# Patient Record
Sex: MALE | Race: WHITE | NOT HISPANIC OR LATINO
[De-identification: names, ages, dates, MRNs, and addresses within clinical notes are randomized per-mention and may not be internally consistent; named-entity substitution may affect disease eponyms.]

---

## 2018-05-02 ENCOUNTER — TRANSCRIPTION ENCOUNTER (OUTPATIENT)
Age: 83
End: 2018-05-02

## 2019-10-26 ENCOUNTER — TRANSCRIPTION ENCOUNTER (OUTPATIENT)
Age: 84
End: 2019-10-26

## 2020-03-16 RX ORDER — WARFARIN SODIUM 2.5 MG/1
1 TABLET ORAL
Qty: 0 | Refills: 0 | DISCHARGE
Start: 2020-03-16

## 2020-06-20 ENCOUNTER — EMERGENCY (EMERGENCY)
Facility: HOSPITAL | Age: 85
LOS: 1 days | Discharge: ANOTHER HOSPITAL | End: 2020-06-20
Attending: EMERGENCY MEDICINE | Admitting: EMERGENCY MEDICINE
Payer: MEDICARE

## 2020-06-20 ENCOUNTER — INPATIENT (INPATIENT)
Facility: HOSPITAL | Age: 85
LOS: 9 days | Discharge: NURSING, REHAB OR LONG-TERM CARE | DRG: 299 | End: 2020-06-30
Attending: INTERNAL MEDICINE | Admitting: INTERNAL MEDICINE
Payer: MEDICARE

## 2020-06-20 VITALS
RESPIRATION RATE: 18 BRPM | DIASTOLIC BLOOD PRESSURE: 87 MMHG | TEMPERATURE: 98 F | SYSTOLIC BLOOD PRESSURE: 128 MMHG | HEART RATE: 92 BPM | OXYGEN SATURATION: 98 %

## 2020-06-20 VITALS
WEIGHT: 184.97 LBS | DIASTOLIC BLOOD PRESSURE: 90 MMHG | OXYGEN SATURATION: 99 % | HEIGHT: 69 IN | TEMPERATURE: 98 F | RESPIRATION RATE: 20 BRPM | SYSTOLIC BLOOD PRESSURE: 146 MMHG | HEART RATE: 120 BPM

## 2020-06-20 VITALS
WEIGHT: 184.97 LBS | RESPIRATION RATE: 18 BRPM | HEART RATE: 96 BPM | DIASTOLIC BLOOD PRESSURE: 90 MMHG | TEMPERATURE: 98 F | SYSTOLIC BLOOD PRESSURE: 145 MMHG | OXYGEN SATURATION: 99 %

## 2020-06-20 DIAGNOSIS — I48.91 UNSPECIFIED ATRIAL FIBRILLATION: ICD-10-CM

## 2020-06-20 DIAGNOSIS — I50.9 HEART FAILURE, UNSPECIFIED: ICD-10-CM

## 2020-06-20 DIAGNOSIS — Z90.2 ACQUIRED ABSENCE OF LUNG [PART OF]: Chronic | ICD-10-CM

## 2020-06-20 DIAGNOSIS — L03.119 CELLULITIS OF UNSPECIFIED PART OF LIMB: ICD-10-CM

## 2020-06-20 DIAGNOSIS — Z29.9 ENCOUNTER FOR PROPHYLACTIC MEASURES, UNSPECIFIED: ICD-10-CM

## 2020-06-20 DIAGNOSIS — E87.1 HYPO-OSMOLALITY AND HYPONATREMIA: ICD-10-CM

## 2020-06-20 LAB
ALBUMIN SERPL ELPH-MCNC: 3.1 G/DL — LOW (ref 3.3–5)
ALP SERPL-CCNC: 76 U/L — SIGNIFICANT CHANGE UP (ref 30–120)
ALT FLD-CCNC: 19 U/L DA — SIGNIFICANT CHANGE UP (ref 10–60)
ANION GAP SERPL CALC-SCNC: 7 MMOL/L — SIGNIFICANT CHANGE UP (ref 5–17)
APTT BLD: 48.8 SEC — HIGH (ref 28.5–37)
AST SERPL-CCNC: 15 U/L — SIGNIFICANT CHANGE UP (ref 10–40)
BASOPHILS # BLD AUTO: 0 K/UL — SIGNIFICANT CHANGE UP (ref 0–0.2)
BASOPHILS NFR BLD AUTO: 0 % — SIGNIFICANT CHANGE UP (ref 0–2)
BILIRUB SERPL-MCNC: 1.2 MG/DL — SIGNIFICANT CHANGE UP (ref 0.2–1.2)
BUN SERPL-MCNC: 14 MG/DL — SIGNIFICANT CHANGE UP (ref 7–23)
BURR CELLS BLD QL SMEAR: PRESENT — SIGNIFICANT CHANGE UP
CALCIUM SERPL-MCNC: 8.8 MG/DL — SIGNIFICANT CHANGE UP (ref 8.4–10.5)
CHLORIDE SERPL-SCNC: 96 MMOL/L — SIGNIFICANT CHANGE UP (ref 96–108)
CO2 SERPL-SCNC: 26 MMOL/L — SIGNIFICANT CHANGE UP (ref 22–31)
CREAT SERPL-MCNC: 0.84 MG/DL — SIGNIFICANT CHANGE UP (ref 0.5–1.3)
ELLIPTOCYTES BLD QL SMEAR: SLIGHT — SIGNIFICANT CHANGE UP
EOSINOPHIL # BLD AUTO: 0 K/UL — SIGNIFICANT CHANGE UP (ref 0–0.5)
EOSINOPHIL NFR BLD AUTO: 0 % — SIGNIFICANT CHANGE UP (ref 0–6)
GLUCOSE SERPL-MCNC: 116 MG/DL — HIGH (ref 70–99)
HCT VFR BLD CALC: 37.1 % — LOW (ref 39–50)
HGB BLD-MCNC: 12.1 G/DL — LOW (ref 13–17)
HYPOCHROMIA BLD QL: SIGNIFICANT CHANGE UP
INR BLD: 5.24 RATIO — CRITICAL HIGH (ref 0.88–1.16)
LACTATE SERPL-SCNC: 1.4 MMOL/L — SIGNIFICANT CHANGE UP (ref 0.7–2)
LYMPHOCYTES # BLD AUTO: 2.14 K/UL — SIGNIFICANT CHANGE UP (ref 1–3.3)
LYMPHOCYTES # BLD AUTO: 26 % — SIGNIFICANT CHANGE UP (ref 13–44)
MANUAL SMEAR VERIFICATION: SIGNIFICANT CHANGE UP
MCHC RBC-ENTMCNC: 29.2 PG — SIGNIFICANT CHANGE UP (ref 27–34)
MCHC RBC-ENTMCNC: 32.6 GM/DL — SIGNIFICANT CHANGE UP (ref 32–36)
MCV RBC AUTO: 89.4 FL — SIGNIFICANT CHANGE UP (ref 80–100)
MONOCYTES # BLD AUTO: 0.82 K/UL — SIGNIFICANT CHANGE UP (ref 0–0.9)
MONOCYTES NFR BLD AUTO: 10 % — SIGNIFICANT CHANGE UP (ref 2–14)
NEUTROPHILS # BLD AUTO: 5.18 K/UL — SIGNIFICANT CHANGE UP (ref 1.8–7.4)
NEUTROPHILS NFR BLD AUTO: 54 % — SIGNIFICANT CHANGE UP (ref 43–77)
NEUTS BAND # BLD: 9 % — HIGH (ref 0–8)
NRBC # BLD: 0 — SIGNIFICANT CHANGE UP
NRBC # BLD: SIGNIFICANT CHANGE UP /100 WBCS (ref 0–0)
NT-PROBNP SERPL-SCNC: 5405 PG/ML — HIGH (ref 0–450)
PLAT MORPH BLD: NORMAL — SIGNIFICANT CHANGE UP
PLATELET # BLD AUTO: 254 K/UL — SIGNIFICANT CHANGE UP (ref 150–400)
POIKILOCYTOSIS BLD QL AUTO: SIGNIFICANT CHANGE UP
POTASSIUM SERPL-MCNC: 4.8 MMOL/L — SIGNIFICANT CHANGE UP (ref 3.5–5.3)
POTASSIUM SERPL-SCNC: 4.8 MMOL/L — SIGNIFICANT CHANGE UP (ref 3.5–5.3)
PROT SERPL-MCNC: 6.5 G/DL — SIGNIFICANT CHANGE UP (ref 6–8.3)
PROTHROM AB SERPL-ACNC: 61.2 SEC — HIGH (ref 10–12.9)
RBC # BLD: 4.15 M/UL — LOW (ref 4.2–5.8)
RBC # FLD: 16 % — HIGH (ref 10.3–14.5)
RBC BLD AUTO: ABNORMAL
SODIUM SERPL-SCNC: 129 MMOL/L — LOW (ref 135–145)
TROPONIN I SERPL-MCNC: 0.03 NG/ML — SIGNIFICANT CHANGE UP (ref 0.02–0.06)
VARIANT LYMPHS # BLD: 1 % — SIGNIFICANT CHANGE UP (ref 0–6)
WBC # BLD: 8.22 K/UL — SIGNIFICANT CHANGE UP (ref 3.8–10.5)
WBC # FLD AUTO: 8.22 K/UL — SIGNIFICANT CHANGE UP (ref 3.8–10.5)

## 2020-06-20 PROCEDURE — 84484 ASSAY OF TROPONIN QUANT: CPT

## 2020-06-20 PROCEDURE — 93005 ELECTROCARDIOGRAM TRACING: CPT

## 2020-06-20 PROCEDURE — 83880 ASSAY OF NATRIURETIC PEPTIDE: CPT

## 2020-06-20 PROCEDURE — 99285 EMERGENCY DEPT VISIT HI MDM: CPT | Mod: 25

## 2020-06-20 PROCEDURE — 96365 THER/PROPH/DIAG IV INF INIT: CPT

## 2020-06-20 PROCEDURE — 71045 X-RAY EXAM CHEST 1 VIEW: CPT | Mod: 26

## 2020-06-20 PROCEDURE — 71045 X-RAY EXAM CHEST 1 VIEW: CPT

## 2020-06-20 PROCEDURE — 83605 ASSAY OF LACTIC ACID: CPT

## 2020-06-20 PROCEDURE — 80053 COMPREHEN METABOLIC PANEL: CPT

## 2020-06-20 PROCEDURE — 87040 BLOOD CULTURE FOR BACTERIA: CPT

## 2020-06-20 PROCEDURE — 99285 EMERGENCY DEPT VISIT HI MDM: CPT

## 2020-06-20 PROCEDURE — 36415 COLL VENOUS BLD VENIPUNCTURE: CPT

## 2020-06-20 PROCEDURE — 96375 TX/PRO/DX INJ NEW DRUG ADDON: CPT

## 2020-06-20 PROCEDURE — 99284 EMERGENCY DEPT VISIT MOD MDM: CPT

## 2020-06-20 PROCEDURE — 85610 PROTHROMBIN TIME: CPT

## 2020-06-20 PROCEDURE — 85027 COMPLETE CBC AUTOMATED: CPT

## 2020-06-20 PROCEDURE — 85730 THROMBOPLASTIN TIME PARTIAL: CPT

## 2020-06-20 PROCEDURE — 93010 ELECTROCARDIOGRAM REPORT: CPT

## 2020-06-20 RX ORDER — FUROSEMIDE 40 MG
20 TABLET ORAL DAILY
Refills: 0 | Status: DISCONTINUED | OUTPATIENT
Start: 2020-06-20 | End: 2020-06-29

## 2020-06-20 RX ORDER — ACETAMINOPHEN 500 MG
650 TABLET ORAL EVERY 6 HOURS
Refills: 0 | Status: DISCONTINUED | OUTPATIENT
Start: 2020-06-20 | End: 2020-06-30

## 2020-06-20 RX ORDER — VANCOMYCIN HCL 1 G
1000 VIAL (EA) INTRAVENOUS ONCE
Refills: 0 | Status: COMPLETED | OUTPATIENT
Start: 2020-06-20 | End: 2020-06-20

## 2020-06-20 RX ORDER — VANCOMYCIN HCL 1 G
1000 VIAL (EA) INTRAVENOUS EVERY 12 HOURS
Refills: 0 | Status: DISCONTINUED | OUTPATIENT
Start: 2020-06-21 | End: 2020-06-22

## 2020-06-20 RX ORDER — FUROSEMIDE 40 MG
20 TABLET ORAL ONCE
Refills: 0 | Status: COMPLETED | OUTPATIENT
Start: 2020-06-20 | End: 2020-06-20

## 2020-06-20 RX ORDER — DILTIAZEM HCL 120 MG
10 CAPSULE, EXT RELEASE 24 HR ORAL ONCE
Refills: 0 | Status: COMPLETED | OUTPATIENT
Start: 2020-06-20 | End: 2020-06-20

## 2020-06-20 RX ORDER — DILTIAZEM HCL 120 MG
5 CAPSULE, EXT RELEASE 24 HR ORAL
Qty: 125 | Refills: 0 | Status: DISCONTINUED | OUTPATIENT
Start: 2020-06-20 | End: 2020-06-22

## 2020-06-20 RX ADMIN — Medication 10 MILLIGRAM(S): at 21:19

## 2020-06-20 RX ADMIN — Medication 20 MILLIGRAM(S): at 18:36

## 2020-06-20 RX ADMIN — Medication 10 MILLIGRAM(S): at 18:36

## 2020-06-20 RX ADMIN — Medication 1000 MILLIGRAM(S): at 18:58

## 2020-06-20 RX ADMIN — Medication 5 MG/HR: at 21:32

## 2020-06-20 RX ADMIN — Medication 250 MILLIGRAM(S): at 18:37

## 2020-06-20 NOTE — H&P ADULT - PROBLEM SELECTOR PLAN 4
Likely chronic, now in setting of overloaded state  - no ivf  - trend bmp - 2/2 Volume  overloaded   -IV Lasix 20 mg daily   - AM bmp

## 2020-06-20 NOTE — H&P ADULT - NEUROLOGICAL DETAILS
responds to verbal commands/cranial nerves intact/sensation intact/normal strength/alert and oriented x 3

## 2020-06-20 NOTE — H&P ADULT - PROBLEM SELECTOR PLAN 3
elevated bnp, unclear previous echo, last seen cardio >20 yrs ago, cxr reviewed with no pleural effusions or pulmonary vascular congestion, doubt acute exacerbation  - no lasix needed presently  - i/os, daily weights  - check TTE  - cardio consult elevated bnp, unclear previous echo, last seen cardio >20 yrs ago, cxr reviewed with no pleural effusions or pulmonary vascular congestion, doubt acute exacerbation  - no lasix needed presently  - i/os, daily weights  - check TTE  - cardio consult ([facility]) elevated bnp, unclear previous echo, last seen cardio >20 yrs ago, cxr reviewed with no pleural effusions or pulmonary vascular congestion, doubt acute exacerbation  - continue lasix 20 mg iv daily  - i/os, daily weights  - check TTE  - cardio consult ([facility]) elevated BNP  - unclear about previous echo, last seen cardio >20 yrs ago, cxr reviewed with no pleural effusions or pulmonary vascular congestion, doubt acute exacerbation  - continue lasix 20 mg iv daily  - i/os, daily weights  - check TTE  - cardio consult ([facility]) 2/2 Rapid A Fib , pt with elevated BNP, Hyponatremia & leg edema  - unclear about previous echo, last seen cardio >20 yrs ago,   - continue lasix 20 mg iv daily  - i/os, daily weights  - check TTE  - cardio consult ([facility])

## 2020-06-20 NOTE — ED ADULT NURSE NOTE - OBJECTIVE STATEMENT
Pt presents from [facility], s/o bilat lower leg redness, weeping edema, elevated heart rate. Pt placed on cardiac monitor, EKG done. Pt has #20 IV access on the left a/c , flushes with ease.

## 2020-06-20 NOTE — ED PROVIDER NOTE - CONSTITUTIONAL, MLM
normal... Well appearing, awake, alert, oriented to person, place, time/situation and in no apparent distress. vital signs normal, afebrile

## 2020-06-20 NOTE — H&P ADULT - PROBLEM SELECTOR PLAN 1
b/l LE marked erythema, but this could likely represent chronic venous stasis with possible +/- cellulitis.  Given vanco in the ED  - Will continue vanc for now, can likely d/c in the AM   - concern for ?PVD, check ABIs, can consider consulting vascular pending results  - cultures drawn, follow data  - no ivf for now given overload'd state  - check b/l LE dopplers  - pain control  - ID consult (francine) b/l LE marked erythema, but this could likely represent chronic venous stasis with possible +/- cellulitis.  Given vanco in the ED  - Will continue vanc for now, can likely d/c in the AM   - concern for ?PVD, check ABIs, can consider consulting vascular pending results  - cultures drawn, follow data  - no ivf for now given overload'd state  - check b/l LE dopplers  - pain control  - ID consult (janice) Cellulitis b/l LE marked erythema, with Oozing clear liquids /skin blister  with yellowish slough  with severe chronic venous stasis with possible +/- cellulitis. also fluid over load with edema   - s/p vanco in the ED  - Will continue vanc for now,   - PVD, check ABIs, , keep b/l lower EXT elevated  - Blood c/sx 2   - pain control, wound care Eval   - ID consult -DR gonzalez/DR Simms Cellulitis b/l LE marked erythema, with Oozing clear liquids /skin blister  with yellowish slough  with severe chronic venous stasis with possible +/- cellulitis, fluid over load with edema -Venous Stasis ulcers, afebrile, NL WBC  - s/p vanco in the ED  - Will continue IV  Vanc for now, Vanco level  - PVD, check ABIs, , keep b/l lower EXT elevated  - Blood c/sx 2   - pain control, wound care Eval   - ID consult -DR gonzalez/DR Simms

## 2020-06-20 NOTE — ED PROVIDER NOTE - SKIN, MLM
Extremities erythema w/ weeping ulcerations on both lower legs from ankles to just below knees. Skin otherwise warm and dry. No rashes.

## 2020-06-20 NOTE — H&P ADULT - GASTROINTESTINAL DETAILS
nontender/no distention/normal/no bruit/soft/no masses palpable/bowel sounds normal/no organomegaly/no rebound tenderness

## 2020-06-20 NOTE — H&P ADULT - HISTORY OF PRESENT ILLNESS
88 y/o M with hx of CHF, afib (on coumadin), lung ca s/p resection presents to ED for b/l weeping edema. Pt reports that his sons made him come into the ED. Reports has been having b/l LE weeping edema for around 1 year. Has gotten worse in the past few day with increasing erythema +/- pus with occasional bouts of pain. Denies any other complaints, no cp, sob, palpitations, abdominal pain, dizziness, palpitations.    Given IV Cardizem and started on Cardizem gtt in the ED for rapid afib 86 y/o M with hx of , afib (on coumadin), 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 CHF , PVD  presents to ED for worsening edema with  b/l weeping skin & erythema with pain that bothers him to walk  . Pt reports that his sons made him come into the ED. Reports has been having b/l LE edema with  weeping legs  for around 1 1/2 year. Has gotten worse in the past few day with increasing erythema, skin blisters +/- pus with occasional bouts of pain. Denies any other complaints, no fever, No chills ,No  cp, sob, palpitations, abdominal pain, dizziness, palpitations.  Pt was transferred from [place] [facility] after he got IV Vanco & IV Lasix, IV Cardizem     Given IV Cardizem and started on Cardizem gtt in the ED for rapid afib

## 2020-06-20 NOTE — ED ADULT NURSE NOTE - NSIMPLEMENTINTERV_GEN_ALL_ED
Implemented All Fall with Harm Risk Interventions:  [place] to call system. Call bell, personal items and telephone within reach. Instruct patient to call for assistance. Room bathroom lighting operational. Non-slip footwear when patient is off stretcher. Physically safe environment: no spills, clutter or unnecessary equipment. Stretcher in lowest position, wheels locked, appropriate side rails in place. Provide visual cue, wrist band, yellow gown, etc. Monitor gait and stability. Monitor for mental status changes and reorient to person, place, and time. Review medications for side effects contributing to fall risk. Reinforce activity limits and safety measures with patient and family. Provide visual clues: red socks.

## 2020-06-20 NOTE — H&P ADULT - NSHPSOCIALHISTORY_GEN_ALL_CORE
Never smoker, denies etoh and drug abuse Never smoker, denies etoh and drug abuse  , Lives alone, Retired

## 2020-06-20 NOTE — ED PROVIDER NOTE - CARE PLAN
Principal Discharge DX:	Afib  Secondary Diagnosis:	CHF (congestive heart failure)  Secondary Diagnosis:	Cellulitis

## 2020-06-20 NOTE — ED PROVIDER NOTE - CLINICAL SUMMARY MEDICAL DECISION MAKING FREE TEXT BOX
Impression is bilateral lower extremities cellulitis. P/o PVD. Plan: Labs, venous doppler. May need admit for IV antibiotics and further evaluation.

## 2020-06-20 NOTE — H&P ADULT - PROBLEM SELECTOR PLAN 2
Uncontrolled, likely driven by possible infection  - continue Cardizem gtt, can transition to PO in the AM, pt is not on any standing cristobal agents at home  - INR supra therapeutic, check daily INR/dose accordingly  - remote telemetry Uncontrolled, likely driven by possible infection  - continue Cardizem gtt, can transition to PO in the AM, pt is not on any standing cristobal agents at home  - INR supra therapeutic, check daily INR/dose accordingly  - remote telemetry  - check thyroid panel Rapid A Fib, -Pt stopped home Cardizem on his own, Non compliant for Cardiology follow up & meds  - continue Cardizem gtt, 5 mg   - INR supra therapeutic, check daily INR/dose AM   - telemetry  - check thyroid panel Rapid A Fib, -Pt stopped home Cardizem on his own, Non compliant for Cardiology follow up & meds  - continue Cardizem gtt, 5 mg   - INR supra therapeutic, check daily INR AM   - telemetry   - check thyroid panel

## 2020-06-20 NOTE — ED PROVIDER NOTE - OBJECTIVE STATEMENT
88 y/o M with hx of CHF transfer from [facility] for admission.  LE increased swelling, redness with concern for cellulitis.  Weeping edema, elevated BNP.  admit for abx, diuresis, cardio consult.

## 2020-06-20 NOTE — H&P ADULT - EXTREMITIES COMMENTS
B/L Lower EXT severe PVD with stasis dermatitis with broken blisters, with Yellowish slough with clear drainage , NO necrotic skin,   Severe erythema, with warmth, swelling & tenderness

## 2020-06-20 NOTE — H&P ADULT - RS GEN PE MLT RESP DETAILS PC
no rales/normal/no rhonchi/breath sounds equal/clear to auscultation bilaterally/respirations non-labored/no chest wall tenderness/no wheezes

## 2020-06-20 NOTE — H&P ADULT - PROBLEM SELECTOR PLAN 5
DVT ppx: on coumadin chronic PVD b/l lower EXT with venous stasis dermatitis & skin blistering  possible wound care

## 2020-06-20 NOTE — H&P ADULT - ASSESSMENT
88 y/o M with hx of CHF, afib (on coumadin), lung ca s/p resection presents to ED for b/l weeping edema. Admitted for b/l LE cellulitis 86 y/o M with hx of CHF, afib (on coumadin), lung ca s/p resection presents to ED for b/l weeping edema. Admitted for b/l LE cellulitis, Rapid A Fib with R/O CHF

## 2020-06-20 NOTE — H&P ADULT - CARDIOVASCULAR DETAILS
positive S2/positive S1/irregular rate and rhythm/tachycardia positive S1/murmur/positive S2/tachycardia/irregular rate and rhythm

## 2020-06-20 NOTE — ED PROVIDER NOTE - OBJECTIVE STATEMENT
87 YOM w/ PMHx of AFib on Coumadin BIBA when son noticed his legs were very swollen and red and weeping for the past few days. Pt states they have been like that for a year but gotten worse recently. Denies history of PVD, CHF, DVT or cellulitis. Denies chest pain, SOB, nausea, vomiting, or other symptoms at this time. PCP: Daiana.

## 2020-06-20 NOTE — H&P ADULT - NSICDXPASTSURGICALHX_GEN_ALL_CORE_FT
PAST SURGICAL HISTORY:  S/P lobectomy of lung PAST SURGICAL HISTORY:  S/P lobectomy of lung Tumor removal from Lower Lobe, NO CT, NO RT

## 2020-06-20 NOTE — H&P ADULT - NSHPPHYSICALEXAM_GEN_ALL_CORE
Vital Signs Last 24 Hrs  T(C): 36.4 (20 Jun 2020 19:16), Max: 36.4 (20 Jun 2020 19:16)  T(F): 97.5 (20 Jun 2020 19:16), Max: 97.5 (20 Jun 2020 19:16)  HR: 96 (20 Jun 2020 19:16) (96 - 96)  BP: 145/90 (20 Jun 2020 19:16) (145/90 - 145/90)  BP(mean): --  RR: 18 (20 Jun 2020 19:16) (18 - 18)  SpO2: 99% (20 Jun 2020 19:16) (99% - 99%)    Physical Exam:  General: NAD  HEENT: NCAT, PERRLA, EOMI bl, moist mucous membranes   Neck: Supple, nontender, no mass  Neurology: A&Ox3, nonfocal, sensation intact, no gait abnormalities   Respiratory: grossly CTA B/L, No W/R/R  CV: RRR, +S1/S2, no murmurs, rubs or gallops  Abdominal: Soft, NT, ND +BSx4  Extremities: b/l weeping edema with erythema extending to mid-shin  MSK: Normal ROM, no joint erythema or warmth, no joint swelling   Skin: warm, dry, normal color

## 2020-06-20 NOTE — ED PROVIDER NOTE - CARE PLAN
Principal Discharge DX:	Cellulitis of lower extremity, unspecified laterality  Secondary Diagnosis:	Acute on chronic congestive heart failure, unspecified heart failure type

## 2020-06-20 NOTE — ED PROVIDER NOTE - MUSCULOSKELETAL, MLM
Spine appears normal, rno muscle or joint tenderness. Extremities erythema w/ weeping ulcerations on both lower legs from ankles to just below knees. Full ROM, pulses sensation intact.

## 2020-06-20 NOTE — ED ADULT NURSE NOTE - OBJECTIVE STATEMENT
86 yo M w/ hx of Afib BIBS w increased swelling, edema w/ weeping in lower extremities. Pt states the bilat leg swelling started over a year ago and only the past few weeks has it began weeping with ulceration. Pt has +3 pedal pitting edema, pulses +2 upper and lower extremities. Denies any CP, chest pressure or any other symptoms. 88 yo M w/ hx of Afib BIBS w increased swelling, edema w/ weeping in lower extremities. Pt states the bilat leg swelling started over a year ago and only the past few weeks has it began weeping with ulceration. Pt has +3 pedal pitting edema, pulses +2 upper and lower extremities. Denies any CP, chest pressure, SOB or any other symptoms.

## 2020-06-20 NOTE — H&P ADULT - NSICDXPASTMEDICALHX_GEN_ALL_CORE_FT
PAST MEDICAL HISTORY:  Atrial fibrillation PAST MEDICAL HISTORY:  Atrial fibrillation     Lung cancer PAST MEDICAL HISTORY:  Atrial fibrillation on AC    Lung cancer Left lung Sx for removal of Tumor, Rt Lung Cryo therapy for recurrence    PVD (peripheral vascular disease)

## 2020-06-20 NOTE — H&P ADULT - NSHPREVIEWOFSYSTEMS_GEN_ALL_CORE
Constitutional: denies fever, chills, diaphoresis   HEENT: denies blurry vision, difficulty hearing  Respiratory: denies SOB, CUEVAS, cough, sputum production, wheezing, hemoptysis  Cardiovascular: denies CP, palpitations, edema  Gastrointestinal: denies nausea, vomiting, diarrhea, constipation, abdominal pain, melena, hematochezia   Genitourinary: denies dysuria, frequency, urgency, hematuria   Skin/Breast: denies rash, itching  Musculoskeletal: b/l LE erythema and swelling, denies myalgias, joint swelling, muscle weakness  Neurologic: denies headache, weakness, dizziness, paresthesias, numbness/tingling  Psychiatric: denies feeling anxious, depressed, suicidal, homicidal thoughts  Hematology/Oncology: denies bruising, tender or enlarged lymph nodes   ROS negative except as noted above

## 2020-06-21 ENCOUNTER — TRANSCRIPTION ENCOUNTER (OUTPATIENT)
Age: 85
End: 2020-06-21

## 2020-06-21 DIAGNOSIS — I50.9 HEART FAILURE, UNSPECIFIED: ICD-10-CM

## 2020-06-21 DIAGNOSIS — I73.9 PERIPHERAL VASCULAR DISEASE, UNSPECIFIED: ICD-10-CM

## 2020-06-21 LAB
ALBUMIN SERPL ELPH-MCNC: 2.9 G/DL — LOW (ref 3.3–5)
ALP SERPL-CCNC: 72 U/L — SIGNIFICANT CHANGE UP (ref 40–120)
ALT FLD-CCNC: 14 U/L — SIGNIFICANT CHANGE UP (ref 12–78)
ANION GAP SERPL CALC-SCNC: 10 MMOL/L — SIGNIFICANT CHANGE UP (ref 5–17)
AST SERPL-CCNC: 13 U/L — LOW (ref 15–37)
BILIRUB SERPL-MCNC: 1.2 MG/DL — SIGNIFICANT CHANGE UP (ref 0.2–1.2)
BUN SERPL-MCNC: 14 MG/DL — SIGNIFICANT CHANGE UP (ref 7–23)
CALCIUM SERPL-MCNC: 8.6 MG/DL — SIGNIFICANT CHANGE UP (ref 8.5–10.1)
CHLORIDE SERPL-SCNC: 97 MMOL/L — SIGNIFICANT CHANGE UP (ref 96–108)
CO2 SERPL-SCNC: 25 MMOL/L — SIGNIFICANT CHANGE UP (ref 22–31)
CREAT SERPL-MCNC: 0.57 MG/DL — SIGNIFICANT CHANGE UP (ref 0.5–1.3)
GLUCOSE SERPL-MCNC: 96 MG/DL — SIGNIFICANT CHANGE UP (ref 70–99)
HCT VFR BLD CALC: 33.9 % — LOW (ref 39–50)
HGB BLD-MCNC: 11.1 G/DL — LOW (ref 13–17)
INR BLD: 6.2 RATIO — CRITICAL HIGH (ref 0.88–1.16)
MCHC RBC-ENTMCNC: 28.7 PG — SIGNIFICANT CHANGE UP (ref 27–34)
MCHC RBC-ENTMCNC: 32.7 GM/DL — SIGNIFICANT CHANGE UP (ref 32–36)
MCV RBC AUTO: 87.6 FL — SIGNIFICANT CHANGE UP (ref 80–100)
NRBC # BLD: 0 /100 WBCS — SIGNIFICANT CHANGE UP (ref 0–0)
NT-PROBNP SERPL-SCNC: 5624 PG/ML — HIGH (ref 0–450)
PLATELET # BLD AUTO: 217 K/UL — SIGNIFICANT CHANGE UP (ref 150–400)
POTASSIUM SERPL-MCNC: 4.1 MMOL/L — SIGNIFICANT CHANGE UP (ref 3.5–5.3)
POTASSIUM SERPL-SCNC: 4.1 MMOL/L — SIGNIFICANT CHANGE UP (ref 3.5–5.3)
PROT SERPL-MCNC: 5.8 G/DL — LOW (ref 6–8.3)
PROTHROM AB SERPL-ACNC: 73.5 SEC — HIGH (ref 10–12.9)
RBC # BLD: 3.87 M/UL — LOW (ref 4.2–5.8)
RBC # FLD: 16.2 % — HIGH (ref 10.3–14.5)
SARS-COV-2 RNA SPEC QL NAA+PROBE: SIGNIFICANT CHANGE UP
SODIUM SERPL-SCNC: 132 MMOL/L — LOW (ref 135–145)
T3 SERPL-MCNC: 68 NG/DL — LOW (ref 80–200)
T4 AB SER-ACNC: 6.2 UG/DL — SIGNIFICANT CHANGE UP (ref 4.6–12)
TSH SERPL-MCNC: 1.33 UIU/ML — SIGNIFICANT CHANGE UP (ref 0.36–3.74)
WBC # BLD: 9.82 K/UL — SIGNIFICANT CHANGE UP (ref 3.8–10.5)
WBC # FLD AUTO: 9.82 K/UL — SIGNIFICANT CHANGE UP (ref 3.8–10.5)

## 2020-06-21 PROCEDURE — 93306 TTE W/DOPPLER COMPLETE: CPT | Mod: 26

## 2020-06-21 PROCEDURE — 99223 1ST HOSP IP/OBS HIGH 75: CPT

## 2020-06-21 RX ORDER — GENTAMICIN SULFATE 3 MG/ML
1 SOLUTION/ DROPS OPHTHALMIC
Refills: 0 | Status: COMPLETED | OUTPATIENT
Start: 2020-06-21 | End: 2020-06-26

## 2020-06-21 RX ADMIN — Medication 650 MILLIGRAM(S): at 06:53

## 2020-06-21 RX ADMIN — Medication 20 MILLIGRAM(S): at 06:13

## 2020-06-21 RX ADMIN — Medication 650 MILLIGRAM(S): at 01:38

## 2020-06-21 RX ADMIN — Medication 650 MILLIGRAM(S): at 17:38

## 2020-06-21 RX ADMIN — Medication 250 MILLIGRAM(S): at 06:11

## 2020-06-21 RX ADMIN — Medication 250 MILLIGRAM(S): at 17:39

## 2020-06-21 RX ADMIN — Medication 5 MG/HR: at 17:46

## 2020-06-21 NOTE — DISCHARGE NOTE PROVIDER - NSDCFUADDAPPT_GEN_ALL_CORE_FT
Please follow up with your PCP within 1 week of discharge  Please follow up with your cardiologist within 1 week of discharge Please follow up with your PCP within 1 week of discharge  Please follow up with your cardiologist within 1 week of discharge  Please follow up with your vascular surgeon within 1 week of discharge

## 2020-06-21 NOTE — CONSULT NOTE ADULT - PROBLEM SELECTOR RECOMMENDATION 9
suspect most of this is chronic, with venous stasis ulcers and dermatitis  pt nontoxic afeb, no leukocytosis  keep elevated  diurese as tolerated  wound care for local care  can treat with short course of antibx  vancomycin started in ED can do 5-7 days   follow levels keep 15-20

## 2020-06-21 NOTE — PHYSICAL THERAPY INITIAL EVALUATION ADULT - PERTINENT HX OF CURRENT PROBLEM, REHAB EVAL
Pt is 87 y.o. M with hx lung CA s/p resection presents to ED for BLE weeping edema admitted for BLE cellulitis.

## 2020-06-21 NOTE — PHYSICAL THERAPY INITIAL EVALUATION ADULT - CRITERIA FOR SKILLED THERAPEUTIC INTERVENTIONS
functional limitations in following categories/impairments found/predicted duration of therapy intervention/risk reduction/prevention/anticipated discharge recommendation/rehab potential/therapy frequency

## 2020-06-21 NOTE — PROGRESS NOTE ADULT - PROBLEM SELECTOR PLAN 3
Likely 2/2 Rapid A Fib ,elevated BNP, leg edema & Low Na   - unclear about previous echo, last seen cardio >20 yrs ago,   - continue Lasix 20 mg iv daily  - i/os, daily weights  - check TTE  - cardio consult ([place])

## 2020-06-21 NOTE — DISCHARGE NOTE PROVIDER - NSDCMRMEDTOKEN_GEN_ALL_CORE_FT
Coumadin 2.5 mg oral tablet: 1 tab(s) orally once a day Coumadin 2.5 mg oral tablet: 1 tab(s) orally once a day  Coumadin 2.5 mg oral tablet: 1 tab(s) orally once a day Coumadin 5 mg oral tablet: 1 tab(s) orally 2 times a day apixaban 5 mg oral tablet: 1 tab(s) orally 2 times a day  Coumadin 5 mg oral tablet: 1 tab(s) orally 2 times a day apixaban 5 mg oral tablet: 1 tab(s) orally 2 times a day  metoprolol tartrate 25 mg oral tablet: 1 tab(s) orally 2 times a day acetaminophen 325 mg oral tablet: 2 tab(s) orally every 6 hours, As needed, pain  apixaban 5 mg oral tablet: 1 tab(s) orally 2 times a day  furosemide 20 mg oral tablet: 1 tab(s) orally once a day  metoprolol tartrate 25 mg oral tablet: 1 tab(s) orally 2 times a day

## 2020-06-21 NOTE — PROGRESS NOTE ADULT - SUBJECTIVE AND OBJECTIVE BOX
Patient is a 87y old  Male who presents with a chief complaint of b/l LE weeping edema (21 Jun 2020 16:42)    HPI:  86 y/o M with hx of CHF, afib (on coumadin), lung ca s/p resection presents to ED for b/l weeping edema. Pt reports that his sons made him come into the ED. Reports has been having b/l LE weeping edema for around 1 year. Has gotten worse in the past few day with increasing erythema +/- pus with occasional bouts of pain. Denies any other complaints, no cp, sob, palpitations, abdominal pain, dizziness, palpitations.    Given IV Cardizem and started on Cardizem gtt in the ED for rapid afib (20 Jun 2020 19:26)    INTERVAL HPI:      OVERNIGHT EVENTS:    Home Medications:  Coumadin 2.5 mg oral tablet: 1 tab(s) orally once a day (20 Jun 2020 19:48)      MEDICATIONS  (STANDING):  diltiazem Infusion 5 mG/Hr (5 mL/Hr) IV Continuous <Continuous>  furosemide   Injectable 20 milliGRAM(s) IV Push daily  vancomycin  IVPB 1000 milliGRAM(s) IV Intermittent every 12 hours    MEDICATIONS  (PRN):  acetaminophen   Tablet .. 650 milliGRAM(s) Oral every 6 hours PRN Temp greater or equal to 38C (100.4F), Mild Pain (1 - 3)      Allergies    No Known Allergies    Intolerances        Social History:  Never smoker, denies etoh and drug abuse (20 Jun 2020 19:26)      REVIEW OF SYSTEMS:  CONSTITUTIONAL: No fever, No chills, No fatigue, No myalgia, No Body ache, No Weakness  EYES: No eye pain,  No visual disturbances, No discharge, NO Redness  ENMT:  No ear pain, No nose bleed, No vertigo; No sinus pain, NO throat pain, No Congestion  NECK: No pain, No stiffness  RESPIRATORY: No cough, NO wheezing, No  hemoptysis, NO  shortness of breath  CARDIOVASCULAR: No chest pain, palpitations  GASTROINTESTINAL: No abdominal pain, NO epigastric pain. No nausea, No vomiting; No diarrhea, No constipation. [  ] BM  GENITOURINARY: No dysuria, No frequency, No urgency, No hematuria, NO incontinence  NEUROLOGICAL: No headaches, No dizziness, No numbness, No tingling, No tremors, No weakness  EXT: No Swelling, No Pain, No Edema  SKIN:  [  ] No itching, burning, rashes, or lesions   MUSCULOSKELETAL: No joint pain ,No Jt swelling; No muscle pain, No back pain, No extremity pain  PSYCHIATRIC: No depression,  No anxiety,  No mood swings ,No difficulty sleeping at night  PAIN SCALE: [  ] None  [  ] Other-  ROS Unable to obtain due to - [  ] Dementia  [  ] Lethargy [  ] Drowsy [  ] Sedated [  ] non verbal  REST OF REVIEW Of SYSTEM - [  ] Normal     Vital Signs Last 24 Hrs  T(C): 36.3 (21 Jun 2020 17:10), Max: 36.8 (21 Jun 2020 08:04)  T(F): 97.3 (21 Jun 2020 17:10), Max: 98.2 (21 Jun 2020 08:04)  HR: 84 (21 Jun 2020 17:10) (83 - 108)  BP: 116/81 (21 Jun 2020 17:10) (116/81 - 148/73)  BP(mean): --  RR: 18 (21 Jun 2020 17:10) (17 - 18)  SpO2: 97% (21 Jun 2020 17:10) (97% - 100%)  Finger Stick        06-20 @ 07:01  -  06-21 @ 07:00  --------------------------------------------------------  IN: 0 mL / OUT: 775 mL / NET: -775 mL        PHYSICAL EXAM:  GENERAL:  [  ] NAD , [  ] well appearing, [  ] Agitated, [  ] Drowsy,  [  ] Lethargy, [  ] confused   HEAD:  [  ] Normal, [  ] Other  EYES:  [  ] EOMI, [  ] PERRLA, [  ] conjunctiva and sclera clear normal, [  ] Other,  [  ] Pallor,[  ] Discharge  ENMT:  [  ] Normal, [  ] Moist mucous membranes, [  ] Good dentition, [  ] No Thrush  NECK:  [  ] Supple, [  ] No JVD, [  ] Normal thyroid, [  ] Lymphadenopathy [  ] Other  CHEST/LUNG:  [  ] Clear to auscultation bilaterally, [  ] Breath Sounds equal B/L / Decrease, [  ] poor effort  [  ] No rales, [  ] No rhonchi  [  ]  No wheezing,   HEART:  [  ] Regular rate and rhythm, [  ] tachycardia, [  ] Bradycardia,  [  ] irregular  [  ] No murmurs, No rubs, No gallops, [  ] PPM in place (Mfr:  )  ABDOMEN:  [  ] Soft, [  ] Nontender, [  ] Nondistended, [  ] No mass, [  ] Bowel sounds present, [  ] obese  NERVOUS SYSTEM:  [  ] Alert & Oriented X3, [  ] Nonfocal  [  ] Confusion  [  ] Encephalopathic [  ] Sedated [  ] Unable to assess, [  ] Dementia [  ] Other-  EXTREMITIES: [  ] 2+ Peripheral Pulses, No clubbing, No cyanosis,  [  ] edema B/L lower EXT. [  ] PVD stasis skin changes B/L Lower EXT, [  ] wound  LYMPH: No lymphadenopathy noted  SKIN:  [  ] No rashes or lesions, [  ] Pressure Ulcers, [  ] ecchymosis, [  ] Skin Tears, [  ] Other    DIET: Diet, DASH/TLC:   Sodium & Cholesterol Restricted (06-20-20 @ 19:31)      LABS:                        11.1   9.82  )-----------( 217      ( 21 Jun 2020 05:27 )             33.9     21 Jun 2020 05:27    132    |  97     |  14     ----------------------------<  96     4.1     |  25     |  0.57     Ca    8.6        21 Jun 2020 05:27    TPro  5.8    /  Alb  2.9    /  TBili  1.2    /  DBili  x      /  AST  13     /  ALT  14     /  AlkPhos  72     21 Jun 2020 05:27    PT/INR - ( 21 Jun 2020 05:27 )   PT: 73.5 sec;   INR: 6.20 ratio      Anemia Panel:      Thyroid Panel:  T4, Serum: 6.2 ug/dL (06-21-20 @ 11:44)  Thyroid Stimulating Hormone, Serum: 1.33 uIU/mL (06-21-20 @ 05:27)      Serum Pro-Brain Natriuretic Peptide: 5624 pg/mL (06-21-20 @ 05:27)      RADIOLOGY & ADDITIONAL TESTS:      HEALTH ISSUES - PROBLEM Dx:  Hyponatremia: Hyponatremia  CHF (congestive heart failure): CHF (congestive heart failure)  Need for prophylactic measure: Need for prophylactic measure  Atrial fibrillation: Atrial fibrillation  Acute on chronic congestive heart failure, unspecified heart failure type: Acute on chronic congestive heart failure, unspecified heart failure type  Cellulitis of lower extremity, unspecified laterality: Cellulitis of lower extremity, unspecified laterality          Consultant(s) Notes Reviewed:  [  ] YES     Care Discussed with [X] Consultants  [  ] Patient  [  ] Family [  ] HCP [  ]   [  ] Social Service  [  ] RN, [  ] Physical Therapy,[  ] Palliative care team  DVT PPX: [  ] Lovenox, [  ] S C Heparin, [  ] Coumadin, [  ] Xarelto, [  ] Eliquis, [  ] Pradaxa, [  ] IV Heparin drip, [  ] SCD [  ] Contraindication 2 to GI Bleed,[  ] Ambulation [  ] Contraindicated 2 to  bleed [  ] Contraindicated 2 to Brain Bleed  Advanced directive: [  ] None, [  ] DNR/DNI Patient is a 87y old  Male who presents with a chief complaint of b/l LE weeping edema (21 Jun 2020 16:42)    HPI:  86 y/o M with hx of CHF, afib (on coumadin), lung ca s/p resection presents to ED for b/l weeping edema. Pt reports that his sons made him come into the ED. Reports has been having b/l LE weeping edema for around 1 year. Has gotten worse in the past few day with increasing erythema +/- pus with occasional bouts of pain. Denies any other complaints, no cp, sob, palpitations, abdominal pain, dizziness, palpitations.    Given IV Cardizem and started on Cardizem gtt in the ED for rapid afib (20 Jun 2020 19:26)    INTERVAL HPI:  6/21/2020: Pt seen, Examined, seen By ID Dr [email or web address], on IV Vanco , on Cardizem drip, HR stable in 90"s  in A fib, High INR C/O pain b/l lower EXT    OVERNIGHT EVENTS: NONE    Home Medications:  Coumadin 2.5 mg oral tablet: 1 tab(s) orally once a day (20 Jun 2020 19:48)      MEDICATIONS  (STANDING):  diltiazem Infusion 5 mG/Hr (5 mL/Hr) IV Continuous <Continuous>  furosemide   Injectable 20 milliGRAM(s) IV Push daily  vancomycin  IVPB 1000 milliGRAM(s) IV Intermittent every 12 hours    MEDICATIONS  (PRN):  acetaminophen   Tablet .. 650 milliGRAM(s) Oral every 6 hours PRN Temp greater or equal to 38C (100.4F), Mild Pain (1 - 3)      Allergies    No Known Allergies    Intolerances        Social History:  Never smoker, denies etoh and drug abuse (20 Jun 2020 19:26)      REVIEW OF SYSTEMS: i am OK  CONSTITUTIONAL: No fever, No chills, No fatigue, No myalgia, No Body ache, No Weakness  EYES: No eye pain,  No visual disturbances, No discharge, NO Redness  ENMT:  No ear pain, No nose bleed, No vertigo; No sinus pain, NO throat pain, No Congestion  NECK: No pain, No stiffness  RESPIRATORY: No cough, NO wheezing, No  hemoptysis, NO  shortness of breath  CARDIOVASCULAR: No chest pain, palpitations  GASTROINTESTINAL: No abdominal pain, NO epigastric pain. No nausea, No vomiting; No diarrhea, No constipation. [  ] BM  GENITOURINARY: No dysuria, No frequency, No urgency, No hematuria, NO incontinence  NEUROLOGICAL: No headaches, No dizziness, No numbness, No tingling, No tremors, No weakness  EXT: No Swelling, No Pain, No Edema  SKIN:  [  ] No itching, burning, rashes, or lesions   MUSCULOSKELETAL: No joint pain ,No Jt swelling; No muscle pain, No back pain, No extremity pain  PSYCHIATRIC: No depression,  No anxiety,  No mood swings ,No difficulty sleeping at night  PAIN SCALE: [  ] None  [ x ] Other- B/L Lower legs pain   ROS Unable to obtain due to - [  ] Dementia  [  ] Lethargy [  ] Drowsy [  ] Sedated [  ] non verbal  REST OF REVIEW Of SYSTEM - [x  ] Normal     Vital Signs Last 24 Hrs  T(C): 36.3 (21 Jun 2020 17:10), Max: 36.8 (21 Jun 2020 08:04)  T(F): 97.3 (21 Jun 2020 17:10), Max: 98.2 (21 Jun 2020 08:04)  HR: 84 (21 Jun 2020 17:10) (83 - 108)  BP: 116/81 (21 Jun 2020 17:10) (116/81 - 148/73)  BP(mean): --  RR: 18 (21 Jun 2020 17:10) (17 - 18)  SpO2: 97% (21 Jun 2020 17:10) (97% - 100%)  Finger Stick        06-20 @ 07:01  -  06-21 @ 07:00  --------------------------------------------------------  IN: 0 mL / OUT: 775 mL / NET: -775 mL        PHYSICAL EXAM:  GENERAL:  [ x ] NAD , [x  ] well appearing, [  ] Agitated, [  ] Drowsy,  [  ] Lethargy, [  ] confused   HEAD:  [ x ] Normal, [  ] Other  EYES:  [ x ] EOMI, [x  ] PERRLA, [ x ] conjunctiva and sclera clear normal, [  ] Other,  [  ] Pallor,[ x ] Discharge Left eye   ENMT:  [ x ] Normal, [ x ] Moist mucous membranes, [ x ] Good dentition, [x  ] No Thrush  NECK:  [ x ] Supple, [ x ] No JVD, [ x ] Normal thyroid, [  ] Lymphadenopathy [  ] Other  CHEST/LUNG:  [x  ] Clear to auscultation bilaterally, [ x ] Breath Sounds equal B/L / Decrease, [  ] poor effort  [ x ] No rales, [x  ] No rhonchi  [x  ]  No wheezing,   HEART:  [  ] Regular rate and rhythm, [  ] tachycardia, [  ] Bradycardia,  [x  ] irregular  [x  ] No murmurs, No rubs, No gallops, [  ] PPM in place (Mfr:  )  ABDOMEN:  [x  ] Soft, [x  ] Nontender, [ x ] Nondistended, [ x ] No mass, [ x ] Bowel sounds present, [  ] obese  NERVOUS SYSTEM:  [ x ] Alert & Oriented X3, [x  ] Nonfocal  [  ] Confusion  [  ] Encephalopathic [  ] Sedated [  ] Unable to assess, [  ] Dementia [  ] Other-  EXTREMITIES: [ x ] 2+ Peripheral Pulses, No clubbing, No cyanosis,  [ x ]  2 + Improving edema B/L lower EXT. [x  ] severe  PVD stasis skin changes B/L Lower EXT, with erythema , stasis dermatitis + Pain [  ] wound  LYMPH: No lymphadenopathy noted  SKIN:  [  ] No rashes or lesions, [  ] Pressure Ulcers, [  ] ecchymosis, [  ] Skin Tears, [ x ] Other- severe stasis dermatitis b/l Lower ext with minimal oozing today    DIET: Diet, DASH/TLC:   Sodium & Cholesterol Restricted (06-20-20 @ 19:31)      LABS:                        11.1   9.82  )-----------( 217      ( 21 Jun 2020 05:27 )             33.9     21 Jun 2020 05:27    132    |  97     |  14     ----------------------------<  96     4.1     |  25     |  0.57     Ca    8.6        21 Jun 2020 05:27    TPro  5.8    /  Alb  2.9    /  TBili  1.2    /  DBili  x      /  AST  13     /  ALT  14     /  AlkPhos  72     21 Jun 2020 05:27    PT/INR - ( 21 Jun 2020 05:27 )   PT: 73.5 sec;   INR: 6.20 ratio      Anemia Panel:      Thyroid Panel:  T4, Serum: 6.2 ug/dL (06-21-20 @ 11:44)  Thyroid Stimulating Hormone, Serum: 1.33 uIU/mL (06-21-20 @ 05:27)      Serum Pro-Brain Natriuretic Peptide: 5624 pg/mL (06-21-20 @ 05:27)      RADIOLOGY & ADDITIONAL TESTS: none      HEALTH ISSUES - PROBLEM Dx:  Hyponatremia: Hyponatremia  CHF (congestive heart failure): CHF (congestive heart failure)  Need for prophylactic measure: Need for prophylactic measure  Atrial fibrillation: Atrial fibrillation  Acute on chronic congestive heart failure, unspecified heart failure type: Acute on chronic congestive heart failure, unspecified heart failure type  Cellulitis of lower extremity, unspecified laterality: Cellulitis of lower extremity, unspecified laterality      Consultant(s) Notes Reviewed:  [x  ] YES     Care Discussed with [X] Consultants  [ x ] Patient  [  ] Family [  ] HCP [  ]   [  ] Social Service  [x  ] RN, [  ] Physical Therapy,[  ] Palliative care team  DVT PPX: [  ] Lovenox, [  ] S C Heparin, [x  ] Coumadin, [  ] Xarelto, [  ] Eliquis, [  ] Pradaxa, [  ] IV Heparin drip, [  ] SCD [  ] Contraindication 2 to GI Bleed,[  ] Ambulation [  ] Contraindicated 2 to  bleed [  ] Contraindicated 2 to Brain Bleed  Advanced directive: [x  ] None, [  ] DNR/DNI Patient is a 87y old  Male who presents with a chief complaint of b/l LE weeping edema (21 Jun 2020 16:42)    HPI:  86 y/o M with hx of CHF, afib (on coumadin), lung ca s/p resection presents to ED for b/l weeping edema. Pt reports that his sons made him come into the ED. Reports has been having b/l LE weeping edema for around 1 year. Has gotten worse in the past few day with increasing erythema +/- pus with occasional bouts of pain. Denies any other complaints, no cp, sob, palpitations, abdominal pain, dizziness, palpitations.    Given IV Cardizem and started on Cardizem gtt in the ED for rapid afib (20 Jun 2020 19:26)    INTERVAL HPI:  6/21/2020: Pt seen, Examined, seen By ID Dr gonzalez, on IV Vanco , on Cardizem drip, HR stable in 90"s  in A fib, High INR C/O pain b/l lower EXT    OVERNIGHT EVENTS: NONE    Home Medications:  Coumadin 2.5 mg oral tablet: 1 tab(s) orally once a day (20 Jun 2020 19:48)      MEDICATIONS  (STANDING):  diltiazem Infusion 5 mG/Hr (5 mL/Hr) IV Continuous <Continuous>  furosemide   Injectable 20 milliGRAM(s) IV Push daily  vancomycin  IVPB 1000 milliGRAM(s) IV Intermittent every 12 hours    MEDICATIONS  (PRN):  acetaminophen   Tablet .. 650 milliGRAM(s) Oral every 6 hours PRN Temp greater or equal to 38C (100.4F), Mild Pain (1 - 3)      Allergies    No Known Allergies    Intolerances        Social History:  Never smoker, denies etoh and drug abuse (20 Jun 2020 19:26)      REVIEW OF SYSTEMS: i am OK  CONSTITUTIONAL: No fever, No chills, No fatigue, No myalgia, No Body ache, No Weakness  EYES: No eye pain,  No visual disturbances, No discharge, NO Redness  ENMT:  No ear pain, No nose bleed, No vertigo; No sinus pain, NO throat pain, No Congestion  NECK: No pain, No stiffness  RESPIRATORY: No cough, NO wheezing, No  hemoptysis, NO  shortness of breath  CARDIOVASCULAR: No chest pain, palpitations  GASTROINTESTINAL: No abdominal pain, NO epigastric pain. No nausea, No vomiting; No diarrhea, No constipation. [  ] BM  GENITOURINARY: No dysuria, No frequency, No urgency, No hematuria, NO incontinence  NEUROLOGICAL: No headaches, No dizziness, No numbness, No tingling, No tremors, No weakness  EXT: No Swelling, No Pain, No Edema  SKIN:  [ x ] No itching, burning, rashes, or lesions   MUSCULOSKELETAL: No joint pain ,No Jt swelling; No muscle pain, No back pain, No extremity pain  PSYCHIATRIC: No depression,  No anxiety,  No mood swings ,No difficulty sleeping at night  PAIN SCALE: [  ] None  [ x ] Other- B/L Lower legs pain   ROS Unable to obtain due to - [  ] Dementia  [  ] Lethargy [  ] Drowsy [  ] Sedated [  ] non verbal  REST OF REVIEW Of SYSTEM - [x  ] Normal     Vital Signs Last 24 Hrs  T(C): 36.3 (21 Jun 2020 17:10), Max: 36.8 (21 Jun 2020 08:04)  T(F): 97.3 (21 Jun 2020 17:10), Max: 98.2 (21 Jun 2020 08:04)  HR: 84 (21 Jun 2020 17:10) (83 - 108)  BP: 116/81 (21 Jun 2020 17:10) (116/81 - 148/73)  BP(mean): --  RR: 18 (21 Jun 2020 17:10) (17 - 18)  SpO2: 97% (21 Jun 2020 17:10) (97% - 100%)  Finger Stick        06-20 @ 07:01  -  06-21 @ 07:00  --------------------------------------------------------  IN: 0 mL / OUT: 775 mL / NET: -775 mL        PHYSICAL EXAM:  GENERAL:  [ x ] NAD , [x  ] well appearing, [  ] Agitated, [  ] Drowsy,  [  ] Lethargy, [  ] confused   HEAD:  [ x ] Normal, [  ] Other  EYES:  [ x ] EOMI, [x  ] PERRLA, [ x ] conjunctiva and sclera clear normal, [  ] Other,  [  ] Pallor,[ x ] Discharge Left eye   ENMT:  [ x ] Normal, [ x ] Moist mucous membranes, [ x ] Good dentition, [x  ] No Thrush  NECK:  [ x ] Supple, [ x ] No JVD, [ x ] Normal thyroid, [  ] Lymphadenopathy [  ] Other  CHEST/LUNG:  [x  ] Clear to auscultation bilaterally, [ x ] Breath Sounds equal B/L / Decrease, [  ] poor effort  [ x ] No rales, [x  ] No rhonchi  [x  ]  No wheezing,   HEART:  [  ] Regular rate and rhythm, [  ] tachycardia, [  ] Bradycardia,  [x  ] irregular  [x  ] 3/6 murmurs, No rubs, No gallops, [  ] PPM in place (Mfr:  )  ABDOMEN:  [x  ] Soft, [x  ] Nontender, [ x ] Nondistended, [ x ] No mass, [ x ] Bowel sounds present, [  ] obese  NERVOUS SYSTEM:  [ x ] Alert & Oriented X3, [x  ] Nonfocal  [  ] Confusion  [  ] Encephalopathic [  ] Sedated [  ] Unable to assess, [  ] Dementia [  ] Other-  EXTREMITIES: [ x ] 2+ Peripheral Pulses, No clubbing, No cyanosis,  [ x ]  2 + Improving edema B/L lower EXT. [x  ] severe  PVD stasis skin changes B/L Lower EXT, with erythema , stasis dermatitis + Pain [ x ] Dry superficial wound/ exudates   LYMPH: No lymphadenopathy noted  SKIN:  [  ] No rashes or lesions, [  ] Pressure Ulcers, [  ] ecchymosis, [  ] Skin Tears, [ x ] Other- severe stasis dermatitis b/l Lower ext with minimal oozing today    DIET: Diet, DASH/TLC:   Sodium & Cholesterol Restricted (06-20-20 @ 19:31)      LABS:                        11.1   9.82  )-----------( 217      ( 21 Jun 2020 05:27 )             33.9     21 Jun 2020 05:27    132    |  97     |  14     ----------------------------<  96     4.1     |  25     |  0.57     Ca    8.6        21 Jun 2020 05:27    TPro  5.8    /  Alb  2.9    /  TBili  1.2    /  DBili  x      /  AST  13     /  ALT  14     /  AlkPhos  72     21 Jun 2020 05:27    PT/INR - ( 21 Jun 2020 05:27 )   PT: 73.5 sec;   INR: 6.20 ratio      Anemia Panel:      Thyroid Panel:  T4, Serum: 6.2 ug/dL (06-21-20 @ 11:44)  Thyroid Stimulating Hormone, Serum: 1.33 uIU/mL (06-21-20 @ 05:27)      Serum Pro-Brain Natriuretic Peptide: 5624 pg/mL (06-21-20 @ 05:27)      RADIOLOGY & ADDITIONAL TESTS: none      HEALTH ISSUES - PROBLEM Dx:  Hyponatremia: Hyponatremia  CHF (congestive heart failure): CHF (congestive heart failure)  Need for prophylactic measure: Need for prophylactic measure  Atrial fibrillation: Atrial fibrillation  Acute on chronic congestive heart failure, unspecified heart failure type: Acute on chronic congestive heart failure, unspecified heart failure type  Cellulitis of lower extremity, unspecified laterality: Cellulitis of lower extremity, unspecified laterality      Consultant(s) Notes Reviewed:  [x  ] YES     Care Discussed with [X] Consultants  [ x ] Patient  [  ] Family [  ] HCP [  ]   [  ] Social Service  [x  ] RN, [  ] Physical Therapy,[  ] Palliative care team  DVT PPX: [  ] Lovenox, [  ] S C Heparin, [x  ] Coumadin, [  ] Xarelto, [  ] Eliquis, [  ] Pradaxa, [  ] IV Heparin drip, [  ] SCD [  ] Contraindication 2 to GI Bleed,[  ] Ambulation [  ] Contraindicated 2 to  bleed [  ] Contraindicated 2 to Brain Bleed  Advanced directive: [x  ] None, [  ] DNR/DNI

## 2020-06-21 NOTE — DISCHARGE NOTE PROVIDER - NSDCACTIVITY_GEN_ALL_CORE
Walking - Outdoors allowed/Walking - Indoors allowed Bathing allowed/Walking - Indoors allowed/No heavy lifting/straining

## 2020-06-21 NOTE — CONSULT NOTE ADULT - SUBJECTIVE AND OBJECTIVE BOX
[facility], Division of Infectious Diseases  DEAN Fenton  [phone]    JENA VARGAS  87y, Male  [id number]    HPI:  86 y/o M with hx of CHF, afib (on coumadin), lung ca s/p resection presents to ED for b/l weeping edema.   Pt states legs have been red and swelling for a long time , but noted they have been getting "worse recently" in the last few weeks  He is having more oozing and pain so his sone made him come in to ED  no fevers, no dysuria, no chest pain, no cough, no headache.  no recent antibx, no sick contacts    PMH/PSH--  Lung cancer  Atrial fibrillation  S/P lobectomy of lung      Allergies--NKDA      Medications--  Antibiotics: vancomycin  IVPB 1000 milliGRAM(s) IV Intermittent every 12 hours    Immunologic:   Other: acetaminophen   Tablet .. PRN  diltiazem Infusion  furosemide   Injectable      Social History--  EtOH: denies ***  Tobacco: denies ***  Drug Use: denies ***    Family/Marital History--    lives alone  nc    Remainder not relevant to clinical concern.    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     Pertinent positives and negatives--  Constitutional: No fevers. No Chills. No Rigors.   Eyes: no blurry vision  ENMT:no sore throat  Cardiovascular: No chest pain. No palpitations.  Respiratory: No shortness of breath. No cough.  Gastrointestinal: No nausea or vomiting. No diarrhea or constipation.   Genitourinary: no dysuria  Musculoskeletal: no myalgia + lower ext pain  Skin: no rash  Neurologic: no headache  Psychiatric:no anxiety  Review of systems otherwise negative except as previously noted.    Physical Exam--  Vital Signs: T(F): 98.2 (06-21-20 @ 08:04), Max: 98.2 (06-21-20 @ 08:04)  HR: 85 (06-21-20 @ 08:04)  BP: 145/78 (06-21-20 @ 08:04)  RR: 18 (06-21-20 @ 08:04)  SpO2: 98% (06-21-20 @ 08:04)  Wt(kg): --  General: Nontoxic-appearing Male in no acute distress.  HEENT: AT/NC.  Neck: Not rigid. No sense of mass.  Nodes: None palpable.  Lungs: Clear bilaterally without rales, wheezing or rhonchi  Heart: irreg s1s2 +SM  Abdomen: Bowel sounds present and normoactive. Soft. Nondistended. Nontender.   Back: No spinal tenderness. No costovertebral angle tenderness.   Extremities: No cyanosis or clubbing. mild edema. b/l le erythema, ulcers, no foul smell  no warm, clear discharge   Skin: Warm. Dry. Good turgor. No rash. No vasculitic stigmata.  Psychiatric: Appropriate affect and mood for situation.         Laboratory & Imaging Data--  CBC                        11.1   9.82  )-----------( 217      ( 21 Jun 2020 05:27 )             33.9       Chemistries  06-21    132<L>  |  97  |  14  ----------------------------<  96  4.1   |  25  |  0.57    Ca    8.6      21 Jun 2020 05:27    TPro  5.8<L>  /  Alb  2.9<L>  /  TBili  1.2  /  DBili  x   /  AST  13<L>  /  ALT  14  /  AlkPhos  72  06-21      Culture Data

## 2020-06-21 NOTE — DISCHARGE NOTE PROVIDER - HOSPITAL COURSE
ADMISSION H+P:        HPI:    88 y/o M with hx of CHF, afib (on coumadin), lung ca s/p resection presents to ED for b/l weeping edema. Pt reports that his sons made him come into the ED. Reports has been having b/l LE weeping edema for around 1 year. Has gotten worse in the past few day with increasing erythema +/- pus with occasional bouts of pain. Denies any other complaints, no cp, sob, palpitations, abdominal pain, dizziness, palpitations.        Given IV Cardizem and started on Cardizem gtt in the ED for rapid afib (20 Jun 2020 19:26)            ---    HOSPITAL COURSE: Patient was admitted to [facility] for cellulitis.         Patient was medically optimized and improved clinically throughout hospital course. Patient seen and examined on day of discharge.        Vital Signs            Physical Exam:    General: well-developed, well-nourished, NAD    HEENT: normocephalic, atraumatic, EOMI, moist mucous membranes     Neck: supple, non-tender, no masses    Neurology: AAOx3, sensation intact    Respiratory: clear to auscultation bilaterally; no wheezes, rhonchi, or rales    CV: regular rate and rhythm, soft S1/S2, no murmurs, rubs, or gallops    Abdominal: soft, non-tender, non-distended, bowel sounds present    Extremities: no clubbing, cyanosis, or edema; palpable peripheral pulses    Musculoskeletal: no joint erythema or warmth, no joint swelling     Skin: warm, dry, normal color        Patient is medically stable for discharge to ____ with outpatient follow up.    ---    CONSULTANTS:         ---    FINAL DISCHARGE DIAGNOSIS LIST:    Please see last daily progress note for final discharge diagnoses ADMISSION H+P:        HPI:    86 y/o M with hx of CHF, afib (on coumadin), lung ca s/p resection presents to ED for b/l weeping edema. Pt reports that his sons made him come into the ED. Reports has been having b/l LE weeping edema for around 1 year. Has gotten worse in the past few day with increasing erythema +/- pus with occasional bouts of pain. Denies any other complaints, no cp, sob, palpitations, abdominal pain, dizziness, palpitations.        Given IV Cardizem and started on Cardizem gtt in the ED for rapid afib (20 Jun 2020 19:26)            ---    HOSPITAL COURSE: Patient was admitted to [facility] for cellulitis. Infectious Disease (Dr. Rose) was consulted. Patient was treated with IV Vancomycin, although it was suspected that most of patient's condition was chronic, with venous stasis ulcers and dermatitis. Patient was nontoxic, afebrile, and had no leukocytosis. Wound care was consulted. Skin changes were attributed to bilateral stasis dermatitis and bilateral venous hypertension with edema and venous stasis ulcers and weeping. Patient was treated with silver alginate and dry dressings every other day, drainage dependant, and ace wraps if vascular studies permit. Vascular doppler studies of bilateral lower extremities were performed ____________.         Patient has chronic atrial fibrillation and had a rapid ventricular response. Cardiology (Dr. Shankar) was consulted. Patient was treated with Cardizem drip and Lasix 20 mg IVP qd. INR was supra therapeutic and home medication warfarin was held during hospital course. Echocardiogram showed ___________.         PT evaluated patient and recommended discharge home with home PT vs. [facility]________. Patient was medically optimized and improved clinically throughout hospital course. Patient seen and examined on day of discharge.        Vital Signs    T(C): 36.3 (22 Jun 2020 09:14), Max: 36.8 (22 Jun 2020 05:08)    T(F): 97.4 (22 Jun 2020 09:14), Max: 98.2 (22 Jun 2020 05:08)    HR: 88 (22 Jun 2020 09:14) (84 - 90)    BP: 120/80 (22 Jun 2020 09:14) (116/81 - 126/84)    BP(mean): --    RR: 18 (22 Jun 2020 09:14) (18 - 20)    SpO2: 97% (22 Jun 2020 09:14) (97% - 98%)        Physical Exam:    General: well-developed, well-nourished, NAD    HEENT: normocephalic, atraumatic, EOMI, moist mucous membranes     Neck: supple, non-tender, no masses    Neurology: AAOx3, sensation intact    Respiratory: clear to auscultation bilaterally; no wheezes, rhonchi, or rales    CV: regular rate and rhythm, soft S1/S2, no murmurs, rubs, or gallops    Abdominal: soft, non-tender, non-distended, bowel sounds present    Extremities: no clubbing, cyanosis, or edema; palpable peripheral pulses    Musculoskeletal: no joint erythema or warmth, no joint swelling     Skin: warm, dry, normal color        Patient is medically stable for discharge to ____ with outpatient follow up.    ---    CONSULTANTS:     Wound Care: Dr. Chong    Infectious Disease: Dr. Rose    Cardiology: [facility]    ---    FINAL DISCHARGE DIAGNOSIS LIST:    Please see last daily progress note for final discharge diagnoses ADMISSION H+P:        HPI:    86 y/o M with hx of CHF, afib (on coumadin), lung ca s/p resection presents to ED for b/l weeping edema. Pt reports that his sons made him come into the ED. Reports has been having b/l LE weeping edema for around 1 year. Has gotten worse in the past few day with increasing erythema +/- pus with occasional bouts of pain. Denies any other complaints, no cp, sob, palpitations, abdominal pain, dizziness, palpitations.        Given IV Cardizem and started on Cardizem gtt in the ED for rapid afib (20 Jun 2020 19:26)            ---    HOSPITAL COURSE: Patient was admitted to [facility] for cellulitis. Infectious Disease (Dr. Rose) was consulted. Patient was treated with IV Vancomycin, although it was suspected that most of patient's condition was chronic, with venous stasis ulcers and dermatitis. Patient was nontoxic, afebrile, and had no leukocytosis. Wound care was consulted. Skin changes were attributed to bilateral stasis dermatitis and bilateral venous hypertension with edema and venous stasis ulcers and weeping. Patient was treated with silver alginate and dry dressings every other day, drainage dependant, and ace wraps if vascular studies permit. Vascular doppler studies of bilateral lower extremities were performed ____________.         Patient has chronic atrial fibrillation and had a rapid ventricular response. Cardiology (Dr. Shankar) was consulted. Patient was treated with Cardizem drip and Lasix 20 mg IVP qd. Cardizem drip was discontinued and patient was transitioned to Cardizem 30 mg PO q6h. Hear rates improved during hospital course. INR was supra therapeutic and home medication warfarin was held during hospital course. Echocardiogram showed ___________.         PT evaluated patient and recommended discharge home with home PT vs. [facility]________. Patient was medically optimized and improved clinically throughout hospital course. Patient seen and examined on day of discharge.        Vital Signs    T(C): 36.3 (22 Jun 2020 09:14), Max: 36.8 (22 Jun 2020 05:08)    T(F): 97.4 (22 Jun 2020 09:14), Max: 98.2 (22 Jun 2020 05:08)    HR: 88 (22 Jun 2020 09:14) (84 - 90)    BP: 120/80 (22 Jun 2020 09:14) (116/81 - 126/84)    BP(mean): --    RR: 18 (22 Jun 2020 09:14) (18 - 20)    SpO2: 97% (22 Jun 2020 09:14) (97% - 98%)        Physical Exam:    General: well-developed, well-nourished, NAD    HEENT: normocephalic, atraumatic, EOMI, moist mucous membranes     Neck: supple, non-tender, no masses    Neurology: AAOx3, sensation intact    Respiratory: clear to auscultation bilaterally; no wheezes, rhonchi, or rales    CV: regular rate and rhythm, soft S1/S2, no murmurs, rubs, or gallops    Abdominal: soft, non-tender, non-distended, bowel sounds present    Extremities: no clubbing, cyanosis, or edema; palpable peripheral pulses    Musculoskeletal: no joint erythema or warmth, no joint swelling     Skin: warm, dry, normal color        Patient is medically stable for discharge to ____ with outpatient follow up.    ---    CONSULTANTS:     Wound Care: Dr. Chong    Infectious Disease: Dr. Rose    Cardiology: [facility]    ---    FINAL DISCHARGE DIAGNOSIS LIST:    Please see last daily progress note for final discharge diagnoses ADMISSION H+P:        HPI:    86 y/o M with hx of CHF, afib (on coumadin), lung ca s/p resection presents to ED for b/l weeping edema. Pt reports that his sons made him come into the ED. Reports has been having b/l LE weeping edema for around 1 year. Has gotten worse in the past few day with increasing erythema +/- pus with occasional bouts of pain. Denies any other complaints, no cp, sob, palpitations, abdominal pain, dizziness, palpitations.        Given IV Cardizem and started on Cardizem gtt in the ED for rapid afib (20 Jun 2020 19:26)            ---    HOSPITAL COURSE: Patient was admitted to [facility] for cellulitis. Infectious Disease (Dr. Rose) was consulted. Patient was treated with IV Vancomycin, although it was suspected that most of patient's condition was chronic, with venous stasis ulcers and dermatitis. Patient was nontoxic, afebrile, and had no leukocytosis. Wound care was consulted. Skin changes were attributed to bilateral stasis dermatitis and bilateral venous hypertension with edema and venous stasis ulcers and weeping. Patient was treated with silver alginate and dry dressings every other day, drainage dependant, and ace wraps if vascular studies permit. Vascular doppler studies of bilateral lower extremities were performed ____________.         Patient has chronic atrial fibrillation and had a rapid ventricular response. Cardiology (Dr. Shankar) was consulted. Patient was treated with Cardizem drip and Lasix 20 mg IVP qd. Cardizem drip was discontinued and patient was transitioned to Cardizem 30 mg PO q6h. Hear rates improved during hospital course. INR was supra therapeutic and home medication warfarin was held during hospital course. Echocardiogram showed mild concentric LVH with moderate global LV systolic dysfunction with LVEF of 40%, grossly normal RV size and systolic functio, calcified trileaflet aortic valve with severe aortic stenosis, mild to moderate MR, mild TR.         PT evaluated patient and recommended discharge home with home PT vs. [facility]________. Patient was medically optimized and improved clinically throughout hospital course. Patient seen and examined on day of discharge.        Vital Signs    T(C): 36.3 (22 Jun 2020 09:14), Max: 36.8 (22 Jun 2020 05:08)    T(F): 97.4 (22 Jun 2020 09:14), Max: 98.2 (22 Jun 2020 05:08)    HR: 88 (22 Jun 2020 09:14) (84 - 90)    BP: 120/80 (22 Jun 2020 09:14) (116/81 - 126/84)    BP(mean): --    RR: 18 (22 Jun 2020 09:14) (18 - 20)    SpO2: 97% (22 Jun 2020 09:14) (97% - 98%)        Physical Exam:    General: well-developed, well-nourished, NAD    HEENT: normocephalic, atraumatic, EOMI, moist mucous membranes     Neck: supple, non-tender, no masses    Neurology: AAOx3, sensation intact    Respiratory: clear to auscultation bilaterally; no wheezes, rhonchi, or rales    CV: regular rate and rhythm, soft S1/S2, no murmurs, rubs, or gallops    Abdominal: soft, non-tender, non-distended, bowel sounds present    Extremities: no clubbing, cyanosis, or edema; palpable peripheral pulses    Musculoskeletal: no joint erythema or warmth, no joint swelling     Skin: warm, dry, normal color        Patient is medically stable for discharge to ____ with outpatient follow up.    ---    CONSULTANTS:     Wound Care: Dr. Chong    Infectious Disease: Dr. Rose    Cardiology: [facility]    ---    FINAL DISCHARGE DIAGNOSIS LIST:    Please see last daily progress note for final discharge diagnoses ADMISSION H+P:        HPI:    88 y/o M with hx of CHF, afib (on coumadin), lung ca s/p resection presents to ED for b/l weeping edema. Pt reports that his sons made him come into the ED. Reports has been having b/l LE weeping edema for around 1 year. Has gotten worse in the past few day with increasing erythema +/- pus with occasional bouts of pain. Denies any other complaints, no cp, sob, palpitations, abdominal pain, dizziness, palpitations.        Given IV Cardizem and started on Cardizem gtt in the ED for rapid afib (20 Jun 2020 19:26)            ---    HOSPITAL COURSE: Patient was admitted to [facility] for cellulitis. Infectious Disease (Dr. Rose) was consulted. Patient was treated with IV Vancomycin, although it was suspected that most of patient's condition was chronic, with venous stasis ulcers and dermatitis. Patient was nontoxic, afebrile, and had no leukocytosis. Rash appeared nonpurulent and rapidly improved so antibiotics were de-escalates to Ceftriaxone 1 gram IV Q-day and course completed with cefuroxime 500mg PO BID with last day 6/25.         Wound care was consulted. Skin changes were attributed to bilateral stasis dermatitis and bilateral venous hypertension with edema and venous stasis ulcers and weeping. Patient was treated with silver alginate and dry dressings every other day, drainage dependant, and ace wraps if vascular studies permit. Vascular doppler studies of bilateral lower extremities were performed ____________.         Patient has chronic atrial fibrillation and had a rapid ventricular response. Cardiology (Dr. Shankar) was consulted. Patient was treated with Cardizem drip and Lasix 20 mg IVP qd. Cardizem drip was discontinued and patient was transitioned to Cardizem 30 mg PO q6h. Hear rates improved during hospital course. INR was supra therapeutic and home medication warfarin was held during hospital course. Echocardiogram showed mild concentric LVH with moderate global LV systolic dysfunction with LVEF of 40%, grossly normal RV size and systolic functio, calcified trileaflet aortic valve with severe aortic stenosis, mild to moderate MR, mild TR.         PT evaluated patient and recommended discharge home with home PT vs. [facility]________. Patient was medically optimized and improved clinically throughout hospital course. Patient seen and examined on day of discharge.        Vital Signs    T(C): 36.3 (22 Jun 2020 09:14), Max: 36.8 (22 Jun 2020 05:08)    T(F): 97.4 (22 Jun 2020 09:14), Max: 98.2 (22 Jun 2020 05:08)    HR: 88 (22 Jun 2020 09:14) (84 - 90)    BP: 120/80 (22 Jun 2020 09:14) (116/81 - 126/84)    BP(mean): --    RR: 18 (22 Jun 2020 09:14) (18 - 20)    SpO2: 97% (22 Jun 2020 09:14) (97% - 98%)        Physical Exam:    General: well-developed, well-nourished, NAD    HEENT: normocephalic, atraumatic, EOMI, moist mucous membranes     Neck: supple, non-tender, no masses    Neurology: AAOx3, sensation intact    Respiratory: clear to auscultation bilaterally; no wheezes, rhonchi, or rales    CV: regular rate and rhythm, soft S1/S2, no murmurs, rubs, or gallops    Abdominal: soft, non-tender, non-distended, bowel sounds present    Extremities: no clubbing, cyanosis, or edema; palpable peripheral pulses    Musculoskeletal: no joint erythema or warmth, no joint swelling     Skin: warm, dry, normal color        Patient is medically stable for discharge to ____ with outpatient follow up.    ---    CONSULTANTS:     Wound Care: Dr. Chong    Infectious Disease: Dr. Rose    Cardiology: [facility]    ---    FINAL DISCHARGE DIAGNOSIS LIST:    Please see last daily progress note for final discharge diagnoses ADMISSION H+P:        HPI:    88 y/o M with hx of CHF, afib (on coumadin), lung ca s/p resection presents to ED for b/l weeping edema. Pt reports that his sons made him come into the ED. Reports has been having b/l LE weeping edema for around 1 year. Has gotten worse in the past few day with increasing erythema +/- pus with occasional bouts of pain. Denies any other complaints, no cp, sob, palpitations, abdominal pain, dizziness, palpitations.        Given IV Cardizem and started on Cardizem gtt in the ED for rapid afib (20 Jun 2020 19:26)            ---    HOSPITAL COURSE: Patient was admitted to [facility] for cellulitis. Infectious Disease (Dr. Rose) was consulted. Patient was treated with IV Vancomycin, although it was suspected that most of patient's condition was chronic, with venous stasis ulcers and dermatitis. Patient was nontoxic, afebrile, and had no leukocytosis. Rash appeared nonpurulent and rapidly improved so antibiotics were de-escalates to Ceftriaxone 1 gram IV Q-day and course completed with cefuroxime 500mg PO BID with last day 6/25.         Wound care was consulted. Skin changes were attributed to bilateral stasis dermatitis and bilateral venous hypertension with edema and venous stasis ulcers and weeping. Patient was treated with silver alginate and dry dressings every other day, drainage dependant, and ace wraps if vascular studies permit. Vascular doppler studies of bilateral lower extremities were performed. Right lower extremity MAYNOR is .54, pulse waveforms are diffusely monophasic and diminished in the right foot. Left lower extremity MAYNOR is .56, pulse waveforms are diffusely monophasic and severely diminished in the left foot. 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        Patient has chronic atrial fibrillation and had a rapid ventricular response. Cardiology (Dr. Shankar) was consulted. Patient was treated with Cardizem drip and Lasix 20 mg IVP qd. Cardizem drip was discontinued and patient was transitioned to Cardizem 30 mg PO q6h. Hear rates improved during hospital course. INR was supra therapeutic and home medication warfarin was held during hospital course. Echocardiogram showed mild concentric LVH with moderate global LV systolic dysfunction with LVEF of 40%, grossly normal RV size and systolic functio, calcified trileaflet aortic valve with severe aortic stenosis, mild to moderate MR, mild TR.         PT evaluated patient and recommended discharge home with home PT vs. [facility]________. Patient was medically optimized and improved clinically throughout hospital course. Patient seen and examined on day of discharge.        Vital Signs    T(C): 36.3 (22 Jun 2020 09:14), Max: 36.8 (22 Jun 2020 05:08)    T(F): 97.4 (22 Jun 2020 09:14), Max: 98.2 (22 Jun 2020 05:08)    HR: 88 (22 Jun 2020 09:14) (84 - 90)    BP: 120/80 (22 Jun 2020 09:14) (116/81 - 126/84)    BP(mean): --    RR: 18 (22 Jun 2020 09:14) (18 - 20)    SpO2: 97% (22 Jun 2020 09:14) (97% - 98%)        Physical Exam:    General: well-developed, well-nourished, NAD    HEENT: normocephalic, atraumatic, EOMI, moist mucous membranes     Neck: supple, non-tender, no masses    Neurology: AAOx3, sensation intact    Respiratory: clear to auscultation bilaterally; no wheezes, rhonchi, or rales    CV: regular rate and rhythm, soft S1/S2, no murmurs, rubs, or gallops    Abdominal: soft, non-tender, non-distended, bowel sounds present    Extremities: no clubbing, cyanosis, or edema; palpable peripheral pulses    Musculoskeletal: no joint erythema or warmth, no joint swelling     Skin: warm, dry, normal color        Patient is medically stable for discharge to ____ with outpatient follow up.    ---    CONSULTANTS:     Wound Care: Dr. Chong    Infectious Disease: Dr. Rose    Cardiology: [facility]    ---    FINAL DISCHARGE DIAGNOSIS LIST:    Please see last daily progress note for final discharge diagnoses ADMISSION H+P:        HPI:    88 y/o M with hx of CHF, afib (on coumadin), lung ca s/p resection presents to ED for b/l weeping edema. Pt reports that his sons made him come into the ED. Reports has been having b/l LE weeping edema for around 1 year. Has gotten worse in the past few day with increasing erythema +/- pus with occasional bouts of pain. Denies any other complaints, no cp, sob, palpitations, abdominal pain, dizziness, palpitations.        Given IV Cardizem and started on Cardizem gtt in the ED for rapid afib (20 Jun 2020 19:26)            ---    HOSPITAL COURSE: Patient was admitted to [facility] for cellulitis. Infectious Disease (Dr. Rose) was consulted. Patient was treated with IV Vancomycin, although it was suspected that most of patient's condition was chronic, with venous stasis ulcers and dermatitis. Patient was nontoxic, afebrile, and had no leukocytosis. Rash appeared nonpurulent and rapidly improved so antibiotics were de-escalates to Ceftriaxone 1 gram IV Q-day and course completed with cefuroxime 500mg PO BID with last day 6/25.         Wound care was consulted. Skin changes were attributed to bilateral stasis dermatitis and bilateral venous hypertension with edema and venous stasis ulcers and weeping. Patient was treated with silver alginate and dry dressings every other day, drainage dependant, and ace wraps if vascular studies permit. Vascular doppler studies of bilateral lower extremities were performed. Right lower extremity MAYNOR is .54, pulse waveforms are diffusely monophasic and diminished in the right foot. Left lower extremity MAYNOR is .56, pulse waveforms are diffusely monophasic and severely diminished in the left foot. 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 Vascular Surgery (Dr. Arias) evaluated you and advised no surgical intervention at this time.         Patient has chronic atrial fibrillation and had a rapid ventricular response. Cardiology (Dr. Shankar) was consulted. Patient was treated with Cardizem drip and Lasix 20 mg IVP qd. Cardizem drip was discontinued and patient was transitioned to Cardizem 30 mg PO q6h. Hear rates improved during hospital course. Cardizem was discontinued and patient was started on Lopressor 25 mg PO BID. INR was supra therapeutic and home medication warfarin was held during hospital course. Echocardiogram showed mild concentric LVH with moderate global LV systolic dysfunction with LVEF of 40%, grossly normal RV size and systolic functio, calcified trileaflet aortic valve with severe aortic stenosis, mild to moderate MR, mild TR.         PT evaluated patient and recommended discharge home with home PT vs. [facility]________. Patient was medically optimized and improved clinically throughout hospital course. Patient seen and examined on day of discharge.        Vital Signs    T(C): 36.5 (23 Jun 2020 07:57), Max: 36.6 (23 Jun 2020 03:04)    T(F): 97.7 (23 Jun 2020 07:57), Max: 97.9 (23 Jun 2020 03:04)    HR: 72 (23 Jun 2020 07:57) (72 - 96)    BP: 117/80 (23 Jun 2020 07:57) (117/80 - 137/90)    BP(mean): --    RR: 18 (23 Jun 2020 07:57) (18 - 18)    SpO2: 98% (23 Jun 2020 07:57) (96% - 98%)        Physical Exam:    General: well-developed, well-nourished, NAD    HEENT: normocephalic, atraumatic, EOMI, moist mucous membranes     Neck: supple, non-tender, no masses    Neurology: AAOx3, sensation intact    Respiratory: clear to auscultation bilaterally; no wheezes, rhonchi, or rales    CV: regular rate and rhythm, soft S1/S2, no murmurs, rubs, or gallops    Abdominal: soft, non-tender, non-distended, bowel sounds present    Extremities: no clubbing, cyanosis, or edema; palpable peripheral pulses    Musculoskeletal: no joint erythema or warmth, no joint swelling     Skin: warm, dry, normal color        Patient is medically stable for discharge to ____ with outpatient follow up.    ---    CONSULTANTS:     Wound Care: Dr. Chong    Infectious Disease: Dr. Rose    Cardiology: [facility]    Vascular Surgery: Dr. Arias    ---    FINAL DISCHARGE DIAGNOSIS LIST:    Please see last daily progress note for final discharge diagnoses ADMISSION H+P:        HPI:    86 y/o M with hx of CHF, afib (on coumadin), lung ca s/p resection presents to ED for b/l weeping edema. Pt reports that his sons made him come into the ED. Reports has been having b/l LE weeping edema for around 1 year. Has gotten worse in the past few day with increasing erythema +/- pus with occasional bouts of pain. Denies any other complaints, no cp, sob, palpitations, abdominal pain, dizziness, palpitations.        Given IV Cardizem and started on Cardizem gtt in the ED for rapid afib (20 Jun 2020 19:26)            ---    HOSPITAL COURSE: Patient was admitted to [facility] for cellulitis. Infectious Disease (Dr. Rose) was consulted. Patient was treated with IV Vancomycin, although it was suspected that most of patient's condition was chronic, with venous stasis ulcers and dermatitis. Patient was nontoxic, afebrile, and had no leukocytosis. Rash appeared nonpurulent and rapidly improved so antibiotics were de-escalates to Ceftriaxone 1 gram IV Q-day and course completed with cefuroxime 500mg PO BID with last day 6/25.         Wound care was consulted. Skin changes were attributed to bilateral stasis dermatitis and bilateral venous hypertension with edema and venous stasis ulcers and weeping. Patient was treated with silver alginate and dry dressings every other day, drainage dependant, and ace wraps if vascular studies permit. Vascular doppler studies of bilateral lower extremities were performed. Right lower extremity MAYNOR is .54, pulse waveforms are diffusely monophasic and diminished in the right foot. Left lower extremity MAYNOR is .56, pulse waveforms are diffusely monophasic and severely diminished in the left foot. 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 Vascular Surgery (Dr. Arias) evaluated you and advised no surgical intervention at this time.         Patient has chronic atrial fibrillation and had a rapid ventricular response. Cardiology (Dr. Shankar) was consulted. Patient was treated with Cardizem drip and Lasix 20 mg IVP qd. Cardizem drip was discontinued and patient was transitioned to Cardizem 30 mg PO q6h. Hear rates improved during hospital course. Cardizem was discontinued and patient was started on Lopressor 25 mg PO BID. INR was supra therapeutic and home medication warfarin was held during hospital course. Echocardiogram showed mild concentric LVH with moderate global LV systolic dysfunction with LVEF of 40%, grossly normal RV size and systolic functio, calcified trileaflet aortic valve with severe aortic stenosis, mild to moderate MR, mild TR.         PT evaluated patient and recommended discharge to subacute rehab. Patient was medically optimized and improved clinically throughout hospital course. Patient seen and examined on day of discharge.        Vital Signs    T(C): 36.5 (23 Jun 2020 07:57), Max: 36.6 (23 Jun 2020 03:04)    T(F): 97.7 (23 Jun 2020 07:57), Max: 97.9 (23 Jun 2020 03:04)    HR: 72 (23 Jun 2020 07:57) (72 - 96)    BP: 117/80 (23 Jun 2020 07:57) (117/80 - 137/90)    BP(mean): --    RR: 18 (23 Jun 2020 07:57) (18 - 18)    SpO2: 98% (23 Jun 2020 07:57) (96% - 98%)        Physical Exam:    General: well-developed, well-nourished, NAD    HEENT: normocephalic, atraumatic, EOMI, moist mucous membranes     Neck: supple, non-tender, no masses    Neurology: AAOx3, sensation intact    Respiratory: clear to auscultation bilaterally; no wheezes, rhonchi, or rales    CV: regular rate and rhythm, soft S1/S2, no murmurs, rubs, or gallops    Abdominal: soft, non-tender, non-distended, bowel sounds present    Extremities: no clubbing, cyanosis, or edema; palpable peripheral pulses    Musculoskeletal: no joint erythema or warmth, no joint swelling     Skin: warm, dry, normal color        Patient is medically stable for discharge to subacute rehab with outpatient follow up.    ---    CONSULTANTS:     Wound Care: Dr. Chong    Infectious Disease: Dr. Rose    Cardiology: [facility]    Vascular Surgery: Dr. Arias    ---    FINAL DISCHARGE DIAGNOSIS LIST:    Please see last daily progress note for final discharge diagnoses ADMISSION H+P:        HPI:    88 y/o M with hx of CHF, afib (on coumadin), lung ca s/p resection presents to ED for b/l weeping edema. Pt reports that his sons made him come into the ED. Reports has been having b/l LE weeping edema for around 1 year. Has gotten worse in the past few day with increasing erythema +/- pus with occasional bouts of pain. Denies any other complaints, no cp, sob, palpitations, abdominal pain, dizziness, palpitations.        Given IV Cardizem and started on Cardizem gtt in the ED for rapid afib (20 Jun 2020 19:26)            ---    HOSPITAL COURSE: Patient was admitted to [facility] for cellulitis. Infectious Disease (Dr. Rose) was consulted. Patient was treated with IV Vancomycin, although it was suspected that most of patient's condition was chronic, with venous stasis ulcers and dermatitis. Patient was nontoxic, afebrile, and had no leukocytosis. Rash appeared nonpurulent and rapidly improved so antibiotics were de-escalates to Ceftriaxone 1 gram IV Q-day and course completed with cefuroxime 500mg PO BID with last day 6/25.         Wound care was consulted. Skin changes were attributed to bilateral stasis dermatitis and bilateral venous hypertension with edema and venous stasis ulcers and weeping. Patient was treated with silver alginate and dry dressings every other day, drainage dependant, and ace wraps. Vascular doppler studies of bilateral lower extremities were performed. Right lower extremity MAYNOR is .54, pulse waveforms are diffusely monophasic and diminished in the right foot. Left lower extremity MAYNOR is .56, pulse waveforms are diffusely monophasic and severely diminished in the left foot. 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 Vascular Surgery (Dr. Arias) evaluated you and advised right LE angiogram with stent/angioplasty in IR ___________.         Patient has chronic atrial fibrillation and had a rapid ventricular response. Cardiology (Dr. Shankar) was consulted. Patient was treated with Cardizem drip and Lasix 20 mg IVP qd. Cardizem drip was discontinued and patient was transitioned to Cardizem 30 mg PO q6h. Heart rates improved during hospital course. Cardizem was discontinued and patient was started on Lopressor 25 mg PO BID. Patient was transitioned to PO Lasix prior to discharge _________INR was supra therapeutic and home medication warfarin was held during hospital course. INR improved and patient was restarted on Coumadin __________. Echocardiogram showed mild concentric LVH with moderate global LV systolic dysfunction with LVEF of 40%, grossly normal RV size and systolic function, calcified trileaflet aortic valve with severe aortic stenosis, mild to moderate MR, mild TR.         PT evaluated patient and recommended discharge to subacute rehab. Patient was medically optimized and improved clinically throughout hospital course. Patient seen and examined on day of discharge.        Vital Signs    T(C): 36.5 (23 Jun 2020 07:57), Max: 36.6 (23 Jun 2020 03:04)    T(F): 97.7 (23 Jun 2020 07:57), Max: 97.9 (23 Jun 2020 03:04)    HR: 72 (23 Jun 2020 07:57) (72 - 96)    BP: 117/80 (23 Jun 2020 07:57) (117/80 - 137/90)    BP(mean): --    RR: 18 (23 Jun 2020 07:57) (18 - 18)    SpO2: 98% (23 Jun 2020 07:57) (96% - 98%)        Physical Exam:    General: well-developed, well-nourished, NAD    HEENT: normocephalic, atraumatic, EOMI, moist mucous membranes     Neck: supple, non-tender, no masses    Neurology: AAOx3, sensation intact    Respiratory: clear to auscultation bilaterally; no wheezes, rhonchi, or rales    CV: regular rate and rhythm, soft S1/S2, no murmurs, rubs, or gallops    Abdominal: soft, non-tender, non-distended, bowel sounds present    Extremities: PVD stasis skin changes B/L lower EXT, b/l le erythema, dried ulcers, yellowish slough, no foul smell, no warmth, no discharge    Musculoskeletal: no joint erythema or warmth, no joint swelling     Skin: warm, dry        Patient is medically stable for discharge to subacute rehab with outpatient follow up.    ---    CONSULTANTS:     Wound Care: Dr. Chong    Infectious Disease: Dr. Rose    Cardiology: [facility]    Vascular Surgery: Dr. Arias    ---    FINAL DISCHARGE DIAGNOSIS LIST:    Please see last daily progress note for final discharge diagnoses ADMISSION H+P:        HPI:    88 y/o M with hx of CHF, afib (on coumadin), lung ca s/p resection presents to ED for b/l weeping edema. Pt reports that his sons made him come into the ED. Reports has been having b/l LE weeping edema for around 1 year. Has gotten worse in the past few day with increasing erythema +/- pus with occasional bouts of pain. Denies any other complaints, no cp, sob, palpitations, abdominal pain, dizziness, palpitations.        Given IV Cardizem and started on Cardizem gtt in the ED for rapid afib (20 Jun 2020 19:26)            ---    HOSPITAL COURSE: Patient was admitted to [facility] for cellulitis. Infectious Disease (Dr. Rose) was consulted. Patient was treated with IV Vancomycin, although it was suspected that most of patient's condition was chronic, with venous stasis ulcers and dermatitis. Patient was nontoxic, afebrile, and had no leukocytosis. Rash appeared nonpurulent and rapidly improved so antibiotics were de-escalates to Ceftriaxone 1 gram IV Q-day and course completed with cefuroxime 500mg PO BID with last day 6/25.         Wound care was consulted. Skin changes were attributed to bilateral stasis dermatitis and bilateral venous hypertension with edema and venous stasis ulcers and weeping. Patient was treated with silver alginate and dry dressings every other day, drainage dependant, and ace wraps. Vascular doppler studies of bilateral lower extremities were performed. Right lower extremity MAYNOR is .54, pulse waveforms are diffusely monophasic and diminished in the right foot. Left lower extremity MAYNOR is .56, pulse waveforms are diffusely monophasic and severely diminished in the left foot. 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 Vascular Surgery (Dr. Arias) evaluated you. Right LE angiogram with stent/angioplasty was performed _____________.         Patient has chronic atrial fibrillation and had a rapid ventricular response. Cardiology (Dr. Shankar) was consulted. Patient was treated with Cardizem drip and Lasix 20 mg IVP qd. Cardizem drip was discontinued and patient was transitioned to Cardizem 30 mg PO q6h. Heart rates improved during hospital course. Cardizem was discontinued and patient was started on Lopressor 25 mg PO BID. Patient was transitioned to PO Lasix prior to discharge _________INR was supra therapeutic and home medication warfarin was held during hospital course. INR improved and patient was restarted on Coumadin __________. Echocardiogram showed mild concentric LVH with moderate global LV systolic dysfunction with LVEF of 40%, grossly normal RV size and systolic function, calcified trileaflet aortic valve with severe aortic stenosis, mild to moderate MR, mild TR.         PT evaluated patient and recommended discharge to subacute rehab. Patient was medically optimized and improved clinically throughout hospital course. Patient seen and examined on day of discharge.        Vital Signs    T(C): 36.3 (24 Jun 2020 07:51), Max: 36.8 (23 Jun 2020 16:05)    T(F): 97.4 (24 Jun 2020 07:51), Max: 98.2 (23 Jun 2020 16:05)    HR: 81 (24 Jun 2020 07:51) (81 - 84)    BP: 115/80 (24 Jun 2020 07:51) (115/80 - 131/86)    BP(mean): --    RR: 18 (24 Jun 2020 07:51) (18 - 18)    SpO2: 96% (24 Jun 2020 07:51) (96% - 97%)        Physical Exam:    General: well-developed, well-nourished, NAD    HEENT: normocephalic, atraumatic, EOMI, moist mucous membranes     Neck: supple, non-tender, no masses    Neurology: AAOx3, sensation intact    Respiratory: clear to auscultation bilaterally; no wheezes, rhonchi, or rales    CV: regular rate and rhythm, soft S1/S2, no murmurs, rubs, or gallops    Abdominal: soft, non-tender, non-distended, bowel sounds present    Extremities: PVD stasis skin changes B/L lower EXT, b/l le erythema, dried ulcers, yellowish slough, no foul smell, no warmth, no discharge    Musculoskeletal: no joint erythema or warmth, no joint swelling     Skin: warm, dry        Patient is medically stable for discharge to subacute rehab with outpatient follow up.    ---    CONSULTANTS:     Wound Care: Dr. Chong    Infectious Disease: Dr. Rose    Cardiology: [facility]    Vascular Surgery: Dr. Arias    ---    FINAL DISCHARGE DIAGNOSIS LIST:    Please see last daily progress note for final discharge diagnoses ADMISSION H+P:        HPI:    88 y/o M with hx of CHF, afib (on coumadin), lung ca s/p resection presents to ED for b/l weeping edema. Pt reports that his sons made him come into the ED. Reports has been having b/l LE weeping edema for around 1 year. Has gotten worse in the past few day with increasing erythema +/- pus with occasional bouts of pain. Denies any other complaints, no cp, sob, palpitations, abdominal pain, dizziness, palpitations.        Given IV Cardizem and started on Cardizem gtt in the ED for rapid afib (20 Jun 2020 19:26)            ---    HOSPITAL COURSE: Patient was admitted to [facility] for cellulitis. Infectious Disease (Dr. Rose) was consulted. Patient was treated with IV Vancomycin, although it was suspected that most of patient's condition was chronic, with venous stasis ulcers and dermatitis. Patient was nontoxic, afebrile, and had no leukocytosis. Rash appeared nonpurulent and rapidly improved so antibiotics were de-escalates to Ceftriaxone 1 gram IV Q-day and course completed with cefuroxime 500mg PO BID with last day 6/25.         Wound care was consulted. Skin changes were attributed to bilateral stasis dermatitis and bilateral venous hypertension with edema and venous stasis ulcers and weeping. Patient was treated with silver alginate and dry dressings every other day, drainage dependant, and ace wraps. Vascular doppler studies of bilateral lower extremities were performed. Right lower extremity MAYNOR is .54, pulse waveforms are diffusely monophasic and diminished in the right foot. Left lower extremity MAYNOR is .56, pulse waveforms are diffusely monophasic and severely diminished in the left foot. 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 Vascular Surgery (Dr. Arias) evaluated you. Right LE angiogram with stent/angioplasty was performed _____________.         Patient has chronic atrial fibrillation and had a rapid ventricular response. Cardiology (Dr. Shankar) was consulted. Patient was treated with Cardizem drip and Lasix 20 mg IVP qd. Cardizem drip was discontinued and patient was transitioned to Cardizem 30 mg PO q6h. Heart rates improved during hospital course. Cardizem was discontinued and patient was started on Lopressor 25 mg PO BID. Patient was transitioned to PO Lasix prior to discharge _________INR was supra therapeutic and home medication warfarin was held during hospital course. INR improved and patient was restarted on Coumadin __________. Echocardiogram showed mild concentric LVH with moderate global LV systolic dysfunction with LVEF of 40%, grossly normal RV size and systolic function, calcified trileaflet aortic valve with severe aortic stenosis, mild to moderate MR, mild TR.         PT evaluated patient and recommended discharge to subacute rehab. Patient was medically optimized and improved clinically throughout hospital course. Patient seen and examined on day of discharge.        Vital Signs    ----        Physical Exam:    General: well-developed, well-nourished, NAD    HEENT: normocephalic, atraumatic, EOMI, moist mucous membranes     Neck: supple, non-tender, no masses    Neurology: AAOx3, sensation intact    Respiratory: clear to auscultation bilaterally; no wheezes, rhonchi, or rales    CV: regular rate and rhythm, soft S1/S2, no murmurs, rubs, or gallops    Abdominal: soft, non-tender, non-distended, bowel sounds present    Extremities: PVD stasis skin changes B/L lower EXT, b/l le erythema, dried ulcers, yellowish slough, no foul smell, no warmth, no discharge    Musculoskeletal: no joint erythema or warmth, no joint swelling     Skin: warm, dry        Patient is medically stable for discharge to subacute rehab with outpatient follow up.    ---    CONSULTANTS:     Wound Care: Dr. Chong    Infectious Disease: Dr. Rose    Cardiology: [facility]    Vascular Surgery: Dr. Arias    ---    FINAL DISCHARGE DIAGNOSIS LIST:    Please see last daily progress note for final discharge diagnoses ADMISSION H+P:        HPI:    86 y/o M with hx of CHF, afib (on coumadin), lung ca s/p resection presents to ED for b/l weeping edema. Pt reports that his sons made him come into the ED. Reports has been having b/l LE weeping edema for around 1 year. Has gotten worse in the past few day with increasing erythema +/- pus with occasional bouts of pain. Denies any other complaints, no cp, sob, palpitations, abdominal pain, dizziness, palpitations.        Given IV Cardizem and started on Cardizem gtt in the ED for rapid afib (20 Jun 2020 19:26)            ---    HOSPITAL COURSE: Patient was admitted to [facility] for cellulitis. Infectious Disease (Dr. Rose) was consulted. Patient was treated with IV Vancomycin, although it was suspected that most of patient's condition was chronic, with venous stasis ulcers and dermatitis. Patient was nontoxic, afebrile, and had no leukocytosis. Rash appeared nonpurulent and rapidly improved so antibiotics were de-escalates to Ceftriaxone 1 gram IV Q-day and course completed with cefuroxime 500mg PO BID with last day 6/25.         Wound care was consulted. Skin changes were attributed to bilateral stasis dermatitis and bilateral venous hypertension with edema and venous stasis ulcers and weeping. Patient was treated with silver alginate and dry dressings every other day, drainage dependant, and ace wraps. Vascular doppler studies of bilateral lower extremities were performed. Right lower extremity MAYNOR is .54, pulse waveforms are diffusely monophasic and diminished in the right foot. Left lower extremity MAYNOR is .56, pulse waveforms are diffusely monophasic and severely diminished in the left foot. 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 Vascular Surgery (Dr. Arias) evaluated you. LE angiogram with stent/angioplasty was performed and showed occluded SFA and popliteal right leg and occluded popliteal left leg.         Patient has chronic atrial fibrillation and had a rapid ventricular response. Cardiology (Dr. Shankar) was consulted. Patient was treated with Cardizem drip and Lasix 20 mg IVP qd. Cardizem drip was discontinued and patient was transitioned to Cardizem 30 mg PO q6h. Heart rates improved during hospital course. Cardizem was discontinued and patient was started on Lopressor 25 mg PO BID. Patient was transitioned to PO Lasix 20 mg PO qd prior to discharge. INR was supra therapeutic and home medication warfarin was held during hospital course. INR was subtherapeutic. Patient was transitioned from Coumadin to Eliquis 5 mg PO BID. Echocardiogram showed mild concentric LVH with moderate global LV systolic dysfunction with LVEF of 40%, grossly normal RV size and systolic function, calcified trileaflet aortic valve with severe aortic stenosis, mild to moderate MR, mild TR.         PT evaluated patient and recommended discharge to subacute rehab. Patient was medically optimized and improved clinically throughout hospital course. Patient seen and examined on day of discharge.        Vital Signs    ----        Physical Exam:    General: well-developed, well-nourished, NAD    HEENT: normocephalic, atraumatic, EOMI, moist mucous membranes     Neck: supple, non-tender, no masses    Neurology: AAOx3, sensation intact    Respiratory: clear to auscultation bilaterally; no wheezes, rhonchi, or rales    CV: regular rate and rhythm, soft S1/S2, no murmurs, rubs, or gallops    Abdominal: soft, non-tender, non-distended, bowel sounds present    Extremities: PVD stasis skin changes B/L lower EXT, b/l le erythema, dried ulcers, yellowish slough, no foul smell, no warmth, no discharge    Musculoskeletal: no joint erythema or warmth, no joint swelling     Skin: warm, dry        Patient is medically stable for discharge to subacute rehab with outpatient follow up.    ---    CONSULTANTS:     Wound Care: Dr. Chong    Infectious Disease: Dr. Rose    Cardiology: [facility]    Vascular Surgery: Dr. Arias    ---    FINAL DISCHARGE DIAGNOSIS LIST:    Please see last daily progress note for final discharge diagnoses ADMISSION H+P:        HPI:    86 y/o M with hx of CHF, afib (on coumadin), lung ca s/p resection presents to ED for b/l weeping edema. Pt reports that his sons made him come into the ED. Reports has been having b/l LE weeping edema for around 1 year. Has gotten worse in the past few day with increasing erythema +/- pus with occasional bouts of pain. Denies any other complaints, no cp, sob, palpitations, abdominal pain, dizziness, palpitations.        Given IV Cardizem and started on Cardizem gtt in the ED for rapid afib (20 Jun 2020 19:26)            ---    HOSPITAL COURSE: Patient was admitted to [facility] for cellulitis. Infectious Disease (Dr. Rose) was consulted. Patient was treated with IV Vancomycin, although it was suspected that most of patient's condition was chronic, with venous stasis ulcers and dermatitis. Patient was nontoxic, afebrile, and had no leukocytosis. Rash appeared nonpurulent and rapidly improved so antibiotics were de-escalates to Ceftriaxone 1 gram IV Q-day and course completed with cefuroxime 500mg PO BID with last day 6/25.         Wound care was consulted. Skin changes were attributed to bilateral stasis dermatitis and bilateral venous hypertension with edema and venous stasis ulcers and weeping. Patient was treated with silver alginate and dry dressings every other day, drainage dependant, and ace wraps. Vascular doppler studies of bilateral lower extremities were performed. Right lower extremity MAYNOR is .54, pulse waveforms are diffusely monophasic and diminished in the right foot. Left lower extremity MAYNOR is .56, pulse waveforms are diffusely monophasic and severely diminished in the left foot. 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 Vascular Surgery (Dr. Arias) evaluated you. LE angiogram with stent/angioplasty was performed and showed occluded SFA and popliteal right leg and occluded popliteal left leg.         Patient has chronic atrial fibrillation and had a rapid ventricular response. Cardiology (Dr. Shankar) was consulted. Patient was treated with Cardizem drip and Lasix 20 mg IVP qd. Cardizem drip was discontinued and patient was transitioned to Cardizem 30 mg PO q6h. Heart rates improved during hospital course. Cardizem was discontinued and patient was started on Lopressor 25 mg PO BID. Patient was transitioned to PO Lasix 20 mg PO qd prior to discharge. INR was supra therapeutic and home medication warfarin was held during hospital course. INR was subtherapeutic. Patient was transitioned from Coumadin to Eliquis 5 mg PO BID. Echocardiogram showed mild concentric LVH with moderate global LV systolic dysfunction with LVEF of 40%, grossly normal RV size and systolic function, calcified trileaflet aortic valve with severe aortic stenosis, mild to moderate MR, mild TR.         PT evaluated patient and recommended discharge to subacute rehab. Patient was medically optimized and improved clinically throughout hospital course. Patient seen and examined on day of discharge.        Vital Signs        Vital Signs Last 24 Hrs    T(C): 36.3 (30 Jun 2020 07:26), Max: 36.6 (30 Jun 2020 05:10)    T(F): 97.3 (30 Jun 2020 07:26), Max: 97.8 (30 Jun 2020 05:10)    HR: 81 (30 Jun 2020 07:26) (72 - 96)    BP: 117/72 (30 Jun 2020 07:26) (106/71 - 121/73)    BP(mean): --    RR: 17 (30 Jun 2020 07:26) (17 - 17)    SpO2: 97% (30 Jun 2020 07:26) (95% - 97%)    ----        Physical Exam:    General: well-developed, well-nourished, NAD    HEENT: normocephalic, atraumatic, EOMI, moist mucous membranes     Neck: supple, non-tender, no masses    Neurology: AAOx3, sensation intact    Respiratory: clear to auscultation bilaterally; no wheezes, rhonchi, or rales    CV: regular rate and rhythm, soft S1/S2, no murmurs, rubs, or gallops    Abdominal: soft, non-tender, non-distended, bowel sounds present    Extremities: PVD stasis skin changes B/L lower EXT, b/l le erythema, dried ulcers, yellowish slough, no foul smell, no warmth, no discharge    Musculoskeletal: no joint erythema or warmth, no joint swelling     Skin: warm, dry        Patient is medically stable for discharge to subacute rehab with outpatient follow up.    ---    CONSULTANTS:     Wound Care: Dr. Chong    Infectious Disease: Dr. Rose    Cardiology: [facility]    Vascular Surgery: Dr. Arias    ---    FINAL DISCHARGE DIAGNOSIS LIST:    Please see last daily progress note for final discharge diagnoses ADMISSION H+P:        HPI:    88 y/o M with hx of CHF, afib (on coumadin), lung ca s/p resection presents to ED for b/l weeping edema. Pt reports that his sons made him come into the ED. Reports has been having b/l LE weeping edema for around 1 year. Has gotten worse in the past few day with increasing erythema +/- pus with occasional bouts of pain. Denies any other complaints, no cp, sob, palpitations, abdominal pain, dizziness, palpitations.        Given IV Cardizem and started on Cardizem gtt in the ED for rapid afib (20 Jun 2020 19:26)            ---    HOSPITAL COURSE: Patient was admitted to Tele     Pt has chronic atrial fibrillation and had a rapid ventricular response. Cardiology (Dr. Shankar) was consulted. Patient was treated with Cardizem drip and Lasix 20 mg IVP qd. Cardizem drip was discontinued and patient was transitioned to Cardizem 30 mg PO q6h. Heart rates improved during hospital course. Cardizem was discontinued  2/2 Low EF and patient was started on Lopressor 25 mg PO BID. Patient was transitioned to PO Lasix 20 mg PO qd prior to discharge. INR was supra therapeutic in ER and home medication warfarin was held during hospital course.  Patient was transitioned from Coumadin to Eliquis 5 mg PO BID.after the angiogram . Echocardiogram showed mild concentric LVH with moderate global LV systolic dysfunction with LVEF of 40%, grossly normal RV size and systolic function, calcified trileaflet aortic valve with severe aortic stenosis, mild to moderate MR, mild TR.      Pt was also treated for  cellulitis. Infectious Disease (Dr. Rose) was consulted. Patient was treated with IV Vancomycin, although it was suspected that most of patient's condition was chronic, with venous stasis ulcers and dermatitis with possible 2ndary infection . Patient was nontoxic, afebrile, and had no leukocytosis. Rash appeared nonpurulent and rapidly improved so antibiotics were de-escalates to Ceftriaxone 1 gram IV Q-day and course completed last day 6/25.         Wound care was consulted. Skin changes were attributed to bilateral stasis dermatitis and bilateral venous hypertension with edema and venous stasis ulcers and weeping. Patient was treated with silver alginate and dry dressings every other day, drainage dependant, and ace wraps. Vascular doppler studies of bilateral lower extremities were performed. Right lower extremity MAYNOR is .54, pulse waveforms are diffusely monophasic and diminished in the right foot. Left lower extremity MAYNOR is .56, pulse waveforms are diffusely monophasic and severely diminished in the left foot. 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 Vascular Surgery (Dr. Arias) evaluated you. LE angiogram with stent/angioplasty was performed and showed occluded SFA and popliteal right leg and occluded popliteal left leg. No Vascular Sx intervention at this time  as per Dr bustamante & Elijah Dotson, Only Local wound care with out pt follow up at wound care center &  Cardiology follow up.            PT evaluated patient and recommended discharge to subacute rehab. Patient was medically optimized and improved clinically throughout hospital course. Patient seen and examined on day of discharge.            Patient is medically stable for discharge to subacute rehab with outpatient follow up.    ---    CONSULTANTS:     Wound Care: Dr. Chong    Infectious Disease: Dr. Rose    Cardiology: [facility]    Vascular Surgery: Dr. Arias    ---    FINAL DISCHARGE DIAGNOSIS LIST:    Please see last daily progress note for final discharge diagnoses

## 2020-06-21 NOTE — DISCHARGE NOTE PROVIDER - NSDCCPCAREPLAN_GEN_ALL_CORE_FT
PRINCIPAL DISCHARGE DIAGNOSIS  Diagnosis: Cellulitis of lower extremity, unspecified laterality  Assessment and Plan of Treatment:       SECONDARY DISCHARGE DIAGNOSES  Diagnosis: Acute on chronic congestive heart failure, unspecified heart failure type  Assessment and Plan of Treatment: PRINCIPAL DISCHARGE DIAGNOSIS  Diagnosis: Stasis dermatitis with ulcer of left lower extremity due to peripheral venous hypertension  Assessment and Plan of Treatment: - You presented with bilateral lower extremityvwounds that were painful, red, and draining. attributed to possible skin infection and chronic vascular changes.   - You were treated with IV Vancomycin      SECONDARY DISCHARGE DIAGNOSES  Diagnosis: Atrial fibrillation  Assessment and Plan of Treatment: - You presented with elevated heart rate  - You were treated with Cardizem drip and Lasix   - Please follow up with you cardiologist (Dr. Boyle) within 1 week of discharge    Diagnosis: Acute on chronic congestive heart failure, unspecified heart failure type  Assessment and Plan of Treatment: PRINCIPAL DISCHARGE DIAGNOSIS  Diagnosis: Stasis dermatitis with ulcer of left lower extremity due to peripheral venous hypertension  Assessment and Plan of Treatment: - You presented with bilateral lower extremity wounds that were painful, red, and draining. You were treated with IV Vancomycin. Symptoms were attributed to possible skin infection and chronic vascular changes.   - Vascular studies of your right lower extremity showed 70% stenosis in your right superficial femoral artery. Left lower extremity showed that the peroneal artery was occluded.  - Please follow up with Wound Care (Dr. Chong) within 1 week of discharge      SECONDARY DISCHARGE DIAGNOSES  Diagnosis: Aortic stenosis  Assessment and Plan of Treatment:     Diagnosis: Atrial fibrillation  Assessment and Plan of Treatment: - You presented with elevated heart rate  - You were treated with Cardizem drip and Lasix   - Please follow up with you cardiologist (Dr. Boyle) within 1 week of discharge PRINCIPAL DISCHARGE DIAGNOSIS  Diagnosis: Stasis dermatitis with ulcer of left lower extremity due to peripheral venous hypertension  Assessment and Plan of Treatment: - You presented with bilateral lower extremity wounds that were painful, red, and draining. You were treated with IV Vancomycin. Symptoms were attributed to possible skin infection and chronic vascular changes.   - Vascular studies of your right lower extremity showed 70% stenosis in your right superficial femoral artery. Left lower extremity showed that the peroneal artery was occluded.   - Wound Care followed you.  - Vascular Surgery followed you and advised no surgical intervention at this time   - Please follow up with Wound Care (Dr. Chong) within 1 week of discharge      SECONDARY DISCHARGE DIAGNOSES  Diagnosis: Aortic stenosis  Assessment and Plan of Treatment: - Echocardiogram showed severe aortic stenosis. Treatment options, including aortic valve replacement were discussed with you and your family.   - Please follow up with you cardiologist (Dr. Boyle) within 1 week of discharge for further management.    Diagnosis: PVD (peripheral vascular disease)  Assessment and Plan of Treatment: PVD (peripheral vascular disease)    Diagnosis: Atrial fibrillation  Assessment and Plan of Treatment: - You presented with elevated heart rate  - You were treated with Cardizem drip, Lasix, and Metoprolol Tartrate  - START Metoprolol Tartrate 25 mg twice a day  - Please follow up with you cardiologist (Dr. Boyle) within 1 week of discharge    Diagnosis: Systolic CHF  Assessment and Plan of Treatment: - Echocardiogram showed systolic dysfunction with ejection fraction of 40%. You were treated with IV Lasix.   - Please follow up with you cardiologist (Dr. Boyle) within 1 week of discharge for further management. PRINCIPAL DISCHARGE DIAGNOSIS  Diagnosis: Stasis dermatitis with ulcer of left lower extremity due to peripheral venous hypertension  Assessment and Plan of Treatment: - You presented with bilateral lower extremity wounds that were painful, red, and draining. You were treated with IV Vancomycin. Symptoms were attributed to possible skin infection and chronic vascular changes.   - Vascular studies of your right lower extremity showed 70% stenosis in your right superficial femoral artery. Left lower extremity showed that the peroneal artery was occluded.   - Vascular Surgery followed you. You had an angiogram of your right lower extremity.   - Wound Care followed you. Continue wound care with silver alginate and dry dressings every other day, drainage dependant and ace wraps.  - Please follow up with Wound Care (Dr. Chong) and Vascular Surgeion (Dr. Arias) within 1 week of discharge.      SECONDARY DISCHARGE DIAGNOSES  Diagnosis: Aortic stenosis  Assessment and Plan of Treatment: - Echocardiogram showed severe aortic stenosis. Treatment options, including aortic valve replacement were discussed with you and your family.   - Please follow up with you cardiologist (Dr. Boyle) within 1 week of discharge for further management.    Diagnosis: Systolic CHF  Assessment and Plan of Treatment: - Echocardiogram showed systolic dysfunction with ejection fraction of 40%. You were treated with IV Lasix.   - Please follow up with you cardiologist (Dr. Boyle) within 1 week of discharge for further management.    Diagnosis: Atrial fibrillation  Assessment and Plan of Treatment: - You presented with elevated heart rate  - You were treated with Cardizem drip, Lasix, and Metoprolol Tartrate  - START Metoprolol Tartrate 25 mg twice a day  - Please follow up with you cardiologist (Dr. Boyle) within 1 week of discharge    Diagnosis: PVD (peripheral vascular disease)  Assessment and Plan of Treatment: - Vascular studies of your right lower extremity showed 70% stenosis in your right superficial femoral artery. Left lower extremity showed that the peroneal artery was occluded.   - Vascular Surgery followed you. You had an angiogram of your right lower extremity.  - Please follow up with Vascular Surgeion (Dr. Arias) within 1 week of discharge. PRINCIPAL DISCHARGE DIAGNOSIS  Diagnosis: Stasis dermatitis with ulcer of left lower extremity due to peripheral venous hypertension  Assessment and Plan of Treatment: - You presented with bilateral lower extremity wounds that were painful, red, and draining. You were treated with IV Vancomycin. Symptoms were attributed to possible skin infection and chronic vascular changes.   - Vascular studies of your right lower extremity showed 70% stenosis in your right superficial femoral artery. Left lower extremity showed that the peroneal artery was occluded.   - Vascular Surgery followed you. You had an angiogram of your lower extremities that showed occlusion of your left superficial femoral artery and popliteal artery and right popliteal artery.  - Wound Care followed you. Continue wound care with silver alginate and dry dressings every other day, drainage dependant and ace wraps.  - Please follow up with Wound Care (Dr. Chong) and Vascular Surgeion (Dr. Arias) within 1 week of discharge from subacute rehab.      SECONDARY DISCHARGE DIAGNOSES  Diagnosis: Atrial fibrillation  Assessment and Plan of Treatment: - You presented with elevated heart rate  - You were treated with Cardizem drip, Lasix, and Metoprolol Tartrate  - STOP home medication Coumadin   - START Metoprolol Tartrate 25 mg twice a day  - START Eliquis 5 mg twice a day  - Please follow up with you cardiologist (Dr. Boyle) within 1 week of discharge    Diagnosis: Aortic stenosis  Assessment and Plan of Treatment: - Echocardiogram showed severe aortic stenosis. Treatment options, including aortic valve replacement were discussed with you and your family.   - Please follow up with you cardiologist (Dr. Boyle) within 1 week of discharge for further management.    Diagnosis: Systolic CHF  Assessment and Plan of Treatment: - Echocardiogram showed systolic dysfunction with ejection fraction of 40%. You were treated with IV Lasix.   - Please follow up with you cardiologist (Dr. Boyle) within 1 week of discharge for further management.    Diagnosis: PVD (peripheral vascular disease)  Assessment and Plan of Treatment: - Please follow up with Vascular Surgeion (Dr. Arias) within 1 week of discharge from subacute rehab. PRINCIPAL DISCHARGE DIAGNOSIS  Diagnosis: Venous stasis ulcer without varicose veins  Assessment and Plan of Treatment: -You presented with bilateral lower extremity wounds, venous stasis ulcres / Cellulitis  that were painful, red, and draining.   -You were treated with IV Vancomycin, later changed to  IV Rocephin daily. Symptoms were attributed to possible 2nd ry infection and chronic vascular stasis skin changes.   - Vascular studies of your right lower extremity showed 70% stenosis in your right superficial femoral artery. Left lower extremity showed that the peroneal artery was occluded.   - Vascular Surgery Dr Goodwin  followed you. You had an angiogram of your lower extremities that showed occlusion of your left superficial femoral artery and popliteal artery and right popliteal artery. No intervention was done. Local wound care to continue.  - Wound Care followed you. Continue DAILY  wound care with silver alginate and dry dressings every other day, drainage dependant and ace wraps.  - Please follow up with Wound Care (Dr. Chong) and Vascular Surge on (Dr. Arias) within 1 week of discharge from subacute rehab.      SECONDARY DISCHARGE DIAGNOSES  Diagnosis: PVD (peripheral vascular disease)  Assessment and Plan of Treatment: - Please follow up with Vascular Surgeion (Dr. Arias) within 1 week of discharge from subacute rehab. 2/2 PAD  -Pt needs aggresive local wound care , follow up with Dr Goodwin in 1-2 weeks on Monday at wound care.  - Wound Care  Dressing B/L Lower EXT . Continue wound care with silver alginate and dry dressings every other day, drainage dependant and ace wraps.  - Please follow up with Wound Care (Dr. Chong) and Vascular Surgeion (Dr. Arias) within 1 week of discharge from subacute rehab.    Diagnosis: Systolic CHF  Assessment and Plan of Treatment: Ac on Chronic systolic CHF - Echocardiogram showed systolic dysfunction with ejection fraction of 40%. You were treated with IV Lasix.   - Please follow up with you cardiologist (Dr. Boyle) within 1 week of discharge for further management.    Diagnosis: Aortic stenosis  Assessment and Plan of Treatment: - Echocardiogram showed severe aortic stenosis. Treatment options, including aortic valve replacement were discussed with you and your family. Out pt close  follow up with Cardio   - Please follow up with you cardiologist (Dr. Boyle) within 1 week of discharge for further management.    Diagnosis: Atrial fibrillation  Assessment and Plan of Treatment: - You presented with rapid  heart rate in A Fib   - You were treated with Cardizem drip, Lasix, and Metoprolol Tartrate  - STOP home medication Coumadin   - START Metoprolol Tartrate 25 mg twice a day  - START Eliquis 5 mg twice a day  - Please follow up with you cardiologist (Dr. Boyle) within 1 week of discharge    Diagnosis: Hyponatremia  Assessment and Plan of Treatment: Hyponatremia Resolved , likely  2/2 fluid overload

## 2020-06-21 NOTE — DISCHARGE NOTE PROVIDER - PROVIDER TOKENS
PROVIDER:[TOKEN:[[id number]]] PROVIDER:[TOKEN:[[id number]]],PROVIDER:[TOKEN:[[id number]]] PROVIDER:[TOKEN:[[id number]]],PROVIDER:[TOKEN:[[id number]]],PROVIDER:[TOKEN:[[id number]]] PROVIDER:[TOKEN:[[id number]]],PROVIDER:[TOKEN:[[id number]]],PROVIDER:[TOKEN:[[id number]]],PROVIDER:[TOKEN:[[id number]]],PROVIDER:[TOKEN:[[id number]]]

## 2020-06-21 NOTE — CONSULT NOTE ADULT - ASSESSMENT
Patient admitted with progressive leg edema and erythema with possible cellulitis. He has weeping lesions no fever or elevated white blood count. He has chronic atrial fibrillation and had a rapid ventricular response. He has no clear evidence for decompensated left-sided heart failure at present.    Recommend    Admit to telemetry    Continue IV Cardizem for now    Echocardiography    Wound care consult    Physical therapy    Further management can be dependent on his clinical course Patient admitted with progressive leg edema and erythema with possible cellulitis. He has weeping lesions no fever or elevated white blood count. He has chronic atrial fibrillation and had a rapid ventricular response. He has no clear evidence for decompensated left-sided heart failure at present.    Recommend    Admit to telemetry    Continue IV Cardizem for now    Agree with diuretics    Abx per med    check proBNP    hold warfarin for now    Echocardiography    Wound care consult    Physical therapy    Further management can be dependent on his clinical course

## 2020-06-21 NOTE — PROGRESS NOTE ADULT - PROBLEM SELECTOR PLAN 1
Venous stasis ulcers with erythema, minimal  Oozing &  with yellowish slough  with severe chronic venous stasis with possible superimposed cellulitis. along with  fluid over load &  edema of B/L lower EXt, Pt is afebrile, NL WBC  - On IV  vanc  q 12 hrs , Vanco level  - PVD,  keep b/l lower EXT elevated  - Blood c/sx 2   - pain control, wound care Eval for local wound  - ID consult -DR gonzalez d/w

## 2020-06-21 NOTE — DISCHARGE NOTE PROVIDER - NSDCFUADDINST_GEN_ALL_CORE_FT
You must follow up with  Vascular Sx-DR Goodwin on Monday in 1week at wound care   Follow up with DR Ahmadi F PMD after D/C from Rehab in 1 week  Follow up with Cardiology Dr Boyle in 1 week from D/C from [facility]

## 2020-06-21 NOTE — PROGRESS NOTE ADULT - PROBLEM SELECTOR PLAN 5
chronic PVD b/l lower EXT with venous stasis dermatitis & skin sloughing   possible wound care  -IV Diuresis

## 2020-06-21 NOTE — CONSULT NOTE ADULT - SUBJECTIVE AND OBJECTIVE BOX
[facility] Consultation    CHIEF COMPLAINT: Patient is a 87y old  Male who presents with a chief complaint of b/l LE weeping edema (20 Jun 2020 19:26)      HPI:  88 y/o M with hx of CHF, afib (on coumadin), lung ca s/p resection presents to ED for b/l weeping edema. Pt reports that his sons made him come into the ED. Reports has been having b/l LE weeping edema for around 1 year. Has gotten worse in the past few day with increasing erythema +/- pus with occasional bouts of pain. Denies any other complaints, no cp, sob, palpitations, abdominal pain, dizziness, palpitations.    Given IV Cardizem and started on Cardizem gtt in the ED for rapid afib (20 Jun 2020 19:26)    He is awake and alert eating breakfast. He has no chest pain, dyspnea, or palpitations. His legs are uncomfortable.      PAST MEDICAL & SURGICAL HISTORY:  Lung cancer  Atrial fibrillation  S/P lobectomy of lung      SOCIAL HISTORY: no tob/etoh    FAMILY HISTORY: no CAD      MEDICATIONS  (STANDING):  diltiazem Infusion 5 mG/Hr (5 mL/Hr) IV Continuous <Continuous>  furosemide   Injectable 20 milliGRAM(s) IV Push daily  vancomycin  IVPB 1000 milliGRAM(s) IV Intermittent every 12 hours    MEDICATIONS  (PRN):  acetaminophen   Tablet .. 650 milliGRAM(s) Oral every 6 hours PRN Temp greater or equal to 38C (100.4F), Mild Pain (1 - 3)      Allergies    No Known Allergies    Intolerances        REVIEW OF SYSTEMS:    CONSTITUTIONAL: pos weakness, no fevers or chills  EYES: No visual changes, No diplopia  ENMT: No throat pain , No exudate  NECK: No pain or stiffness  RESPIRATORY: No cough, wheezing, hemoptysis; No shortness of breath  CARDIOVASCULAR: No chest pain or palpitations  GASTROINTESTINAL: No abdominal pain. No nausea, vomiting, or hematemesis; No diarrhea or constipation. No melena or hematochezia.  GENITOURINARY: No dysuria, frequency or hematuria  NEUROLOGICAL: No numbness or weakness  SKIN: legs with erythema bilat  All other review of systems is negative unless indicated above    VITAL SIGNS:   Vital Signs Last 24 Hrs  T(C): 36.8 (21 Jun 2020 08:04), Max: 36.8 (21 Jun 2020 08:04)  T(F): 98.2 (21 Jun 2020 08:04), Max: 98.2 (21 Jun 2020 08:04)  HR: 85 (21 Jun 2020 08:04) (83 - 108)  BP: 145/78 (21 Jun 2020 08:04) (124/57 - 148/73)  BP(mean): --  RR: 18 (21 Jun 2020 08:04) (17 - 18)  SpO2: 98% (21 Jun 2020 08:04) (97% - 100%)    I&O's Summary    20 Jun 2020 07:01  -  21 Jun 2020 07:00  --------------------------------------------------------  IN: 0 mL / OUT: 775 mL / NET: -775 mL        PHYSICAL EXAM:    Constitutional: NAD, awake and alert, well-developed  Eyes:   Pupils round, no lesions  ENMT: no exudate or erythema  Pulmonary: Non-labored, breath sounds are clear bilaterally, No wheezing, rales or rhonchi  Cardiovascular: PMI not palpable irreg normal S1 and S2, no murmurs, rubs, gallops or clicks  Gastrointestinal: Bowel Sounds present, soft, nontender.   Lymph: No cervical lymphadenopathy.  Neurological: Alert, no focal deficits  Skin: No rashes. Changes of chronic venous stasis. No cyanosis.  Ext: 1+ edema bilat  Psych:  Mood & affect appropriate    LABS: All Labs Reviewed:                        11.1   9.82  )-----------( 217      ( 21 Jun 2020 05:27 )             33.9     21 Jun 2020 05:27    132    |  97     |  14     ----------------------------<  96     4.1     |  25     |  0.57     Ca    8.6        21 Jun 2020 05:27    TPro  5.8    /  Alb  2.9    /  TBili  1.2    /  DBili  x      /  AST  13     /  ALT  14     /  AlkPhos  72     21 Jun 2020 05:27    PT/INR - ( 21 Jun 2020 05:27 )   PT: 73.5 sec;   INR: 6.20 ratio         06-21 @ 05:27  TSH: 1.33    ECG: AF with elev VR, non spec ST/T abn

## 2020-06-21 NOTE — ED ADULT NURSE REASSESSMENT NOTE - NS ED NURSE REASSESS COMMENT FT1
Patient mentating at baseline, stable on cardiac monitor remains on Cardizem, reports no chest pain, will monitor support and safety maintained.

## 2020-06-21 NOTE — PROGRESS NOTE ADULT - PROBLEM SELECTOR PLAN 2
Rapid A Fib, -Pt stopped home Cardizem on his own, Non compliant for Cardiology follow up & meds, Cardiology Dr Shankar   - continue Cardizem gtt, 5 mg , HR Stable now , Change to PO meds as per Cardiology  - INR supra therapeutic, check daily INR/dose AM   -- thyroid panel Rapid A Fib, -Pt stopped home Cardizem on his own, Non compliant for Cardiology follow up & meds, Cardiology Dr Shankar   - continue Cardizem gtt, 5 mg , HR Stable now , Change to PO meds as per Cardiology Dr Shankar  - INR supra therapeutic, check daily INR/dose AM   -- thyroid panel-Nl

## 2020-06-21 NOTE — PHYSICAL THERAPY INITIAL EVALUATION ADULT - ADDITIONAL COMMENTS
Pt states he lives alone in split level home with KEN with single HR, denies having needed AD, drives and was fully independent prior.

## 2020-06-21 NOTE — PROGRESS NOTE ADULT - ASSESSMENT
86 y/o M with hx of CHF, afib (on coumadin), lung ca s/p resection presents to ED for b/l weeping edema. Admitted for b/l LE cellulitis, Rapid A Fib with R/O CHF

## 2020-06-21 NOTE — DISCHARGE NOTE PROVIDER - CARE PROVIDERS DIRECT ADDRESSES
,DirectAddress_Unknown ,DirectAddress_Unknown,[email or web address] ,DirectAddress_Unknown,[email or web address],DirectAddress_Unknown ,DirectAddress_Unknown,[email or web address],DirectAddress_Unknown,DirectAddress_Unknown,DirectAddress_Unknown

## 2020-06-21 NOTE — DISCHARGE NOTE PROVIDER - CARE PROVIDER_API CALL
Inderjit Ahmadi  INTERNAL MEDICINE  [address] [place] [address]  Phone: [phone]  Fax: [phone]  Follow Up Time: Inderjit Ahmadi  INTERNAL MEDICINE  [address] [place] [address]  Phone: [phone]  Fax: [phone]  Follow Up Time:     Phil Boyle  CARDIOVASCULAR DISEASE  [address] [place] [address]  Phone: [phone]  Fax: [phone]  Follow Up Time: Inderjit Ahmadi  INTERNAL MEDICINE  [address] [place] [address]  Phone: [phone]  Fax: [phone]  Follow Up Time:     Phil Boyle  CARDIOVASCULAR DISEASE  [address] [place] [address]  Phone: [phone]  Fax: [phone]  Follow Up Time: Inderjit Ahmadi  INTERNAL MEDICINE  [address] [place] [address]  Phone: [phone]  Fax: [phone]  Follow Up Time:     Phil Boyle  CARDIOVASCULAR DISEASE  [address] [place] [address]  Phone: [phone]  Fax: [phone]  Follow Up Time:     Shahla Schwarz)  Vascular Surgery  [address] [place] [address]  Phone: [phone]  Fax: [phone]  Follow Up Time: Inderjit Ahmadi  INTERNAL MEDICINE  [address] [place] [address]  Phone: [phone]  Fax: [phone]  Follow Up Time:     Phil Boyle  CARDIOVASCULAR DISEASE  [address] [place] [address]  Phone: [phone]  Fax: [phone]  Follow Up Time:     Shahla Schwarz (MD)  Vascular Surgery  [address] [place] [address]  Phone: [phone]  Fax: [phone]  Follow Up Time:     Nemesio Harrington  HEMATOLOGY  [address] [facility] [address]  [place] [address]  Phone: [phone]  Fax: [phone]  Follow Up Time:     Vlad Otero (DPM)  [facility]  [address] [place] [address]  Phone: [phone]  Fax: [phone]  Follow Up Time:

## 2020-06-21 NOTE — PROVIDER CONTACT NOTE (CRITICAL VALUE NOTIFICATION) - ACTION/TREATMENT ORDERED:
Call placed to Dr DC Wylie   06:27 Second call placed to Dr DC Wylie Call placed to Dr DC Wylie   06:27 Second call placed to Dr DC Wylie  06:32 Dr DC Wylie notified

## 2020-06-22 DIAGNOSIS — I87.331 CHRONIC VENOUS HYPERTENSION (IDIOPATHIC) WITH ULCER AND INFLAMMATION OF RIGHT LOWER EXTREMITY: ICD-10-CM

## 2020-06-22 DIAGNOSIS — I50.20 UNSPECIFIED SYSTOLIC (CONGESTIVE) HEART FAILURE: ICD-10-CM

## 2020-06-22 DIAGNOSIS — I83.813 VARICOSE VEINS OF BILATERAL LOWER EXTREMITIES WITH PAIN: ICD-10-CM

## 2020-06-22 DIAGNOSIS — I87.332 CHRONIC VENOUS HYPERTENSION (IDIOPATHIC) WITH ULCER AND INFLAMMATION OF LEFT LOWER EXTREMITY: ICD-10-CM

## 2020-06-22 DIAGNOSIS — I35.0 NONRHEUMATIC AORTIC (VALVE) STENOSIS: ICD-10-CM

## 2020-06-22 DIAGNOSIS — I87.2 VENOUS INSUFFICIENCY (CHRONIC) (PERIPHERAL): ICD-10-CM

## 2020-06-22 PROBLEM — I48.91 UNSPECIFIED ATRIAL FIBRILLATION: Chronic | Status: ACTIVE | Noted: 2020-06-20

## 2020-06-22 LAB
ANION GAP SERPL CALC-SCNC: 7 MMOL/L — SIGNIFICANT CHANGE UP (ref 5–17)
BUN SERPL-MCNC: 12 MG/DL — SIGNIFICANT CHANGE UP (ref 7–23)
CALCIUM SERPL-MCNC: 8.4 MG/DL — LOW (ref 8.5–10.1)
CHLORIDE SERPL-SCNC: 94 MMOL/L — LOW (ref 96–108)
CO2 SERPL-SCNC: 28 MMOL/L — SIGNIFICANT CHANGE UP (ref 22–31)
CREAT SERPL-MCNC: 0.57 MG/DL — SIGNIFICANT CHANGE UP (ref 0.5–1.3)
GLUCOSE SERPL-MCNC: 98 MG/DL — SIGNIFICANT CHANGE UP (ref 70–99)
HCT VFR BLD CALC: 33.7 % — LOW (ref 39–50)
HGB BLD-MCNC: 11.4 G/DL — LOW (ref 13–17)
INR BLD: 6.6 RATIO — CRITICAL HIGH (ref 0.88–1.16)
MCHC RBC-ENTMCNC: 28.9 PG — SIGNIFICANT CHANGE UP (ref 27–34)
MCHC RBC-ENTMCNC: 33.8 GM/DL — SIGNIFICANT CHANGE UP (ref 32–36)
MCV RBC AUTO: 85.5 FL — SIGNIFICANT CHANGE UP (ref 80–100)
NRBC # BLD: 0 /100 WBCS — SIGNIFICANT CHANGE UP (ref 0–0)
PLATELET # BLD AUTO: 253 K/UL — SIGNIFICANT CHANGE UP (ref 150–400)
POTASSIUM SERPL-MCNC: 3.6 MMOL/L — SIGNIFICANT CHANGE UP (ref 3.5–5.3)
POTASSIUM SERPL-SCNC: 3.6 MMOL/L — SIGNIFICANT CHANGE UP (ref 3.5–5.3)
PROTHROM AB SERPL-ACNC: 78.4 SEC — HIGH (ref 10–12.9)
RBC # BLD: 3.94 M/UL — LOW (ref 4.2–5.8)
RBC # FLD: 15.8 % — HIGH (ref 10.3–14.5)
SODIUM SERPL-SCNC: 129 MMOL/L — LOW (ref 135–145)
WBC # BLD: 9.97 K/UL — SIGNIFICANT CHANGE UP (ref 3.8–10.5)
WBC # FLD AUTO: 9.97 K/UL — SIGNIFICANT CHANGE UP (ref 3.8–10.5)

## 2020-06-22 PROCEDURE — 99233 SBSQ HOSP IP/OBS HIGH 50: CPT

## 2020-06-22 PROCEDURE — 93922 UPR/L XTREMITY ART 2 LEVELS: CPT | Mod: 26

## 2020-06-22 PROCEDURE — 99221 1ST HOSP IP/OBS SF/LOW 40: CPT

## 2020-06-22 RX ORDER — CEFTRIAXONE 500 MG/1
1000 INJECTION, POWDER, FOR SOLUTION INTRAMUSCULAR; INTRAVENOUS EVERY 24 HOURS
Refills: 0 | Status: DISCONTINUED | OUTPATIENT
Start: 2020-06-23 | End: 2020-06-26

## 2020-06-22 RX ORDER — WARFARIN SODIUM 2.5 MG/1
1 TABLET ORAL
Qty: 0 | Refills: 0 | DISCHARGE

## 2020-06-22 RX ORDER — DILTIAZEM HCL 120 MG
30 CAPSULE, EXT RELEASE 24 HR ORAL
Refills: 0 | Status: DISCONTINUED | OUTPATIENT
Start: 2020-06-22 | End: 2020-06-23

## 2020-06-22 RX ORDER — CEFTRIAXONE 500 MG/1
INJECTION, POWDER, FOR SOLUTION INTRAMUSCULAR; INTRAVENOUS
Refills: 0 | Status: DISCONTINUED | OUTPATIENT
Start: 2020-06-22 | End: 2020-06-26

## 2020-06-22 RX ORDER — POTASSIUM CHLORIDE 20 MEQ
40 PACKET (EA) ORAL ONCE
Refills: 0 | Status: COMPLETED | OUTPATIENT
Start: 2020-06-22 | End: 2020-06-22

## 2020-06-22 RX ORDER — DILTIAZEM HCL 120 MG
30 CAPSULE, EXT RELEASE 24 HR ORAL EVERY 6 HOURS
Refills: 0 | Status: DISCONTINUED | OUTPATIENT
Start: 2020-06-22 | End: 2020-06-22

## 2020-06-22 RX ORDER — POTASSIUM CHLORIDE 20 MEQ
40 PACKET (EA) ORAL ONCE
Refills: 0 | Status: DISCONTINUED | OUTPATIENT
Start: 2020-06-22 | End: 2020-06-22

## 2020-06-22 RX ORDER — CEFTRIAXONE 500 MG/1
1000 INJECTION, POWDER, FOR SOLUTION INTRAMUSCULAR; INTRAVENOUS ONCE
Refills: 0 | Status: COMPLETED | OUTPATIENT
Start: 2020-06-22 | End: 2020-06-22

## 2020-06-22 RX ORDER — DILTIAZEM HCL 120 MG
30 CAPSULE, EXT RELEASE 24 HR ORAL
Refills: 0 | Status: DISCONTINUED | OUTPATIENT
Start: 2020-06-22 | End: 2020-06-22

## 2020-06-22 RX ADMIN — CEFTRIAXONE 100 MILLIGRAM(S): 500 INJECTION, POWDER, FOR SOLUTION INTRAMUSCULAR; INTRAVENOUS at 16:12

## 2020-06-22 RX ADMIN — Medication 250 MILLIGRAM(S): at 05:06

## 2020-06-22 RX ADMIN — Medication 650 MILLIGRAM(S): at 08:57

## 2020-06-22 RX ADMIN — Medication 40 MILLIEQUIVALENT(S): at 10:19

## 2020-06-22 RX ADMIN — Medication 650 MILLIGRAM(S): at 21:14

## 2020-06-22 RX ADMIN — Medication 20 MILLIGRAM(S): at 05:07

## 2020-06-22 RX ADMIN — Medication 30 MILLIGRAM(S): at 15:05

## 2020-06-22 RX ADMIN — Medication 30 MILLIGRAM(S): at 20:46

## 2020-06-22 NOTE — PROGRESS NOTE ADULT - ASSESSMENT
86 y/o M with hx of CHF, afib (on coumadin), lung ca s/p resection presents to ED for b/l weeping edema. Admitted for b/l LE cellulitis, Rapid A Fib with R/O CHF 86 y/o M with hx of CHF, afib (on coumadin), lung ca s/p resection presents to ED for b/l weeping edema. Admitted for b/l LE cellulitis, Rapid A Fib, and acute exacerbation of systolic CHF.

## 2020-06-22 NOTE — PROGRESS NOTE ADULT - PROBLEM SELECTOR PLAN 6
DVT ppx: on coumadin chronic PVD b/l lower EXT with venous stasis dermatitis & skin blistering  - Wound care (Dr. Chong) consulted, recs appreciated - Skin changes attributed to bilateral stasis dermatitis and bilateral venous hypertension with edema and venous stasis ulcers and weeping - silver alginate and dry dressings every other day, drainage dependant, and ace wraps if vascular studies permit chronic PVD b/l lower EXT with venous stasis dermatitis & skin blistering-dry now   - Wound care (Dr. Chong) consulted, recs appreciated - Skin changes attributed to bilateral stasis dermatitis and bilateral venous hypertension with edema and venous stasis ulcers and weeping - silver alginate and dry dressings every other day, drainage dependant, and ace wraps if vascular studies permit

## 2020-06-22 NOTE — PROGRESS NOTE ADULT - PROBLEM SELECTOR PLAN 3
2/2 Rapid A Fib, pt with elevated BNP, Hyponatremia & leg edema  - unclear about previous echo, last seen cardio >20 yrs ago  - continue lasix 20 mg iv daily  - i/os, daily weights  - check TTE  - Cardio consulted (torrey), recs appreciated 2/2 Rapid A Fib, pt with elevated BNP, Hyponatremia & leg edema  - unclear about previous echo, last seen cardio >20 yrs ago.  - Echo (6/22) - Mild concentric LVH with moderate global LV systolic dysfunction with LVEF of 40%, grossly normal RV size and systolic functio, calcified trileaflet aortic valve with severe aortic stenosis, mild to moderate MR, mild TR.   - continue lasix 20 mg iv daily  - i/os, daily weights  - Cardio consulted (torrey), recs appreciated 2/2 Rapid A Fib, pt with elevated BNP, Hyponatremia & leg edema  - unclear about previous echo, last seen cardio >20 yrs ago.  - Echo (6/22) - Mild concentric LVH with moderate global LV systolic dysfunction with LVEF of 40%, grossly normal RV size and systolic functio, calcified trileaflet aortic valve with severe aortic stenosis, mild to moderate MR, mild TR.   - continue Lasix 20 mg IV daily  - i/os, daily weights  - Cardio consulted (torrey), recs appreciated

## 2020-06-22 NOTE — PROGRESS NOTE ADULT - SUBJECTIVE AND OBJECTIVE BOX
Patient is a 87y old  Male who presents with a chief complaint of b/l LE weeping edema (21 Jun 2020 18:00)    HPI:  86 y/o M with hx of , afib (on coumadin), 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 CHF , PVD  presents to ED for worsening edema with  b/l weeping skin & erythema with pain that bothers him to walk  . Pt reports that his sons made him come into the ED. Reports has been having b/l LE edema with  weeping legs  for around 1 1/2 year. Has gotten worse in the past few day with increasing erythema, skin blisters +/- pus with occasional bouts of pain. Denies any other complaints, no fever, No chills ,No  cp, sob, palpitations, abdominal pain, dizziness, palpitations.  Pt was transferred from [place] ER after he got IV Vanco & IV Lasix, IV Cardizem     Given IV Cardizem and started on Cardizem gtt in the ED for rapid afib (20 Jun 2020 19:26)    INTERVAL HPI:  6/22/2020: Patient seen and examined at bedside. Low, stable H/H. INR 6.6. Hyponatremia, Na 132 --> 129 today. On Vancomycin.     OVERNIGHT EVENTS: NONE    Home Medications:  Coumadin 2.5 mg oral tablet: 1 tab(s) orally once a day (20 Jun 2020 19:48)      MEDICATIONS  (STANDING):  diltiazem Infusion 5 mG/Hr (5 mL/Hr) IV Continuous <Continuous>  furosemide   Injectable 20 milliGRAM(s) IV Push daily  gentamicin 0.3% Ophthalmic Solution 1 Drop(s) Both EYES five times a day  potassium chloride    Tablet ER 40 milliEquivalent(s) Oral once  vancomycin  IVPB 1000 milliGRAM(s) IV Intermittent every 12 hours    MEDICATIONS  (PRN):  acetaminophen   Tablet .. 650 milliGRAM(s) Oral every 6 hours PRN Temp greater or equal to 38C (100.4F), Mild Pain (1 - 3)      No Known Allergies      Social History:  Never smoker, denies etoh and drug abuse  , Lives alone, Retired  (20 Jun 2020 19:26)      REVIEW OF SYSTEMS:  CONSTITUTIONAL: No fever, No chills, No fatigue, No myalgia, No Body ache, No Weakness  EYES: No eye pain,  No visual disturbances, No discharge, No Redness  ENMT: No ear pain, No nose bleed, No vertigo; No sinus pain, No throat pain, No Congestion  NECK: No pain, No stiffness  RESPIRATORY: No cough, No wheezing, No hemoptysis, No shortness of breath  CARDIOVASCULAR: No chest pain, No palpitations  GASTROINTESTINAL: No abdominal pain, No epigastric pain. No nausea, No vomiting, No diarrhea, No constipation; [ x ] BM  GENITOURINARY: No dysuria, No frequency, No urgency, No hematuria, No incontinence  NEUROLOGICAL: No headaches, No dizziness, No numbness, No tingling, No tremors, No weakness  EXTREMITIES: No Swelling, No Pain, No Edema  SKIN: [ x ] Redness lower extremities b/l   MUSCULOSKELETAL: No joint pain, No joint swelling; No muscle pain, No back pain, No extremity pain  PSYCHIATRIC: No depression, No anxiety, No mood swings, No difficulty sleeping at night  PAIN SCALE: [ x ] None  [  ] Other-  ROS Unable to obtain due to: [  ] Dementia  [  ] Lethargy  [  ] Sedated  [  ] Non verbal  REST OF REVIEW OF SYSTEMS: [ x ] Normal     Vital Signs Last 24 Hrs  T(C): 36.8 (22 Jun 2020 05:08), Max: 36.8 (22 Jun 2020 05:08)  T(F): 98.2 (22 Jun 2020 05:08), Max: 98.2 (22 Jun 2020 05:08)  HR: 90 (22 Jun 2020 05:08) (84 - 90)  BP: 126/75 (22 Jun 2020 05:08) (116/81 - 126/84)  BP(mean): --  RR: 18 (22 Jun 2020 05:08) (18 - 20)  SpO2: 98% (22 Jun 2020 05:08) (97% - 98%)    CAPILLARY BLOOD GLUCOSE          I&O's Summary    21 Jun 2020 07:01  -  22 Jun 2020 07:00  --------------------------------------------------------  IN: 300 mL / OUT: 1800 mL / NET: -1500 mL      PHYSICAL EXAM:  GENERAL:  [ x ] NAD, [ x ] Well appearing, [  ] Agitated, [  ] Drowsy, [  ] Lethargy, [  ] Confused   HEAD:  [ x ] Normal, [  ] Other  EYES:  [ x ] EOMI, [ x ] PERRLA, [ x ] Conjunctiva and sclera clear normal, [  ] Other, [  ] Pallor, [  ] Discharge  ENMT:  [ x ] Normal, [ x ] Moist mucous membranes, [ x ] Good dentition, [ x ] No thrush  NECK:  [ x ] Supple, [ x ] No JVD, [ x ] Normal thyroid, [ x ] Lymphadenopathy, [  ] Other  CHEST/LUNG:  [ x ] Clear to auscultation bilaterally, [ x ] Breath Sounds equal B/L, [  ] Poor effort, [ x ] No rales, [ x ] No rhonchi, [ x ] No wheezing  HEART:  [ x ] Regular rate and rhythm, [  ] Tachycardia, [  ] Bradycardia, [  ] Irregular, [ x ] No murmurs, No rubs, No gallops, [  ] PPM in place (Mfr:  )  ABDOMEN:  [ x ] Soft, [ x ] Nontender, [ x ] Nondistended, [ x ] No mass, [  x] Bowel sounds present, [  ] Obese  NERVOUS SYSTEM:  [ x ] Alert & Oriented x3, [  ] Nonfocal, [  ] Confusion, [  ] Encephalopathic, [  ] Sedated, [  ] Unable to assess, [  ] Dementia, [  ] Other-  EXTREMITIES:  [ x ] 2+ Peripheral Pulses, No clubbing, No cyanosis,  [ x ] Mild Edema B/L lower EXT, [ x ] PVD stasis skin changes B/L lower EXT, [ x ] b/l le erythema, ulcers, no foul smell, no warmth, clear discharge   LYMPH:  No lymphadenopathy noted  SKIN:  [ x ] No rashes or lesions, [  ] Pressure ulcers, [  ] Ecchymosis, [  ] Skin tears    DIET: Diet, DASH/TLC:   Sodium & Cholesterol Restricted  1500mL Fluid Restriction ([id number]) (06-22-20 @ 08:06)      LABS:                        11.4   9.97  )-----------( 253      ( 22 Jun 2020 06:48 )             33.7     22 Jun 2020 06:48    129    |  94     |  12     ----------------------------<  98     3.6     |  28     |  0.57     Ca    8.4        22 Jun 2020 06:48      PT/INR - ( 22 Jun 2020 06:48 )   PT: 78.4 sec;   INR: 6.60 ratio                        Anemia Panel:      Thyroid Panel:  T4, Serum: 6.2 ug/dL (06-21-20 @ 11:44)  Thyroid Stimulating Hormone, Serum: 1.33 uIU/mL (06-21-20 @ 05:27)            Serum Pro-Brain Natriuretic Peptide: 5624 pg/mL (06-21-20 @ 05:27)      RADIOLOGY & ADDITIONAL TESTS:      HEALTH ISSUES - PROBLEM Dx:  CHF (congestive heart failure): CHF (congestive heart failure)  PVD (peripheral vascular disease): PVD (peripheral vascular disease)  Hyponatremia: Hyponatremia  Need for prophylactic measure: Need for prophylactic measure  Atrial fibrillation: Atrial fibrillation  Acute on chronic congestive heart failure, unspecified heart failure type: Acute on chronic congestive heart failure, unspecified heart failure type  Cellulitis of lower extremity, unspecified laterality: Cellulitis of lower extremity, unspecified laterality        Consultant(s) Notes Reviewed:  [ x ] YES     Care Discussed with [ x ] Consultants, [ x ] Patient, [  ] Family, [  ] HCP, [  ] , [  ] Social Service, [ x ] RN, [  ] Physical Therapy, [  ] Palliative Care Team  DVT PPX: [  ] Lovenox, [  ] SC Heparin, [ x ] Coumadin, [  ] Xarelto, [  ] Eliquis, [  ] Pradaxa, [  ] IV Heparin drip, [  ] SCD, [  ] Ambulation, [  ] Contraindicated 2/2 GI Bleed, [  ] Contraindicated 2/2  Bleed, [  ] Contraindicated 2/2 Brain Bleed  Advanced Directive: [  ] None, [  ] DNR/DNI Patient is a 87y old  Male who presents with a chief complaint of b/l LE weeping edema (21 Jun 2020 18:00)    HPI:  88 y/o M with hx of , afib (on coumadin), 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 CHF , PVD  presents to ED for worsening edema with  b/l weeping skin & erythema with pain that bothers him to walk  . Pt reports that his sons made him come into the ED. Reports has been having b/l LE edema with  weeping legs  for around 1 1/2 year. Has gotten worse in the past few day with increasing erythema, skin blisters +/- pus with occasional bouts of pain. Denies any other complaints, no fever, No chills ,No  cp, sob, palpitations, abdominal pain, dizziness, palpitations.  Pt was transferred from [place] ER after he got IV Vanco & IV Lasix, IV Cardizem     Given IV Cardizem and started on Cardizem gtt in the ED for rapid afib (20 Jun 2020 19:26)    INTERVAL HPI:  6/22/2020: Patient seen and examined at bedside. Low, stable H/H. INR 6.6. Hyponatremia, Na 132 --> 129 today. On Vancomycin. Stop Cardizem gtt. Started Cardizem 30 mg PO q6h.     OVERNIGHT EVENTS: NONE    Home Medications:  Coumadin 2.5 mg oral tablet: 1 tab(s) orally once a day (20 Jun 2020 19:48)      MEDICATIONS  (STANDING):  diltiazem Infusion 5 mG/Hr (5 mL/Hr) IV Continuous <Continuous>  furosemide   Injectable 20 milliGRAM(s) IV Push daily  gentamicin 0.3% Ophthalmic Solution 1 Drop(s) Both EYES five times a day  potassium chloride    Tablet ER 40 milliEquivalent(s) Oral once  vancomycin  IVPB 1000 milliGRAM(s) IV Intermittent every 12 hours    MEDICATIONS  (PRN):  acetaminophen   Tablet .. 650 milliGRAM(s) Oral every 6 hours PRN Temp greater or equal to 38C (100.4F), Mild Pain (1 - 3)      No Known Allergies      Social History:  Never smoker, denies etoh and drug abuse  , Lives alone, Retired  (20 Jun 2020 19:26)      REVIEW OF SYSTEMS:  CONSTITUTIONAL: No fever, No chills, No fatigue, No myalgia, No Body ache, No Weakness  EYES: No eye pain,  No visual disturbances, No discharge, No Redness  ENMT: No ear pain, No nose bleed, No vertigo; No sinus pain, No throat pain, No Congestion  NECK: No pain, No stiffness  RESPIRATORY: No cough, No wheezing, No hemoptysis, No shortness of breath  CARDIOVASCULAR: No chest pain, No palpitations  GASTROINTESTINAL: No abdominal pain, No epigastric pain. No nausea, No vomiting, No diarrhea, No constipation; [ x ] BM  GENITOURINARY: No dysuria, No frequency, No urgency, No hematuria, No incontinence  NEUROLOGICAL: No headaches, No dizziness, No numbness, No tingling, No tremors, No weakness  EXTREMITIES: No Swelling, No Pain, No Edema  SKIN: [ x ] Redness lower extremities b/l   MUSCULOSKELETAL: No joint pain, No joint swelling; No muscle pain, No back pain, No extremity pain  PSYCHIATRIC: No depression, No anxiety, No mood swings, No difficulty sleeping at night  PAIN SCALE: [ x ] None  [  ] Other-  ROS Unable to obtain due to: [  ] Dementia  [  ] Lethargy  [  ] Sedated  [  ] Non verbal  REST OF REVIEW OF SYSTEMS: [ x ] Normal     Vital Signs Last 24 Hrs  T(C): 36.8 (22 Jun 2020 05:08), Max: 36.8 (22 Jun 2020 05:08)  T(F): 98.2 (22 Jun 2020 05:08), Max: 98.2 (22 Jun 2020 05:08)  HR: 90 (22 Jun 2020 05:08) (84 - 90)  BP: 126/75 (22 Jun 2020 05:08) (116/81 - 126/84)  BP(mean): --  RR: 18 (22 Jun 2020 05:08) (18 - 20)  SpO2: 98% (22 Jun 2020 05:08) (97% - 98%)    CAPILLARY BLOOD GLUCOSE          I&O's Summary    21 Jun 2020 07:01  -  22 Jun 2020 07:00  --------------------------------------------------------  IN: 300 mL / OUT: 1800 mL / NET: -1500 mL      PHYSICAL EXAM:  GENERAL:  [ x ] NAD, [ x ] Well appearing, [  ] Agitated, [  ] Drowsy, [  ] Lethargy, [  ] Confused   HEAD:  [ x ] Normal, [  ] Other  EYES:  [ x ] EOMI, [ x ] PERRLA, [ x ] Conjunctiva and sclera clear normal, [  ] Other, [  ] Pallor, [  ] Discharge  ENMT:  [ x ] Normal, [ x ] Moist mucous membranes, [ x ] Good dentition, [ x ] No thrush  NECK:  [ x ] Supple, [ x ] No JVD, [ x ] Normal thyroid, [ x ] Lymphadenopathy, [  ] Other  CHEST/LUNG:  [ x ] Clear to auscultation bilaterally, [ x ] Breath Sounds equal B/L, [  ] Poor effort, [ x ] No rales, [ x ] No rhonchi, [ x ] No wheezing  HEART:  [ x ] Regular rate and rhythm, [  ] Tachycardia, [  ] Bradycardia, [  ] Irregular, [ x ] No murmurs, No rubs, No gallops, [  ] PPM in place (Mfr:  )  ABDOMEN:  [ x ] Soft, [ x ] Nontender, [ x ] Nondistended, [ x ] No mass, [  x] Bowel sounds present, [  ] Obese  NERVOUS SYSTEM:  [ x ] Alert & Oriented x3, [  ] Nonfocal, [  ] Confusion, [  ] Encephalopathic, [  ] Sedated, [  ] Unable to assess, [  ] Dementia, [  ] Other-  EXTREMITIES:  [ x ] 2+ Peripheral Pulses, No clubbing, No cyanosis,  [ x ] Mild Edema B/L lower EXT, [ x ] PVD stasis skin changes B/L lower EXT, [ x ] b/l le erythema, ulcers, no foul smell, no warmth, clear discharge   LYMPH:  No lymphadenopathy noted  SKIN:  [ x ] No rashes or lesions, [  ] Pressure ulcers, [  ] Ecchymosis, [  ] Skin tears    DIET: Diet, DASH/TLC:   Sodium & Cholesterol Restricted  1500mL Fluid Restriction ([id number]) (06-22-20 @ 08:06)      LABS:                        11.4   9.97  )-----------( 253      ( 22 Jun 2020 06:48 )             33.7     22 Jun 2020 06:48    129    |  94     |  12     ----------------------------<  98     3.6     |  28     |  0.57     Ca    8.4        22 Jun 2020 06:48      PT/INR - ( 22 Jun 2020 06:48 )   PT: 78.4 sec;   INR: 6.60 ratio                        Anemia Panel:      Thyroid Panel:  T4, Serum: 6.2 ug/dL (06-21-20 @ 11:44)  Thyroid Stimulating Hormone, Serum: 1.33 uIU/mL (06-21-20 @ 05:27)            Serum Pro-Brain Natriuretic Peptide: 5624 pg/mL (06-21-20 @ 05:27)      RADIOLOGY & ADDITIONAL TESTS:      HEALTH ISSUES - PROBLEM Dx:  CHF (congestive heart failure): CHF (congestive heart failure)  PVD (peripheral vascular disease): PVD (peripheral vascular disease)  Hyponatremia: Hyponatremia  Need for prophylactic measure: Need for prophylactic measure  Atrial fibrillation: Atrial fibrillation  Acute on chronic congestive heart failure, unspecified heart failure type: Acute on chronic congestive heart failure, unspecified heart failure type  Cellulitis of lower extremity, unspecified laterality: Cellulitis of lower extremity, unspecified laterality        Consultant(s) Notes Reviewed:  [ x ] YES     Care Discussed with [ x ] Consultants, [ x ] Patient, [  ] Family, [  ] HCP, [  ] , [  ] Social Service, [ x ] RN, [  ] Physical Therapy, [  ] Palliative Care Team  DVT PPX: [  ] Lovenox, [  ] SC Heparin, [ x ] Coumadin, [  ] Xarelto, [  ] Eliquis, [  ] Pradaxa, [  ] IV Heparin drip, [  ] SCD, [  ] Ambulation, [  ] Contraindicated 2/2 GI Bleed, [  ] Contraindicated 2/2  Bleed, [  ] Contraindicated 2/2 Brain Bleed  Advanced Directive: [  ] None, [  ] DNR/DNI Patient is a 87y old  Male who presents with a chief complaint of b/l LE weeping edema (21 Jun 2020 18:00)    HPI:  86 y/o M with hx of , afib (on coumadin), 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 CHF , PVD  presents to ED for worsening edema with  b/l weeping skin & erythema with pain that bothers him to walk  . Pt reports that his sons made him come into the ED. Reports has been having b/l LE edema with  weeping legs  for around 1 1/2 year. Has gotten worse in the past few day with increasing erythema, skin blisters +/- pus with occasional bouts of pain. Denies any other complaints, no fever, No chills ,No  cp, sob, palpitations, abdominal pain, dizziness, palpitations.  Pt was transferred from [place] ER after he got IV Vanco & IV Lasix, IV Cardizem     Given IV Cardizem and started on Cardizem gtt in the ED for rapid afib (20 Jun 2020 19:26)    INTERVAL HPI:  6/22/2020: Patient seen and examined at bedside. Low, stable H/H. INR 6.6. Hyponatremia, Na 132 --> 129 today. On Vancomycin. Stop Cardizem gtt. Started Cardizem 30 mg PO q6h. Vascular studies pending. Wound care evaluated patient - start silver alginate and dry dressings every other day, drainage dependant, and ace wraps if vascular studies permit.    OVERNIGHT EVENTS: NONE    Home Medications:  Coumadin 2.5 mg oral tablet: 1 tab(s) orally once a day (20 Jun 2020 19:48)      MEDICATIONS  (STANDING):  diltiazem Infusion 5 mG/Hr (5 mL/Hr) IV Continuous <Continuous>  furosemide   Injectable 20 milliGRAM(s) IV Push daily  gentamicin 0.3% Ophthalmic Solution 1 Drop(s) Both EYES five times a day  potassium chloride    Tablet ER 40 milliEquivalent(s) Oral once  vancomycin  IVPB 1000 milliGRAM(s) IV Intermittent every 12 hours    MEDICATIONS  (PRN):  acetaminophen   Tablet .. 650 milliGRAM(s) Oral every 6 hours PRN Temp greater or equal to 38C (100.4F), Mild Pain (1 - 3)      No Known Allergies      Social History:  Never smoker, denies etoh and drug abuse  , Lives alone, Retired  (20 Jun 2020 19:26)      REVIEW OF SYSTEMS:  CONSTITUTIONAL: No fever, No chills, No fatigue, No myalgia, No Body ache, No Weakness  EYES: No eye pain,  No visual disturbances, No discharge, No Redness  ENMT: No ear pain, No nose bleed, No vertigo; No sinus pain, No throat pain, No Congestion  NECK: No pain, No stiffness  RESPIRATORY: No cough, No wheezing, No hemoptysis, No shortness of breath  CARDIOVASCULAR: No chest pain, No palpitations  GASTROINTESTINAL: No abdominal pain, No epigastric pain. No nausea, No vomiting, No diarrhea, No constipation; [ x ] BM  GENITOURINARY: No dysuria, No frequency, No urgency, No hematuria, No incontinence  NEUROLOGICAL: No headaches, No dizziness, No numbness, No tingling, No tremors, No weakness  EXTREMITIES: No Swelling, No Pain, No Edema  SKIN: [ x ] Redness lower extremities b/l   MUSCULOSKELETAL: No joint pain, No joint swelling; No muscle pain, No back pain, No extremity pain  PSYCHIATRIC: No depression, No anxiety, No mood swings, No difficulty sleeping at night  PAIN SCALE: [ x ] None  [  ] Other-  ROS Unable to obtain due to: [  ] Dementia  [  ] Lethargy  [  ] Sedated  [  ] Non verbal  REST OF REVIEW OF SYSTEMS: [ x ] Normal     Vital Signs Last 24 Hrs  T(C): 36.8 (22 Jun 2020 05:08), Max: 36.8 (22 Jun 2020 05:08)  T(F): 98.2 (22 Jun 2020 05:08), Max: 98.2 (22 Jun 2020 05:08)  HR: 90 (22 Jun 2020 05:08) (84 - 90)  BP: 126/75 (22 Jun 2020 05:08) (116/81 - 126/84)  BP(mean): --  RR: 18 (22 Jun 2020 05:08) (18 - 20)  SpO2: 98% (22 Jun 2020 05:08) (97% - 98%)    CAPILLARY BLOOD GLUCOSE          I&O's Summary    21 Jun 2020 07:01  -  22 Jun 2020 07:00  --------------------------------------------------------  IN: 300 mL / OUT: 1800 mL / NET: -1500 mL      PHYSICAL EXAM:  GENERAL:  [ x ] NAD, [ x ] Well appearing, [  ] Agitated, [  ] Drowsy, [  ] Lethargy, [  ] Confused   HEAD:  [ x ] Normal, [  ] Other  EYES:  [ x ] EOMI, [ x ] PERRLA, [ x ] Conjunctiva and sclera clear normal, [  ] Other, [  ] Pallor, [  ] Discharge  ENMT:  [ x ] Normal, [ x ] Moist mucous membranes, [ x ] Good dentition, [ x ] No thrush  NECK:  [ x ] Supple, [ x ] No JVD, [ x ] Normal thyroid, [ x ] Lymphadenopathy, [  ] Other  CHEST/LUNG:  [ x ] Clear to auscultation bilaterally, [ x ] Breath Sounds equal B/L, [  ] Poor effort, [ x ] No rales, [ x ] No rhonchi, [ x ] No wheezing  HEART:  [ x ] Regular rate and rhythm, [  ] Tachycardia, [  ] Bradycardia, [  ] Irregular, [ x ] No murmurs, No rubs, No gallops, [  ] PPM in place (Mfr:  )  ABDOMEN:  [ x ] Soft, [ x ] Nontender, [ x ] Nondistended, [ x ] No mass, [  x] Bowel sounds present, [  ] Obese  NERVOUS SYSTEM:  [ x ] Alert & Oriented x3, [  ] Nonfocal, [  ] Confusion, [  ] Encephalopathic, [  ] Sedated, [  ] Unable to assess, [  ] Dementia, [  ] Other-  EXTREMITIES:  [ x ] 2+ Peripheral Pulses, No clubbing, No cyanosis,  [ x ] Mild Edema B/L lower EXT, [ x ] PVD stasis skin changes B/L lower EXT, [ x ] b/l le erythema, ulcers, no foul smell, no warmth, clear discharge   LYMPH:  No lymphadenopathy noted  SKIN:  [ x ] No rashes or lesions, [  ] Pressure ulcers, [  ] Ecchymosis, [  ] Skin tears    DIET: Diet, DASH/TLC:   Sodium & Cholesterol Restricted  1500mL Fluid Restriction ([id number]) (06-22-20 @ 08:06)      LABS:                        11.4   9.97  )-----------( 253      ( 22 Jun 2020 06:48 )             33.7     22 Jun 2020 06:48    129    |  94     |  12     ----------------------------<  98     3.6     |  28     |  0.57     Ca    8.4        22 Jun 2020 06:48      PT/INR - ( 22 Jun 2020 06:48 )   PT: 78.4 sec;   INR: 6.60 ratio                        Anemia Panel:      Thyroid Panel:  T4, Serum: 6.2 ug/dL (06-21-20 @ 11:44)  Thyroid Stimulating Hormone, Serum: 1.33 uIU/mL (06-21-20 @ 05:27)            Serum Pro-Brain Natriuretic Peptide: 5624 pg/mL (06-21-20 @ 05:27)      RADIOLOGY & ADDITIONAL TESTS:      HEALTH ISSUES - PROBLEM Dx:  CHF (congestive heart failure): CHF (congestive heart failure)  PVD (peripheral vascular disease): PVD (peripheral vascular disease)  Hyponatremia: Hyponatremia  Need for prophylactic measure: Need for prophylactic measure  Atrial fibrillation: Atrial fibrillation  Acute on chronic congestive heart failure, unspecified heart failure type: Acute on chronic congestive heart failure, unspecified heart failure type  Cellulitis of lower extremity, unspecified laterality: Cellulitis of lower extremity, unspecified laterality        Consultant(s) Notes Reviewed:  [ x ] YES     Care Discussed with [ x ] Consultants, [ x ] Patient, [  ] Family, [  ] HCP, [  ] , [  ] Social Service, [ x ] RN, [  ] Physical Therapy, [  ] Palliative Care Team  DVT PPX: [  ] Lovenox, [  ] SC Heparin, [ x ] Coumadin, [  ] Xarelto, [  ] Eliquis, [  ] Pradaxa, [  ] IV Heparin drip, [  ] SCD, [  ] Ambulation, [  ] Contraindicated 2/2 GI Bleed, [  ] Contraindicated 2/2  Bleed, [  ] Contraindicated 2/2 Brain Bleed  Advanced Directive: [  ] None, [  ] DNR/DNI Patient is a 87y old  Male who presents with a chief complaint of b/l LE weeping edema (21 Jun 2020 18:00)    HPI:  88 y/o M with hx of , afib (on coumadin), 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 CHF , PVD  presents to ED for worsening edema with  b/l weeping skin & erythema with pain that bothers him to walk  . Pt reports that his sons made him come into the ED. Reports has been having b/l LE edema with  weeping legs  for around 1 1/2 year. Has gotten worse in the past few day with increasing erythema, skin blisters +/- pus with occasional bouts of pain. Denies any other complaints, no fever, No chills ,No  cp, sob, palpitations, abdominal pain, dizziness, palpitations.  Pt was transferred from [place] ER after he got IV Vanco & IV Lasix, IV Cardizem     Given IV Cardizem and started on Cardizem gtt in the ED for rapid afib (20 Jun 2020 19:26)    INTERVAL HPI:  6/22/2020: Patient seen and examined at bedside. Low, stable H/H. INR 6.6. Hyponatremia, Na 132 --> 129 today. Dc Vancomycin. Rash appears nonpurulent and rapidly improving so will de-escalate to Ceftriaxone 1 gram IV Q-day and potential to finish course with cefuroxime 500mg PO BID with last day 6/25. Stop Cardizem gtt. Started Cardizem 30 mg PO q6h. Vascular studies pending. Wound care evaluated patient - start silver alginate and dry dressings every other day, drainage dependant, and ace wraps if vascular studies permit  .   OVERNIGHT EVENTS: NONE    Home Medications:  Coumadin 2.5 mg oral tablet: 1 tab(s) orally once a day (20 Jun 2020 19:48)      MEDICATIONS  (STANDING):  diltiazem Infusion 5 mG/Hr (5 mL/Hr) IV Continuous <Continuous>  furosemide   Injectable 20 milliGRAM(s) IV Push daily  gentamicin 0.3% Ophthalmic Solution 1 Drop(s) Both EYES five times a day  potassium chloride    Tablet ER 40 milliEquivalent(s) Oral once  vancomycin  IVPB 1000 milliGRAM(s) IV Intermittent every 12 hours    MEDICATIONS  (PRN):  acetaminophen   Tablet .. 650 milliGRAM(s) Oral every 6 hours PRN Temp greater or equal to 38C (100.4F), Mild Pain (1 - 3)      No Known Allergies      Social History:  Never smoker, denies etoh and drug abuse  , Lives alone, Retired  (20 Jun 2020 19:26)      REVIEW OF SYSTEMS:  CONSTITUTIONAL: No fever, No chills, No fatigue, No myalgia, No Body ache, No Weakness  EYES: No eye pain,  No visual disturbances, No discharge, No Redness  ENMT: No ear pain, No nose bleed, No vertigo; No sinus pain, No throat pain, No Congestion  NECK: No pain, No stiffness  RESPIRATORY: No cough, No wheezing, No hemoptysis, No shortness of breath  CARDIOVASCULAR: No chest pain, No palpitations  GASTROINTESTINAL: No abdominal pain, No epigastric pain. No nausea, No vomiting, No diarrhea, No constipation; [ x ] BM  GENITOURINARY: No dysuria, No frequency, No urgency, No hematuria, No incontinence  NEUROLOGICAL: No headaches, No dizziness, No numbness, No tingling, No tremors, No weakness  EXTREMITIES: No Swelling, No Pain, No Edema  SKIN: [ x ] Redness lower extremities b/l   MUSCULOSKELETAL: No joint pain, No joint swelling; No muscle pain, No back pain, No extremity pain  PSYCHIATRIC: No depression, No anxiety, No mood swings, No difficulty sleeping at night  PAIN SCALE: [ x ] None  [  ] Other-  ROS Unable to obtain due to: [  ] Dementia  [  ] Lethargy  [  ] Sedated  [  ] Non verbal  REST OF REVIEW OF SYSTEMS: [ x ] Normal     Vital Signs Last 24 Hrs  T(C): 36.8 (22 Jun 2020 05:08), Max: 36.8 (22 Jun 2020 05:08)  T(F): 98.2 (22 Jun 2020 05:08), Max: 98.2 (22 Jun 2020 05:08)  HR: 90 (22 Jun 2020 05:08) (84 - 90)  BP: 126/75 (22 Jun 2020 05:08) (116/81 - 126/84)  BP(mean): --  RR: 18 (22 Jun 2020 05:08) (18 - 20)  SpO2: 98% (22 Jun 2020 05:08) (97% - 98%)    CAPILLARY BLOOD GLUCOSE          I&O's Summary    21 Jun 2020 07:01  -  22 Jun 2020 07:00  --------------------------------------------------------  IN: 300 mL / OUT: 1800 mL / NET: -1500 mL      PHYSICAL EXAM:  GENERAL:  [ x ] NAD, [ x ] Well appearing, [  ] Agitated, [  ] Drowsy, [  ] Lethargy, [  ] Confused   HEAD:  [ x ] Normal, [  ] Other  EYES:  [ x ] EOMI, [ x ] PERRLA, [ x ] Conjunctiva and sclera clear normal, [  ] Other, [  ] Pallor, [  ] Discharge  ENMT:  [ x ] Normal, [ x ] Moist mucous membranes, [ x ] Good dentition, [ x ] No thrush  NECK:  [ x ] Supple, [ x ] No JVD, [ x ] Normal thyroid, [ x ] Lymphadenopathy, [  ] Other  CHEST/LUNG:  [ x ] Clear to auscultation bilaterally, [ x ] Breath Sounds equal B/L, [  ] Poor effort, [ x ] No rales, [ x ] No rhonchi, [ x ] No wheezing  HEART:  [ x ] Regular rate and rhythm, [  ] Tachycardia, [  ] Bradycardia, [  ] Irregular, [ x ] No murmurs, No rubs, No gallops, [  ] PPM in place (Mfr:  )  ABDOMEN:  [ x ] Soft, [ x ] Nontender, [ x ] Nondistended, [ x ] No mass, [  x] Bowel sounds present, [  ] Obese  NERVOUS SYSTEM:  [ x ] Alert & Oriented x3, [  ] Nonfocal, [  ] Confusion, [  ] Encephalopathic, [  ] Sedated, [  ] Unable to assess, [  ] Dementia, [  ] Other-  EXTREMITIES:  [ x ] 2+ Peripheral Pulses, No clubbing, No cyanosis,  [ x ] Mild Edema B/L lower EXT, [ x ] PVD stasis skin changes B/L lower EXT, [ x ] b/l le erythema, ulcers, no foul smell, no warmth, clear discharge   LYMPH:  No lymphadenopathy noted  SKIN:  [ x ] No rashes or lesions, [  ] Pressure ulcers, [  ] Ecchymosis, [  ] Skin tears    DIET: Diet, DASH/TLC:   Sodium & Cholesterol Restricted  1500mL Fluid Restriction ([id number]) (06-22-20 @ 08:06)      LABS:                        11.4   9.97  )-----------( 253      ( 22 Jun 2020 06:48 )             33.7     22 Jun 2020 06:48    129    |  94     |  12     ----------------------------<  98     3.6     |  28     |  0.57     Ca    8.4        22 Jun 2020 06:48      PT/INR - ( 22 Jun 2020 06:48 )   PT: 78.4 sec;   INR: 6.60 ratio                        Anemia Panel:      Thyroid Panel:  T4, Serum: 6.2 ug/dL (06-21-20 @ 11:44)  Thyroid Stimulating Hormone, Serum: 1.33 uIU/mL (06-21-20 @ 05:27)            Serum Pro-Brain Natriuretic Peptide: 5624 pg/mL (06-21-20 @ 05:27)      RADIOLOGY & ADDITIONAL TESTS:      HEALTH ISSUES - PROBLEM Dx:  CHF (congestive heart failure): CHF (congestive heart failure)  PVD (peripheral vascular disease): PVD (peripheral vascular disease)  Hyponatremia: Hyponatremia  Need for prophylactic measure: Need for prophylactic measure  Atrial fibrillation: Atrial fibrillation  Acute on chronic congestive heart failure, unspecified heart failure type: Acute on chronic congestive heart failure, unspecified heart failure type  Cellulitis of lower extremity, unspecified laterality: Cellulitis of lower extremity, unspecified laterality        Consultant(s) Notes Reviewed:  [ x ] YES     Care Discussed with [ x ] Consultants, [ x ] Patient, [  ] Family, [  ] HCP, [  ] , [  ] Social Service, [ x ] RN, [  ] Physical Therapy, [  ] Palliative Care Team  DVT PPX: [  ] Lovenox, [  ] SC Heparin, [ x ] Coumadin, [  ] Xarelto, [  ] Eliquis, [  ] Pradaxa, [  ] IV Heparin drip, [  ] SCD, [  ] Ambulation, [  ] Contraindicated 2/2 GI Bleed, [  ] Contraindicated 2/2  Bleed, [  ] Contraindicated 2/2 Brain Bleed  Advanced Directive: [  ] None, [  ] DNR/DNI Patient is a 87y old  Male who presents with a chief complaint of b/l LE weeping edema (21 Jun 2020 18:00)    HPI:  86 y/o M with hx of , afib (on coumadin), 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 CHF , PVD  presents to ED for worsening edema with  b/l weeping skin & erythema with pain that bothers him to walk  . Pt reports that his sons made him come into the ED. Reports has been having b/l LE edema with  weeping legs  for around 1 1/2 year. Has gotten worse in the past few day with increasing erythema, skin blisters +/- pus with occasional bouts of pain. Denies any other complaints, no fever, No chills ,No  cp, sob, palpitations, abdominal pain, dizziness, palpitations.  Pt was transferred from [place] ER after he got IV Vanco & IV Lasix, IV Cardizem     Given IV Cardizem and started on Cardizem gtt in the ED for rapid afib (20 Jun 2020 19:26)    INTERVAL HPI:  6/22/2020: Patient seen and examined at bedside. Low, stable H/H. INR 6.6. Hyponatremia, Na 132 --> 129 today. Dc Vancomycin. Rash appears nonpurulent and rapidly improving so will de-escalate to Ceftriaxone 1 gram IV Q-day and potential to finish course with cefuroxime 500mg PO BID with last day 6/25. Stop Cardizem gtt. Started Cardizem 30 mg PO q6h. Vascular studies pending. Wound care evaluated patient - start silver alginate and dry dressings every other day, drainage dependant, and ace wraps if vascular studies permit  .   OVERNIGHT EVENTS: NONE    Home Medications:  Coumadin 2.5 mg oral tablet: 1 tab(s) orally once a day (20 Jun 2020 19:48)      MEDICATIONS  (STANDING):  diltiazem Infusion 5 mG/Hr (5 mL/Hr) IV Continuous <Continuous>  furosemide   Injectable 20 milliGRAM(s) IV Push daily  gentamicin 0.3% Ophthalmic Solution 1 Drop(s) Both EYES five times a day  potassium chloride    Tablet ER 40 milliEquivalent(s) Oral once  vancomycin  IVPB 1000 milliGRAM(s) IV Intermittent every 12 hours    MEDICATIONS  (PRN):  acetaminophen   Tablet .. 650 milliGRAM(s) Oral every 6 hours PRN Temp greater or equal to 38C (100.4F), Mild Pain (1 - 3)      No Known Allergies      Social History:  Never smoker, denies etoh and drug abuse  , Lives alone, Retired  (20 Jun 2020 19:26)      REVIEW OF SYSTEMS: i fell 100% better  CONSTITUTIONAL: No fever, No chills, No fatigue, No myalgia, No Body ache, No Weakness  EYES: No eye pain,  No visual disturbances, No discharge, No Redness  ENMT: No ear pain, No nose bleed, No vertigo; No sinus pain, No throat pain, No Congestion  NECK: No pain, No stiffness  RESPIRATORY: No cough, No wheezing, No hemoptysis, No shortness of breath  CARDIOVASCULAR: No chest pain, No palpitations  GASTROINTESTINAL: No abdominal pain, No epigastric pain. No nausea, No vomiting, No diarrhea, No constipation; [ x ] BM  GENITOURINARY: No dysuria, No frequency, No urgency, No hematuria, No incontinence  NEUROLOGICAL: No headaches, No dizziness, No numbness, No tingling, No tremors, No weakness  EXTREMITIES: No Swelling, No Pain, No Edema  SKIN: [ x ] Redness lower extremities b/l   MUSCULOSKELETAL: No joint pain, No joint swelling; No muscle pain, No back pain, No extremity pain  PSYCHIATRIC: No depression, No anxiety, No mood swings, No difficulty sleeping at night  PAIN SCALE: [ x ] None  [  ] Other-  ROS Unable to obtain due to: [  ] Dementia  [  ] Lethargy  [  ] Sedated  [  ] Non verbal  REST OF REVIEW OF SYSTEMS: [ x ] Normal     Vital Signs Last 24 Hrs  T(C): 36.8 (22 Jun 2020 05:08), Max: 36.8 (22 Jun 2020 05:08)  T(F): 98.2 (22 Jun 2020 05:08), Max: 98.2 (22 Jun 2020 05:08)  HR: 90 (22 Jun 2020 05:08) (84 - 90)  BP: 126/75 (22 Jun 2020 05:08) (116/81 - 126/84)  BP(mean): --  RR: 18 (22 Jun 2020 05:08) (18 - 20)  SpO2: 98% (22 Jun 2020 05:08) (97% - 98%)    CAPILLARY BLOOD GLUCOSE          I&O's Summary    21 Jun 2020 07:01  -  22 Jun 2020 07:00  --------------------------------------------------------  IN: 300 mL / OUT: 1800 mL / NET: -1500 mL      PHYSICAL EXAM:  GENERAL:  [ x ] NAD, [ x ] Well appearing, [  ] Agitated, [  ] Drowsy, [  ] Lethargy, [  ] Confused   HEAD:  [ x ] Normal, [  ] Other  EYES:  [ x ] EOMI, [ x ] PERRLA, [ x ] Conjunctiva and sclera clear normal, [  ] Other, [  ] Pallor, [  ] Discharge  ENMT:  [ x ] Normal, [ x ] Moist mucous membranes, [ x ] Good dentition, [ x ] No thrush  NECK:  [ x ] Supple, [ x ] No JVD, [ x ] Normal thyroid, [ x ] Lymphadenopathy, [  ] Other  CHEST/LUNG:  [ x ] Clear to auscultation bilaterally, [ x ] Breath Sounds equal B/L, [  ] Poor effort, [ x ] No rales, [ x ] No rhonchi, [ x ] No wheezing  HEART:  [ x ] Regular rate and rhythm, [  ] Tachycardia, [  ] Bradycardia, [  ] Irregular, [ x ] 3/6 murmurs, No rubs, No gallops, [  ] PPM in place (Mfr:  )  ABDOMEN:  [ x ] Soft, [ x ] Nontender, [ x ] Nondistended, [ x ] No mass, [  x] Bowel sounds present, [  ] Obese  NERVOUS SYSTEM:  [ x ] Alert & Oriented x3, [ x ] Nonfocal, [  ] Confusion, [  ] Encephalopathic, [  ] Sedated, [  ] Unable to assess, [  ] Dementia, [  ] Other-  EXTREMITIES:  [ x ] 2+ Peripheral Pulses, No clubbing, No cyanosis,  [ x ] Mild Edema B/L lower EXT, [ x ] PVD stasis skin changes B/L lower EXT, [ x ] b/l le erythema,  dried ulcers, yellowish slough , no foul smell, no warmth, no discharge.  LYMPH:  No lymphadenopathy noted  SKIN:  [ x ] No rashes or lesions, [  ] Pressure ulcers, [  ] Ecchymosis, [  ] Skin tears    DIET: Diet, DASH/TLC:   Sodium & Cholesterol Restricted  1500mL Fluid Restriction ([id number]) (06-22-20 @ 08:06)      LABS:                        11.4   9.97  )-----------( 253      ( 22 Jun 2020 06:48 )             33.7     22 Jun 2020 06:48    129    |  94     |  12     ----------------------------<  98     3.6     |  28     |  0.57     Ca    8.4        22 Jun 2020 06:48      PT/INR - ( 22 Jun 2020 06:48 )   PT: 78.4 sec;   INR: 6.60 ratio                        Anemia Panel:      Thyroid Panel:  T4, Serum: 6.2 ug/dL (06-21-20 @ 11:44)  Thyroid Stimulating Hormone, Serum: 1.33 uIU/mL (06-21-20 @ 05:27)            Serum Pro-Brain Natriuretic Peptide: 5624 pg/mL (06-21-20 @ 05:27)      RADIOLOGY & ADDITIONAL TESTS:      HEALTH ISSUES - PROBLEM Dx:  CHF (congestive heart failure): CHF (congestive heart failure)  PVD (peripheral vascular disease): PVD (peripheral vascular disease)  Hyponatremia: Hyponatremia  Need for prophylactic measure: Need for prophylactic measure  Atrial fibrillation: Atrial fibrillation  Acute on chronic congestive heart failure, unspecified heart failure type: Acute on chronic congestive heart failure, unspecified heart failure type  Cellulitis of lower extremity, unspecified laterality: Cellulitis of lower extremity, unspecified laterality        Consultant(s) Notes Reviewed:  [ x ] YES     Care Discussed with [ x ] Consultants, [ x ] Patient, [  ] Family, [  ] HCP, [  ] , [  ] Social Service, [ x ] RN, [  ] Physical Therapy, [  ] Palliative Care Team  DVT PPX: [  ] Lovenox, [  ] SC Heparin, [ x ] Coumadin, [  ] Xarelto, [  ] Eliquis, [  ] Pradaxa, [  ] IV Heparin drip, [  ] SCD, [  ] Ambulation, [  ] Contraindicated 2/2 GI Bleed, [  ] Contraindicated 2/2  Bleed, [  ] Contraindicated 2/2 Brain Bleed  Advanced Directive: [  ] None, [  ] DNR/DNI Patient is a 87y old  Male who presents with a chief complaint of b/l LE weeping edema (21 Jun 2020 18:00)    HPI:  86 y/o M with hx of , afib (on coumadin), 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 CHF , PVD  presents to ED for worsening edema with  b/l weeping skin & erythema with pain that bothers him to walk  . Pt reports that his sons made him come into the ED. Reports has been having b/l LE edema with  weeping legs  for around 1 1/2 year. Has gotten worse in the past few day with increasing erythema, skin blisters +/- pus with occasional bouts of pain. Denies any other complaints, no fever, No chills ,No  cp, sob, palpitations, abdominal pain, dizziness, palpitations.  Pt was transferred from [place] ER after he got IV Vanco & IV Lasix, IV Cardizem     Given IV Cardizem and started on Cardizem gtt in the ED for rapid afib (20 Jun 2020 19:26)    INTERVAL HPI:  6/22/2020: Patient seen and examined at bedside. Low, stable H/H. INR 6.6. Hyponatremia, Na 132 --> 129 today. Dc Vancomycin. Rash appears nonpurulent and rapidly improving so will de-escalate to Ceftriaxone 1 gram IV Q-day and potential to finish course with cefuroxime 500mg PO BID with last day 6/25. Stop Cardizem gtt. Started Cardizem 30 mg PO q6h. Vascular studies pending. Wound care evaluated patient - start silver alginate and dry dressings every other day, drainage dependant, and ace wraps if vascular studies permit.  .   OVERNIGHT EVENTS: NONE    Home Medications:  Coumadin 2.5 mg oral tablet: 1 tab(s) orally once a day (20 Jun 2020 19:48)      MEDICATIONS  (STANDING):  diltiazem Infusion 5 mG/Hr (5 mL/Hr) IV Continuous <Continuous>  furosemide   Injectable 20 milliGRAM(s) IV Push daily  gentamicin 0.3% Ophthalmic Solution 1 Drop(s) Both EYES five times a day  potassium chloride    Tablet ER 40 milliEquivalent(s) Oral once  vancomycin  IVPB 1000 milliGRAM(s) IV Intermittent every 12 hours    MEDICATIONS  (PRN):  acetaminophen   Tablet .. 650 milliGRAM(s) Oral every 6 hours PRN Temp greater or equal to 38C (100.4F), Mild Pain (1 - 3)      No Known Allergies      Social History:  Never smoker, denies etoh and drug abuse  , Lives alone, Retired  (20 Jun 2020 19:26)      REVIEW OF SYSTEMS: i fell 100% better  CONSTITUTIONAL: No fever, No chills, No fatigue, No myalgia, No Body ache, No Weakness  EYES: No eye pain,  No visual disturbances, No discharge, No Redness  ENMT: No ear pain, No nose bleed, No vertigo; No sinus pain, No throat pain, No Congestion  NECK: No pain, No stiffness  RESPIRATORY: No cough, No wheezing, No hemoptysis, No shortness of breath  CARDIOVASCULAR: No chest pain, No palpitations  GASTROINTESTINAL: No abdominal pain, No epigastric pain. No nausea, No vomiting, No diarrhea, No constipation; [ x ] BM  GENITOURINARY: No dysuria, No frequency, No urgency, No hematuria, No incontinence  NEUROLOGICAL: No headaches, No dizziness, No numbness, No tingling, No tremors, No weakness  EXTREMITIES: No Swelling, No Pain, No Edema  SKIN: [ x ] Redness lower extremities b/l   MUSCULOSKELETAL: No joint pain, No joint swelling; No muscle pain, No back pain, No extremity pain  PSYCHIATRIC: No depression, No anxiety, No mood swings, No difficulty sleeping at night  PAIN SCALE: [ x ] None  [  ] Other-  ROS Unable to obtain due to: [  ] Dementia  [  ] Lethargy  [  ] Sedated  [  ] Non verbal  REST OF REVIEW OF SYSTEMS: [ x ] Normal     Vital Signs Last 24 Hrs  T(C): 36.8 (22 Jun 2020 05:08), Max: 36.8 (22 Jun 2020 05:08)  T(F): 98.2 (22 Jun 2020 05:08), Max: 98.2 (22 Jun 2020 05:08)  HR: 90 (22 Jun 2020 05:08) (84 - 90)  BP: 126/75 (22 Jun 2020 05:08) (116/81 - 126/84)  BP(mean): --  RR: 18 (22 Jun 2020 05:08) (18 - 20)  SpO2: 98% (22 Jun 2020 05:08) (97% - 98%)    CAPILLARY BLOOD GLUCOSE          I&O's Summary    21 Jun 2020 07:01  -  22 Jun 2020 07:00  --------------------------------------------------------  IN: 300 mL / OUT: 1800 mL / NET: -1500 mL      PHYSICAL EXAM:  GENERAL:  [ x ] NAD, [ x ] Well appearing, [  ] Agitated, [  ] Drowsy, [  ] Lethargy, [  ] Confused   HEAD:  [ x ] Normal, [  ] Other  EYES:  [ x ] EOMI, [ x ] PERRLA, [ x ] Conjunctiva and sclera clear normal, [  ] Other, [  ] Pallor, [  ] Discharge  ENMT:  [ x ] Normal, [ x ] Moist mucous membranes, [ x ] Good dentition, [ x ] No thrush  NECK:  [ x ] Supple, [ x ] No JVD, [ x ] Normal thyroid, [ x ] Lymphadenopathy, [  ] Other  CHEST/LUNG:  [ x ] Clear to auscultation bilaterally, [ x ] Breath Sounds equal B/L, [  ] Poor effort, [ x ] No rales, [ x ] No rhonchi, [ x ] No wheezing  HEART:  [ x ] Regular rate and rhythm, [  ] Tachycardia, [  ] Bradycardia, [  ] Irregular, [ x ] 3/6 murmurs, No rubs, No gallops, [  ] PPM in place (Mfr:  )  ABDOMEN:  [ x ] Soft, [ x ] Nontender, [ x ] Nondistended, [ x ] No mass, [  x] Bowel sounds present, [  ] Obese  NERVOUS SYSTEM:  [ x ] Alert & Oriented x3, [ x ] Nonfocal, [  ] Confusion, [  ] Encephalopathic, [  ] Sedated, [  ] Unable to assess, [  ] Dementia, [  ] Other-  EXTREMITIES:  [ x ] 2+ Peripheral Pulses, No clubbing, No cyanosis,  [ x ] Mild Edema B/L lower EXT, [ x ] PVD stasis skin changes B/L lower EXT, [ x ] b/l le erythema,  dried ulcers, yellowish slough , no foul smell, no warmth, no discharge.  LYMPH:  No lymphadenopathy noted  SKIN:  [ x ] No rashes or lesions, [  ] Pressure ulcers, [  ] Ecchymosis, [  ] Skin tears    DIET: Diet, DASH/TLC:   Sodium & Cholesterol Restricted  1500mL Fluid Restriction ([id number]) (06-22-20 @ 08:06)      LABS:                        11.4   9.97  )-----------( 253      ( 22 Jun 2020 06:48 )             33.7     22 Jun 2020 06:48    129    |  94     |  12     ----------------------------<  98     3.6     |  28     |  0.57     Ca    8.4        22 Jun 2020 06:48      PT/INR - ( 22 Jun 2020 06:48 )   PT: 78.4 sec;   INR: 6.60 ratio        Anemia Panel:      Thyroid Panel:  T4, Serum: 6.2 ug/dL (06-21-20 @ 11:44)  Thyroid Stimulating Hormone, Serum: 1.33 uIU/mL (06-21-20 @ 05:27)    Serum Pro-Brain Natriuretic Peptide: 5624 pg/mL (06-21-20 @ 05:27)      RADIOLOGY & ADDITIONAL TESTS:  < from: VA Physiol Extremity Lower 3+ Level, BI (06.22.20 @ 16:24) >    EXAM:  PHYSIOL EXTREM LOW 3+ LEV BI                            PROCEDURE DATE:  06/22/2020          INTERPRETATION:  Clinical Information: Peripheral vascular disease.    Technique: Bilateral lower extremity ABIs/PVR    Comparison: None    Findings/  Impression:  Right lower extremity: The ankle brachial index is .54. The pulse waveforms are diffusely monophasic and diminished in the right foot.    Left lower extremity: The ankle brachial index is .56. The pulse waveforms are diffusely monophasic and severely diminished in the left foot.        < end of copied text >        HEALTH ISSUES - PROBLEM Dx:  CHF (congestive heart failure): CHF (congestive heart failure)  PVD (peripheral vascular disease): PVD (peripheral vascular disease)  Hyponatremia: Hyponatremia  Need for prophylactic measure: Need for prophylactic measure  Atrial fibrillation: Atrial fibrillation  Acute on chronic congestive heart failure, unspecified heart failure type: Acute on chronic congestive heart failure, unspecified heart failure type  Cellulitis of lower extremity, unspecified laterality: Cellulitis of lower extremity, unspecified laterality        Consultant(s) Notes Reviewed:  [ x ] YES     Care Discussed with [ x ] Consultants, [ x ] Patient, [x  ] Family,- 2 sons [  ] HCP, [  ] , [  ] Social Service, [ x ] RN, [  ] Physical Therapy, [  ] Palliative Care Team  DVT PPX: [  ] Lovenox, [  ] SC Heparin, [ x ] Coumadin, [  ] Xarelto, [  ] Eliquis, [  ] Pradaxa, [  ] IV Heparin drip, [  ] SCD, [  ] Ambulation, [  ] Contraindicated 2/2 GI Bleed, [  ] Contraindicated 2/2  Bleed, [  ] Contraindicated 2/2 Brain Bleed  Advanced Directive: [ x ] None, [  ] DNR/DNI

## 2020-06-22 NOTE — CONSULT NOTE ADULT - SUBJECTIVE AND OBJECTIVE BOX
Chief Complaint: bilateral lower leg edema and opened weeping wounds with cellulitis    HPI: 86 Y/O white male admitted 6/20/20 after 1 1/2 days of increased pain, redness , swelling and weeping of multiple lower leg ulcers. Patient relates several year history of lower leg edema with pigmentation changes and varicose veins bilaterally. Legs started to have increased swelling, pain and skin breakdown with weeping    PAST MEDICAL & SURGICAL HISTORY:  PVD (peripheral vascular disease)  Lung cancer: Left lung Sx for removal of Tumor, Rt Lung Cryo therapy for recurrence  Atrial fibrillation: on AC  Atrial fibrillation, unspecified type  S/P lobectomy of lung: Tumor removal from Lower Lobe, NO CT, NO RT      Allergies    No Known Allergies    Intolerances        MEDICATIONS  (STANDING):  diltiazem Infusion 5 mG/Hr (5 mL/Hr) IV Continuous <Continuous>  furosemide   Injectable 20 milliGRAM(s) IV Push daily  gentamicin 0.3% Ophthalmic Solution 1 Drop(s) Both EYES five times a day  vancomycin  IVPB 1000 milliGRAM(s) IV Intermittent every 12 hours    MEDICATIONS  (PRN):  acetaminophen   Tablet .. 650 milliGRAM(s) Oral every 6 hours PRN Temp greater or equal to 38C (100.4F), Mild Pain (1 - 3)      FAMILY HISTORY:          ROS:  CONSTITUTIONAL: No fever, weight loss, or fatigue  EYES: No eye pain, visual disturbances, or discharge  ENMT:  No difficulty hearing, tinnitus, vertigo; No sinus or throat pain  NECK: No pain or stiffness  BREASTS: No pain, masses, or nipple discharge  RESPIRATORY: No cough, wheezing, chills or hemoptysis; No shortness of breath  CARDIOVASCULAR: No chest pain, palpitations, dizziness, or leg swelling  GASTROINTESTINAL: No abdominal or epigastric pain. No nausea, vomiting, or hematemesis; No diarrhea or constipation. No melena or hematochezia.  GENITOURINARY: No dysuria, frequency, hematuria, or incontinence  NEUROLOGICAL: No headaches, memory loss, loss of strength, numbness, or tremors  SKIN: No itching, burning, rashes, or lesions   LYMPH NODES: No enlarged glands  ENDOCRINE: No heat or cold intolerance; No hair loss  MUSCULOSKELETAL: No joint pain or swelling; No muscle, back, or extremity pain  PSYCHIATRIC: No depression, anxiety, mood swings, or difficulty sleeping  HEME/LYMPH: No easy bruising, or bleeding gums  ALLERGY AND IMMUNOLOGIC: No hives or eczema    PHYSICAL EXAM-      Vital Signs Last 24 Hrs  T(C): 36.3 (22 Jun 2020 09:14), Max: 36.8 (22 Jun 2020 05:08)  T(F): 97.4 (22 Jun 2020 09:14), Max: 98.2 (22 Jun 2020 05:08)  HR: 88 (22 Jun 2020 09:14) (84 - 90)  BP: 120/80 (22 Jun 2020 09:14) (116/81 - 126/84)  BP(mean): --  RR: 18 (22 Jun 2020 09:14) (18 - 20)  SpO2: 97% (22 Jun 2020 09:14) (97% - 98%)    Constitutional: well developed, well nourished, no apparent distress, alert, oriented x 3.   Pulmonary: no respiratory distress, normal respiratory rhythm and effort, lungs are clear to auscultation/percussion. No CVA tenderness.  Cardiovascular: heart rate normal, normal sinus rhythm; no murmurs, gallops, rubs, heaves or thrills   Abdomen: soft, non-tender, +BS, no guarding/rebound/rigidity.  Vascular:   ENMT:  Neck:  Extremites: bilateral lower leg varicose veins with hyperpigmentation and erythma. Multiple scattered VSU with weeping. Bilateral palpable DP pulses  Skin:                            11.4   9.97  )-----------( 253      ( 22 Jun 2020 06:48 )             33.7     06-22    129<L>  |  94<L>  |  12  ----------------------------<  98  3.6   |  28  |  0.57    Ca    8.4<L>      22 Jun 2020 06:48    TPro  5.8<L>  /  Alb  2.9<L>  /  TBili  1.2  /  DBili  x   /  AST  13<L>  /  ALT  14  /  AlkPhos  72  06-21      Radiology      Impression:      Recommendations:

## 2020-06-22 NOTE — PROGRESS NOTE ADULT - PROBLEM SELECTOR PLAN 1
Cellulitis b/l LE marked erythema, with Oozing clear liquids/skin blister with yellowish slough  with severe chronic venous stasis with possible +/- cellulitis, fluid over load with edema - Venous Stasis ulcers, afebrile, NL WBC  - s/p vanco in the ED  - Will continue IV Vanc for now, keep Vanco level 15-20  - PVD, check ABIs, , keep b/l lower EXT elevated  - Blood c/sx 2   - pain control, wound care Eval   - ID consult (Dr gonzalez/Dr Simms) consulted, recs appreciated - suspect most of this is chronic, with venous stasis ulcers and dermatitis Cellulitis b/l LE marked erythema, with Oozing clear liquids/skin blister with yellowish slough  with severe chronic venous stasis with possible +/- cellulitis, fluid over load with edema - Venous Stasis ulcers, afebrile, NL WBC  - s/p vanco in the ED  - Will continue IV Vanc for now, keep Vanco level 15-20  - PVD, check ABIs, , keep b/l lower EXT elevated  - Blood c/sx 2   - pain control  - Vascular studies pending  - Wound care (Dr. Chong) consulted, recs appreciated - Skin changes attributed to bilateral stasis dermatitis and bilateral venous hypertension with edema and venous stasis ulcers and weeping - silver alginate and dry dressings every other day, drainage dependant, and ace wraps if vascular studies permit  - ID consult (Dr gonzalez/Dr Simms) consulted, recs appreciated - suspect most of this is chronic, with venous stasis ulcers and dermatitis Cellulitis b/l LE marked erythema, with Oozing clear liquids/skin blister with yellowish slough  with severe chronic venous stasis with possible +/- cellulitis, fluid over load with edema - Venous Stasis ulcers, afebrile, NL WBC  - s/p vanco  - Cellulitis appears nonpurulent and rapidly improving so will de-escalate to Ceftriaxone 1 gram IV Q-day and potential to finish course with cefuroxime 500mg PO BID with last day 6/25.   - PVD, check ABIs, , keep b/l lower EXT elevated  - Blood c/sx 2   - Pain control  - Vascular studies pending  - Wound care (Dr. Chong) consulted, recs appreciated - Skin changes attributed to bilateral stasis dermatitis and bilateral venous hypertension with edema and venous stasis ulcers and weeping - silver alginate and dry dressings every other day, drainage dependant, and ace wraps if vascular studies permit  - ID consult (Dr gonzalez/Dr Simms) consulted, recs appreciated - suspect most of this is chronic, with venous stasis ulcers and dermatitis

## 2020-06-22 NOTE — PROGRESS NOTE ADULT - PROBLEM SELECTOR PLAN 2
Rapid A Fib - Pt stopped home Cardizem on his own, non compliant for Cardiology follow up & meds  - continue Cardizem gtt, 5 mg   - INR supra therapeutic, check daily INR AM   - telemetry   - TSH wnl, low T3 Rapid A Fib - Pt stopped home Cardizem on his own, non compliant for Cardiology follow up & meds  - Dc Cardizem gtt, 5 mg and start Cardizem 30 mg PO q6h  (6/22)  - INR supra therapeutic, check daily INR AM   - telemetry   - TSH wnl, low T3 Rapid A Fib - Pt stopped home Cardizem on his own, non compliant for Cardiology follow up & meds  - Dc Cardizem gtt, 5 mg and start Cardizem 30 mg PO q6h  (6/22)  - INR supra therapeutic, check daily INR AM   - Echo (6/22) - Mild concentric LVH with moderate global LV systolic dysfunction with LVEF of 40%, grossly normal RV size and systolic functio, calcified trileaflet aortic valve with severe aortic stenosis, mild to moderate MR, mild TR.     - Continue to monitor on telemetry   - TSH wnl, low T3

## 2020-06-22 NOTE — CONSULT NOTE ADULT - ASSESSMENT
bilateral stasis dermatitis, bilateral venous hypertension with edema and venous stasis ulcers and weeping. Resolving cellulitis bilateral lower legs

## 2020-06-22 NOTE — PROGRESS NOTE ADULT - ASSESSMENT
88 y/o M with hx of CHF, afib (on coumadin), lung ca s/p resection presents to ED for b/l weeping edema. Admitted for b/l LE cellulitis, Rapid A Fib with RVR, volume overload    Afib with RVR  Tele reviewed afib 70bpm  on cardizem 5mg/hr, change to 30mg po q 6 hours  hold coumaidn for INR 6.60    Volume overload  bnp [id number]  continue iv lasix   strict intake and output- negative 1500 cc 24 hours  echo results pending   troponin negative    chronic venous stasis with dermatitis   as per primary, fu id, wound consult      Monitor and replete electrolytes. Keep K>4.0 and Mg>2.0.  Beckie Oscar FNP-C  Cardiology NP  [id number] [phone]

## 2020-06-22 NOTE — PROGRESS NOTE ADULT - ATTENDING COMMENTS
Pt seen, examined, case & care plan d/w pt, residents at detail.  D/W Son -Devante & Other son at bed side at detail.  Dr Mcelroy d/w both sons at detail.  Pt has severe AS & EF 40 %   Podiatry eval DR Otero Called  [facility]   PT follow up

## 2020-06-22 NOTE — PROGRESS NOTE ADULT - PROBLEM SELECTOR PLAN 1
this appears nonpurulent and rapidly improving so will de-escalate to   Ceftriaxone 1 gram IV Q-day and potential to finish course with cefuroxime 500mg PO BID with last day 6/25

## 2020-06-22 NOTE — PROGRESS NOTE ADULT - ATTENDING COMMENTS
The patient was personally seen and examined, in addition to being examined and evaluated by NP.  All elements of the note were edited where appropriate.    severe decomp hf in the setting of rapid af and severe valvular heart disease  iv lasix  remains on dilt gtt, changing to po and will adjust rate control as needed  will need to determine whether his AV can be addressed

## 2020-06-22 NOTE — PROGRESS NOTE ADULT - SUBJECTIVE AND OBJECTIVE BOX
infectious diseases progress note:    JENA VARGAS is a 87y y. o. Male patient    No concerning overnight events, pt reports significant pain in the legs limiting ability to walk, but redness and swelling and everything improving    Allergies    No Known Allergies    Intolerances        ANTIBIOTICS/RELEVANT:  antimicrobials  vancomycin  IVPB 1000 milliGRAM(s) IV Intermittent every 12 hours    immunologic:    OTHER:  acetaminophen   Tablet .. 650 milliGRAM(s) Oral every 6 hours PRN  diltiazem    Tablet 30 milliGRAM(s) Oral every 6 hours  diltiazem Infusion 5 mG/Hr IV Continuous <Continuous>  furosemide   Injectable 20 milliGRAM(s) IV Push daily  gentamicin 0.3% Ophthalmic Solution 1 Drop(s) Both EYES five times a day      Objective:  Vital Signs Last 24 Hrs  T(C): 36.3 (22 Jun 2020 09:14), Max: 36.8 (22 Jun 2020 05:08)  T(F): 97.4 (22 Jun 2020 09:14), Max: 98.2 (22 Jun 2020 05:08)  HR: 88 (22 Jun 2020 09:14) (84 - 90)  BP: 120/80 (22 Jun 2020 09:14) (116/81 - 126/84)  BP(mean): --  RR: 18 (22 Jun 2020 09:14) (18 - 20)  SpO2: 97% (22 Jun 2020 09:14) (97% - 98%)    T(C): 36.3 (06-22-20 @ 09:14), Max: 36.8 (06-20-20 @ 14:40)  T(C): 36.3 (06-22-20 @ 09:14), Max: 36.8 (06-20-20 @ 14:40)  T(C): 36.3 (06-22-20 @ 09:14), Max: 36.8 (06-20-20 @ 14:40)    PHYSICAL EXAM:  HEENT: NC atraumatic  Neck: supple  Respiratory: no accessory muscle use, breathing comfortably  Cardiovascular: distant  Gastrointestinal: normal appearing, nondistended  Extremities: no clubbing, no cyanosis, legs with erythema, some chronic changes in addition to acute      LABS:                          11.4   9.97  )-----------( 253      ( 22 Jun 2020 06:48 )             33.7       9.97 06-22 @ 06:48  9.82 06-21 @ 05:27  8.22 06-20 @ 15:47      06-22    129<L>  |  94<L>  |  12  ----------------------------<  98  3.6   |  28  |  0.57    Ca    8.4<L>      22 Jun 2020 06:48    TPro  5.8<L>  /  Alb  2.9<L>  /  TBili  1.2  /  DBili  x   /  AST  13<L>  /  ALT  14  /  AlkPhos  72  06-21      Creatinine, Serum: 0.57 mg/dL (06-22-20 @ 06:48)  Creatinine, Serum: 0.57 mg/dL (06-21-20 @ 05:27)  Creatinine, Serum: 0.84 mg/dL (06-20-20 @ 15:47)      PT/INR - ( 22 Jun 2020 06:48 )   PT: 78.4 sec;   INR: 6.60 ratio         PTT - ( 20 Jun 2020 15:47 )  PTT:48.8 sec          INFLAMMATORY MARKERS  Auto Neutrophil #: 5.18 K/uL (06-20-20 @ 15:47)  Auto Lymphocyte #: 2.14 K/uL (06-20-20 @ 15:47)    Lactate, Blood: 1.4 mmol/L (06-20-20 @ 15:47)    Auto Eosinophil #: 0.00 K/uL (06-20-20 @ 15:47)          Troponin I, Serum: .026 ng/mL (06-20-20 @ 15:47)                INR: 6.60 ratio (06-22-20 @ 06:48)  INR: 6.20 ratio (06-21-20 @ 05:27)  INR: 5.24 ratio (06-20-20 @ 15:47)  Activated Partial Thromboplastin Time: 48.8 sec (06-20-20 @ 15:47)          MICROBIOLOGY:              RADIOLOGY & ADDITIONAL STUDIES:

## 2020-06-22 NOTE — CONSULT NOTE ADULT - PROBLEM SELECTOR RECOMMENDATION 9
patient to have vascular doppler studies lower legs  elevation both lower legs  silver alginate and dry dressings every other day drainage dependant, Ace wraps if vascular studies permit  follow up in wound care clinic S/P discharge

## 2020-06-22 NOTE — PROGRESS NOTE ADULT - PROBLEM SELECTOR PLAN 5
chronic PVD b/l lower EXT with venous stasis dermatitis & skin blistering  possible wound care chronic PVD b/l lower EXT with venous stasis dermatitis & skin blistering  - possible wound care chronic PVD b/l lower EXT with venous stasis dermatitis & skin blistering  - Wound care (Dr. Chong) consulted, recs appreciated - Skin changes attributed to bilateral stasis dermatitis and bilateral venous hypertension with edema and venous stasis ulcers and weeping - silver alginate and dry dressings every other day, drainage dependant, and ace wraps if vascular studies permit 132 --> 129 today  - 2/2 volume overloaded   - IV Lasix 20 mg daily

## 2020-06-22 NOTE — PROGRESS NOTE ADULT - SUBJECTIVE AND OBJECTIVE BOX
[facility] -- Radha Boyle, Vonnie, Lilibeth, Joel Pulido Savella  Office # [phone]      Follow Up:    chf, afib  Subjective/Observations:   Seen at bedside + le pain  no chest pain no dizziness no palpitations or shortness of breath     REVIEW OF SYSTEMS: All other review of systems is negative unless indicated above    PAST MEDICAL & SURGICAL HISTORY:  PVD (peripheral vascular disease)  Lung cancer: Left lung Sx for removal of Tumor, Rt Lung Cryo therapy for recurrence  Atrial fibrillation: on AC  Atrial fibrillation, unspecified type  S/P lobectomy of lung: Tumor removal from Lower Lobe, NO CT, NO RT      MEDICATIONS  (STANDING):  diltiazem Infusion 5 mG/Hr (5 mL/Hr) IV Continuous <Continuous>  furosemide   Injectable 20 milliGRAM(s) IV Push daily  gentamicin 0.3% Ophthalmic Solution 1 Drop(s) Both EYES five times a day  vancomycin  IVPB 1000 milliGRAM(s) IV Intermittent every 12 hours    MEDICATIONS  (PRN):  acetaminophen   Tablet .. 650 milliGRAM(s) Oral every 6 hours PRN Temp greater or equal to 38C (100.4F), Mild Pain (1 - 3)      Allergies    No Known Allergies    Intolerances        Vital Signs Last 24 Hrs  T(C): 36.3 (22 Jun 2020 09:14), Max: 36.8 (22 Jun 2020 05:08)  T(F): 97.4 (22 Jun 2020 09:14), Max: 98.2 (22 Jun 2020 05:08)  HR: 88 (22 Jun 2020 09:14) (84 - 90)  BP: 120/80 (22 Jun 2020 09:14) (116/81 - 126/84)  BP(mean): --  RR: 18 (22 Jun 2020 09:14) (18 - 20)  SpO2: 97% (22 Jun 2020 09:14) (97% - 98%)    I&O's Summary    21 Jun 2020 07:01  -  22 Jun 2020 07:00  --------------------------------------------------------  IN: 300 mL / OUT: 1800 mL / NET: -1500 mL          PHYSICAL EXAM:  TELE: afib 70  Constitutional: NAD, awake and alert, well-developed  HEENT: Moist Mucous Membranes, Anicteric  Pulmonary: Non-labored, breath sounds are clear bilaterally, No wheezing, crackles or rhonchi  Cardiovascular: IRegular, S1 and S2 nl, No murmurs, rubs, gallops or clicks  Gastrointestinal: Bowel Sounds present, soft, nontender.   Lymph:+ pitting edema bilaterally, erythema and wounds noted draining   Skin: No visible rashes or ulcers.  Psych:  Mood & affect appropriate    LABS: All Labs Reviewed:                        11.4   9.97  )-----------( 253      ( 22 Jun 2020 06:48 )             33.7                         11.1   9.82  )-----------( 217      ( 21 Jun 2020 05:27 )             33.9                         12.1   8.22  )-----------( 254      ( 20 Jun 2020 15:47 )             37.1     22 Jun 2020 06:48    129    |  94     |  12     ----------------------------<  98     3.6     |  28     |  0.57   21 Jun 2020 05:27    132    |  97     |  14     ----------------------------<  96     4.1     |  25     |  0.57   20 Jun 2020 15:47    129    |  96     |  14     ----------------------------<  116    4.8     |  26     |  0.84     Ca    8.4        22 Jun 2020 06:48  Ca    8.6        21 Jun 2020 05:27  Ca    8.8        20 Jun 2020 15:47    TPro  5.8    /  Alb  2.9    /  TBili  1.2    /  DBili  x      /  AST  13     /  ALT  14     /  AlkPhos  72     21 Jun 2020 05:27  TPro  6.5    /  Alb  3.1    /  TBili  1.2    /  DBili  x      /  AST  15     /  ALT  19     /  AlkPhos  76     20 Jun 2020 15:47    PT/INR - ( 22 Jun 2020 06:48 )   PT: 78.4 sec;   INR: 6.60 ratio         PTT - ( 20 Jun 2020 15:47 )  PTT:48.8 sec  CARDIAC MARKERS ( 20 Jun 2020 15:47 )  .026 ng/mL / x     / x     / x     / x             ECG:  < from: 12 Lead ECG (06.20.20 @ 14:49) >    Ventricular Rate 104 BPM    Atrial Rate 117 BPM    QRS Duration 114 ms    Q-T Interval 310 ms    QTC Calculation(Bezet) 407 ms    R Axis -28 degrees    T Axis 72 degrees    Diagnosis Line Atrial fibrillation  Abnormal ECG  No previous ECGs available    < end of copied text >      Echo:    Radiology: [facility] -- Radha Boyle, Vonnie, Lilibeth, Joel Pulido Savella  Office # [phone]      Follow Up:    chf, afib  Subjective/Observations:   Seen at bedside + le pain  no chest pain no dizziness no palpitations or shortness of breath     REVIEW OF SYSTEMS: All other review of systems is negative unless indicated above    PAST MEDICAL & SURGICAL HISTORY:  PVD (peripheral vascular disease)  Lung cancer: Left lung Sx for removal of Tumor, Rt Lung Cryo therapy for recurrence  Atrial fibrillation: on AC  Atrial fibrillation, unspecified type  S/P lobectomy of lung: Tumor removal from Lower Lobe, NO CT, NO RT      MEDICATIONS  (STANDING):  diltiazem Infusion 5 mG/Hr (5 mL/Hr) IV Continuous <Continuous>  furosemide   Injectable 20 milliGRAM(s) IV Push daily  gentamicin 0.3% Ophthalmic Solution 1 Drop(s) Both EYES five times a day  vancomycin  IVPB 1000 milliGRAM(s) IV Intermittent every 12 hours    MEDICATIONS  (PRN):  acetaminophen   Tablet .. 650 milliGRAM(s) Oral every 6 hours PRN Temp greater or equal to 38C (100.4F), Mild Pain (1 - 3)      Allergies    No Known Allergies    Intolerances        Vital Signs Last 24 Hrs  T(C): 36.3 (22 Jun 2020 09:14), Max: 36.8 (22 Jun 2020 05:08)  T(F): 97.4 (22 Jun 2020 09:14), Max: 98.2 (22 Jun 2020 05:08)  HR: 88 (22 Jun 2020 09:14) (84 - 90)  BP: 120/80 (22 Jun 2020 09:14) (116/81 - 126/84)  BP(mean): --  RR: 18 (22 Jun 2020 09:14) (18 - 20)  SpO2: 97% (22 Jun 2020 09:14) (97% - 98%)    I&O's Summary    21 Jun 2020 07:01  -  22 Jun 2020 07:00  --------------------------------------------------------  IN: 300 mL / OUT: 1800 mL / NET: -1500 mL          PHYSICAL EXAM:  TELE: afib 70  Constitutional: NAD, awake and alert, well-developed  HEENT: Moist Mucous Membranes, Anicteric  Pulmonary: Non-labored, breath sounds are clear bilaterally, No wheezing, crackles or rhonchi  Cardiovascular: IRegular, S1 and S2 nl, No murmurs, rubs, gallops or clicks  Gastrointestinal: Bowel Sounds present, soft, nontender.   Lymph:+ pitting edema bilaterally, erythema and wounds noted draining   Skin: purulent wounds noted bilateral le   Psych:  Mood & affect appropriate    LABS: All Labs Reviewed:                        11.4   9.97  )-----------( 253      ( 22 Jun 2020 06:48 )             33.7                         11.1   9.82  )-----------( 217      ( 21 Jun 2020 05:27 )             33.9                         12.1   8.22  )-----------( 254      ( 20 Jun 2020 15:47 )             37.1     22 Jun 2020 06:48    129    |  94     |  12     ----------------------------<  98     3.6     |  28     |  0.57   21 Jun 2020 05:27    132    |  97     |  14     ----------------------------<  96     4.1     |  25     |  0.57   20 Jun 2020 15:47    129    |  96     |  14     ----------------------------<  116    4.8     |  26     |  0.84     Ca    8.4        22 Jun 2020 06:48  Ca    8.6        21 Jun 2020 05:27  Ca    8.8        20 Jun 2020 15:47    TPro  5.8    /  Alb  2.9    /  TBili  1.2    /  DBili  x      /  AST  13     /  ALT  14     /  AlkPhos  72     21 Jun 2020 05:27  TPro  6.5    /  Alb  3.1    /  TBili  1.2    /  DBili  x      /  AST  15     /  ALT  19     /  AlkPhos  76     20 Jun 2020 15:47    PT/INR - ( 22 Jun 2020 06:48 )   PT: 78.4 sec;   INR: 6.60 ratio         PTT - ( 20 Jun 2020 15:47 )  PTT:48.8 sec  CARDIAC MARKERS ( 20 Jun 2020 15:47 )  .026 ng/mL / x     / x     / x     / x             ECG:  < from: 12 Lead ECG (06.20.20 @ 14:49) >    Ventricular Rate 104 BPM    Atrial Rate 117 BPM    QRS Duration 114 ms    Q-T Interval 310 ms    QTC Calculation(Bezet) 407 ms    R Axis -28 degrees    T Axis 72 degrees    Diagnosis Line Atrial fibrillation  Abnormal ECG  No previous ECGs available    < end of copied text >      Echo:    Radiology: [facility] -- Radha Boyle, Vonnie, Lilibeth, Joel Pulido Savella  Office # [phone]      Follow Up:    chf, afib  Subjective/Observations:   Seen at bedside + le pain  no chest pain no dizziness no palpitations or shortness of breath     REVIEW OF SYSTEMS: All other review of systems is negative unless indicated above    PAST MEDICAL & SURGICAL HISTORY:  PVD (peripheral vascular disease)  Lung cancer: Left lung Sx for removal of Tumor, Rt Lung Cryo therapy for recurrence  Atrial fibrillation: on AC  Atrial fibrillation, unspecified type  S/P lobectomy of lung: Tumor removal from Lower Lobe, NO CT, NO RT      MEDICATIONS  (STANDING):  diltiazem Infusion 5 mG/Hr (5 mL/Hr) IV Continuous <Continuous>  furosemide   Injectable 20 milliGRAM(s) IV Push daily  gentamicin 0.3% Ophthalmic Solution 1 Drop(s) Both EYES five times a day  vancomycin  IVPB 1000 milliGRAM(s) IV Intermittent every 12 hours    MEDICATIONS  (PRN):  acetaminophen   Tablet .. 650 milliGRAM(s) Oral every 6 hours PRN Temp greater or equal to 38C (100.4F), Mild Pain (1 - 3)      Allergies    No Known Allergies    Intolerances        Vital Signs Last 24 Hrs  T(C): 36.3 (22 Jun 2020 09:14), Max: 36.8 (22 Jun 2020 05:08)  T(F): 97.4 (22 Jun 2020 09:14), Max: 98.2 (22 Jun 2020 05:08)  HR: 88 (22 Jun 2020 09:14) (84 - 90)  BP: 120/80 (22 Jun 2020 09:14) (116/81 - 126/84)  BP(mean): --  RR: 18 (22 Jun 2020 09:14) (18 - 20)  SpO2: 97% (22 Jun 2020 09:14) (97% - 98%)    I&O's Summary    21 Jun 2020 07:01  -  22 Jun 2020 07:00  --------------------------------------------------------  IN: 300 mL / OUT: 1800 mL / NET: -1500 mL          PHYSICAL EXAM:  TELE: afib 70  Constitutional: NAD, awake and alert, well-developed  HEENT: Moist Mucous Membranes, Anicteric  Pulmonary: Non-labored, breath sounds are clear bilaterally, No wheezing, crackles or rhonchi  Cardiovascular: IRegular, S1 and S2 nl,+ murmur   Gastrointestinal: Bowel Sounds present, soft, nontender.   Lymph:+ pitting edema bilaterally, erythema and wounds noted draining   Skin: purulent wounds noted bilateral le   Psych:  Mood & affect appropriate    LABS: All Labs Reviewed:                        11.4   9.97  )-----------( 253      ( 22 Jun 2020 06:48 )             33.7                         11.1   9.82  )-----------( 217      ( 21 Jun 2020 05:27 )             33.9                         12.1   8.22  )-----------( 254      ( 20 Jun 2020 15:47 )             37.1     22 Jun 2020 06:48    129    |  94     |  12     ----------------------------<  98     3.6     |  28     |  0.57   21 Jun 2020 05:27    132    |  97     |  14     ----------------------------<  96     4.1     |  25     |  0.57   20 Jun 2020 15:47    129    |  96     |  14     ----------------------------<  116    4.8     |  26     |  0.84     Ca    8.4        22 Jun 2020 06:48  Ca    8.6        21 Jun 2020 05:27  Ca    8.8        20 Jun 2020 15:47    TPro  5.8    /  Alb  2.9    /  TBili  1.2    /  DBili  x      /  AST  13     /  ALT  14     /  AlkPhos  72     21 Jun 2020 05:27  TPro  6.5    /  Alb  3.1    /  TBili  1.2    /  DBili  x      /  AST  15     /  ALT  19     /  AlkPhos  76     20 Jun 2020 15:47    PT/INR - ( 22 Jun 2020 06:48 )   PT: 78.4 sec;   INR: 6.60 ratio         PTT - ( 20 Jun 2020 15:47 )  PTT:48.8 sec  CARDIAC MARKERS ( 20 Jun 2020 15:47 )  .026 ng/mL / x     / x     / x     / x             ECG:  < from: 12 Lead ECG (06.20.20 @ 14:49) >    Ventricular Rate 104 BPM    Atrial Rate 117 BPM    QRS Duration 114 ms    Q-T Interval 310 ms    QTC Calculation(Bezet) 407 ms    R Axis -28 degrees    T Axis 72 degrees    Diagnosis Line Atrial fibrillation  Abnormal ECG  No previous ECGs available    < end of copied text >      Echo:      Radiology: [facility] -- Radha Boyle, Vonnie, Lilibeth, Joel Pulido Savella  Office # [phone]      Follow Up:    chf, afib  Subjective/Observations:   Seen at bedside + le pain  no chest pain no dizziness no palpitations or shortness of breath     REVIEW OF SYSTEMS: All other review of systems is negative unless indicated above    PAST MEDICAL & SURGICAL HISTORY:  PVD (peripheral vascular disease)  Lung cancer: Left lung Sx for removal of Tumor, Rt Lung Cryo therapy for recurrence  Atrial fibrillation: on AC  Atrial fibrillation, unspecified type  S/P lobectomy of lung: Tumor removal from Lower Lobe, NO CT, NO RT      MEDICATIONS  (STANDING):  diltiazem Infusion 5 mG/Hr (5 mL/Hr) IV Continuous <Continuous>  furosemide   Injectable 20 milliGRAM(s) IV Push daily  gentamicin 0.3% Ophthalmic Solution 1 Drop(s) Both EYES five times a day  vancomycin  IVPB 1000 milliGRAM(s) IV Intermittent every 12 hours    MEDICATIONS  (PRN):  acetaminophen   Tablet .. 650 milliGRAM(s) Oral every 6 hours PRN Temp greater or equal to 38C (100.4F), Mild Pain (1 - 3)      Allergies    No Known Allergies    Intolerances        Vital Signs Last 24 Hrs  T(C): 36.3 (22 Jun 2020 09:14), Max: 36.8 (22 Jun 2020 05:08)  T(F): 97.4 (22 Jun 2020 09:14), Max: 98.2 (22 Jun 2020 05:08)  HR: 88 (22 Jun 2020 09:14) (84 - 90)  BP: 120/80 (22 Jun 2020 09:14) (116/81 - 126/84)  BP(mean): --  RR: 18 (22 Jun 2020 09:14) (18 - 20)  SpO2: 97% (22 Jun 2020 09:14) (97% - 98%)    I&O's Summary    21 Jun 2020 07:01  -  22 Jun 2020 07:00  --------------------------------------------------------  IN: 300 mL / OUT: 1800 mL / NET: -1500 mL          PHYSICAL EXAM:  TELE: afib 70  Constitutional: NAD, awake and alert, well-developed  HEENT: Moist Mucous Membranes, Anicteric  Pulmonary: Non-labored, breath sounds are clear bilaterally, No wheezing, crackles or rhonchi  Cardiovascular: IRegular, S1 and S2 nl,+ 2/6 sys murmur   Gastrointestinal: Bowel Sounds present, soft, nontender.   Lymph:+ pitting edema bilaterally, erythema and wounds noted draining   Skin: purulent wounds noted bilateral le   Psych:  Mood & affect appropriate    LABS: All Labs Reviewed:                        11.4   9.97  )-----------( 253      ( 22 Jun 2020 06:48 )             33.7                         11.1   9.82  )-----------( 217      ( 21 Jun 2020 05:27 )             33.9                         12.1   8.22  )-----------( 254      ( 20 Jun 2020 15:47 )             37.1     22 Jun 2020 06:48    129    |  94     |  12     ----------------------------<  98     3.6     |  28     |  0.57   21 Jun 2020 05:27    132    |  97     |  14     ----------------------------<  96     4.1     |  25     |  0.57   20 Jun 2020 15:47    129    |  96     |  14     ----------------------------<  116    4.8     |  26     |  0.84     Ca    8.4        22 Jun 2020 06:48  Ca    8.6        21 Jun 2020 05:27  Ca    8.8        20 Jun 2020 15:47    TPro  5.8    /  Alb  2.9    /  TBili  1.2    /  DBili  x      /  AST  13     /  ALT  14     /  AlkPhos  72     21 Jun 2020 05:27  TPro  6.5    /  Alb  3.1    /  TBili  1.2    /  DBili  x      /  AST  15     /  ALT  19     /  AlkPhos  76     20 Jun 2020 15:47    PT/INR - ( 22 Jun 2020 06:48 )   PT: 78.4 sec;   INR: 6.60 ratio         PTT - ( 20 Jun 2020 15:47 )  PTT:48.8 sec  CARDIAC MARKERS ( 20 Jun 2020 15:47 )  .026 ng/mL / x     / x     / x     / x             ECG:  < from: 12 Lead ECG (06.20.20 @ 14:49) >    Ventricular Rate 104 BPM    Atrial Rate 117 BPM    QRS Duration 114 ms    Q-T Interval 310 ms    QTC Calculation(Bezet) 407 ms    R Axis -28 degrees    T Axis 72 degrees    Diagnosis Line Atrial fibrillation  Abnormal ECG  No previous ECGs available    < end of copied text >      Echo:      Radiology:

## 2020-06-23 LAB
ANION GAP SERPL CALC-SCNC: 5 MMOL/L — SIGNIFICANT CHANGE UP (ref 5–17)
APTT BLD: 43.8 SEC — HIGH (ref 28.5–37)
BUN SERPL-MCNC: 12 MG/DL — SIGNIFICANT CHANGE UP (ref 7–23)
CALCIUM SERPL-MCNC: 8.5 MG/DL — SIGNIFICANT CHANGE UP (ref 8.5–10.1)
CHLORIDE SERPL-SCNC: 96 MMOL/L — SIGNIFICANT CHANGE UP (ref 96–108)
CO2 SERPL-SCNC: 30 MMOL/L — SIGNIFICANT CHANGE UP (ref 22–31)
CREAT SERPL-MCNC: 0.72 MG/DL — SIGNIFICANT CHANGE UP (ref 0.5–1.3)
GLUCOSE SERPL-MCNC: 99 MG/DL — SIGNIFICANT CHANGE UP (ref 70–99)
HCT VFR BLD CALC: 34.1 % — LOW (ref 39–50)
HGB BLD-MCNC: 11.3 G/DL — LOW (ref 13–17)
INR BLD: 3.55 RATIO — HIGH (ref 0.88–1.16)
MAGNESIUM SERPL-MCNC: 2 MG/DL — SIGNIFICANT CHANGE UP (ref 1.6–2.6)
MCHC RBC-ENTMCNC: 28.4 PG — SIGNIFICANT CHANGE UP (ref 27–34)
MCHC RBC-ENTMCNC: 33.1 GM/DL — SIGNIFICANT CHANGE UP (ref 32–36)
MCV RBC AUTO: 85.7 FL — SIGNIFICANT CHANGE UP (ref 80–100)
NRBC # BLD: 0 /100 WBCS — SIGNIFICANT CHANGE UP (ref 0–0)
PHOSPHATE SERPL-MCNC: 2.9 MG/DL — SIGNIFICANT CHANGE UP (ref 2.5–4.5)
PLATELET # BLD AUTO: 243 K/UL — SIGNIFICANT CHANGE UP (ref 150–400)
POTASSIUM SERPL-MCNC: 3.7 MMOL/L — SIGNIFICANT CHANGE UP (ref 3.5–5.3)
POTASSIUM SERPL-SCNC: 3.7 MMOL/L — SIGNIFICANT CHANGE UP (ref 3.5–5.3)
PROTHROM AB SERPL-ACNC: 41.4 SEC — HIGH (ref 10–12.9)
RBC # BLD: 3.98 M/UL — LOW (ref 4.2–5.8)
RBC # FLD: 15.6 % — HIGH (ref 10.3–14.5)
SODIUM SERPL-SCNC: 131 MMOL/L — LOW (ref 135–145)
WBC # BLD: 7.48 K/UL — SIGNIFICANT CHANGE UP (ref 3.8–10.5)
WBC # FLD AUTO: 7.48 K/UL — SIGNIFICANT CHANGE UP (ref 3.8–10.5)

## 2020-06-23 PROCEDURE — 99222 1ST HOSP IP/OBS MODERATE 55: CPT

## 2020-06-23 PROCEDURE — 99232 SBSQ HOSP IP/OBS MODERATE 35: CPT

## 2020-06-23 PROCEDURE — 93925 LOWER EXTREMITY STUDY: CPT | Mod: 26

## 2020-06-23 PROCEDURE — 99221 1ST HOSP IP/OBS SF/LOW 40: CPT

## 2020-06-23 RX ORDER — METOPROLOL TARTRATE 50 MG
25 TABLET ORAL
Refills: 0 | Status: DISCONTINUED | OUTPATIENT
Start: 2020-06-23 | End: 2020-06-30

## 2020-06-23 RX ADMIN — GENTAMICIN SULFATE 1 DROP(S): 3 SOLUTION/ DROPS OPHTHALMIC at 20:00

## 2020-06-23 RX ADMIN — CEFTRIAXONE 100 MILLIGRAM(S): 500 INJECTION, POWDER, FOR SOLUTION INTRAMUSCULAR; INTRAVENOUS at 15:16

## 2020-06-23 RX ADMIN — Medication 25 MILLIGRAM(S): at 12:15

## 2020-06-23 RX ADMIN — Medication 650 MILLIGRAM(S): at 05:20

## 2020-06-23 RX ADMIN — Medication 20 MILLIGRAM(S): at 05:20

## 2020-06-23 RX ADMIN — Medication 25 MILLIGRAM(S): at 17:31

## 2020-06-23 RX ADMIN — Medication 30 MILLIGRAM(S): at 02:12

## 2020-06-23 RX ADMIN — Medication 650 MILLIGRAM(S): at 20:00

## 2020-06-23 RX ADMIN — Medication 30 MILLIGRAM(S): at 09:05

## 2020-06-23 NOTE — CONSULT NOTE ADULT - ASSESSMENT
Assessment: 87 y.o male with elongated, dystrophic nails.    Plan:    Patient examined and evaluated at this time. Case discussed with Dr. Otero.  Discussed etiology of symptoms and all questions answered to satisfaction with patient and family members.  Pt nails aseptically debrided x10 w/o complications  Wound care for b/l LE per wound care attending  Medical management per primary team.    Podiatry will sign off

## 2020-06-23 NOTE — CONSULT NOTE ADULT - REASON FOR ADMISSION
b/l LE weeping edema

## 2020-06-23 NOTE — PROGRESS NOTE ADULT - PROBLEM SELECTOR PLAN 2
Rapid A Fib - Pt stopped home Cardizem on his own, non compliant for Cardiology follow up & meds  - Dc Cardizem gtt, 5 mg and start Cardizem 30 mg PO q6h  (6/22)  - INR supra therapeutic, check daily INR AM   - Echo (6/22) - Mild concentric LVH with moderate global LV systolic dysfunction with LVEF of 40%, grossly normal RV size and systolic functio, calcified trileaflet aortic valve with severe aortic stenosis, mild to moderate MR, mild TR.     - Continue to monitor on telemetry   - TSH wnl, low T3 Rapid A Fib - Pt stopped home Cardizem on his own, non compliant for Cardiology follow up & meds  - Dc Cardizem gtt 5 mg and Cardizem 30 mg PO q6h (6/23)  - Start Lopressor 25 mg PO BID (6/23)  - INR supra therapeutic, check daily INR AM   - Echo (6/22) - Mild concentric LVH with moderate global LV systolic dysfunction with LVEF of 40%, grossly normal RV size and systolic functio, calcified trileaflet aortic valve with severe aortic stenosis, mild to moderate MR, mild TR.     - Continue to monitor on telemetry   - TSH wnl, low T3 Rapid A Fib - Pt stopped home Cardizem on his own, non compliant for Cardiology follow up & meds  - Dc Cardizem gtt 5 mg and Cardizem 30 mg PO q6h (6/23)  - Start Lopressor 25 mg PO BID (6/23)  - Consider starting lisinopril 2.5 mg qd tomorrow   - INR supra therapeutic, check daily INR AM   - Echo (6/22) - Mild concentric LVH with moderate global LV systolic dysfunction with LVEF of 40%, grossly normal RV size and systolic functio, calcified trileaflet aortic valve with severe aortic stenosis, mild to moderate MR, mild TR.     - Continue to monitor on telemetry   - TSH wnl, low T3 Cellulitis b/l LE marked erythema, with Oozing clear liquids/skin blister with yellowish slough  with severe chronic venous stasis with possible +/- cellulitis, fluid over load with edema - Venous Stasis ulcers, afebrile, NL WBC  - s/p vanco  - Cellulitis appears nonpurulent and rapidly improving so de-escalate to Ceftriaxone 1 gram IV Q-day (started 6/23) and potential to finish course with cefuroxime 500mg PO BID with last day 6/25.   - PVD, keep b/l lower EXT elevated  - Blood c/sx 2   - Pain control  - Vascular studies: RLE: MAYNOR is .54, pulse waveforms are diffusely monophasic and diminished in the right foot. LLE: MAYNOR is .56, pulse waveforms are diffusely monophasic and severely diminished in the left foot.  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 Single-vessel runoff via the posterior tibial artery. 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 Otherwise, no focal stenosis or occlusion in the left leg.  - Vascular Surgery (Dr. Arias) consulted, recs appreciated - plan for right LE angiogram, possible stent/angioplasty on Thursday in IR  - Wound care (Dr. Chong) consulted, recs appreciated - Skin changes attributed to bilateral stasis dermatitis and bilateral venous hypertension with edema and venous stasis ulcers and weeping - silver alginate and dry dressings every other day, drainage dependant, and ace wraps  - ID consult (Dr gonzalez/Dr Simms) consulted, recs appreciated - suspect most of this is chronic, with venous stasis ulcers and dermatitis

## 2020-06-23 NOTE — PROGRESS NOTE ADULT - PROBLEM SELECTOR PLAN 5
132 --> 129 --> 131 today  - 2/2 volume overloaded   - IV Lasix 20 mg daily - Echo (6/22) - Mild concentric LVH with moderate global LV systolic dysfunction with LVEF of 40%, grossly normal RV size and systolic functio, calcified trileaflet aortic valve with severe aortic stenosis, mild to moderate MR, mild TR.   - Cardio DR Mcelroy d/w both sons at bed side at detail about ECHO results & different treatment options including TAVR as out pt. Chronic PVD b/l lower EXT with venous stasis dermatitis & skin blistering-dry now   - Wound care (Dr. Chong) consulted, recs appreciated - Skin changes attributed to bilateral stasis dermatitis and bilateral venous hypertension with edema and venous stasis ulcers and weeping - silver alginate and dry dressings every other day, drainage dependant, and ace wraps  - Vascular studies: RLE: MAYNOR is .54, pulse waveforms are diffusely monophasic and diminished in the right foot. LLE: MAYNOR is .56, pulse waveforms are diffusely monophasic and severely diminished in the left foot.  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 Single-vessel runoff via the posterior tibial artery. 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 Otherwise, no focal stenosis or occlusion in the left leg.  - Vascular Surgery (Dr. Arias) consulted, recs appreciated - plan for right LE angiogram, possible stent/angioplasty on Thursday in IR

## 2020-06-23 NOTE — PROGRESS NOTE ADULT - SUBJECTIVE AND OBJECTIVE BOX
infectious diseases progress note:    JENA VARGAS is a 87y y. o. Male patient    No concerning overnight events, pt reports pain control is improved but not perfect, legs are improved    Allergies    No Known Allergies    Intolerances        ANTIBIOTICS/RELEVANT:  antimicrobials  cefTRIAXone   IVPB      cefTRIAXone   IVPB 1000 milliGRAM(s) IV Intermittent every 24 hours    immunologic:    OTHER:  acetaminophen   Tablet .. 650 milliGRAM(s) Oral every 6 hours PRN  furosemide   Injectable 20 milliGRAM(s) IV Push daily  gentamicin 0.3% Ophthalmic Solution 1 Drop(s) Both EYES five times a day  metoprolol tartrate 25 milliGRAM(s) Oral two times a day      Objective:  Vital Signs Last 24 Hrs  T(C): 36.5 (23 Jun 2020 07:57), Max: 36.6 (23 Jun 2020 03:04)  T(F): 97.7 (23 Jun 2020 07:57), Max: 97.9 (23 Jun 2020 03:04)  HR: 72 (23 Jun 2020 07:57) (72 - 96)  BP: 117/80 (23 Jun 2020 07:57) (117/80 - 137/90)  BP(mean): --  RR: 18 (23 Jun 2020 07:57) (18 - 18)  SpO2: 98% (23 Jun 2020 07:57) (96% - 98%)    T(C): 36.5 (06-23-20 @ 07:57), Max: 36.8 (06-22-20 @ 05:08)  T(C): 36.5 (06-23-20 @ 07:57), Max: 36.8 (06-20-20 @ 14:40)  T(C): 36.5 (06-23-20 @ 07:57), Max: 36.8 (06-20-20 @ 14:40)    PHYSICAL EXAM:  HEENT: NC atraumatic  Neck: supple  Respiratory: no accessory muscle use, breathing comfortably  Cardiovascular: distant  Gastrointestinal: normal appearing, nondistended  Extremities: no clubbing, no cyanosis, legs wrapped      LABS:                          11.3   7.48  )-----------( 243      ( 23 Jun 2020 07:01 )             34.1       7.48 06-23 @ 07:01  9.97 06-22 @ 06:48  9.82 06-21 @ 05:27  8.22 06-20 @ 15:47      06-23    131<L>  |  96  |  12  ----------------------------<  99  3.7   |  30  |  0.72    Ca    8.5      23 Jun 2020 07:01  Phos  2.9     06-23  Mg     2.0     06-23        Creatinine, Serum: 0.72 mg/dL (06-23-20 @ 07:01)  Creatinine, Serum: 0.57 mg/dL (06-22-20 @ 06:48)  Creatinine, Serum: 0.57 mg/dL (06-21-20 @ 05:27)  Creatinine, Serum: 0.84 mg/dL (06-20-20 @ 15:47)      PT/INR - ( 23 Jun 2020 07:01 )   PT: 41.4 sec;   INR: 3.55 ratio         PTT - ( 23 Jun 2020 07:01 )  PTT:43.8 sec          INFLAMMATORY MARKERS  Auto Neutrophil #: 5.18 K/uL (06-20-20 @ 15:47)  Auto Lymphocyte #: 2.14 K/uL (06-20-20 @ 15:47)    Lactate, Blood: 1.4 mmol/L (06-20-20 @ 15:47)    Auto Eosinophil #: 0.00 K/uL (06-20-20 @ 15:47)          Troponin I, Serum: .026 ng/mL (06-20-20 @ 15:47)                INR: 3.55 ratio (06-23-20 @ 07:01)  Activated Partial Thromboplastin Time: 43.8 sec (06-23-20 @ 07:01)  INR: 6.60 ratio (06-22-20 @ 06:48)  INR: 6.20 ratio (06-21-20 @ 05:27)  INR: 5.24 ratio (06-20-20 @ 15:47)  Activated Partial Thromboplastin Time: 48.8 sec (06-20-20 @ 15:47)          MICROBIOLOGY:      Culture - Blood (collected 20 Jun 2020 22:27)  Source: .Blood Blood  Preliminary Report (21 Jun 2020 23:01):    No growth to date.    Culture - Blood (collected 20 Jun 2020 22:26)  Source: .Blood Blood  Preliminary Report (21 Jun 2020 23:01):    No growth to date.        RADIOLOGY & ADDITIONAL STUDIES:

## 2020-06-23 NOTE — CONSULT NOTE ADULT - ATTENDING COMMENTS
Patient evaluated at the bedside  Non invasive studies were also reviewed.   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 Patient would benefit from compression dressings to help heal venous stasis wounds but inflow-arterial needs to be addressed first. I explained to patient details about findings. I would start by performing a BLE angio with RLE intervention this Thursday. INR will need to be less than 1.5. Patients son (Álvaro leslie [phone], home [phone]) was called. I gave him all details and is aware of the plan. Patient may need LLE intervention during the same admission if PVD is identified this Thursday.

## 2020-06-23 NOTE — PROGRESS NOTE ADULT - PROBLEM SELECTOR PLAN 3
2/2 Rapid A Fib, pt with elevated BNP, Hyponatremia & leg edema  - unclear about previous echo, last seen cardio >20 yrs ago.  - Echo (6/22) - Mild concentric LVH with moderate global LV systolic dysfunction with LVEF of 40%, grossly normal RV size and systolic functio, calcified trileaflet aortic valve with severe aortic stenosis, mild to moderate MR, mild TR.   - continue Lasix 20 mg IV daily  - i/os, daily weights  - Cardio consulted (torrey), recs appreciated Chronic PVD b/l lower EXT with venous stasis dermatitis & skin blistering-dry now   - Wound care (Dr. Chong) consulted, recs appreciated - Skin changes attributed to bilateral stasis dermatitis and bilateral venous hypertension with edema and venous stasis ulcers and weeping - silver alginate and dry dressings every other day, drainage dependant, and ace wraps  - Vascular studies: RLE: MAYNOR is .54, pulse waveforms are diffusely monophasic and diminished in the right foot. LLE: MAYNOR is .56, pulse waveforms are diffusely monophasic and severely diminished in the left foot.  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 Single-vessel runoff via the posterior tibial artery. 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 Otherwise, no focal stenosis or occlusion in the left leg.  - Vascular Surgery (Dr. Arias) consulted, recs appreciated - no surgical intervention at this time Chronic PVD b/l lower EXT with venous stasis dermatitis & skin blistering-dry now   - Wound care (Dr. Chong) consulted, recs appreciated - Skin changes attributed to bilateral stasis dermatitis and bilateral venous hypertension with edema and venous stasis ulcers and weeping - silver alginate and dry dressings every other day, drainage dependant, and ace wraps  - Vascular studies: RLE: MAYNOR is .54, pulse waveforms are diffusely monophasic and diminished in the right foot. LLE: MAYNOR is .56, pulse waveforms are diffusely monophasic and severely diminished in the left foot.  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 Single-vessel runoff via the posterior tibial artery. 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 Otherwise, no focal stenosis or occlusion in the left leg.  - Vascular Surgery (Dr. Arias) consulted, recs appreciated - plan for right LE angiogram, possible stent/angioplasty on Thursday in IR

## 2020-06-23 NOTE — PROGRESS NOTE ADULT - PROBLEM SELECTOR PLAN 4
- Echo (6/22) - Mild concentric LVH with moderate global LV systolic dysfunction with LVEF of 40%, grossly normal RV size and systolic functio, calcified trileaflet aortic valve with severe aortic stenosis, mild to moderate MR, mild TR.   - Cardio DR Goncalves d/w both sons at bed side at detail about ECHO results & different treatment options including TAVR as out pt. 2/2 Rapid A Fib, pt with elevated BNP, Hyponatremia & leg edema  - unclear about previous echo, last seen cardio >20 yrs ago.  - Echo (6/22) - Mild concentric LVH with moderate global LV systolic dysfunction with LVEF of 40%, grossly normal RV size and systolic functio, calcified trileaflet aortic valve with severe aortic stenosis, mild to moderate MR, mild TR.   - continue Lasix 20 mg IV daily  - i/os, daily weights  - Cardio consulted (torrey), recs appreciated - Echo (6/22) - Mild concentric LVH with moderate global LV systolic dysfunction with LVEF of 40%, grossly normal RV size and systolic functio, calcified trileaflet aortic valve with severe aortic stenosis, mild to moderate MR, mild TR.   - Cardio DR Mcelroy d/w both sons at bed side at detail about ECHO results & different treatment options including TAVR as out pt.

## 2020-06-23 NOTE — PROGRESS NOTE ADULT - SUBJECTIVE AND OBJECTIVE BOX
Patient is a 87y old  Male who presents with a chief complaint of b/l LE weeping edema (22 Jun 2020 14:15)    HPI:  86 y/o M with hx of , afib (on coumadin), 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 CHF , PVD  presents to ED for worsening edema with  b/l weeping skin & erythema with pain that bothers him to walk  . Pt reports that his sons made him come into the ED. Reports has been having b/l LE edema with  weeping legs  for around 1 1/2 year. Has gotten worse in the past few day with increasing erythema, skin blisters +/- pus with occasional bouts of pain. Denies any other complaints, no fever, No chills ,No  cp, sob, palpitations, abdominal pain, dizziness, palpitations.  Pt was transferred from [place] ER after he got IV Vanco & IV Lasix, IV Cardizem     Given IV Cardizem and started on Cardizem gtt in the ED for rapid afib (20 Jun 2020 19:26)    INTERVAL HPI:  6/22/2020: Patient seen and examined at bedside. Low, stable H/H. INR 6.6. Hyponatremia, Na 132 --> 129 today. Dc Vancomycin. Rash appears nonpurulent and rapidly improving so will de-escalate to Ceftriaxone 1 gram IV Q-day and potential to finish course with cefuroxime 500mg PO BID with last day 6/25. Stop Cardizem gtt. Started Cardizem 30 mg PO q6h. Vascular studies pending. Wound care evaluated patient - start silver alginate and dry dressings every other day, drainage dependant, and ace wraps if vascular studies permit.  6/23/2020: Patient seen and examined at bedside. Low, stable H/H. INR 3.55. Hyponatremia improving. On Rocephin.  .   OVERNIGHT EVENTS: NONE    Home Medications:  Coumadin 5 mg oral tablet: 1 tab(s) orally 2 times a day (22 Jun 2020 18:12)      MEDICATIONS  (STANDING):  cefTRIAXone   IVPB      cefTRIAXone   IVPB 1000 milliGRAM(s) IV Intermittent every 24 hours  diltiazem    Tablet 30 milliGRAM(s) Oral <User Schedule>  furosemide   Injectable 20 milliGRAM(s) IV Push daily  gentamicin 0.3% Ophthalmic Solution 1 Drop(s) Both EYES five times a day    MEDICATIONS  (PRN):  acetaminophen   Tablet .. 650 milliGRAM(s) Oral every 6 hours PRN Temp greater or equal to 38C (100.4F), Mild Pain (1 - 3)      No Known Allergies      Social History:  Never smoker, denies etoh and drug abuse  , Lives alone, Retired  (20 Jun 2020 19:26)    REVIEW OF SYSTEMS:   CONSTITUTIONAL: No fever, No chills, No fatigue, No myalgia, No Body ache, No Weakness  EYES: No eye pain,  No visual disturbances, No discharge, No Redness  ENMT: No ear pain, No nose bleed, No vertigo; No sinus pain, No throat pain, No Congestion  NECK: No pain, No stiffness  RESPIRATORY: No cough, No wheezing, No hemoptysis, No shortness of breath  CARDIOVASCULAR: No chest pain, No palpitations  GASTROINTESTINAL: No abdominal pain, No epigastric pain. No nausea, No vomiting, No diarrhea, No constipation; [ x ] BM  GENITOURINARY: No dysuria, No frequency, No urgency, No hematuria, No incontinence  NEUROLOGICAL: No headaches, No dizziness, No numbness, No tingling, No tremors, No weakness  EXTREMITIES: No Swelling, No Pain, No Edema  SKIN: [ x ] Redness lower extremities b/l   MUSCULOSKELETAL: No joint pain, No joint swelling; No muscle pain, No back pain, No extremity pain  PSYCHIATRIC: No depression, No anxiety, No mood swings, No difficulty sleeping at night  PAIN SCALE: [ x ] None  [  ] Other-  ROS Unable to obtain due to: [  ] Dementia  [  ] Lethargy  [  ] Sedated  [  ] Non verbal  REST OF REVIEW OF SYSTEMS: [ x ] Normal        Vital Signs Last 24 Hrs  T(C): 36.4 (23 Jun 2020 04:15), Max: 36.6 (23 Jun 2020 03:04)  T(F): 97.5 (23 Jun 2020 04:15), Max: 97.9 (23 Jun 2020 03:04)  HR: 73 (23 Jun 2020 04:15) (73 - 96)  BP: 137/90 (23 Jun 2020 04:15) (119/80 - 137/90)  BP(mean): --  RR: 18 (23 Jun 2020 04:15) (18 - 18)  SpO2: 96% (23 Jun 2020 04:15) (96% - 98%)    CAPILLARY BLOOD GLUCOSE          I&O's Summary    22 Jun 2020 07:01  -  23 Jun 2020 07:00  --------------------------------------------------------  IN: 200 mL / OUT: 1200 mL / NET: -1000 mL      PHYSICAL EXAM:  GENERAL:  [ x ] NAD, [ x ] Well appearing, [  ] Agitated, [  ] Drowsy, [  ] Lethargy, [  ] Confused   HEAD:  [ x ] Normal, [  ] Other  EYES:  [ x ] EOMI, [ x ] PERRLA, [ x ] Conjunctiva and sclera clear normal, [  ] Other, [  ] Pallor, [  ] Discharge  ENMT:  [ x ] Normal, [ x ] Moist mucous membranes, [ x ] Good dentition, [ x ] No thrush  NECK:  [ x ] Supple, [ x ] No JVD, [ x ] Normal thyroid, [ x ] Lymphadenopathy, [  ] Other  CHEST/LUNG:  [ x ] Clear to auscultation bilaterally, [ x ] Breath Sounds equal B/L, [  ] Poor effort, [ x ] No rales, [ x ] No rhonchi, [ x ] No wheezing  HEART:  [ x ] Regular rate and rhythm, [  ] Tachycardia, [  ] Bradycardia, [  ] Irregular, [ x ] 3/6 murmurs, No rubs, No gallops, [  ] PPM in place (Mfr:  )  ABDOMEN:  [ x ] Soft, [ x ] Nontender, [ x ] Nondistended, [ x ] No mass, [  x] Bowel sounds present, [  ] Obese  NERVOUS SYSTEM:  [ x ] Alert & Oriented x3, [ x ] Nonfocal, [  ] Confusion, [  ] Encephalopathic, [  ] Sedated, [  ] Unable to assess, [  ] Dementia, [  ] Other-  EXTREMITIES:  [ x ] 2+ Peripheral Pulses, No clubbing, No cyanosis,  [ x ] Mild Edema B/L lower EXT, [ x ] PVD stasis skin changes B/L lower EXT, [ x ] b/l le erythema, dried ulcers, yellowish slough , no foul smell, no warmth, no discharge.  LYMPH:  No lymphadenopathy noted  SKIN:  [ x ] No rashes or lesions, [  ] Pressure ulcers, [  ] Ecchymosis, [  ] Skin tears    DIET: Diet, DASH/TLC:   Sodium & Cholesterol Restricted  1500mL Fluid Restriction ([id number]) (06-22-20 @ 08:06)      LABS:                        11.3   7.48  )-----------( 243      ( 23 Jun 2020 07:01 )             34.1     23 Jun 2020 07:01    131    |  96     |  12     ----------------------------<  99     3.7     |  30     |  0.72     Ca    8.5        23 Jun 2020 07:01  Phos  2.9       23 Jun 2020 07:01  Mg     2.0       23 Jun 2020 07:01      PT/INR - ( 23 Jun 2020 07:01 )   PT: 41.4 sec;   INR: 3.55 ratio         PTT - ( 23 Jun 2020 07:01 )  PTT:43.8 sec    Culture Results:   No growth to date. (06-20 @ 22:27)  Culture Results:   No growth to date. (06-20 @ 22:26)      CARDIAC MARKERS ( 20 Jun 2020 15:47 )  .026 ng/mL / x     / x     / x     / x            Culture - Blood (collected 20 Jun 2020 22:27)  Source: .Blood Blood  Preliminary Report (21 Jun 2020 23:01):    No growth to date.    Culture - Blood (collected 20 Jun 2020 22:26)  Source: .Blood Blood  Preliminary Report (21 Jun 2020 23:01):    No growth to date.       Anemia Panel:      Thyroid Panel:  T4, Serum: 6.2 ug/dL (06-21-20 @ 11:44)  Thyroid Stimulating Hormone, Serum: 1.33 uIU/mL (06-21-20 @ 05:27)            Serum Pro-Brain Natriuretic Peptide: 5624 pg/mL (06-21-20 @ 05:27)  Serum Pro-Brain Natriuretic Peptide: 5405 pg/mL (06-20-20 @ 15:47)      RADIOLOGY & ADDITIONAL TESTS:      HEALTH ISSUES - PROBLEM Dx:  Varicose veins of bilateral lower extremities with pain: Varicose veins of bilateral lower extremities with pain  Stasis dermatitis with ulcer of right lower extremity due to peripheral venous hypertension: Stasis dermatitis with ulcer of right lower extremity due to peripheral venous hypertension  Chronic venous stasis dermatitis of both lower extremities: Chronic venous stasis dermatitis of both lower extremities  CHF (congestive heart failure): CHF (congestive heart failure)  PVD (peripheral vascular disease): PVD (peripheral vascular disease)  Hyponatremia: Hyponatremia  Need for prophylactic measure: Need for prophylactic measure  Acute on chronic congestive heart failure, unspecified heart failure type: Acute on chronic congestive heart failure, unspecified heart failure type  Cellulitis of lower extremity, unspecified laterality: Cellulitis of lower extremity, unspecified laterality  Stasis dermatitis with ulcer of left lower extremity due to peripheral venous hypertension: Stasis dermatitis with ulcer of left lower extremity due to peripheral venous hypertension  Atrial fibrillation: Atrial fibrillation  Severe aortic stenosis: Severe aortic stenosis  Systolic CHF: Systolic CHF        Consultant(s) Notes Reviewed:  [ x ] YES     Care Discussed with [ x ] Consultants, [ x ] Patient, [x  ] Family,- 2 sons [  ] HCP, [  ] , [  ] Social Service, [ x ] RN, [  ] Physical Therapy, [  ] Palliative Care Team  DVT PPX: [  ] Lovenox, [  ] SC Heparin, [ x ] Coumadin, [  ] Xarelto, [  ] Eliquis, [  ] Pradaxa, [  ] IV Heparin drip, [  ] SCD, [  ] Ambulation, [  ] Contraindicated 2/2 GI Bleed, [  ] Contraindicated 2/2  Bleed, [  ] Contraindicated 2/2 Brain Bleed  Advanced Directive: [ x ] None, [  ] DNR/DNI Patient is a 87y old  Male who presents with a chief complaint of b/l LE weeping edema (22 Jun 2020 14:15)    HPI:  86 y/o M with hx of , afib (on coumadin), 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 CHF , PVD  presents to ED for worsening edema with  b/l weeping skin & erythema with pain that bothers him to walk  . Pt reports that his sons made him come into the ED. Reports has been having b/l LE edema with  weeping legs  for around 1 1/2 year. Has gotten worse in the past few day with increasing erythema, skin blisters +/- pus with occasional bouts of pain. Denies any other complaints, no fever, No chills ,No  cp, sob, palpitations, abdominal pain, dizziness, palpitations.  Pt was transferred from [place] ER after he got IV Vanco & IV Lasix, IV Cardizem     Given IV Cardizem and started on Cardizem gtt in the ED for rapid afib (20 Jun 2020 19:26)    INTERVAL HPI:  6/22/2020: Patient seen and examined at bedside. Low, stable H/H. INR 6.6. Hyponatremia, Na 132 --> 129 today. Dc Vancomycin. Rash appears nonpurulent and rapidly improving so will de-escalate to Ceftriaxone 1 gram IV Q-day and potential to finish course with cefuroxime 500mg PO BID with last day 6/25. Stop Cardizem gtt. Started Cardizem 30 mg PO q6h. Vascular studies pending. Wound care evaluated patient - start silver alginate and dry dressings every other day, drainage dependant, and ace wraps if vascular studies permit.  6/23/2020: Patient seen and examined at bedside. Low, stable H/H. INR 3.55. Hyponatremia improving. On Rocephin.   .   OVERNIGHT EVENTS: NONE    Home Medications:  Coumadin 5 mg oral tablet: 1 tab(s) orally 2 times a day (22 Jun 2020 18:12)      MEDICATIONS  (STANDING):  cefTRIAXone   IVPB      cefTRIAXone   IVPB 1000 milliGRAM(s) IV Intermittent every 24 hours  diltiazem    Tablet 30 milliGRAM(s) Oral <User Schedule>  furosemide   Injectable 20 milliGRAM(s) IV Push daily  gentamicin 0.3% Ophthalmic Solution 1 Drop(s) Both EYES five times a day    MEDICATIONS  (PRN):  acetaminophen   Tablet .. 650 milliGRAM(s) Oral every 6 hours PRN Temp greater or equal to 38C (100.4F), Mild Pain (1 - 3)      No Known Allergies      Social History:  Never smoker, denies etoh and drug abuse  , Lives alone, Retired  (20 Jun 2020 19:26)    REVIEW OF SYSTEMS:   CONSTITUTIONAL: No fever, No chills, No fatigue, No myalgia, No Body ache, No Weakness  EYES: No eye pain,  No visual disturbances, No discharge, No Redness  ENMT: No ear pain, No nose bleed, No vertigo; No sinus pain, No throat pain, No Congestion  NECK: No pain, No stiffness  RESPIRATORY: No cough, No wheezing, No hemoptysis, No shortness of breath  CARDIOVASCULAR: No chest pain, No palpitations  GASTROINTESTINAL: No abdominal pain, No epigastric pain. No nausea, No vomiting, No diarrhea, No constipation; [ x ] BM  GENITOURINARY: No dysuria, No frequency, No urgency, No hematuria, No incontinence  NEUROLOGICAL: No headaches, No dizziness, No numbness, No tingling, No tremors, No weakness  EXTREMITIES: No Swelling, No Pain, No Edema  SKIN: [ x ] Redness lower extremities b/l   MUSCULOSKELETAL: No joint pain, No joint swelling; No muscle pain, No back pain, No extremity pain  PSYCHIATRIC: No depression, No anxiety, No mood swings, No difficulty sleeping at night  PAIN SCALE: [ x ] None  [  ] Other-  ROS Unable to obtain due to: [  ] Dementia  [  ] Lethargy  [  ] Sedated  [  ] Non verbal  REST OF REVIEW OF SYSTEMS: [ x ] Normal        Vital Signs Last 24 Hrs  T(C): 36.4 (23 Jun 2020 04:15), Max: 36.6 (23 Jun 2020 03:04)  T(F): 97.5 (23 Jun 2020 04:15), Max: 97.9 (23 Jun 2020 03:04)  HR: 73 (23 Jun 2020 04:15) (73 - 96)  BP: 137/90 (23 Jun 2020 04:15) (119/80 - 137/90)  BP(mean): --  RR: 18 (23 Jun 2020 04:15) (18 - 18)  SpO2: 96% (23 Jun 2020 04:15) (96% - 98%)    CAPILLARY BLOOD GLUCOSE          I&O's Summary    22 Jun 2020 07:01  -  23 Jun 2020 07:00  --------------------------------------------------------  IN: 200 mL / OUT: 1200 mL / NET: -1000 mL      PHYSICAL EXAM:  GENERAL:  [ x ] NAD, [ x ] Well appearing, [  ] Agitated, [  ] Drowsy, [  ] Lethargy, [  ] Confused   HEAD:  [ x ] Normal, [  ] Other  EYES:  [ x ] EOMI, [ x ] PERRLA, [ x ] Conjunctiva and sclera clear normal, [  ] Other, [  ] Pallor, [  ] Discharge  ENMT:  [ x ] Normal, [ x ] Moist mucous membranes, [ x ] Good dentition, [ x ] No thrush  NECK:  [ x ] Supple, [ x ] No JVD, [ x ] Normal thyroid, [ x ] Lymphadenopathy, [  ] Other  CHEST/LUNG:  [ x ] Clear to auscultation bilaterally, [ x ] Breath Sounds equal B/L, [  ] Poor effort, [ x ] No rales, [ x ] No rhonchi, [ x ] No wheezing  HEART:  [ x ] Regular rate and rhythm, [  ] Tachycardia, [  ] Bradycardia, [  ] Irregular, [ x ] 3/6 murmurs, No rubs, No gallops, [  ] PPM in place (Mfr:  )  ABDOMEN:  [ x ] Soft, [ x ] Nontender, [ x ] Nondistended, [ x ] No mass, [  x] Bowel sounds present, [  ] Obese  NERVOUS SYSTEM:  [ x ] Alert & Oriented x3, [ x ] Nonfocal, [  ] Confusion, [  ] Encephalopathic, [  ] Sedated, [  ] Unable to assess, [  ] Dementia, [  ] Other-  EXTREMITIES:  [ x ] 2+ Peripheral Pulses, No clubbing, No cyanosis,  [ x ] Mild Edema B/L lower EXT, [ x ] PVD stasis skin changes B/L lower EXT, [ x ] b/l le erythema, dried ulcers, yellowish slough , no foul smell, no warmth, no discharge.  LYMPH:  No lymphadenopathy noted  SKIN:  [ x ] No rashes or lesions, [  ] Pressure ulcers, [  ] Ecchymosis, [  ] Skin tears    DIET: Diet, DASH/TLC:   Sodium & Cholesterol Restricted  1500mL Fluid Restriction ([id number]) (06-22-20 @ 08:06)      LABS:                        11.3   7.48  )-----------( 243      ( 23 Jun 2020 07:01 )             34.1     23 Jun 2020 07:01    131    |  96     |  12     ----------------------------<  99     3.7     |  30     |  0.72     Ca    8.5        23 Jun 2020 07:01  Phos  2.9       23 Jun 2020 07:01  Mg     2.0       23 Jun 2020 07:01      PT/INR - ( 23 Jun 2020 07:01 )   PT: 41.4 sec;   INR: 3.55 ratio         PTT - ( 23 Jun 2020 07:01 )  PTT:43.8 sec    Culture Results:   No growth to date. (06-20 @ 22:27)  Culture Results:   No growth to date. (06-20 @ 22:26)      CARDIAC MARKERS ( 20 Jun 2020 15:47 )  .026 ng/mL / x     / x     / x     / x            Culture - Blood (collected 20 Jun 2020 22:27)  Source: .Blood Blood  Preliminary Report (21 Jun 2020 23:01):    No growth to date.    Culture - Blood (collected 20 Jun 2020 22:26)  Source: .Blood Blood  Preliminary Report (21 Jun 2020 23:01):    No growth to date.       Anemia Panel:      Thyroid Panel:  T4, Serum: 6.2 ug/dL (06-21-20 @ 11:44)  Thyroid Stimulating Hormone, Serum: 1.33 uIU/mL (06-21-20 @ 05:27)            Serum Pro-Brain Natriuretic Peptide: 5624 pg/mL (06-21-20 @ 05:27)  Serum Pro-Brain Natriuretic Peptide: 5405 pg/mL (06-20-20 @ 15:47)      RADIOLOGY & ADDITIONAL TESTS:      HEALTH ISSUES - PROBLEM Dx:  Varicose veins of bilateral lower extremities with pain: Varicose veins of bilateral lower extremities with pain  Stasis dermatitis with ulcer of right lower extremity due to peripheral venous hypertension: Stasis dermatitis with ulcer of right lower extremity due to peripheral venous hypertension  Chronic venous stasis dermatitis of both lower extremities: Chronic venous stasis dermatitis of both lower extremities  CHF (congestive heart failure): CHF (congestive heart failure)  PVD (peripheral vascular disease): PVD (peripheral vascular disease)  Hyponatremia: Hyponatremia  Need for prophylactic measure: Need for prophylactic measure  Acute on chronic congestive heart failure, unspecified heart failure type: Acute on chronic congestive heart failure, unspecified heart failure type  Cellulitis of lower extremity, unspecified laterality: Cellulitis of lower extremity, unspecified laterality  Stasis dermatitis with ulcer of left lower extremity due to peripheral venous hypertension: Stasis dermatitis with ulcer of left lower extremity due to peripheral venous hypertension  Atrial fibrillation: Atrial fibrillation  Severe aortic stenosis: Severe aortic stenosis  Systolic CHF: Systolic CHF        Consultant(s) Notes Reviewed:  [ x ] YES     Care Discussed with [ x ] Consultants, [ x ] Patient, [x  ] Family,- 2 sons [  ] HCP, [  ] , [  ] Social Service, [ x ] RN, [  ] Physical Therapy, [  ] Palliative Care Team  DVT PPX: [  ] Lovenox, [  ] SC Heparin, [ x ] Coumadin, [  ] Xarelto, [  ] Eliquis, [  ] Pradaxa, [  ] IV Heparin drip, [  ] SCD, [  ] Ambulation, [  ] Contraindicated 2/2 GI Bleed, [  ] Contraindicated 2/2  Bleed, [  ] Contraindicated 2/2 Brain Bleed  Advanced Directive: [ x ] None, [  ] DNR/DNI Patient is a 87y old  Male who presents with a chief complaint of b/l LE weeping edema (22 Jun 2020 14:15)    HPI:  86 y/o M with hx of , afib (on coumadin), 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 CHF , PVD  presents to ED for worsening edema with  b/l weeping skin & erythema with pain that bothers him to walk  . Pt reports that his sons made him come into the ED. Reports has been having b/l LE edema with  weeping legs  for around 1 1/2 year. Has gotten worse in the past few day with increasing erythema, skin blisters +/- pus with occasional bouts of pain. Denies any other complaints, no fever, No chills ,No  cp, sob, palpitations, abdominal pain, dizziness, palpitations.  Pt was transferred from [place] ER after he got IV Vanco & IV Lasix, IV Cardizem     Given IV Cardizem and started on Cardizem gtt in the ED for rapid afib (20 Jun 2020 19:26)    INTERVAL HPI:  6/22/2020: Patient seen and examined at bedside. Low, stable H/H. INR 6.6. Hyponatremia, Na 132 --> 129 today. Dc Vancomycin. Rash appears nonpurulent and rapidly improving so will de-escalate to Ceftriaxone 1 gram IV Q-day and potential to finish course with cefuroxime 500mg PO BID with last day 6/25. Stop Cardizem gtt. Started Cardizem 30 mg PO q6h. Vascular studies pending. Wound care evaluated patient - start silver alginate and dry dressings every other day, drainage dependant, and ace wraps if vascular studies permit.  6/23/2020: Patient seen and examined at bedside. Low, stable H/H. INR 3.55. Hyponatremia improving. On Rocephin. DC Cardizem. Start Lopressor 25 mg PO BID. 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 Single-vessel runoff via the posterior tibial artery. 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 F/u vascular surgery.     OVERNIGHT EVENTS: NONE    Home Medications:  Coumadin 5 mg oral tablet: 1 tab(s) orally 2 times a day (22 Jun 2020 18:12)      MEDICATIONS  (STANDING):  cefTRIAXone   IVPB      cefTRIAXone   IVPB 1000 milliGRAM(s) IV Intermittent every 24 hours  diltiazem    Tablet 30 milliGRAM(s) Oral <User Schedule>  furosemide   Injectable 20 milliGRAM(s) IV Push daily  gentamicin 0.3% Ophthalmic Solution 1 Drop(s) Both EYES five times a day    MEDICATIONS  (PRN):  acetaminophen   Tablet .. 650 milliGRAM(s) Oral every 6 hours PRN Temp greater or equal to 38C (100.4F), Mild Pain (1 - 3)      No Known Allergies      Social History:  Never smoker, denies etoh and drug abuse  , Lives alone, Retired  (20 Jun 2020 19:26)    REVIEW OF SYSTEMS:   CONSTITUTIONAL: No fever, No chills, No fatigue, No myalgia, No Body ache, No Weakness  EYES: No eye pain,  No visual disturbances, No discharge, No Redness  ENMT: No ear pain, No nose bleed, No vertigo; No sinus pain, No throat pain, No Congestion  NECK: No pain, No stiffness  RESPIRATORY: No cough, No wheezing, No hemoptysis, No shortness of breath  CARDIOVASCULAR: No chest pain, No palpitations  GASTROINTESTINAL: No abdominal pain, No epigastric pain. No nausea, No vomiting, No diarrhea, No constipation; [ x ] BM  GENITOURINARY: No dysuria, No frequency, No urgency, No hematuria, No incontinence  NEUROLOGICAL: No headaches, No dizziness, No numbness, No tingling, No tremors, No weakness  EXTREMITIES: No Swelling, No Pain, No Edema  SKIN: [ x ] Redness lower extremities b/l   MUSCULOSKELETAL: No joint pain, No joint swelling; No muscle pain, No back pain, No extremity pain  PSYCHIATRIC: No depression, No anxiety, No mood swings, No difficulty sleeping at night  PAIN SCALE: [ x ] None  [  ] Other-  ROS Unable to obtain due to: [  ] Dementia  [  ] Lethargy  [  ] Sedated  [  ] Non verbal  REST OF REVIEW OF SYSTEMS: [ x ] Normal        Vital Signs Last 24 Hrs  T(C): 36.4 (23 Jun 2020 04:15), Max: 36.6 (23 Jun 2020 03:04)  T(F): 97.5 (23 Jun 2020 04:15), Max: 97.9 (23 Jun 2020 03:04)  HR: 73 (23 Jun 2020 04:15) (73 - 96)  BP: 137/90 (23 Jun 2020 04:15) (119/80 - 137/90)  BP(mean): --  RR: 18 (23 Jun 2020 04:15) (18 - 18)  SpO2: 96% (23 Jun 2020 04:15) (96% - 98%)    CAPILLARY BLOOD GLUCOSE          I&O's Summary    22 Jun 2020 07:01  -  23 Jun 2020 07:00  --------------------------------------------------------  IN: 200 mL / OUT: 1200 mL / NET: -1000 mL      PHYSICAL EXAM:  GENERAL:  [ x ] NAD, [ x ] Well appearing, [  ] Agitated, [  ] Drowsy, [  ] Lethargy, [  ] Confused   HEAD:  [ x ] Normal, [  ] Other  EYES:  [ x ] EOMI, [ x ] PERRLA, [ x ] Conjunctiva and sclera clear normal, [  ] Other, [  ] Pallor, [  ] Discharge  ENMT:  [ x ] Normal, [ x ] Moist mucous membranes, [ x ] Good dentition, [ x ] No thrush  NECK:  [ x ] Supple, [ x ] No JVD, [ x ] Normal thyroid, [ x ] Lymphadenopathy, [  ] Other  CHEST/LUNG:  [ x ] Clear to auscultation bilaterally, [ x ] Breath Sounds equal B/L, [  ] Poor effort, [ x ] No rales, [ x ] No rhonchi, [ x ] No wheezing  HEART:  [ x ] Regular rate and rhythm, [  ] Tachycardia, [  ] Bradycardia, [  ] Irregular, [ x ] 3/6 murmurs, No rubs, No gallops, [  ] PPM in place (Mfr:  )  ABDOMEN:  [ x ] Soft, [ x ] Nontender, [ x ] Nondistended, [ x ] No mass, [  x] Bowel sounds present, [  ] Obese  NERVOUS SYSTEM:  [ x ] Alert & Oriented x3, [ x ] Nonfocal, [  ] Confusion, [  ] Encephalopathic, [  ] Sedated, [  ] Unable to assess, [  ] Dementia, [  ] Other-  EXTREMITIES:  [ x ] 2+ Peripheral Pulses, No clubbing, No cyanosis,  [ x ] Mild Edema B/L lower EXT, [ x ] PVD stasis skin changes B/L lower EXT, [ x ] b/l le erythema, dried ulcers, yellowish slough , no foul smell, no warmth, no discharge.  LYMPH:  No lymphadenopathy noted  SKIN:  [ x ] No rashes or lesions, [  ] Pressure ulcers, [  ] Ecchymosis, [  ] Skin tears    DIET: Diet, DASH/TLC:   Sodium & Cholesterol Restricted  1500mL Fluid Restriction ([id number]) (06-22-20 @ 08:06)      LABS:                        11.3   7.48  )-----------( 243      ( 23 Jun 2020 07:01 )             34.1     23 Jun 2020 07:01    131    |  96     |  12     ----------------------------<  99     3.7     |  30     |  0.72     Ca    8.5        23 Jun 2020 07:01  Phos  2.9       23 Jun 2020 07:01  Mg     2.0       23 Jun 2020 07:01      PT/INR - ( 23 Jun 2020 07:01 )   PT: 41.4 sec;   INR: 3.55 ratio         PTT - ( 23 Jun 2020 07:01 )  PTT:43.8 sec    Culture Results:   No growth to date. (06-20 @ 22:27)  Culture Results:   No growth to date. (06-20 @ 22:26)      CARDIAC MARKERS ( 20 Jun 2020 15:47 )  .026 ng/mL / x     / x     / x     / x            Culture - Blood (collected 20 Jun 2020 22:27)  Source: .Blood Blood  Preliminary Report (21 Jun 2020 23:01):    No growth to date.    Culture - Blood (collected 20 Jun 2020 22:26)  Source: .Blood Blood  Preliminary Report (21 Jun 2020 23:01):    No growth to date.       Anemia Panel:      Thyroid Panel:  T4, Serum: 6.2 ug/dL (06-21-20 @ 11:44)  Thyroid Stimulating Hormone, Serum: 1.33 uIU/mL (06-21-20 @ 05:27)            Serum Pro-Brain Natriuretic Peptide: 5624 pg/mL (06-21-20 @ 05:27)  Serum Pro-Brain Natriuretic Peptide: 5405 pg/mL (06-20-20 @ 15:47)      RADIOLOGY & ADDITIONAL TESTS:      HEALTH ISSUES - PROBLEM Dx:  Varicose veins of bilateral lower extremities with pain: Varicose veins of bilateral lower extremities with pain  Stasis dermatitis with ulcer of right lower extremity due to peripheral venous hypertension: Stasis dermatitis with ulcer of right lower extremity due to peripheral venous hypertension  Chronic venous stasis dermatitis of both lower extremities: Chronic venous stasis dermatitis of both lower extremities  CHF (congestive heart failure): CHF (congestive heart failure)  PVD (peripheral vascular disease): PVD (peripheral vascular disease)  Hyponatremia: Hyponatremia  Need for prophylactic measure: Need for prophylactic measure  Acute on chronic congestive heart failure, unspecified heart failure type: Acute on chronic congestive heart failure, unspecified heart failure type  Cellulitis of lower extremity, unspecified laterality: Cellulitis of lower extremity, unspecified laterality  Stasis dermatitis with ulcer of left lower extremity due to peripheral venous hypertension: Stasis dermatitis with ulcer of left lower extremity due to peripheral venous hypertension  Atrial fibrillation: Atrial fibrillation  Severe aortic stenosis: Severe aortic stenosis  Systolic CHF: Systolic CHF        Consultant(s) Notes Reviewed:  [ x ] YES     Care Discussed with [ x ] Consultants, [ x ] Patient, [x  ] Family,- 2 sons [  ] HCP, [  ] , [  ] Social Service, [ x ] RN, [  ] Physical Therapy, [  ] Palliative Care Team  DVT PPX: [  ] Lovenox, [  ] SC Heparin, [ x ] Coumadin, [  ] Xarelto, [  ] Eliquis, [  ] Pradaxa, [  ] IV Heparin drip, [  ] SCD, [  ] Ambulation, [  ] Contraindicated 2/2 GI Bleed, [  ] Contraindicated 2/2  Bleed, [  ] Contraindicated 2/2 Brain Bleed  Advanced Directive: [ x ] None, [  ] DNR/DNI Patient is a 87y old  Male who presents with a chief complaint of b/l LE weeping edema (22 Jun 2020 14:15)    HPI:  86 y/o M with hx of , afib (on coumadin), 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 CHF , PVD  presents to ED for worsening edema with  b/l weeping skin & erythema with pain that bothers him to walk  . Pt reports that his sons made him come into the ED. Reports has been having b/l LE edema with  weeping legs  for around 1 1/2 year. Has gotten worse in the past few day with increasing erythema, skin blisters +/- pus with occasional bouts of pain. Denies any other complaints, no fever, No chills ,No  cp, sob, palpitations, abdominal pain, dizziness, palpitations.  Pt was transferred from [place] ER after he got IV Vanco & IV Lasix, IV Cardizem     Given IV Cardizem and started on Cardizem gtt in the ED for rapid afib (20 Jun 2020 19:26)    INTERVAL HPI:  6/22/2020: Patient seen and examined at bedside. Low, stable H/H. INR 6.6. Hyponatremia, Na 132 --> 129 today. Dc Vancomycin. Rash appears nonpurulent and rapidly improving so will de-escalate to Ceftriaxone 1 gram IV Q-day and potential to finish course with cefuroxime 500mg PO BID with last day 6/25. Stop Cardizem gtt. Started Cardizem 30 mg PO q6h. Vascular studies pending. Wound care evaluated patient - start silver alginate and dry dressings every other day, drainage dependant, and ace wraps if vascular studies permit.  6/23/2020: Patient seen and examined at bedside. Low, stable H/H. INR 3.55. Hyponatremia improving. On Rocephin. DC Cardizem. Start Lopressor 25 mg PO BID. 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 Single-vessel runoff via the posterior tibial artery. 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 Vascular surgery (Dr. Arias) - no surgical intervention at this time.     OVERNIGHT EVENTS: NONE    Home Medications:  Coumadin 5 mg oral tablet: 1 tab(s) orally 2 times a day (22 Jun 2020 18:12)      MEDICATIONS  (STANDING):  cefTRIAXone   IVPB      cefTRIAXone   IVPB 1000 milliGRAM(s) IV Intermittent every 24 hours  diltiazem    Tablet 30 milliGRAM(s) Oral <User Schedule>  furosemide   Injectable 20 milliGRAM(s) IV Push daily  gentamicin 0.3% Ophthalmic Solution 1 Drop(s) Both EYES five times a day    MEDICATIONS  (PRN):  acetaminophen   Tablet .. 650 milliGRAM(s) Oral every 6 hours PRN Temp greater or equal to 38C (100.4F), Mild Pain (1 - 3)      No Known Allergies      Social History:  Never smoker, denies etoh and drug abuse  , Lives alone, Retired  (20 Jun 2020 19:26)    REVIEW OF SYSTEMS:   CONSTITUTIONAL: No fever, No chills, No fatigue, No myalgia, No Body ache, No Weakness  EYES: No eye pain,  No visual disturbances, No discharge, No Redness  ENMT: No ear pain, No nose bleed, No vertigo; No sinus pain, No throat pain, No Congestion  NECK: No pain, No stiffness  RESPIRATORY: No cough, No wheezing, No hemoptysis, No shortness of breath  CARDIOVASCULAR: No chest pain, No palpitations  GASTROINTESTINAL: No abdominal pain, No epigastric pain. No nausea, No vomiting, No diarrhea, No constipation; [ x ] BM  GENITOURINARY: No dysuria, No frequency, No urgency, No hematuria, No incontinence  NEUROLOGICAL: No headaches, No dizziness, No numbness, No tingling, No tremors, No weakness  EXTREMITIES: No Swelling, No Pain, No Edema  SKIN: [ x ] Redness lower extremities b/l   MUSCULOSKELETAL: No joint pain, No joint swelling; No muscle pain, No back pain, No extremity pain  PSYCHIATRIC: No depression, No anxiety, No mood swings, No difficulty sleeping at night  PAIN SCALE: [ x ] None  [  ] Other-  ROS Unable to obtain due to: [  ] Dementia  [  ] Lethargy  [  ] Sedated  [  ] Non verbal  REST OF REVIEW OF SYSTEMS: [ x ] Normal        Vital Signs Last 24 Hrs  T(C): 36.4 (23 Jun 2020 04:15), Max: 36.6 (23 Jun 2020 03:04)  T(F): 97.5 (23 Jun 2020 04:15), Max: 97.9 (23 Jun 2020 03:04)  HR: 73 (23 Jun 2020 04:15) (73 - 96)  BP: 137/90 (23 Jun 2020 04:15) (119/80 - 137/90)  BP(mean): --  RR: 18 (23 Jun 2020 04:15) (18 - 18)  SpO2: 96% (23 Jun 2020 04:15) (96% - 98%)    CAPILLARY BLOOD GLUCOSE          I&O's Summary    22 Jun 2020 07:01  -  23 Jun 2020 07:00  --------------------------------------------------------  IN: 200 mL / OUT: 1200 mL / NET: -1000 mL      PHYSICAL EXAM:  GENERAL:  [ x ] NAD, [ x ] Well appearing, [  ] Agitated, [  ] Drowsy, [  ] Lethargy, [  ] Confused   HEAD:  [ x ] Normal, [  ] Other  EYES:  [ x ] EOMI, [ x ] PERRLA, [ x ] Conjunctiva and sclera clear normal, [  ] Other, [  ] Pallor, [  ] Discharge  ENMT:  [ x ] Normal, [ x ] Moist mucous membranes, [ x ] Good dentition, [ x ] No thrush  NECK:  [ x ] Supple, [ x ] No JVD, [ x ] Normal thyroid, [ x ] Lymphadenopathy, [  ] Other  CHEST/LUNG:  [ x ] Clear to auscultation bilaterally, [ x ] Breath Sounds equal B/L, [  ] Poor effort, [ x ] No rales, [ x ] No rhonchi, [ x ] No wheezing  HEART:  [ x ] Regular rate and rhythm, [  ] Tachycardia, [  ] Bradycardia, [  ] Irregular, [ x ] 3/6 murmurs, No rubs, No gallops, [  ] PPM in place (Mfr:  )  ABDOMEN:  [ x ] Soft, [ x ] Nontender, [ x ] Nondistended, [ x ] No mass, [  x] Bowel sounds present, [  ] Obese  NERVOUS SYSTEM:  [ x ] Alert & Oriented x3, [ x ] Nonfocal, [  ] Confusion, [  ] Encephalopathic, [  ] Sedated, [  ] Unable to assess, [  ] Dementia, [  ] Other-  EXTREMITIES:  [ x ] 2+ Peripheral Pulses, No clubbing, No cyanosis,  [ x ] Mild Edema B/L lower EXT, [ x ] PVD stasis skin changes B/L lower EXT, [ x ] b/l le erythema, dried ulcers, yellowish slough , no foul smell, no warmth, no discharge.  LYMPH:  No lymphadenopathy noted  SKIN:  [ x ] No rashes or lesions, [  ] Pressure ulcers, [  ] Ecchymosis, [  ] Skin tears    DIET: Diet, DASH/TLC:   Sodium & Cholesterol Restricted  1500mL Fluid Restriction ([id number]) (06-22-20 @ 08:06)      LABS:                        11.3   7.48  )-----------( 243      ( 23 Jun 2020 07:01 )             34.1     23 Jun 2020 07:01    131    |  96     |  12     ----------------------------<  99     3.7     |  30     |  0.72     Ca    8.5        23 Jun 2020 07:01  Phos  2.9       23 Jun 2020 07:01  Mg     2.0       23 Jun 2020 07:01      PT/INR - ( 23 Jun 2020 07:01 )   PT: 41.4 sec;   INR: 3.55 ratio         PTT - ( 23 Jun 2020 07:01 )  PTT:43.8 sec    Culture Results:   No growth to date. (06-20 @ 22:27)  Culture Results:   No growth to date. (06-20 @ 22:26)      CARDIAC MARKERS ( 20 Jun 2020 15:47 )  .026 ng/mL / x     / x     / x     / x            Culture - Blood (collected 20 Jun 2020 22:27)  Source: .Blood Blood  Preliminary Report (21 Jun 2020 23:01):    No growth to date.    Culture - Blood (collected 20 Jun 2020 22:26)  Source: .Blood Blood  Preliminary Report (21 Jun 2020 23:01):    No growth to date.       Anemia Panel:      Thyroid Panel:  T4, Serum: 6.2 ug/dL (06-21-20 @ 11:44)  Thyroid Stimulating Hormone, Serum: 1.33 uIU/mL (06-21-20 @ 05:27)            Serum Pro-Brain Natriuretic Peptide: 5624 pg/mL (06-21-20 @ 05:27)  Serum Pro-Brain Natriuretic Peptide: 5405 pg/mL (06-20-20 @ 15:47)      RADIOLOGY & ADDITIONAL TESTS:      HEALTH ISSUES - PROBLEM Dx:  Varicose veins of bilateral lower extremities with pain: Varicose veins of bilateral lower extremities with pain  Stasis dermatitis with ulcer of right lower extremity due to peripheral venous hypertension: Stasis dermatitis with ulcer of right lower extremity due to peripheral venous hypertension  Chronic venous stasis dermatitis of both lower extremities: Chronic venous stasis dermatitis of both lower extremities  CHF (congestive heart failure): CHF (congestive heart failure)  PVD (peripheral vascular disease): PVD (peripheral vascular disease)  Hyponatremia: Hyponatremia  Need for prophylactic measure: Need for prophylactic measure  Acute on chronic congestive heart failure, unspecified heart failure type: Acute on chronic congestive heart failure, unspecified heart failure type  Cellulitis of lower extremity, unspecified laterality: Cellulitis of lower extremity, unspecified laterality  Stasis dermatitis with ulcer of left lower extremity due to peripheral venous hypertension: Stasis dermatitis with ulcer of left lower extremity due to peripheral venous hypertension  Atrial fibrillation: Atrial fibrillation  Severe aortic stenosis: Severe aortic stenosis  Systolic CHF: Systolic CHF        Consultant(s) Notes Reviewed:  [ x ] YES     Care Discussed with [ x ] Consultants, [ x ] Patient, [x  ] Family,- 2 sons [  ] HCP, [  ] , [  ] Social Service, [ x ] RN, [  ] Physical Therapy, [  ] Palliative Care Team  DVT PPX: [  ] Lovenox, [  ] SC Heparin, [ x ] Coumadin, [  ] Xarelto, [  ] Eliquis, [  ] Pradaxa, [  ] IV Heparin drip, [  ] SCD, [  ] Ambulation, [  ] Contraindicated 2/2 GI Bleed, [  ] Contraindicated 2/2  Bleed, [  ] Contraindicated 2/2 Brain Bleed  Advanced Directive: [ x ] None, [  ] DNR/DNI Patient is a 87y old  Male who presents with a chief complaint of b/l LE weeping edema (22 Jun 2020 14:15)    HPI:  88 y/o M with hx of , afib (on coumadin), 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 CHF , PVD  presents to ED for worsening edema with  b/l weeping skin & erythema with pain that bothers him to walk  . Pt reports that his sons made him come into the ED. Reports has been having b/l LE edema with  weeping legs  for around 1 1/2 year. Has gotten worse in the past few day with increasing erythema, skin blisters +/- pus with occasional bouts of pain. Denies any other complaints, no fever, No chills ,No  cp, sob, palpitations, abdominal pain, dizziness, palpitations.  Pt was transferred from [place] ER after he got IV Vanco & IV Lasix, IV Cardizem     Given IV Cardizem and started on Cardizem gtt in the ED for rapid afib (20 Jun 2020 19:26)    INTERVAL HPI:  6/22/2020: Patient seen and examined at bedside. Low, stable H/H. INR 6.6. Hyponatremia, Na 132 --> 129 today. Dc Vancomycin. Rash appears nonpurulent and rapidly improving so will de-escalate to Ceftriaxone 1 gram IV Q-day and potential to finish course with cefuroxime 500mg PO BID with last day 6/25. Stop Cardizem gtt. Started Cardizem 30 mg PO q6h. Vascular studies pending. Wound care evaluated patient - start silver alginate and dry dressings every other day, drainage dependant, and ace wraps if vascular studies permit.  6/23/2020: Patient seen and examined at bedside. Low, stable H/H. INR 3.55. Hyponatremia improving. On Rocephin. DC Cardizem. Start Lopressor 25 mg PO BID. 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 Single-vessel runoff via the posterior tibial artery. 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 Vascular surgery (Dr. Arias) - plan for right LE angiogram, possible stent/angioplasty on Thursday in IR.    OVERNIGHT EVENTS: NONE    Home Medications:  Coumadin 5 mg oral tablet: 1 tab(s) orally 2 times a day (22 Jun 2020 18:12)      MEDICATIONS  (STANDING):  cefTRIAXone   IVPB      cefTRIAXone   IVPB 1000 milliGRAM(s) IV Intermittent every 24 hours  diltiazem    Tablet 30 milliGRAM(s) Oral <User Schedule>  furosemide   Injectable 20 milliGRAM(s) IV Push daily  gentamicin 0.3% Ophthalmic Solution 1 Drop(s) Both EYES five times a day    MEDICATIONS  (PRN):  acetaminophen   Tablet .. 650 milliGRAM(s) Oral every 6 hours PRN Temp greater or equal to 38C (100.4F), Mild Pain (1 - 3)      No Known Allergies      Social History:  Never smoker, denies etoh and drug abuse  , Lives alone, Retired  (20 Jun 2020 19:26)    REVIEW OF SYSTEMS:   CONSTITUTIONAL: No fever, No chills, No fatigue, No myalgia, No Body ache, No Weakness  EYES: No eye pain,  No visual disturbances, No discharge, No Redness  ENMT: No ear pain, No nose bleed, No vertigo; No sinus pain, No throat pain, No Congestion  NECK: No pain, No stiffness  RESPIRATORY: No cough, No wheezing, No hemoptysis, No shortness of breath  CARDIOVASCULAR: No chest pain, No palpitations  GASTROINTESTINAL: No abdominal pain, No epigastric pain. No nausea, No vomiting, No diarrhea, No constipation; [ x ] BM  GENITOURINARY: No dysuria, No frequency, No urgency, No hematuria, No incontinence  NEUROLOGICAL: No headaches, No dizziness, No numbness, No tingling, No tremors, No weakness  EXTREMITIES: No Swelling, No Pain, No Edema  SKIN: [ x ] Redness lower extremities b/l   MUSCULOSKELETAL: No joint pain, No joint swelling; No muscle pain, No back pain, No extremity pain  PSYCHIATRIC: No depression, No anxiety, No mood swings, No difficulty sleeping at night  PAIN SCALE: [ x ] None  [  ] Other-  ROS Unable to obtain due to: [  ] Dementia  [  ] Lethargy  [  ] Sedated  [  ] Non verbal  REST OF REVIEW OF SYSTEMS: [ x ] Normal        Vital Signs Last 24 Hrs  T(C): 36.4 (23 Jun 2020 04:15), Max: 36.6 (23 Jun 2020 03:04)  T(F): 97.5 (23 Jun 2020 04:15), Max: 97.9 (23 Jun 2020 03:04)  HR: 73 (23 Jun 2020 04:15) (73 - 96)  BP: 137/90 (23 Jun 2020 04:15) (119/80 - 137/90)  BP(mean): --  RR: 18 (23 Jun 2020 04:15) (18 - 18)  SpO2: 96% (23 Jun 2020 04:15) (96% - 98%)    CAPILLARY BLOOD GLUCOSE          I&O's Summary    22 Jun 2020 07:01  -  23 Jun 2020 07:00  --------------------------------------------------------  IN: 200 mL / OUT: 1200 mL / NET: -1000 mL      PHYSICAL EXAM:  GENERAL:  [ x ] NAD, [ x ] Well appearing, [  ] Agitated, [  ] Drowsy, [  ] Lethargy, [  ] Confused   HEAD:  [ x ] Normal, [  ] Other  EYES:  [ x ] EOMI, [ x ] PERRLA, [ x ] Conjunctiva and sclera clear normal, [  ] Other, [  ] Pallor, [  ] Discharge  ENMT:  [ x ] Normal, [ x ] Moist mucous membranes, [ x ] Good dentition, [ x ] No thrush  NECK:  [ x ] Supple, [ x ] No JVD, [ x ] Normal thyroid, [ x ] Lymphadenopathy, [  ] Other  CHEST/LUNG:  [ x ] Clear to auscultation bilaterally, [ x ] Breath Sounds equal B/L, [  ] Poor effort, [ x ] No rales, [ x ] No rhonchi, [ x ] No wheezing  HEART:  [ x ] Regular rate and rhythm, [  ] Tachycardia, [  ] Bradycardia, [  ] Irregular, [ x ] 3/6 murmurs, No rubs, No gallops, [  ] PPM in place (Mfr:  )  ABDOMEN:  [ x ] Soft, [ x ] Nontender, [ x ] Nondistended, [ x ] No mass, [  x] Bowel sounds present, [  ] Obese  NERVOUS SYSTEM:  [ x ] Alert & Oriented x3, [ x ] Nonfocal, [  ] Confusion, [  ] Encephalopathic, [  ] Sedated, [  ] Unable to assess, [  ] Dementia, [  ] Other-  EXTREMITIES:  [ x ] 2+ Peripheral Pulses, No clubbing, No cyanosis,  [ x ] Mild Edema B/L lower EXT, [ x ] PVD stasis skin changes B/L lower EXT, [ x ] b/l le erythema, dried ulcers, yellowish slough , no foul smell, no warmth, no discharge.  LYMPH:  No lymphadenopathy noted  SKIN:  [ x ] No rashes or lesions, [  ] Pressure ulcers, [  ] Ecchymosis, [  ] Skin tears    DIET: Diet, DASH/TLC:   Sodium & Cholesterol Restricted  1500mL Fluid Restriction ([id number]) (06-22-20 @ 08:06)      LABS:                        11.3   7.48  )-----------( 243      ( 23 Jun 2020 07:01 )             34.1     23 Jun 2020 07:01    131    |  96     |  12     ----------------------------<  99     3.7     |  30     |  0.72     Ca    8.5        23 Jun 2020 07:01  Phos  2.9       23 Jun 2020 07:01  Mg     2.0       23 Jun 2020 07:01      PT/INR - ( 23 Jun 2020 07:01 )   PT: 41.4 sec;   INR: 3.55 ratio         PTT - ( 23 Jun 2020 07:01 )  PTT:43.8 sec    Culture Results:   No growth to date. (06-20 @ 22:27)  Culture Results:   No growth to date. (06-20 @ 22:26)      CARDIAC MARKERS ( 20 Jun 2020 15:47 )  .026 ng/mL / x     / x     / x     / x            Culture - Blood (collected 20 Jun 2020 22:27)  Source: .Blood Blood  Preliminary Report (21 Jun 2020 23:01):    No growth to date.    Culture - Blood (collected 20 Jun 2020 22:26)  Source: .Blood Blood  Preliminary Report (21 Jun 2020 23:01):    No growth to date.       Anemia Panel:      Thyroid Panel:  T4, Serum: 6.2 ug/dL (06-21-20 @ 11:44)  Thyroid Stimulating Hormone, Serum: 1.33 uIU/mL (06-21-20 @ 05:27)            Serum Pro-Brain Natriuretic Peptide: 5624 pg/mL (06-21-20 @ 05:27)  Serum Pro-Brain Natriuretic Peptide: 5405 pg/mL (06-20-20 @ 15:47)      RADIOLOGY & ADDITIONAL TESTS:      HEALTH ISSUES - PROBLEM Dx:  Varicose veins of bilateral lower extremities with pain: Varicose veins of bilateral lower extremities with pain  Stasis dermatitis with ulcer of right lower extremity due to peripheral venous hypertension: Stasis dermatitis with ulcer of right lower extremity due to peripheral venous hypertension  Chronic venous stasis dermatitis of both lower extremities: Chronic venous stasis dermatitis of both lower extremities  CHF (congestive heart failure): CHF (congestive heart failure)  PVD (peripheral vascular disease): PVD (peripheral vascular disease)  Hyponatremia: Hyponatremia  Need for prophylactic measure: Need for prophylactic measure  Acute on chronic congestive heart failure, unspecified heart failure type: Acute on chronic congestive heart failure, unspecified heart failure type  Cellulitis of lower extremity, unspecified laterality: Cellulitis of lower extremity, unspecified laterality  Stasis dermatitis with ulcer of left lower extremity due to peripheral venous hypertension: Stasis dermatitis with ulcer of left lower extremity due to peripheral venous hypertension  Atrial fibrillation: Atrial fibrillation  Severe aortic stenosis: Severe aortic stenosis  Systolic CHF: Systolic CHF        Consultant(s) Notes Reviewed:  [ x ] YES     Care Discussed with [ x ] Consultants, [ x ] Patient, [x  ] Family,- 2 sons [  ] HCP, [  ] , [  ] Social Service, [ x ] RN, [  ] Physical Therapy, [  ] Palliative Care Team  DVT PPX: [  ] Lovenox, [  ] SC Heparin, [ x ] Coumadin, [  ] Xarelto, [  ] Eliquis, [  ] Pradaxa, [  ] IV Heparin drip, [  ] SCD, [  ] Ambulation, [  ] Contraindicated 2/2 GI Bleed, [  ] Contraindicated 2/2  Bleed, [  ] Contraindicated 2/2 Brain Bleed  Advanced Directive: [ x ] None, [  ] DNR/DNI Patient is a 87y old  Male who presents with a chief complaint of b/l LE weeping edema (22 Jun 2020 14:15)    HPI:  86 y/o M with hx of , afib (on coumadin), 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 CHF , PVD  presents to ED for worsening edema with  b/l weeping skin & erythema with pain that bothers him to walk  . Pt reports that his sons made him come into the ED. Reports has been having b/l LE edema with  weeping legs  for around 1 1/2 year. Has gotten worse in the past few day with increasing erythema, skin blisters +/- pus with occasional bouts of pain. Denies any other complaints, no fever, No chills ,No  cp, sob, palpitations, abdominal pain, dizziness, palpitations.  Pt was transferred from [place] ER after he got IV Vanco & IV Lasix, IV Cardizem     Given IV Cardizem and started on Cardizem gtt in the ED for rapid afib (20 Jun 2020 19:26)    INTERVAL HPI:  6/22/2020: Patient seen and examined at bedside. Low, stable H/H. INR 6.6. Hyponatremia, Na 132 --> 129 today. Dc Vancomycin. Rash appears nonpurulent and rapidly improving so will de-escalate to Ceftriaxone 1 gram IV Q-day and potential to finish course with cefuroxime 500mg PO BID with last day 6/25. Stop Cardizem gtt. Started Cardizem 30 mg PO q6h. Vascular studies pending. Wound care evaluated patient - start silver alginate and dry dressings every other day, drainage dependant, and ace wraps if vascular studies permit.  6/23/2020: Patient seen and examined at bedside. Low, stable H/H. INR 3.55. Hyponatremia improving. On Rocephin. DC Cardizem. Start Lopressor 25 mg PO BID. 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 Single-vessel runoff via the posterior tibial artery. 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 Vascular surgery (Dr. Arias) - plan for right LE angiogram, possible stent/angioplasty on Thursday in IR.    OVERNIGHT EVENTS: NONE    Home Medications:  Coumadin 5 mg oral tablet: 1 tab(s) orally 2 times a day (22 Jun 2020 18:12)      MEDICATIONS  (STANDING):  cefTRIAXone   IVPB      cefTRIAXone   IVPB 1000 milliGRAM(s) IV Intermittent every 24 hours  diltiazem    Tablet 30 milliGRAM(s) Oral <User Schedule>  furosemide   Injectable 20 milliGRAM(s) IV Push daily  gentamicin 0.3% Ophthalmic Solution 1 Drop(s) Both EYES five times a day    MEDICATIONS  (PRN):  acetaminophen   Tablet .. 650 milliGRAM(s) Oral every 6 hours PRN Temp greater or equal to 38C (100.4F), Mild Pain (1 - 3)      No Known Allergies      Social History:  Never smoker, denies etoh and drug abuse  , Lives alone, Retired  (20 Jun 2020 19:26)    REVIEW OF SYSTEMS:   CONSTITUTIONAL: No fever, No chills, No fatigue, No myalgia, No Body ache, No Weakness  EYES: No eye pain,  No visual disturbances, No discharge, No Redness  ENMT: No ear pain, No nose bleed, No vertigo; No sinus pain, No throat pain, No Congestion  NECK: No pain, No stiffness  RESPIRATORY: No cough, No wheezing, No hemoptysis, No shortness of breath  CARDIOVASCULAR: No chest pain, No palpitations  GASTROINTESTINAL: No abdominal pain, No epigastric pain. No nausea, No vomiting, No diarrhea, No constipation; [ x ] BM  GENITOURINARY: No dysuria, No frequency, No urgency, No hematuria, No incontinence  NEUROLOGICAL: No headaches, No dizziness, No numbness, No tingling, No tremors, No weakness  EXTREMITIES: No Swelling, No Pain, No Edema  SKIN: [ x ] Redness lower extremities b/l   MUSCULOSKELETAL: No joint pain, No joint swelling; No muscle pain, No back pain, No extremity pain  PSYCHIATRIC: No depression, No anxiety, No mood swings, No difficulty sleeping at night  PAIN SCALE: [ x ] None  [  ] Other-  ROS Unable to obtain due to: [  ] Dementia  [  ] Lethargy  [  ] Sedated  [  ] Non verbal  REST OF REVIEW OF SYSTEMS: [ x ] Normal        Vital Signs Last 24 Hrs  T(C): 36.4 (23 Jun 2020 04:15), Max: 36.6 (23 Jun 2020 03:04)  T(F): 97.5 (23 Jun 2020 04:15), Max: 97.9 (23 Jun 2020 03:04)  HR: 73 (23 Jun 2020 04:15) (73 - 96)  BP: 137/90 (23 Jun 2020 04:15) (119/80 - 137/90)  BP(mean): --  RR: 18 (23 Jun 2020 04:15) (18 - 18)  SpO2: 96% (23 Jun 2020 04:15) (96% - 98%)    CAPILLARY BLOOD GLUCOSE          I&O's Summary    22 Jun 2020 07:01  -  23 Jun 2020 07:00  --------------------------------------------------------  IN: 200 mL / OUT: 1200 mL / NET: -1000 mL      PHYSICAL EXAM:  GENERAL:  [ x ] NAD, [ x ] Well appearing, [  ] Agitated, [  ] Drowsy, [  ] Lethargy, [  ] Confused   HEAD:  [ x ] Normal, [  ] Other  EYES:  [ x ] EOMI, [ x ] PERRLA, [ x ] Conjunctiva and sclera clear normal, [  ] Other, [  ] Pallor, [  ] Discharge  ENMT:  [ x ] Normal, [ x ] Moist mucous membranes, [ x ] Good dentition, [ x ] No thrush  NECK:  [ x ] Supple, [ x ] No JVD, [ x ] Normal thyroid, [ x ] Lymphadenopathy, [  ] Other  CHEST/LUNG:  [ x ] Clear to auscultation bilaterally, [ x ] Breath Sounds equal B/L, [  ] Poor effort, [ x ] No rales, [ x ] No rhonchi, [ x ] No wheezing  HEART:  [ x ] Regular rate and rhythm, [  ] Tachycardia, [  ] Bradycardia, [  ] Irregular, [ x ] 3/6 murmurs, No rubs, No gallops, [  ] PPM in place (Mfr:  )  ABDOMEN:  [ x ] Soft, [ x ] Nontender, [ x ] Nondistended, [ x ] No mass, [  x] Bowel sounds present, [  ] Obese  NERVOUS SYSTEM:  [ x ] Alert & Oriented x3, [ x ] Nonfocal, [  ] Confusion, [  ] Encephalopathic, [  ] Sedated, [  ] Unable to assess, [  ] Dementia, [  ] Other-  EXTREMITIES:  [ x ] 2+ Peripheral Pulses, No clubbing, No cyanosis,  [ x ] Mild Edema B/L lower EXT, [ x ] PVD stasis skin changes B/L lower EXT, [ x ] b/l le erythema, dried ulcers, yellowish slough , no foul smell, no warmth, no discharge.  LYMPH:  No lymphadenopathy noted  SKIN:  [ x ] No rashes or lesions, [  ] Pressure ulcers, [  ] Ecchymosis, [  ] Skin tears    DIET: Diet, DASH/TLC:   Sodium & Cholesterol Restricted  1500mL Fluid Restriction ([id number]) (06-22-20 @ 08:06)      LABS:                        11.3   7.48  )-----------( 243      ( 23 Jun 2020 07:01 )             34.1     23 Jun 2020 07:01    131    |  96     |  12     ----------------------------<  99     3.7     |  30     |  0.72     Ca    8.5        23 Jun 2020 07:01  Phos  2.9       23 Jun 2020 07:01  Mg     2.0       23 Jun 2020 07:01      PT/INR - ( 23 Jun 2020 07:01 )   PT: 41.4 sec;   INR: 3.55 ratio         PTT - ( 23 Jun 2020 07:01 )  PTT:43.8 sec    Culture Results:   No growth to date. (06-20 @ 22:27)  Culture Results:   No growth to date. (06-20 @ 22:26)      CARDIAC MARKERS ( 20 Jun 2020 15:47 )  .026 ng/mL / x     / x     / x     / x            Culture - Blood (collected 20 Jun 2020 22:27)  Source: .Blood Blood  Preliminary Report (21 Jun 2020 23:01):    No growth to date.    Culture - Blood (collected 20 Jun 2020 22:26)  Source: .Blood Blood  Preliminary Report (21 Jun 2020 23:01):    No growth to date.       Anemia Panel:      Thyroid Panel:  T4, Serum: 6.2 ug/dL (06-21-20 @ 11:44)  Thyroid Stimulating Hormone, Serum: 1.33 uIU/mL (06-21-20 @ 05:27)            Serum Pro-Brain Natriuretic Peptide: 5624 pg/mL (06-21-20 @ 05:27)  Serum Pro-Brain Natriuretic Peptide: 5405 pg/mL (06-20-20 @ 15:47)      RADIOLOGY & ADDITIONAL TESTS:      HEALTH ISSUES - PROBLEM Dx:  Varicose veins of bilateral lower extremities with pain: Varicose veins of bilateral lower extremities with pain  Stasis dermatitis with ulcer of right lower extremity due to peripheral venous hypertension: Stasis dermatitis with ulcer of right lower extremity due to peripheral venous hypertension  Chronic venous stasis dermatitis of both lower extremities: Chronic venous stasis dermatitis of both lower extremities  CHF (congestive heart failure): CHF (congestive heart failure)  PVD (peripheral vascular disease): PVD (peripheral vascular disease)  Hyponatremia: Hyponatremia  Need for prophylactic measure: Need for prophylactic measure  Acute on chronic congestive heart failure, unspecified heart failure type: Acute on chronic congestive heart failure, unspecified heart failure type  Cellulitis of lower extremity, unspecified laterality: Cellulitis of lower extremity, unspecified laterality  Stasis dermatitis with ulcer of left lower extremity due to peripheral venous hypertension: Stasis dermatitis with ulcer of left lower extremity due to peripheral venous hypertension  Atrial fibrillation: Atrial fibrillation  Severe aortic stenosis: Severe aortic stenosis  Systolic CHF: Systolic CHF        Consultant(s) Notes Reviewed:  [ x ] YES     Care Discussed with [ x ] Consultants, [ x ] Patient, [x  ] Family,-  sons [  ] HCP, [  ] , [  ] Social Service, [ x ] RN, [  ] Physical Therapy, [  ] Palliative Care Team  DVT PPX: [  ] Lovenox, [  ] SC Heparin, [ x ] Coumadin, [  ] Xarelto, [  ] Eliquis, [  ] Pradaxa, [  ] IV Heparin drip, [  ] SCD, [  ] Ambulation, [  ] Contraindicated 2/2 GI Bleed, [  ] Contraindicated 2/2  Bleed, [  ] Contraindicated 2/2 Brain Bleed  Advanced Directive: [ x ] None, [  ] DNR/DNI Patient is a 87y old  Male who presents with a chief complaint of b/l LE weeping edema (22 Jun 2020 14:15)    HPI:  86 y/o M with hx of , afib (on coumadin), 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 CHF , PVD  presents to ED for worsening edema with  b/l weeping skin & erythema with pain that bothers him to walk  . Pt reports that his sons made him come into the ED. Reports has been having b/l LE edema with  weeping legs  for around 1 1/2 year. Has gotten worse in the past few day with increasing erythema, skin blisters +/- pus with occasional bouts of pain. Denies any other complaints, no fever, No chills ,No  cp, sob, palpitations, abdominal pain, dizziness, palpitations.  Pt was transferred from [place] [facility] after he got IV Vanco & IV Lasix, IV Cardizem     Given IV Cardizem and started on Cardizem gtt in the ED for rapid afib (20 Jun 2020 19:26)    INTERVAL HPI:  6/22/2020: Patient seen and examined at bedside. Low, stable H/H. INR 6.6. Hyponatremia, Na 132 --> 129 today. Dc Vancomycin. Rash appears nonpurulent and rapidly improving so will de-escalate to Ceftriaxone 1 gram IV Q-day and potential to finish course with cefuroxime 500mg PO BID with last day 6/25. Stop Cardizem gtt. Started Cardizem 30 mg PO q6h. Vascular studies pending. Wound care evaluated patient - start silver alginate and dry dressings every other day, drainage dependant, and ace wraps if vascular studies permit.  6/23/2020: Patient seen and examined at bedside. Low, stable H/H. INR 3.55. Hyponatremia improving. On Rocephin. DC Cardizem. Start Lopressor 25 mg PO BID. 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 Single-vessel runoff via the posterior tibial artery. 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 Vascular surgery (Dr. Arias) - plan for right LE angiogram, possible stent/angioplasty on Thursday in IR. HOLD AC    OVERNIGHT EVENTS: NONE    Home Medications:  Coumadin 5 mg oral tablet: 1 tab(s) orally 2 times a day (22 Jun 2020 18:12)      MEDICATIONS  (STANDING):  cefTRIAXone   IVPB      cefTRIAXone   IVPB 1000 milliGRAM(s) IV Intermittent every 24 hours  diltiazem    Tablet 30 milliGRAM(s) Oral <User Schedule>  furosemide   Injectable 20 milliGRAM(s) IV Push daily  gentamicin 0.3% Ophthalmic Solution 1 Drop(s) Both EYES five times a day    MEDICATIONS  (PRN):  acetaminophen   Tablet .. 650 milliGRAM(s) Oral every 6 hours PRN Temp greater or equal to 38C (100.4F), Mild Pain (1 - 3)      No Known Allergies      Social History:  Never smoker, denies etoh and drug abuse  , Lives alone, Retired  (20 Jun 2020 19:26)    REVIEW OF SYSTEMS:   CONSTITUTIONAL: No fever, No chills, No fatigue, No myalgia, No Body ache, No Weakness  EYES: No eye pain,  No visual disturbances, No discharge, No Redness  ENMT: No ear pain, No nose bleed, No vertigo; No sinus pain, No throat pain, No Congestion  NECK: No pain, No stiffness  RESPIRATORY: No cough, No wheezing, No hemoptysis, No shortness of breath  CARDIOVASCULAR: No chest pain, No palpitations  GASTROINTESTINAL: No abdominal pain, No epigastric pain. No nausea, No vomiting, No diarrhea, No constipation; [ x ] BM  GENITOURINARY: No dysuria, No frequency, No urgency, No hematuria, No incontinence  NEUROLOGICAL: No headaches, No dizziness, No numbness, No tingling, No tremors, No weakness  EXTREMITIES: No Swelling, No Pain, No Edema  SKIN: [ x ] Redness lower extremities b/l   MUSCULOSKELETAL: No joint pain, No joint swelling; No muscle pain, No back pain, No extremity pain  PSYCHIATRIC: No depression, No anxiety, No mood swings, No difficulty sleeping at night  PAIN SCALE: [ x ] None  [  ] Other-  ROS Unable to obtain due to: [  ] Dementia  [  ] Lethargy  [  ] Sedated  [  ] Non verbal  REST OF REVIEW OF SYSTEMS: [ x ] Normal        Vital Signs Last 24 Hrs  T(C): 36.4 (23 Jun 2020 04:15), Max: 36.6 (23 Jun 2020 03:04)  T(F): 97.5 (23 Jun 2020 04:15), Max: 97.9 (23 Jun 2020 03:04)  HR: 73 (23 Jun 2020 04:15) (73 - 96)  BP: 137/90 (23 Jun 2020 04:15) (119/80 - 137/90)  BP(mean): --  RR: 18 (23 Jun 2020 04:15) (18 - 18)  SpO2: 96% (23 Jun 2020 04:15) (96% - 98%)    CAPILLARY BLOOD GLUCOSE          I&O's Summary    22 Jun 2020 07:01  -  23 Jun 2020 07:00  --------------------------------------------------------  IN: 200 mL / OUT: 1200 mL / NET: -1000 mL      PHYSICAL EXAM:  GENERAL:  [ x ] NAD, [ x ] Well appearing, [  ] Agitated, [  ] Drowsy, [  ] Lethargy, [  ] Confused   HEAD:  [ x ] Normal, [  ] Other  EYES:  [ x ] EOMI, [ x ] PERRLA, [ x ] Conjunctiva and sclera clear normal, [  ] Other, [  ] Pallor, [  ] Discharge  ENMT:  [ x ] Normal, [ x ] Moist mucous membranes, [ x ] Good dentition, [ x ] No thrush  NECK:  [ x ] Supple, [ x ] No JVD, [ x ] Normal thyroid, [ x ] Lymphadenopathy, [  ] Other  CHEST/LUNG:  [ x ] Clear to auscultation bilaterally, [ x ] Breath Sounds equal B/L, [  ] Poor effort, [ x ] No rales, [ x ] No rhonchi, [ x ] No wheezing  HEART:  [ x ] Regular rate and rhythm, [  ] Tachycardia, [  ] Bradycardia, [  ] Irregular, [ x ] 3/6 murmurs, No rubs, No gallops, [  ] PPM in place (Mfr:  )  ABDOMEN:  [ x ] Soft, [ x ] Nontender, [ x ] Nondistended, [ x ] No mass, [  x] Bowel sounds present, [  ] Obese  NERVOUS SYSTEM:  [ x ] Alert & Oriented x3, [ x ] Nonfocal, [  ] Confusion, [  ] Encephalopathic, [  ] Sedated, [  ] Unable to assess, [  ] Dementia, [  ] Other-  EXTREMITIES:  [ x ] 2+ Peripheral Pulses, No clubbing, No cyanosis,  [ x ] Mild Edema B/L lower EXT, [ x ] PVD stasis skin changes B/L lower EXT, [ x ] b/l le erythema, dried ulcers, yellowish slough , no foul smell, no warmth, no discharge.  LYMPH:  No lymphadenopathy noted  SKIN:  [ x ] No rashes or lesions, [  ] Pressure ulcers, [  ] Ecchymosis, [  ] Skin tears    DIET: Diet, DASH/TLC:   Sodium & Cholesterol Restricted  1500mL Fluid Restriction ([id number]) (06-22-20 @ 08:06)      LABS:                        11.3   7.48  )-----------( 243      ( 23 Jun 2020 07:01 )             34.1     23 Jun 2020 07:01    131    |  96     |  12     ----------------------------<  99     3.7     |  30     |  0.72     Ca    8.5        23 Jun 2020 07:01  Phos  2.9       23 Jun 2020 07:01  Mg     2.0       23 Jun 2020 07:01      PT/INR - ( 23 Jun 2020 07:01 )   PT: 41.4 sec;   INR: 3.55 ratio         PTT - ( 23 Jun 2020 07:01 )  PTT:43.8 sec    Culture Results:   No growth to date. (06-20 @ 22:27)  Culture Results:   No growth to date. (06-20 @ 22:26)      CARDIAC MARKERS ( 20 Jun 2020 15:47 )  .026 ng/mL / x     / x     / x     / x            Culture - Blood (collected 20 Jun 2020 22:27)  Source: .Blood Blood  Preliminary Report (21 Jun 2020 23:01):    No growth to date.    Culture - Blood (collected 20 Jun 2020 22:26)  Source: .Blood Blood  Preliminary Report (21 Jun 2020 23:01):    No growth to date.       Anemia Panel:      Thyroid Panel:  T4, Serum: 6.2 ug/dL (06-21-20 @ 11:44)  Thyroid Stimulating Hormone, Serum: 1.33 uIU/mL (06-21-20 @ 05:27)            Serum Pro-Brain Natriuretic Peptide: 5624 pg/mL (06-21-20 @ 05:27)  Serum Pro-Brain Natriuretic Peptide: 5405 pg/mL (06-20-20 @ 15:47)      RADIOLOGY & ADDITIONAL TESTS:      HEALTH ISSUES - PROBLEM Dx:  Varicose veins of bilateral lower extremities with pain: Varicose veins of bilateral lower extremities with pain  Stasis dermatitis with ulcer of right lower extremity due to peripheral venous hypertension: Stasis dermatitis with ulcer of right lower extremity due to peripheral venous hypertension  Chronic venous stasis dermatitis of both lower extremities: Chronic venous stasis dermatitis of both lower extremities  CHF (congestive heart failure): CHF (congestive heart failure)  PVD (peripheral vascular disease): PVD (peripheral vascular disease)  Hyponatremia: Hyponatremia  Need for prophylactic measure: Need for prophylactic measure  Acute on chronic congestive heart failure, unspecified heart failure type: Acute on chronic congestive heart failure, unspecified heart failure type  Cellulitis of lower extremity, unspecified laterality: Cellulitis of lower extremity, unspecified laterality  Stasis dermatitis with ulcer of left lower extremity due to peripheral venous hypertension: Stasis dermatitis with ulcer of left lower extremity due to peripheral venous hypertension  Atrial fibrillation: Atrial fibrillation  Severe aortic stenosis: Severe aortic stenosis  Systolic CHF: Systolic CHF        Consultant(s) Notes Reviewed:  [ x ] YES     Care Discussed with [ x ] Consultants, [ x ] Patient, [x  ] Family,-  sons [  ] HCP, [  ] , [  ] Social Service, [ x ] RN, [  ] Physical Therapy, [  ] Palliative Care Team  DVT PPX: [  ] Lovenox, [  ] SC Heparin, [ x ] Coumadin, [  ] Xarelto, [  ] Eliquis, [  ] Pradaxa, [  ] IV Heparin drip, [  ] SCD, [  ] Ambulation, [  ] Contraindicated 2/2 GI Bleed, [  ] Contraindicated 2/2  Bleed, [  ] Contraindicated 2/2 Brain Bleed  Advanced Directive: [ x ] None, [  ] DNR/DNI

## 2020-06-23 NOTE — PROGRESS NOTE ADULT - PROBLEM SELECTOR PLAN 1
recommend   Ceftriaxone 1 gram IV Q-day and potential to finish course with cefuroxime 500mg PO BID with last day 6/25

## 2020-06-23 NOTE — PROGRESS NOTE ADULT - SUBJECTIVE AND OBJECTIVE BOX
[facility] -- Radha Boyle, Vonnie, Lilibeth, Joel Pulido Savella  Office # [phone]    Follow up:  chf, afib  Subjective/Observations:   No events overnight resting comfortably in bed.  No complaints of chest pain, dyspnea, or palpitations reported. No signs of orthopnea or PND.    + le pain but improved         REVIEW OF SYSTEMS: All other review of systems is negative unless indicated above    PAST MEDICAL & SURGICAL HISTORY:  PVD (peripheral vascular disease)  Lung cancer: Left lung Sx for removal of Tumor, Rt Lung Cryo therapy for recurrence  Atrial fibrillation: on AC  Atrial fibrillation, unspecified type  S/P lobectomy of lung: Tumor removal from Lower Lobe, NO CT, NO RT      MEDICATIONS  (STANDING):  cefTRIAXone   IVPB      cefTRIAXone   IVPB 1000 milliGRAM(s) IV Intermittent every 24 hours  diltiazem    Tablet 30 milliGRAM(s) Oral <User Schedule>  furosemide   Injectable 20 milliGRAM(s) IV Push daily  gentamicin 0.3% Ophthalmic Solution 1 Drop(s) Both EYES five times a day    MEDICATIONS  (PRN):  acetaminophen   Tablet .. 650 milliGRAM(s) Oral every 6 hours PRN Temp greater or equal to 38C (100.4F), Mild Pain (1 - 3)      Allergies    No Known Allergies    Intolerances        Vital Signs Last 24 Hrs  T(C): 36.5 (23 Jun 2020 07:57), Max: 36.6 (23 Jun 2020 03:04)  T(F): 97.7 (23 Jun 2020 07:57), Max: 97.9 (23 Jun 2020 03:04)  HR: 72 (23 Jun 2020 07:57) (72 - 96)  BP: 117/80 (23 Jun 2020 07:57) (117/80 - 137/90)  BP(mean): --  RR: 18 (23 Jun 2020 07:57) (18 - 18)  SpO2: 98% (23 Jun 2020 07:57) (96% - 98%)    I&O's Summary    22 Jun 2020 07:01  -  23 Jun 2020 07:00  --------------------------------------------------------  IN: 200 mL / OUT: 1200 mL / NET: -1000 mL          PHYSICAL EXAM:  TELE: Afib   Constitutional: NAD, awake and alert, well-developed  HEENT: Moist Mucous Membranes, Anicteric  Pulmonary: Non-labored, breath sounds are clear bilaterally, No wheezing, crackles or rhonchi  Cardiovascular: irregular, S1 and S2 nl, + murmur No rubs, gallops or clicks   Gastrointestinal: Bowel Sounds present, soft, nontender.   Lymph: No lymphadenopathy. + Bilat LE peripheral edema.   Skin: No visible rashes or ulcers.  Psych:  Mood & affect appropriate    LABS: All Labs Reviewed:                        11.3   7.48  )-----------( 243      ( 23 Jun 2020 07:01 )             34.1                         11.4   9.97  )-----------( 253      ( 22 Jun 2020 06:48 )             33.7                         11.1   9.82  )-----------( 217      ( 21 Jun 2020 05:27 )             33.9     23 Jun 2020 07:01    131    |  96     |  12     ----------------------------<  99     3.7     |  30     |  0.72   22 Jun 2020 06:48    129    |  94     |  12     ----------------------------<  98     3.6     |  28     |  0.57   21 Jun 2020 05:27    132    |  97     |  14     ----------------------------<  96     4.1     |  25     |  0.57     Ca    8.5        23 Jun 2020 07:01  Ca    8.4        22 Jun 2020 06:48  Ca    8.6        21 Jun 2020 05:27  Phos  2.9       23 Jun 2020 07:01  Mg     2.0       23 Jun 2020 07:01    TPro  5.8    /  Alb  2.9    /  TBili  1.2    /  DBili  x      /  AST  13     /  ALT  14     /  AlkPhos  72     21 Jun 2020 05:27  TPro  6.5    /  Alb  3.1    /  TBili  1.2    /  DBili  x      /  AST  15     /  ALT  19     /  AlkPhos  76     20 Jun 2020 15:47    PT/INR - ( 23 Jun 2020 07:01 )   PT: 41.4 sec;   INR: 3.55 ratio         PTT - ( 23 Jun 2020 07:01 )  PTT:43.8 sec         ECG:  < from: 12 Lead ECG (06.20.20 @ 14:49) >  Ventricular Rate 104 BPM    Atrial Rate 117 BPM    QRS Duration 114 ms    Q-T Interval 310 ms    QTC Calculation(Bezet) 407 ms    R Axis -28 degrees    T Axis 72 degrees    Diagnosis Line Atrial fibrillation  Abnormal ECG  No previous ECGs available  Confirmed by Marlene Poeples MD ([id number]) on 6/22/2020 9:42:20 AM    < end of copied text >    Echo:  < from: TTE Echo Complete w/o Contrast w/ Doppler (06.21.20 @ 13:37) >  EXAM:  ECHO TTE WO CON COMP W DOPP         PROCEDURE DATE:  06/21/2020        INTERPRETATION:  INDICATION: Heart failure  Sonographer AS    Blood Pressure 124/57    Height unavailable     Weight 83.9 kg    Dimensions:    LA 3.8       Normal Values: 2.0 - 4.0 cm    Ao 3.0        Normal Values: 2.0 - 3.8 cm  SEPTUM 1.2       Normal Values: 0.6 - 1.2 cm  PWT 1.3       Normal Values: 0.6 - 1.1 cm  LVIDd 4.7         Normal Values: 3.0 - 5.6 cm  LVIDs Normal Values: 1.8 - 4.0 cm      OBSERVATIONS:  Technically difficult study  Mitral Valve: Thickened leaflets, mild to moderate MR.  Aortic Valve/Aorta: Calcified trileaflet aortic valve with decreased opening. Peak transaortic valve gradient of 72.9 mmHg with a mean transaortic valve gradient of 41 mmHg. The aortic valve area is calculated to be 0.36 sq cm. This is consistent with severe aortic stenosis  Tricuspid Valve: normal with mild TR.  Pulmonic Valve: Trace PI  Left Atrium: normal  Right Atrium: Not well visualized  Left Ventricle: Moderate global left ventricular systolic dysfunction with an estimated LVEF of 40%. Mild LVH  Right Ventricle: Grossly normal size and systolic function.  Pericardium/Pleura: normal, no significant pericardial effusion.  Pulmonary/RV Pressure: estimated PA systolic pressure of 28.4mmHg assuming an RA pressure of 3 mmHg.      Conclusion:   Technically difficult study  Mild concentric LVH with moderate global left ventricular systolic dysfunction with an estimated LVEF of 40%  Grossly normal RV size and systolic function.   Calcified trileaflet aortic valve with severe aortic stenosis  Mild to moderate MR   Mild TR.     No significant pericardial effusion.        KARIE LONGO   This document has been electronically signed. Jun 22 2020  1:08PM        < end of copied text >    Radiology:  < from: [facility] Duplex Lower Extrem Arterial, Bilat (06.23.20 @ 07:57) >  EXAM:  DUPLEX LOW ARTERIES COMP BILAT                            PROCEDURE DATE:  06/23/2020          INTERPRETATION:  US LOWER EXTREMITY ARTERIAL DUPLEX COMPLETE BILATERAL    CLINICAL INDICATION:  Peripheral vascular disease with bilateral leg swelling    COMPARISON: None    Real-time sonography of the bilateral lower extremity arterial system was performed using a high-resolution linear array transducer and including color and spectral Doppler.     Diffuse calcified plaque in the bilateral leg.. No soft tissue abnormalities are demonstrated in either leg. Flow phase patterns and peak systolic velocity measurements (in cm/s) were observed as follows:    RIGHT:  CFA: Biphasic; 56   Proximal SFA: Biphasic; 55   Mid SFA: Biphasic; 55   DistalSFA: Biphasic;  413   Popliteal: Monophasic;  43   Anterior tibial: Occluded;  Posterior tibial: Monophasic; 69   Peroneal: Occluded;  Dorsalis pedis: Occluded;    LEFT:  CFA: Triphasic; 120   Proximal SFA: Triphasic; 86   Mid SFA: Triphasic; 74   Distal SFA: Biphasic; 67   Popliteal: Biphasic;  81   Anterior tibial: Monophasic; 34   Posterior tibial: Biphasic; 74   Peroneal: Occluded;  Dorsalis pedis: Biphasic;  77     IMPRESSION:    Greater than 70% stenosis at the distal right SFA with diminished, tardus parvus flow below the right knee. Single-vessel runoff via the posterior tibial artery.    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 Otherwise, no focal stenosis or occlusion in the left leg.  CORTEZ HENSON M.D., ATTENDING RADIOLOGIST  This document has been electronically signed. Jun 22 2020  5:06PM    < end of copied text >  < from: Xray Chest 1 View- PORTABLE-Urgent (06.20.20 @ 16:03) >  EXAM:  XR CHEST PORTABLE URGENT 1V                                  PROCEDURE DATE:  06/20/2020          INTERPRETATION:  PROCEDURE: AP view of the chest.    CLINICAL INFORMATION: Leg swelling.    COMPARISON: None.    FINDINGS:    Lungs: Peripherallinear atelectasis versus scarring in the right upper lobe. No focal consolidation.  Heart: The heart is normal in size. Surgical clips overlie the heart.  Mediastinum: The mediastinum is within normal limits.    IMPRESSION:  Peripheral linear atelectasis versus scarring in the right upper lobe. No focal consolidation.        MADELINE SRIVASTAVA M.D., ATTENDING RADIOLOGIST  This document has been electronically signed. Jun 20 2020  4:10PM    < end of copied text > [facility] -- Radha Boyle, Vonnie, Lilibeth, Joel Pulido Savella  Office # [phone]    Follow up:  chf, afib    Subjective/Observations:   No events overnight resting comfortably in bed.  No complaints of chest pain, dyspnea, or palpitations reported. No signs of orthopnea or PND.    + le pain but improved         REVIEW OF SYSTEMS: All other review of systems is negative unless indicated above    PAST MEDICAL & SURGICAL HISTORY:  PVD (peripheral vascular disease)  Lung cancer: Left lung Sx for removal of Tumor, Rt Lung Cryo therapy for recurrence  Atrial fibrillation: on AC  Atrial fibrillation, unspecified type  S/P lobectomy of lung: Tumor removal from Lower Lobe, NO CT, NO RT      MEDICATIONS  (STANDING):  cefTRIAXone   IVPB      cefTRIAXone   IVPB 1000 milliGRAM(s) IV Intermittent every 24 hours  diltiazem    Tablet 30 milliGRAM(s) Oral <User Schedule>  furosemide   Injectable 20 milliGRAM(s) IV Push daily  gentamicin 0.3% Ophthalmic Solution 1 Drop(s) Both EYES five times a day    MEDICATIONS  (PRN):  acetaminophen   Tablet .. 650 milliGRAM(s) Oral every 6 hours PRN Temp greater or equal to 38C (100.4F), Mild Pain (1 - 3)      Allergies    No Known Allergies    Intolerances        Vital Signs Last 24 Hrs  T(C): 36.5 (23 Jun 2020 07:57), Max: 36.6 (23 Jun 2020 03:04)  T(F): 97.7 (23 Jun 2020 07:57), Max: 97.9 (23 Jun 2020 03:04)  HR: 72 (23 Jun 2020 07:57) (72 - 96)  BP: 117/80 (23 Jun 2020 07:57) (117/80 - 137/90)  BP(mean): --  RR: 18 (23 Jun 2020 07:57) (18 - 18)  SpO2: 98% (23 Jun 2020 07:57) (96% - 98%)    I&O's Summary    22 Jun 2020 07:01  -  23 Jun 2020 07:00  --------------------------------------------------------  IN: 200 mL / OUT: 1200 mL / NET: -1000 mL          PHYSICAL EXAM:  TELE: Afib   Constitutional: NAD, awake   HEENT: Moist Mucous Membranes, Anicteric  Pulmonary: Non-labored, breath sounds are clear bilaterally, No wheezing, crackles or rhonchi  Cardiovascular: irregular, S1 and S2 nl, + murmur No rubs, gallops or clicks   Gastrointestinal: Bowel Sounds present, soft, nontender.   Lymph: No lymphadenopathy. + Bilat LE peripheral edema.   Skin: No visible rashes or ulcers.  Psych:  Mood & affect appropriate    LABS: All Labs Reviewed:                        11.3   7.48  )-----------( 243      ( 23 Jun 2020 07:01 )             34.1                         11.4   9.97  )-----------( 253      ( 22 Jun 2020 06:48 )             33.7                         11.1   9.82  )-----------( 217      ( 21 Jun 2020 05:27 )             33.9     23 Jun 2020 07:01    131    |  96     |  12     ----------------------------<  99     3.7     |  30     |  0.72   22 Jun 2020 06:48    129    |  94     |  12     ----------------------------<  98     3.6     |  28     |  0.57   21 Jun 2020 05:27    132    |  97     |  14     ----------------------------<  96     4.1     |  25     |  0.57     Ca    8.5        23 Jun 2020 07:01  Ca    8.4        22 Jun 2020 06:48  Ca    8.6        21 Jun 2020 05:27  Phos  2.9       23 Jun 2020 07:01  Mg     2.0       23 Jun 2020 07:01    TPro  5.8    /  Alb  2.9    /  TBili  1.2    /  DBili  x      /  AST  13     /  ALT  14     /  AlkPhos  72     21 Jun 2020 05:27  TPro  6.5    /  Alb  3.1    /  TBili  1.2    /  DBili  x      /  AST  15     /  ALT  19     /  AlkPhos  76     20 Jun 2020 15:47    PT/INR - ( 23 Jun 2020 07:01 )   PT: 41.4 sec;   INR: 3.55 ratio         PTT - ( 23 Jun 2020 07:01 )  PTT:43.8 sec         ECG:  < from: 12 Lead ECG (06.20.20 @ 14:49) >  Ventricular Rate 104 BPM    Atrial Rate 117 BPM    QRS Duration 114 ms    Q-T Interval 310 ms    QTC Calculation(Bezet) 407 ms    R Axis -28 degrees    T Axis 72 degrees    Diagnosis Line Atrial fibrillation  Abnormal ECG  No previous ECGs available  Confirmed by Marlene Peoples MD (20) on 6/22/2020 9:42:20 AM    < end of copied text >    Echo:  < from: TTE Echo Complete w/o Contrast w/ Doppler (06.21.20 @ 13:37) >  EXAM:  ECHO TTE WO CON COMP W DOPP         PROCEDURE DATE:  06/21/2020        INTERPRETATION:  INDICATION: Heart failure  Sonographer AS    Blood Pressure 124/57    Height unavailable     Weight 83.9 kg    Dimensions:    LA 3.8       Normal Values: 2.0 - 4.0 cm    Ao 3.0        Normal Values: 2.0 - 3.8 cm  SEPTUM 1.2       Normal Values: 0.6 - 1.2 cm  PWT 1.3       Normal Values: 0.6 - 1.1 cm  LVIDd 4.7         Normal Values: 3.0 - 5.6 cm  LVIDs Normal Values: 1.8 - 4.0 cm      OBSERVATIONS:  Technically difficult study  Mitral Valve: Thickened leaflets, mild to moderate MR.  Aortic Valve/Aorta: Calcified trileaflet aortic valve with decreased opening. Peak transaortic valve gradient of 72.9 mmHg with a mean transaortic valve gradient of 41 mmHg. The aortic valve area is calculated to be 0.36 sq cm. This is consistent with severe aortic stenosis  Tricuspid Valve: normal with mild TR.  Pulmonic Valve: Trace PI  Left Atrium: normal  Right Atrium: Not well visualized  Left Ventricle: Moderate global left ventricular systolic dysfunction with an estimated LVEF of 40%. Mild LVH  Right Ventricle: Grossly normal size and systolic function.  Pericardium/Pleura: normal, no significant pericardial effusion.  Pulmonary/RV Pressure: estimated PA systolic pressure of 28.4mmHg assuming an RA pressure of 3 mmHg.      Conclusion:   Technically difficult study  Mild concentric LVH with moderate global left ventricular systolic dysfunction with an estimated LVEF of 40%  Grossly normal RV size and systolic function.   Calcified trileaflet aortic valve with severe aortic stenosis  Mild to moderate MR   Mild TR.     No significant pericardial effusion.        KARIE LONGO   This document has been electronically signed. Jun 22 2020  1:08PM        < end of copied text >    Radiology:  < from: [facility] Duplex Lower Extrem Arterial, Bilat (06.23.20 @ 07:57) >  EXAM:  DUPLEX LOW ARTERIES COMP BILAT                            PROCEDURE DATE:  06/23/2020          INTERPRETATION:  US LOWER EXTREMITY ARTERIAL DUPLEX COMPLETE BILATERAL    CLINICAL INDICATION:  Peripheral vascular disease with bilateral leg swelling    COMPARISON: None    Real-time sonography of the bilateral lower extremity arterial system was performed using a high-resolution linear array transducer and including color and spectral Doppler.     Diffuse calcified plaque in the bilateral leg.. No soft tissue abnormalities are demonstrated in either leg. Flow phase patterns and peak systolic velocity measurements (in cm/s) were observed as follows:    RIGHT:  CFA: Biphasic; 56   Proximal SFA: Biphasic; 55   Mid SFA: Biphasic; 55   DistalSFA: Biphasic;  413   Popliteal: Monophasic;  43   Anterior tibial: Occluded;  Posterior tibial: Monophasic; 69   Peroneal: Occluded;  Dorsalis pedis: Occluded;    LEFT:  CFA: Triphasic; 120   Proximal SFA: Triphasic; 86   Mid SFA: Triphasic; 74   Distal SFA: Biphasic; 67   Popliteal: Biphasic;  81   Anterior tibial: Monophasic; 34   Posterior tibial: Biphasic; 74   Peroneal: Occluded;  Dorsalis pedis: Biphasic;  77     IMPRESSION:    Greater than 70% stenosis at the distal right SFA with diminished, tardus parvus flow below the right knee. Single-vessel runoff via the posterior tibial artery.    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 Otherwise, no focal stenosis or occlusion in the left leg.  CORTEZ HENSON M.D., ATTENDING RADIOLOGIST  This document has been electronically signed. Jun 22 2020  5:06PM    < end of copied text >  < from: Xray Chest 1 View- PORTABLE-Urgent (06.20.20 @ 16:03) >  EXAM:  XR CHEST PORTABLE URGENT 1V                                  PROCEDURE DATE:  06/20/2020          INTERPRETATION:  PROCEDURE: AP view of the chest.    CLINICAL INFORMATION: Leg swelling.    COMPARISON: None.    FINDINGS:    Lungs: Peripherallinear atelectasis versus scarring in the right upper lobe. No focal consolidation.  Heart: The heart is normal in size. Surgical clips overlie the heart.  Mediastinum: The mediastinum is within normal limits.    IMPRESSION:  Peripheral linear atelectasis versus scarring in the right upper lobe. No focal consolidation.        MADELINE SRIVASTAVA M.D., ATTENDING RADIOLOGIST  This document has been electronically signed. Jun 20 2020  4:10PM    < end of copied text >

## 2020-06-23 NOTE — CONSULT NOTE ADULT - SUBJECTIVE AND OBJECTIVE BOX
Vascular Attending:        HPI:  88 y/o M with significant pmhx of afib (on coumadin), lung CA,  PVD  presents to ED for worsening edema with  b/l weeping skin & erythema with pain that bothers him to walk  .Reports has been having b/l LE edema with  weeping legs  for around 1 1/2 year. Has gotten worse in the past few day with increasing erythema, skin blisters +/- pus with occasional bouts of pain. Denies any other complaints, no fever, No chills ,No  cp, sob, palpitations, abdominal pain, dizziness, palpitations.    Pt was transferred from [place] ER after he got IV Vanco & IV Lasix, IV Cardizem       PAST MEDICAL & SURGICAL HISTORY:  PVD (peripheral vascular disease)  Lung cancer: Left lung Sx for removal of Tumor, Rt Lung Cryo therapy for recurrence  Atrial fibrillation: on AC  Atrial fibrillation, unspecified type  S/P lobectomy of lung: Tumor removal from Lower Lobe, NO CT, NO RT      REVIEW OF SYSTEMS:    CONSTITUTIONAL: No weakness, fevers or chills, weight loss  EYES/ENT: No visual changes;  dizziness or throat pain   NECK: No pain or stiffness/ swelling  RESPIRATORY: No cough, wheezing, hemoptysis; No shortness of breath  CARDIOVASCULAR: No chest pain or palpitations, Dyspnea on exertion  GASTROINTESTINAL: No abdominal or epigastric pain. No nausea, vomiting, or hematemesis; No diarrhea or constipation. No melena or hematochezia.  GENITOURINARY: No dysuria, frequency or hematuria  NEUROLOGICAL: No numbness or weakness  SKIN: No itching, rashes  VASCULAR: no rest pain, claudication, peripheral edema, calf tenderness       MEDICATIONS  (STANDING):  cefTRIAXone   IVPB      cefTRIAXone   IVPB 1000 milliGRAM(s) IV Intermittent every 24 hours  furosemide   Injectable 20 milliGRAM(s) IV Push daily  gentamicin 0.3% Ophthalmic Solution 1 Drop(s) Both EYES five times a day  metoprolol tartrate 25 milliGRAM(s) Oral two times a day    MEDICATIONS  (PRN):  acetaminophen   Tablet .. 650 milliGRAM(s) Oral every 6 hours PRN Temp greater or equal to 38C (100.4F), Mild Pain (1 - 3)      Allergies    No Known Allergies    Intolerances        SOCIAL HISTORY:      Vital Signs Last 24 Hrs  T(C): 36.5 (23 Jun 2020 07:57), Max: 36.6 (23 Jun 2020 03:04)  T(F): 97.7 (23 Jun 2020 07:57), Max: 97.9 (23 Jun 2020 03:04)  HR: 72 (23 Jun 2020 07:57) (72 - 96)  BP: 117/80 (23 Jun 2020 07:57) (117/80 - 137/90)  BP(mean): --  RR: 18 (23 Jun 2020 07:57) (18 - 18)  SpO2: 98% (23 Jun 2020 07:57) (96% - 98%)    General: A+O x 3 in NAD  HEENT: PERRLA, EOM intact  Neck: trachea midline  Chest: Clear through auscultation bilaterally, No rales, rhonchi wheezes noted bilaterally  Heart: S1,S1 RRR, no murmurs noted  Abdomen: soft non distended, non tympanic, BS x 4, no guarding noted  Incision: intact, no erythema noted  Extremities: no edema noted, warm,  no calf tenderness   Pulses:   Right:                                                                          Left:  FEM [ ]2+ [ ]1+ [ ]doppler                                             FEM [ ]2+ [ ]1+ [ ]doppler    POP [ ]2+ [ ]1+ [ ]doppler                                             POP [ ]2+ [ ]1+ [ ]doppler    DP [ ]2+ [ ]1+ [ ]doppler                                                DP [ ]2+ [ ]1+ [ ]doppler  PT[ ]2+ [ ]1+ [ ]doppler                                                  PT [ ]2+ [ ]1+ [ ]doppler      LABS:                        11.3   7.48  )-----------( 243      ( 23 Jun 2020 07:01 )             34.1     06-23    131<L>  |  96  |  12  ----------------------------<  99  3.7   |  30  |  0.72    Ca    8.5      23 Jun 2020 07:01  Phos  2.9     06-23  Mg     2.0     06-23      PT/INR - ( 23 Jun 2020 07:01 )   PT: 41.4 sec;   INR: 3.55 ratio         PTT - ( 23 Jun 2020 07:01 )  PTT:43.8 sec      RADIOLOGY & ADDITIONAL STUDIES  < from: VA Duplex Lower Extrem Arterial, Bilat (06.23.20 @ 07:57) >    INTERPRETATION:  US LOWER EXTREMITY ARTERIAL DUPLEX COMPLETE BILATERAL    CLINICAL INDICATION:  Peripheral vascular disease with bilateral leg swelling    COMPARISON: None    Real-time sonography of the bilateral lower extremity arterial system was performed using a high-resolution linear array transducer and including color and spectral Doppler.     Diffuse calcified plaque in the bilateral leg.. No soft tissue abnormalities are demonstrated in either leg. Flow phase patterns and peak systolic velocity measurements (in cm/s) were observed as follows:    RIGHT:  CFA: Biphasic; 56   Proximal SFA: Biphasic; 55   Mid SFA: Biphasic; 55   DistalSFA: Biphasic;  413   Popliteal: Monophasic;  43   Anterior tibial: Occluded;  Posterior tibial: Monophasic; 69   Peroneal: Occluded;  Dorsalis pedis: Occluded;    LEFT:  CFA: Triphasic; 120   Proximal SFA: Triphasic; 86   Mid SFA: Triphasic; 74   Distal SFA: Biphasic; 67   Popliteal: Biphasic;  81   Anterior tibial: Monophasic; 34   Posterior tibial: Biphasic; 74   Peroneal: Occluded;  Dorsalis pedis: Biphasic;  77     IMPRESSION:    Greater than 70% stenosis at the distal right SFA with diminished, tardus parvus flow below the right knee. Single-vessel runoff via the posterior tibial artery.    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 Otherwise, no focal stenosis or occlusion in the left leg.    < end of copied text >    Impression and Plan: Vascular Attending:        HPI:  86 y/o M with significant pmhx of afib (on coumadin), lung CA s/p removal of tumor and cryo for reoccurance,  PVD presents to ED for worsening edema with  b/l weeping skin & erythema with pain that bothers him to walk  .Reports has been having b/l LE edema with weeping legs for around 1 1/2 year. Has gotten worse in the past few weeks with increasing erythema, skin blisters +/- pus with occasional bouts of pain which brought him into the hospital. Admits to some claudication when walking but unable to differentiate if this is new or chronic. Denies any other complaints, no fever, no chills ,no  cp, sob, palpitations, abdominal pain, dizziness, palpitations, rest pain.    Pt was transferred from [place] ER after he got IV Vanco & IV Lasix, IV Cardizem       PAST MEDICAL & SURGICAL HISTORY:  PVD (peripheral vascular disease)  Lung cancer: Left lung Sx for removal of Tumor, Rt Lung Cryo therapy for recurrence  Atrial fibrillation: on AC  Atrial fibrillation, unspecified type  S/P lobectomy of lung: Tumor removal from Lower Lobe, NO CT, NO RT      REVIEW OF SYSTEMS:    CONSTITUTIONAL: No weakness, fevers or chills, weight loss  EYES/ENT: No visual changes;  dizziness or throat pain   NECK: No pain or stiffness/ swelling  RESPIRATORY: No cough, wheezing, hemoptysis; No shortness of breath  CARDIOVASCULAR: No chest pain or palpitations, admits to some CUEVAS  NEUROLOGICAL: No numbness or weakness  SKIN: as above  VASCULAR: no rest pain, see HPI      MEDICATIONS  (STANDING):  cefTRIAXone   IVPB      cefTRIAXone   IVPB 1000 milliGRAM(s) IV Intermittent every 24 hours  furosemide   Injectable 20 milliGRAM(s) IV Push daily  gentamicin 0.3% Ophthalmic Solution 1 Drop(s) Both EYES five times a day  metoprolol tartrate 25 milliGRAM(s) Oral two times a day    MEDICATIONS  (PRN):  acetaminophen   Tablet .. 650 milliGRAM(s) Oral every 6 hours PRN Temp greater or equal to 38C (100.4F), Mild Pain (1 - 3)      Allergies    No Known Allergies    Intolerances        SOCIAL HISTORY:      Vital Signs Last 24 Hrs  T(C): 36.5 (23 Jun 2020 07:57), Max: 36.6 (23 Jun 2020 03:04)  T(F): 97.7 (23 Jun 2020 07:57), Max: 97.9 (23 Jun 2020 03:04)  HR: 72 (23 Jun 2020 07:57) (72 - 96)  BP: 117/80 (23 Jun 2020 07:57) (117/80 - 137/90)  BP(mean): --  RR: 18 (23 Jun 2020 07:57) (18 - 18)  SpO2: 98% (23 Jun 2020 07:57) (96% - 98%)    General: A+O x 3 in NAD  HEENT: PERRLA, EOM intact  Neck: trachea midline  Heart: S1,S1 irregularly irregular, normal rate  Extremities: +2 pitting edema with brownish discoloration with erythema noted in lower half of bilateral legs with multiple venous ulcerations on left > Right. loss of hair on both feet  Pulses:   Right:                                                                          Left:  FEM    [ x]1+ [biphasic ]doppler                                          [ x]1+ [biphasic ]doppler              POP    [ x]1+ [biphasic ]doppler                                                       POP [ x]1+ [biphasic ]doppler              DP      [ x]1+ [biphasic ]doppler                                                       DP [ x]1+ [biphasic ]doppler              PT      [ x]1+ [biphasic ]doppler                                                           PT [ x]1+ [biphasic ]doppler                  LABS:                        11.3   7.48  )-----------( 243      ( 23 Jun 2020 07:01 )             34.1     06-23    131<L>  |  96  |  12  ----------------------------<  99  3.7   |  30  |  0.72    Ca    8.5      23 Jun 2020 07:01  Phos  2.9     06-23  Mg     2.0     06-23      PT/INR - ( 23 Jun 2020 07:01 )   PT: 41.4 sec;   INR: 3.55 ratio         PTT - ( 23 Jun 2020 07:01 )  PTT:43.8 sec      RADIOLOGY & ADDITIONAL STUDIES  < from: VA Duplex Lower Extrem Arterial, Bilat (06.23.20 @ 07:57) >    INTERPRETATION:  US LOWER EXTREMITY ARTERIAL DUPLEX COMPLETE BILATERAL    CLINICAL INDICATION:  Peripheral vascular disease with bilateral leg swelling    COMPARISON: None    Real-time sonography of the bilateral lower extremity arterial system was performed using a high-resolution linear array transducer and including color and spectral Doppler.     Diffuse calcified plaque in the bilateral leg.. No soft tissue abnormalities are demonstrated in either leg. Flow phase patterns and peak systolic velocity measurements (in cm/s) were observed as follows:    RIGHT:  CFA: Biphasic; 56   Proximal SFA: Biphasic; 55   Mid SFA: Biphasic; 55   DistalSFA: Biphasic;  413   Popliteal: Monophasic;  43   Anterior tibial: Occluded;  Posterior tibial: Monophasic; 69   Peroneal: Occluded;  Dorsalis pedis: Occluded;    LEFT:  CFA: Triphasic; 120   Proximal SFA: Triphasic; 86   Mid SFA: Triphasic; 74   Distal SFA: Biphasic; 67   Popliteal: Biphasic;  81   Anterior tibial: Monophasic; 34   Posterior tibial: Biphasic; 74   Peroneal: Occluded;  Dorsalis pedis: Biphasic;  77     EXAM:  PHYSIOL [facility] LOW 3+ LEV BI                            PROCEDURE DATE:  06/22/2020          INTERPRETATION:  Clinical Information: Peripheral vascular disease.    Technique: Bilateral lower extremity ABIs/PVR    Comparison: None    Findings/  Impression:  Right lower extremity: The ankle brachial index is .54. The pulse waveforms are diffusely monophasic and diminished in the right foot.    Left lower extremity: The ankle brachial index is .56. The pulse waveforms are diffusely monophasic and severely diminished in the left foot.    IMPRESSION:    Greater than 70% stenosis at the distal right SFA with diminished, tardus parvus flow below the right knee. Single-vessel runoff via the posterior tibial artery.    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 Otherwise, no focal stenosis or occlusion in the left leg.    < end of copied text >    Impression and Plan:

## 2020-06-23 NOTE — CONSULT NOTE ADULT - ASSESSMENT
this is chronic, with venous stasis ulcers and dermatitis  pt nontoxic afeb, no leukocytosis 88 y/o M with significant pmhx of afib (on coumadin), lung CA s/p removal of tumor and cryo for reoccurance, PVD with worsening bilateral LE peripheral edema with venous ulcerations with moderate PAD/ cellulitis  - aquacel to venous ulcers  - light compressive dressings due to moderate PAD with ACE bandages bilaterally   - no surgical intervention at this time 86 y/o M with significant pmhx of afib (on coumadin), lung CA s/p removal of tumor and cryo for reoccurance, PVD with worsening bilateral LE peripheral edema with venous ulcerations with moderate PAD/ stenosis in right SFA and LLE  - aquacel to venous ulcers  - light compressive dressings due to moderate PAD with ACE bandages bilaterally at this time  - will plan for right LE angiogram, possible stent/angioplasty on Thursday in IR  - likely will address left sided PAD on this admission  - no coumadin at this time, goal INR for procedure is 1.5 or less.

## 2020-06-23 NOTE — PROGRESS NOTE ADULT - PROBLEM SELECTOR PLAN 6
chronic PVD b/l lower EXT with venous stasis dermatitis & skin blistering-dry now   - Wound care (Dr. Chong) consulted, recs appreciated - Skin changes attributed to bilateral stasis dermatitis and bilateral venous hypertension with edema and venous stasis ulcers and weeping - silver alginate and dry dressings every other day, drainage dependant, and ace wraps chronic PVD b/l lower EXT with venous stasis dermatitis & skin blistering-dry now   - Wound care (Dr. Chong) consulted, recs appreciated - Skin changes attributed to bilateral stasis dermatitis and bilateral venous hypertension with edema and venous stasis ulcers and weeping - silver alginate and dry dressings every other day, drainage dependant, and ace wraps  - Vascular studies: RLE: MAYNOR is .54, pulse waveforms are diffusely monophasic and diminished in the right foot. LLE: MAYNOR is .56, pulse waveforms are diffusely monophasic and severely diminished in the left foot.  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 Single-vessel runoff via the posterior tibial artery. 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 Otherwise, no focal stenosis or occlusion in the left leg. 132 --> 129 --> 131 today  - 2/2 volume overloaded   - IV Lasix 20 mg daily

## 2020-06-23 NOTE — PROGRESS NOTE ADULT - ASSESSMENT
88 y/o M with hx of CHF, afib (on coumadin), lung ca s/p resection presents to ED for b/l weeping edema. Admitted for b/l LE cellulitis, Rapid A Fib with RVR, volume overload    Afib with RVR  -controlled  -HR: 72 (06-23-20 @ 07:57) (72 - 96)  - BP: 117/80 (06-23-20 @ 07:57) (117/80 - 137/90)  -S/P cardizem gtt, and cardizem  30mg po q 6 hours, will D/C in light of the EF and AS  -Will start lopressor 25 mg twice daily with hold parameters   - Dose coumadin to maintain INR of 2-3   -INR: 3.55 ratio (06-23-20 @ 07:01)      Volume overload  -bnp 5624  -continue iv lasix   -strict intake and output  -IN: 200 mL / OUT: 1200 mL / NET: -1000 mL  -echo w/ severe AS, mod global LVDF EF 40 % mild LVh   -troponin negative    chronic venous stasis with dermatitis   -as per primary, fu id, wound consult      Monitor and replete electrolytes. Keep K>4.0 and Mg>2.0.  Matt Kohli DNP, ANP-c  Cardiology   Spectra #[id number] [phone] 86 y/o M with hx of CHF, afib (on coumadin), lung ca s/p resection presents to ED for b/l weeping edema. Admitted for b/l LE cellulitis, Rapid A Fib with RVR, volume overload    Afib with RVR  -controlled  -HR: 72 (06-23-20 @ 07:57) (72 - 96)  - BP: 117/80 (06-23-20 @ 07:57) (117/80 - 137/90)  -S/P cardizem gtt, and cardizem  30mg po q 6 hours, will D/C in light of the EF and AS  -Will start lopressor 25 mg twice daily with hold parameters   - Dose coumadin to maintain INR of 2-3   -INR: 3.55 ratio (06-23-20 @ 07:01)      Volume overload  -bnp 5624  -continue iv lasix   -strict intake and output  -IN: 200 mL / OUT: 1200 mL / NET: -1000 mL    -echo w/ severe AS, mod global LVDF EF 40 % mild LVh   -troponin negative  - will need outpt evaluation for AS. ?TAVR.   - consider starting lisinopril 2.5 mg Qday madelyn.     chronic venous stasis with dermatitis   -as per primary, fu id, wound consult      Monitor and replete electrolytes. Keep K>4.0 and Mg>2.0.  Matt Kohli DNP, ANP-c  Cardiology   Spectra #[id number] [phone]

## 2020-06-23 NOTE — PROGRESS NOTE ADULT - ASSESSMENT
86 y/o M with hx of CHF, afib (on coumadin), lung ca s/p resection presents to ED for b/l weeping edema. Admitted for b/l LE cellulitis, Rapid A Fib, and acute exacerbation of systolic CHF.

## 2020-06-23 NOTE — PROGRESS NOTE ADULT - ATTENDING COMMENTS
Pt seen, Examined case & care plan d/w pt, residents at detail.  AM Labs   D/W Surgical PA/Vascular -   venous ulcerations with moderate PAD/ stenosis in right SFA and LLE  - Aquacel to venous ulcers  - light compressive dressings due to moderate PAD with ACE bandages bilaterally at this time  - will plan for right LE angiogram, possible stent/angioplasty on Thursday in IR  - likely will address left sided PAD on this admission  - no coumadin at this time, goal INR for procedure is 1.5 or less.

## 2020-06-23 NOTE — PROGRESS NOTE ADULT - PROBLEM SELECTOR PLAN 1
Cellulitis b/l LE marked erythema, with Oozing clear liquids/skin blister with yellowish slough  with severe chronic venous stasis with possible +/- cellulitis, fluid over load with edema - Venous Stasis ulcers, afebrile, NL WBC  - s/p vanco  - Cellulitis appears nonpurulent and rapidly improving so de-escalate to Ceftriaxone 1 gram IV Q-day (started 6/23) and potential to finish course with cefuroxime 500mg PO BID with last day 6/25.   - PVD, check ABIs, , keep b/l lower EXT elevated  - Blood c/sx 2   - Pain control  - Vascular studies: RLE: MAYNOR is .54, pulse waveforms are diffusely monophasic and diminished in the right foot. LLE: MAYNOR is .56, pulse waveforms are diffusely monophasic and severely diminished in the left foot.  - Wound care (Dr. Chong) consulted, recs appreciated - Skin changes attributed to bilateral stasis dermatitis and bilateral venous hypertension with edema and venous stasis ulcers and weeping - silver alginate and dry dressings every other day, drainage dependant, and ace wraps  - ID consult (Dr gonzalez/Dr Simms) consulted, recs appreciated - suspect most of this is chronic, with venous stasis ulcers and dermatitis Cellulitis b/l LE marked erythema, with Oozing clear liquids/skin blister with yellowish slough  with severe chronic venous stasis with possible +/- cellulitis, fluid over load with edema - Venous Stasis ulcers, afebrile, NL WBC  - s/p vanco  - Cellulitis appears nonpurulent and rapidly improving so de-escalate to Ceftriaxone 1 gram IV Q-day (started 6/23) and potential to finish course with cefuroxime 500mg PO BID with last day 6/25.   - PVD, keep b/l lower EXT elevated  - Blood c/sx 2   - Pain control  - Vascular studies: RLE: MAYNOR is .54, pulse waveforms are diffusely monophasic and diminished in the right foot. LLE: MAYNOR is .56, pulse waveforms are diffusely monophasic and severely diminished in the left foot.  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 Single-vessel runoff via the posterior tibial artery. 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 Otherwise, no focal stenosis or occlusion in the left leg.  - Wound care (Dr. Chong) consulted, recs appreciated - Skin changes attributed to bilateral stasis dermatitis and bilateral venous hypertension with edema and venous stasis ulcers and weeping - silver alginate and dry dressings every other day, drainage dependant, and ace wraps  - ID consult (Dr gonzalez/Dr Simms) consulted, recs appreciated - suspect most of this is chronic, with venous stasis ulcers and dermatitis Cellulitis b/l LE marked erythema, with Oozing clear liquids/skin blister with yellowish slough  with severe chronic venous stasis with possible +/- cellulitis, fluid over load with edema - Venous Stasis ulcers, afebrile, NL WBC  - s/p vanco  - Cellulitis appears nonpurulent and rapidly improving so de-escalate to Ceftriaxone 1 gram IV Q-day (started 6/23) and potential to finish course with cefuroxime 500mg PO BID with last day 6/25.   - PVD, keep b/l lower EXT elevated  - Blood c/sx 2   - Pain control  - Vascular studies: RLE: MAYNOR is .54, pulse waveforms are diffusely monophasic and diminished in the right foot. LLE: MAYNOR is .56, pulse waveforms are diffusely monophasic and severely diminished in the left foot.  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 Single-vessel runoff via the posterior tibial artery. 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 Otherwise, no focal stenosis or occlusion in the left leg.  - Vascular Surgery (Dr. Arias) consulted, f/u recs  - Wound care (Dr. Chong) consulted, recs appreciated - Skin changes attributed to bilateral stasis dermatitis and bilateral venous hypertension with edema and venous stasis ulcers and weeping - silver alginate and dry dressings every other day, drainage dependant, and ace wraps  - ID consult (Dr gonzalez/Dr Simms) consulted, recs appreciated - suspect most of this is chronic, with venous stasis ulcers and dermatitis Cellulitis b/l LE marked erythema, with Oozing clear liquids/skin blister with yellowish slough  with severe chronic venous stasis with possible +/- cellulitis, fluid over load with edema - Venous Stasis ulcers, afebrile, NL WBC  - s/p vanco  - Cellulitis appears nonpurulent and rapidly improving so de-escalate to Ceftriaxone 1 gram IV Q-day (started 6/23) and potential to finish course with cefuroxime 500mg PO BID with last day 6/25.   - PVD, keep b/l lower EXT elevated  - Blood c/sx 2   - Pain control  - Vascular studies: RLE: MAYNOR is .54, pulse waveforms are diffusely monophasic and diminished in the right foot. LLE: MAYNOR is .56, pulse waveforms are diffusely monophasic and severely diminished in the left foot.  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 Single-vessel runoff via the posterior tibial artery. 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 Otherwise, no focal stenosis or occlusion in the left leg.  - Vascular Surgery (Dr. Arias) consulted, recs appreciated - no surgical intervention at this time  - Wound care (Dr. Chong) consulted, recs appreciated - Skin changes attributed to bilateral stasis dermatitis and bilateral venous hypertension with edema and venous stasis ulcers and weeping - silver alginate and dry dressings every other day, drainage dependant, and ace wraps  - ID consult (Dr gonzalez/Dr Simms) consulted, recs appreciated - suspect most of this is chronic, with venous stasis ulcers and dermatitis Cellulitis b/l LE marked erythema, with Oozing clear liquids/skin blister with yellowish slough  with severe chronic venous stasis with possible +/- cellulitis, fluid over load with edema - Venous Stasis ulcers, afebrile, NL WBC  - s/p vanco  - Cellulitis appears nonpurulent and rapidly improving so de-escalate to Ceftriaxone 1 gram IV Q-day (started 6/23) and potential to finish course with cefuroxime 500mg PO BID with last day 6/25.   - PVD, keep b/l lower EXT elevated  - Blood c/sx 2   - Pain control  - Vascular studies: RLE: MAYNOR is .54, pulse waveforms are diffusely monophasic and diminished in the right foot. LLE: MAYNOR is .56, pulse waveforms are diffusely monophasic and severely diminished in the left foot.  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 Single-vessel runoff via the posterior tibial artery. 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 Otherwise, no focal stenosis or occlusion in the left leg.  - Vascular Surgery (Dr. Arias) consulted, recs appreciated - plan for right LE angiogram, possible stent/angioplasty on Thursday in IR  - Wound care (Dr. Chong) consulted, recs appreciated - Skin changes attributed to bilateral stasis dermatitis and bilateral venous hypertension with edema and venous stasis ulcers and weeping - silver alginate and dry dressings every other day, drainage dependant, and ace wraps  - ID consult (Dr gonzalez/Dr Simms) consulted, recs appreciated - suspect most of this is chronic, with venous stasis ulcers and dermatitis Rapid A Fib - Pt stopped home Cardizem on his own, non compliant for Cardiology follow up & meds  - Dc Cardizem gtt 5 mg and Cardizem 30 mg PO q6h (6/23)  - Start Lopressor 25 mg PO BID (6/23)  - Consider starting lisinopril 2.5 mg qd tomorrow   - INR supra therapeutic, check daily INR AM   - Echo (6/22) - Mild concentric LVH with moderate global LV systolic dysfunction with LVEF of 40%, grossly normal RV size and systolic functio, calcified trileaflet aortic valve with severe aortic stenosis, mild to moderate MR, mild TR.     - Continue to monitor on telemetry   - TSH wnl, low T3

## 2020-06-23 NOTE — CONSULT NOTE ADULT - SUBJECTIVE AND OBJECTIVE BOX
Podiatry was consulted for an 87 year old Male seen at [facility] [address] for elongated, dystrophic nails. Patient relates he used to cut his nails on his own with a  due to their thickness but relates no that they are unable to cut it themselves. Denies any fever, chills, nausea, vomiting, chest pain, shortness of breath, or calf pain at this time.    REVIEW OF SYSTEMS    PAST MEDICAL & SURGICAL HISTORY:  PVD (peripheral vascular disease)  Lung cancer: Left lung Sx for removal of Tumor, Rt Lung Cryo therapy for recurrence  Atrial fibrillation: on AC  Atrial fibrillation, unspecified type  S/P lobectomy of lung: Tumor removal from Lower Lobe, NO CT, NO RT      Allergies    No Known Allergies    Intolerances        MEDICATIONS  (STANDING):  cefTRIAXone   IVPB      cefTRIAXone   IVPB 1000 milliGRAM(s) IV Intermittent every 24 hours  diltiazem    Tablet 30 milliGRAM(s) Oral <User Schedule>  furosemide   Injectable 20 milliGRAM(s) IV Push daily  gentamicin 0.3% Ophthalmic Solution 1 Drop(s) Both EYES five times a day    MEDICATIONS  (PRN):  acetaminophen   Tablet .. 650 milliGRAM(s) Oral every 6 hours PRN Temp greater or equal to 38C (100.4F), Mild Pain (1 - 3)      Social History:  Never smoker, denies etoh and drug abuse  , Lives alone, Retired  (20 Jun 2020 19:26)      FAMILY HISTORY:      Vital Signs Last 24 Hrs  T(C): 36.5 (23 Jun 2020 07:57), Max: 36.6 (23 Jun 2020 03:04)  T(F): 97.7 (23 Jun 2020 07:57), Max: 97.9 (23 Jun 2020 03:04)  HR: 72 (23 Jun 2020 07:57) (72 - 96)  BP: 117/80 (23 Jun 2020 07:57) (117/80 - 137/90)  BP(mean): --  RR: 18 (23 Jun 2020 07:57) (18 - 18)  SpO2: 98% (23 Jun 2020 07:57) (96% - 98%)    PHYSICAL EXAM:  Vascular: DP & PT non-palpable bilaterally, Capillary refill >5 seconds, Digital hair absent bilaterally  Neurological: Light touch sensation intact bilaterally  Musculoskeletal: 5/5 strength in all quadrants bilaterally, AJ & STJ ROM intact  Dermatological: Elongated, dystrophic, discolored nails noted 1-5 bilaterally with subungual debris

## 2020-06-23 NOTE — CONSULT NOTE ADULT - CONSULT REASON
AF
bilateral lower leg opened weepy wounds
cellulitis
hypertrophic nails
bilateral swelling and ulcerations

## 2020-06-24 LAB
ANION GAP SERPL CALC-SCNC: 8 MMOL/L — SIGNIFICANT CHANGE UP (ref 5–17)
BUN SERPL-MCNC: 13 MG/DL — SIGNIFICANT CHANGE UP (ref 7–23)
CALCIUM SERPL-MCNC: 8.4 MG/DL — LOW (ref 8.5–10.1)
CHLORIDE SERPL-SCNC: 97 MMOL/L — SIGNIFICANT CHANGE UP (ref 96–108)
CO2 SERPL-SCNC: 28 MMOL/L — SIGNIFICANT CHANGE UP (ref 22–31)
CREAT SERPL-MCNC: 0.67 MG/DL — SIGNIFICANT CHANGE UP (ref 0.5–1.3)
GLUCOSE SERPL-MCNC: 95 MG/DL — SIGNIFICANT CHANGE UP (ref 70–99)
HCT VFR BLD CALC: 34.8 % — LOW (ref 39–50)
HGB BLD-MCNC: 11.9 G/DL — LOW (ref 13–17)
INR BLD: 2.36 RATIO — HIGH (ref 0.88–1.16)
MAGNESIUM SERPL-MCNC: 2 MG/DL — SIGNIFICANT CHANGE UP (ref 1.6–2.6)
MCHC RBC-ENTMCNC: 29.2 PG — SIGNIFICANT CHANGE UP (ref 27–34)
MCHC RBC-ENTMCNC: 34.2 GM/DL — SIGNIFICANT CHANGE UP (ref 32–36)
MCV RBC AUTO: 85.5 FL — SIGNIFICANT CHANGE UP (ref 80–100)
NRBC # BLD: 0 /100 WBCS — SIGNIFICANT CHANGE UP (ref 0–0)
PHOSPHATE SERPL-MCNC: 3.2 MG/DL — SIGNIFICANT CHANGE UP (ref 2.5–4.5)
PLATELET # BLD AUTO: 265 K/UL — SIGNIFICANT CHANGE UP (ref 150–400)
POTASSIUM SERPL-MCNC: 3.6 MMOL/L — SIGNIFICANT CHANGE UP (ref 3.5–5.3)
POTASSIUM SERPL-SCNC: 3.6 MMOL/L — SIGNIFICANT CHANGE UP (ref 3.5–5.3)
PROTHROM AB SERPL-ACNC: 27.2 SEC — HIGH (ref 10–12.9)
RBC # BLD: 4.07 M/UL — LOW (ref 4.2–5.8)
RBC # FLD: 15.6 % — HIGH (ref 10.3–14.5)
SARS-COV-2 RNA SPEC QL NAA+PROBE: SIGNIFICANT CHANGE UP
SODIUM SERPL-SCNC: 133 MMOL/L — LOW (ref 135–145)
WBC # BLD: 8.08 K/UL — SIGNIFICANT CHANGE UP (ref 3.8–10.5)
WBC # FLD AUTO: 8.08 K/UL — SIGNIFICANT CHANGE UP (ref 3.8–10.5)

## 2020-06-24 PROCEDURE — 99232 SBSQ HOSP IP/OBS MODERATE 35: CPT

## 2020-06-24 RX ORDER — LISINOPRIL 2.5 MG/1
2.5 TABLET ORAL DAILY
Refills: 0 | Status: DISCONTINUED | OUTPATIENT
Start: 2020-06-24 | End: 2020-06-24

## 2020-06-24 RX ORDER — POTASSIUM CHLORIDE 20 MEQ
40 PACKET (EA) ORAL ONCE
Refills: 0 | Status: COMPLETED | OUTPATIENT
Start: 2020-06-24 | End: 2020-06-24

## 2020-06-24 RX ORDER — SODIUM CHLORIDE 9 MG/ML
1000 INJECTION INTRAMUSCULAR; INTRAVENOUS; SUBCUTANEOUS
Refills: 0 | Status: DISCONTINUED | OUTPATIENT
Start: 2020-06-24 | End: 2020-06-24

## 2020-06-24 RX ADMIN — Medication 25 MILLIGRAM(S): at 04:52

## 2020-06-24 RX ADMIN — Medication 650 MILLIGRAM(S): at 12:23

## 2020-06-24 RX ADMIN — CEFTRIAXONE 100 MILLIGRAM(S): 500 INJECTION, POWDER, FOR SOLUTION INTRAMUSCULAR; INTRAVENOUS at 14:50

## 2020-06-24 RX ADMIN — Medication 650 MILLIGRAM(S): at 20:36

## 2020-06-24 RX ADMIN — Medication 20 MILLIGRAM(S): at 04:52

## 2020-06-24 RX ADMIN — Medication 650 MILLIGRAM(S): at 03:40

## 2020-06-24 RX ADMIN — Medication 40 MILLIEQUIVALENT(S): at 09:51

## 2020-06-24 RX ADMIN — Medication 25 MILLIGRAM(S): at 18:05

## 2020-06-24 NOTE — PROGRESS NOTE ADULT - PROBLEM SELECTOR PLAN 4
- Echo (6/22) - Mild concentric LVH with moderate global LV systolic dysfunction with LVEF of 40%, grossly normal RV size and systolic functio, calcified trileaflet aortic valve with severe aortic stenosis, mild to moderate MR, mild TR.   - Cardio DR Mcelroy d/w both sons at bed side at detail about ECHO results & different treatment options including TAVR as out pt.

## 2020-06-24 NOTE — PROGRESS NOTE ADULT - ATTENDING COMMENTS
Pt seen, examined, case & care plan d/w pt, residents at detail.  D/W Cardiology -Dr Pulido -STOP IV Fluids as ordered by Surgical PA, D/W JORGE Maradiaga  HOLD ACEi, Continue IV Lasix, NO Vit K. PT/INR in AM   NPO after mid night  Pt will be followed by Cardiology in AM to reassess.  pt is medically stable for Angiogram Lower EXT with DR VALLADARES/Dr Goodwin.

## 2020-06-24 NOTE — PROGRESS NOTE ADULT - ATTENDING COMMENTS
I personally saw and examined the patient in detail.  I have spoken to the above provider regarding the assessment and plan of care.  I reviewed the above assessment and plan of care, and agree.  I have made changes in the body of the note where appropriate.  High risk given severe AS.  STill mildly volume overloaded.  Continue IV Lasix

## 2020-06-24 NOTE — PROGRESS NOTE ADULT - SUBJECTIVE AND OBJECTIVE BOX
[facility] -- Radha Boyle, Vonnie, Lilibeth, Joel Pulido Savella  Office # [phone]      Follow Up:    CHF, Afib   Subjective/Observations:   No events overnight resting comfortably in bed.  No complaints of chest pain, dyspnea, or palpitations reported. No signs of orthopnea or PND.     REVIEW OF SYSTEMS: All other review of systems is negative unless indicated above    PAST MEDICAL & SURGICAL HISTORY:  PVD (peripheral vascular disease)  Lung cancer: Left lung Sx for removal of Tumor, Rt Lung Cryo therapy for recurrence  Atrial fibrillation: on AC  Atrial fibrillation, unspecified type  S/P lobectomy of lung: Tumor removal from Lower Lobe, NO CT, NO RT      MEDICATIONS  (STANDING):  cefTRIAXone   IVPB      cefTRIAXone   IVPB 1000 milliGRAM(s) IV Intermittent every 24 hours  furosemide   Injectable 20 milliGRAM(s) IV Push daily  gentamicin 0.3% Ophthalmic Solution 1 Drop(s) Both EYES five times a day  metoprolol tartrate 25 milliGRAM(s) Oral two times a day  sodium chloride 0.9%. 1000 milliLiter(s) (75 mL/Hr) IV Continuous <Continuous>    MEDICATIONS  (PRN):  acetaminophen   Tablet .. 650 milliGRAM(s) Oral every 6 hours PRN Temp greater or equal to 38C (100.4F), Mild Pain (1 - 3)      Allergies    No Known Allergies    Intolerances        Vital Signs Last 24 Hrs  T(C): 36.3 (24 Jun 2020 07:51), Max: 36.8 (23 Jun 2020 16:05)  T(F): 97.4 (24 Jun 2020 07:51), Max: 98.2 (23 Jun 2020 16:05)  HR: 81 (24 Jun 2020 07:51) (81 - 84)  BP: 115/80 (24 Jun 2020 07:51) (115/80 - 131/86)  BP(mean): --  RR: 18 (24 Jun 2020 07:51) (18 - 18)  SpO2: 96% (24 Jun 2020 07:51) (96% - 97%)    I&O's Summary    23 Jun 2020 07:01  -  24 Jun 2020 07:00  --------------------------------------------------------  IN: 0 mL / OUT: 775 mL / NET: -775 mL          PHYSICAL EXAM:  TELE: Afib   Constitutional: NAD, awake and alert, well-developed  HEENT: Moist Mucous Membranes, Anicteric  Pulmonary: Non-labored, breath sounds are clear bilaterally, No wheezing, crackles or rhonchi  Cardiovascular: Irregular, S1 and S2 nl, + murmur No rubs, gallops or clicks   Gastrointestinal: Bowel Sounds present, soft, nontender.   Lymph: No lymphadenopathy. No peripheral edema.  Skin: No visible rashes or ulcers.  Psych:  Mood & affect appropriate    LABS: All Labs Reviewed:                        11.9   8.08  )-----------( 265      ( 24 Jun 2020 06:40 )             34.8                         11.3   7.48  )-----------( 243      ( 23 Jun 2020 07:01 )             34.1                         11.4   9.97  )-----------( 253      ( 22 Jun 2020 06:48 )             33.7     24 Jun 2020 06:40    133    |  97     |  13     ----------------------------<  95     3.6     |  28     |  0.67   23 Jun 2020 07:01    131    |  96     |  12     ----------------------------<  99     3.7     |  30     |  0.72   22 Jun 2020 06:48    129    |  94     |  12     ----------------------------<  98     3.6     |  28     |  0.57     Ca    8.4        24 Jun 2020 06:40  Ca    8.5        23 Jun 2020 07:01  Ca    8.4        22 Jun 2020 06:48  Phos  3.2       24 Jun 2020 06:40  Phos  2.9       23 Jun 2020 07:01  Mg     2.0       24 Jun 2020 06:40  Mg     2.0       23 Jun 2020 07:01      PT/INR - ( 24 Jun 2020 06:40 )   PT: 27.2 sec;   INR: 2.36 ratio         PTT - ( 23 Jun 2020 07:01 )  PTT:43.8 sec       ECG:  < from: 12 Lead ECG (06.20.20 @ 14:49) >  Ventricular Rate 104 BPM    Atrial Rate 117 BPM    QRS Duration 114 ms    Q-T Interval 310 ms    QTC Calculation(Bezet) 407 ms    R Axis -28 degrees    T Axis 72 degrees    Diagnosis Line Atrial fibrillation  Abnormal ECG  No previous ECGs available  Confirmed by Marlene Peoples MD ([id number]) on 6/22/2020 9:42:20 AM    < end of copied text >    Echo:  < from: TTE Echo Complete w/o Contrast w/ Doppler (06.21.20 @ 13:37) >  EXAM:  ECHO TTE WO CON COMP W DOPP         PROCEDURE DATE:  06/21/2020        INTERPRETATION:  INDICATION: Heart failure  Sonographer AS    Blood Pressure 124/57    Height unavailable     Weight 83.9 kg    Dimensions:    LA 3.8       Normal Values: 2.0 - 4.0 cm    Ao 3.0        Normal Values: 2.0 - 3.8 cm  SEPTUM 1.2       Normal Values: 0.6 - 1.2 cm  PWT 1.3       Normal Values: 0.6 - 1.1 cm  LVIDd 4.7         Normal Values: 3.0 - 5.6 cm  LVIDs Normal Values: 1.8 - 4.0 cm      OBSERVATIONS:  Technically difficult study  Mitral Valve: Thickened leaflets, mild to moderate MR.  Aortic Valve/Aorta: Calcified trileaflet aortic valve with decreased opening. Peak transaortic valve gradient of 72.9 mmHg with a mean transaortic valve gradient of 41 mmHg. The aortic valve area is calculated to be 0.36 sq cm. This is consistent with severe aortic stenosis  Tricuspid Valve: normal with mild TR.  Pulmonic Valve: Trace PI  Left Atrium: normal  Right Atrium: Not well visualized  Left Ventricle: Moderate global left ventricular systolic dysfunction with an estimated LVEF of 40%. Mild LVH  Right Ventricle: Grossly normal size and systolic function.  Pericardium/Pleura: normal, no significant pericardial effusion.  Pulmonary/RV Pressure: estimated PA systolic pressure of 28.4mmHg assuming an RA pressure of 3 mmHg.      Conclusion:   Technically difficult study  Mild concentric LVH with moderate global left ventricular systolic dysfunction with an estimated LVEF of 40%  Grossly normal RV size and systolic function.   Calcified trileaflet aortic valve with severe aortic stenosis  Mild to moderate MR   Mild TR.     No significant pericardial effusion.        KARIE LONGO   This document has been electronically signed. Jun 22 2020  1:08PM        < end of copied text >    Radiology:  < from: VA Duplex Lower Extrem Arterial, Bilat (06.23.20 @ 07:57) >  EXAM:  DUPLEX LOW ARTERIES COMP BILAT                            PROCEDURE DATE:  06/23/2020          INTERPRETATION:  US LOWER EXTREMITY ARTERIAL DUPLEX COMPLETE BILATERAL    CLINICAL INDICATION:  Peripheral vascular disease with bilateral leg swelling    COMPARISON: None    Real-time sonography of the bilateral lower extremity arterial system was performed using a high-resolution linear array transducer and including color and spectral Doppler.     Diffuse calcified plaque in the bilateral leg.. No soft tissue abnormalities are demonstrated in either leg. Flow phase patterns and peak systolic velocity measurements (in cm/s) were observed as follows:    RIGHT:  CFA: Biphasic; 56   Proximal SFA: Biphasic; 55   Mid SFA: Biphasic; 55   DistalSFA: Biphasic;  413   Popliteal: Monophasic;  43   Anterior tibial: Occluded;  Posterior tibial: Monophasic; 69   Peroneal: Occluded;  Dorsalis pedis: Occluded;    LEFT:  CFA: Triphasic; 120   Proximal SFA: Triphasic; 86   Mid SFA: Triphasic; 74   Distal SFA: Biphasic; 67   Popliteal: Biphasic;  81   Anterior tibial: Monophasic; 34   Posterior tibial: Biphasic; 74   Peroneal: Occluded;  Dorsalis pedis: Biphasic;  77     IMPRESSION:    Greater than 70% stenosis at the distal right SFA with diminished, tardus parvus flow below the right knee. Single-vessel runoff via the posterior tibial artery.    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 Otherwise, no focal stenosis or occlusion in the left leg.  CORTEZ HENSON M.D., ATTENDING RADIOLOGIST  This document has been electronically signed. Jun 22 2020  5:06PM    < end of copied text >  < from: Xray Chest 1 View- PORTABLE-Urgent (06.20.20 @ 16:03) >  EXAM:  XR CHEST PORTABLE URGENT 1V                                  PROCEDURE DATE:  06/20/2020          INTERPRETATION:  PROCEDURE: AP view of the chest.    CLINICAL INFORMATION: Leg swelling.    COMPARISON: None.    FINDINGS:    Lungs: Peripherallinear atelectasis versus scarring in the right upper lobe. No focal consolidation.  Heart: The heart is normal in size. Surgical clips overlie the heart.  Mediastinum: The mediastinum is within normal limits.    IMPRESSION:  Peripheral linear atelectasis versus scarring in the right upper lobe. No focal consolidation.        MADELINE SRIVASTAVA M.D., ATTENDING RADIOLOGIST  This document has been electronically signed. Jun 20 2020  4:10PM

## 2020-06-24 NOTE — PROGRESS NOTE ADULT - ASSESSMENT
88 y/o M with hx of CHF, afib (on coumadin), lung ca s/p resection presents to ED for b/l weeping edema. Admitted for b/l LE cellulitis, Rapid A Fib with RVR, volume overload    Afib with RVR  -controlled  -HR: 81 (06-24-20 @ 07:51) (81 - 84)  - BP: 115/80 (06-24-20 @ 07:51) (115/80 - 131/86)  -S/P cardizem gtt, and cardizem  30mg po q 6 hours, will D/C in light of the EF and AS  -CW lopressor 25 mg twice daily with hold parameters   - Dose coumadin to maintain INR of 2-3   -INR: 2.36 ratio (06-24-20 @ 06:40)    Volume overload  -bnp 5624  -continue iv lasix   -strict intake and output  - IN: 0 mL / OUT: 775 mL / NET: -775 mL  -echo w/ severe AS, mod global LVDF EF 40 % mild LVh   -troponin negative  - will need outpt evaluation for AS. ?TAVR.   - start  lisinopril 2.5 mg daily with hold parameters     chronic venous stasis with dermatitis   -as per primary, fu id, wound consult    -  Patient is considered a high risk but optimized for a non-cardiac procedure. His structural heart disease is not modifiable nor is is overall risk of any invasive procedure. Monitor routine hemodynamics     Monitor and replete electrolytes. Keep K>4.0 and Mg>2.0.  Matt Kohli DNP, ANP-c  Cardiology   Spectra #[phone]

## 2020-06-24 NOTE — PROGRESS NOTE ADULT - SUBJECTIVE AND OBJECTIVE BOX
SUBJECTIVE:  Patient seen and examined at bedside.  No overnight events.  Patient with no new complaints at this time.    Vital Signs Last 24 Hrs  T(C): 36.3 (24 Jun 2020 07:51), Max: 36.8 (23 Jun 2020 16:05)  T(F): 97.4 (24 Jun 2020 07:51), Max: 98.2 (23 Jun 2020 16:05)  HR: 81 (24 Jun 2020 07:51) (81 - 84)  BP: 115/80 (24 Jun 2020 07:51) (115/80 - 131/86)  RR: 18 (24 Jun 2020 07:51) (18 - 18)  SpO2: 96% (24 Jun 2020 07:51) (96% - 97%)    PHYSICAL EXAM  GENERAL:  Well-nourished, well-developed male sitting in a chair in NAD  HEAD:  Normocephalic, atraumatic  EXTREMITIES: Bilateral lower legs wrapped in ace wrap  NEURO:  A&O x 3    MEDICATIONS  (STANDING):  cefTRIAXone   IVPB      cefTRIAXone   IVPB 1000 milliGRAM(s) IV Intermittent every 24 hours  furosemide   Injectable 20 milliGRAM(s) IV Push daily  gentamicin 0.3% Ophthalmic Solution 1 Drop(s) Both EYES five times a day  metoprolol tartrate 25 milliGRAM(s) Oral two times a day  sodium chloride 0.9%. 1000 milliLiter(s) (75 mL/Hr) IV Continuous <Continuous>    MEDICATIONS  (PRN):  acetaminophen   Tablet .. 650 milliGRAM(s) Oral every 6 hours PRN Temp greater or equal to 38C (100.4F), Mild Pain (1 - 3)    LABS:                        11.9   8.08  )-----------( 265      ( 24 Jun 2020 06:40 )             34.8     06-24    133<L>  |  97  |  13  ----------------------------<  95  3.6   |  28  |  0.67    Ca    8.4<L>      24 Jun 2020 06:40  Phos  3.2     06-24  Mg     2.0     06-24    PT/INR - ( 24 Jun 2020 06:40 )   PT: 27.2 sec;   INR: 2.36 ratio  PTT - ( 23 Jun 2020 07:01 )  PTT:43.8 sec    RADIOLOGY & ADDITIONAL STUDIES:  < from: VA Duplex Lower Extrem Arterial, Bilat (06.23.20 @ 07:57) >  IMPRESSION:  Greater than 70% stenosis at the distal right SFA with diminished, tardus parvus flow below the right knee. Single-vessel runoff via the posterior tibial artery.  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 Otherwise, no focal stenosis or occlusion in the left leg.    < from: VA Physiol Extremity Lower 3+ Level, BI (06.22.20 @ 16:24) >  Impression:  Right lower extremity: The ankle brachial index is .54. The pulse waveforms are diffusely monophasic and diminished in the right foot.    Left lower extremity: The ankle brachial index is .56. The pulse waveforms are diffusely monophasic and severely diminished in the left foot.    ASSESSMENT & PLAN  87 year old male with significant pmhx of afib (on coumadin, held for now), lung CA s/p removal of tumor and cryo for recurrence, PVD with worsening bilateral LE peripheral edema, venous ulcerations, moderate PAD/ stenosis in right SFA and LLE.    - Preop for angio tomorrow 6/25/20  - Follow up AM labs, INR should be 1.5 or under for angio  - Continue ceftriaxone per ID  - OOB, pain control  - Case discussed with Dr. Mesa

## 2020-06-24 NOTE — PROGRESS NOTE ADULT - PROBLEM SELECTOR PLAN 5
Chronic PVD b/l lower EXT with venous stasis dermatitis & skin blistering-dry now   - Wound care (Dr. Chong) consulted, recs appreciated - Skin changes attributed to bilateral stasis dermatitis and bilateral venous hypertension with edema and venous stasis ulcers and weeping - silver alginate and dry dressings every other day, drainage dependant, and ace wraps  - Vascular studies: RLE: MAYNOR is .54, pulse waveforms are diffusely monophasic and diminished in the right foot. LLE: MAYNOR is .56, pulse waveforms are diffusely monophasic and severely diminished in the left foot.  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 Single-vessel runoff via the posterior tibial artery. 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 Otherwise, no focal stenosis or occlusion in the left leg.  - Vascular Surgery (Dr. Arias) consulted, recs appreciated - plan for right LE angiogram, possible stent/angioplasty on Thursday in [place]

## 2020-06-24 NOTE — PROGRESS NOTE ADULT - SUBJECTIVE AND OBJECTIVE BOX
Patient is a 87y old  Male who presents with a chief complaint of b/l LE weeping edema (23 Jun 2020 12:26)    HPI:  86 y/o M with hx of , afib (on coumadin), 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 CHF , PVD  presents to ED for worsening edema with  b/l weeping skin & erythema with pain that bothers him to walk  . Pt reports that his sons made him come into the ED. Reports has been having b/l LE edema with  weeping legs  for around 1 1/2 year. Has gotten worse in the past few day with increasing erythema, skin blisters +/- pus with occasional bouts of pain. Denies any other complaints, no fever, No chills ,No  cp, sob, palpitations, abdominal pain, dizziness, palpitations.  Pt was transferred from [place] [facility] after he got IV Vanco & IV Lasix, IV Cardizem     Given IV Cardizem and started on Cardizem gtt in the ED for rapid afib (20 Jun 2020 19:26)    INTERVAL HPI:  6/22/2020: Patient seen and examined at bedside. Low, stable H/H. INR 6.6. Hyponatremia, Na 132 --> 129 today. Dc Vancomycin. Rash appears nonpurulent and rapidly improving so will de-escalate to Ceftriaxone 1 gram IV Q-day and potential to finish course with cefuroxime 500mg PO BID with last day 6/25. Stop Cardizem gtt. Started Cardizem 30 mg PO q6h. Vascular studies pending. Wound care evaluated patient - start silver alginate and dry dressings every other day, drainage dependant, and ace wraps if vascular studies permit.  6/23/2020: Patient seen and examined at bedside. Low, stable H/H. INR 3.55. Hyponatremia improving. On Rocephin. DC Cardizem. Start Lopressor 25 mg PO BID. 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 Single-vessel runoff via the posterior tibial artery. 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 Vascular surgery (Dr. Arias) - plan for RLE angiogram, possible stent/angioplasty on Thursday in IR. HOLD AC  6/24/2020: Patient seen and examined at bedside. Low, stable H/H. INR 2.36. Hyponatremia improving. Pending RLE angiogram, possible stent/angioplasty in IR tomorrow.    OVERNIGHT EVENTS: NONE    Home Medications:  Coumadin 5 mg oral tablet: 1 tab(s) orally 2 times a day (22 Jun 2020 18:12)      MEDICATIONS  (STANDING):  cefTRIAXone   IVPB      cefTRIAXone   IVPB 1000 milliGRAM(s) IV Intermittent every 24 hours  furosemide   Injectable 20 milliGRAM(s) IV Push daily  gentamicin 0.3% Ophthalmic Solution 1 Drop(s) Both EYES five times a day  metoprolol tartrate 25 milliGRAM(s) Oral two times a day  potassium chloride   Powder 40 milliEquivalent(s) Oral once    MEDICATIONS  (PRN):  acetaminophen   Tablet .. 650 milliGRAM(s) Oral every 6 hours PRN Temp greater or equal to 38C (100.4F), Mild Pain (1 - 3)      No Known Allergies      Social History:  Never smoker, denies etoh and drug abuse  , Lives alone, Retired  (20 Jun 2020 19:26)      REVIEW OF SYSTEMS:   CONSTITUTIONAL: No fever, No chills, No fatigue, No myalgia, No Body ache, No Weakness  EYES: No eye pain,  No visual disturbances, No discharge, No Redness  ENMT: No ear pain, No nose bleed, No vertigo; No sinus pain, No throat pain, No Congestion  NECK: No pain, No stiffness  RESPIRATORY: No cough, No wheezing, No hemoptysis, No shortness of breath  CARDIOVASCULAR: No chest pain, No palpitations  GASTROINTESTINAL: No abdominal pain, No epigastric pain. No nausea, No vomiting, No diarrhea, No constipation; [ x ] BM  GENITOURINARY: No dysuria, No frequency, No urgency, No hematuria, No incontinence  NEUROLOGICAL: No headaches, No dizziness, No numbness, No tingling, No tremors, No weakness  EXTREMITIES: No Swelling, No Pain, No Edema  SKIN: [ x ] Redness lower extremities b/l   MUSCULOSKELETAL: No joint pain, No joint swelling; No muscle pain, No back pain, No extremity pain  PSYCHIATRIC: No depression, No anxiety, No mood swings, No difficulty sleeping at night  PAIN SCALE: [ x ] None  [  ] Other-  ROS Unable to obtain due to: [  ] Dementia  [  ] Lethargy  [  ] Sedated  [  ] Non verbal  REST OF REVIEW OF SYSTEMS: [ x ] Normal     Vital Signs Last 24 Hrs  T(C): 36.8 (24 Jun 2020 04:26), Max: 36.8 (23 Jun 2020 16:05)  T(F): 98.2 (24 Jun 2020 04:26), Max: 98.2 (23 Jun 2020 16:05)  HR: 84 (24 Jun 2020 04:26) (72 - 84)  BP: 120/76 (24 Jun 2020 04:26) (117/80 - 131/86)  BP(mean): --  RR: 18 (24 Jun 2020 04:26) (18 - 18)  SpO2: 97% (24 Jun 2020 04:26) (96% - 98%)    CAPILLARY BLOOD GLUCOSE          I&O's Summary    23 Jun 2020 07:01  -  24 Jun 2020 07:00  --------------------------------------------------------  IN: 0 mL / OUT: 775 mL / NET: -775 mL      PHYSICAL EXAM:  GENERAL:  [ x ] NAD, [ x ] Well appearing, [  ] Agitated, [  ] Drowsy, [  ] Lethargy, [  ] Confused   HEAD:  [ x ] Normal, [  ] Other  EYES:  [ x ] EOMI, [ x ] PERRLA, [ x ] Conjunctiva and sclera clear normal, [  ] Other, [  ] Pallor, [  ] Discharge  ENMT:  [ x ] Normal, [ x ] Moist mucous membranes, [ x ] Good dentition, [ x ] No thrush  NECK:  [ x ] Supple, [ x ] No JVD, [ x ] Normal thyroid, [ x ] Lymphadenopathy, [  ] Other  CHEST/LUNG:  [ x ] Clear to auscultation bilaterally, [ x ] Breath Sounds equal B/L, [  ] Poor effort, [ x ] No rales, [ x ] No rhonchi, [ x ] No wheezing  HEART:  [ x ] Regular rate and rhythm, [  ] Tachycardia, [  ] Bradycardia, [  ] Irregular, [ x ] 3/6 murmurs, No rubs, No gallops, [  ] PPM in place (Mfr:  )  ABDOMEN:  [ x ] Soft, [ x ] Nontender, [ x ] Nondistended, [ x ] No mass, [  x] Bowel sounds present, [  ] Obese  NERVOUS SYSTEM:  [ x ] Alert & Oriented x3, [ x ] Nonfocal, [  ] Confusion, [  ] Encephalopathic, [  ] Sedated, [  ] Unable to assess, [  ] Dementia, [  ] Other-  EXTREMITIES:  [ x ] 2+ Peripheral Pulses, No clubbing, No cyanosis,  [ x ] Mild Edema B/L lower EXT, [ x ] PVD stasis skin changes B/L lower EXT, [ x ] b/l le erythema, dried ulcers, yellowish slough , no foul smell, no warmth, no discharge.  LYMPH:  No lymphadenopathy noted  SKIN:  [ x ] No rashes or lesions, [  ] Pressure ulcers, [  ] Ecchymosis, [  ] Skin tears    DIET: Diet, DASH/TLC:   Sodium & Cholesterol Restricted  1500mL Fluid Restriction ([id number]) (06-22-20 @ 08:06)      LABS:                        11.9   8.08  )-----------( 265      ( 24 Jun 2020 06:40 )             34.8     24 Jun 2020 06:40    133    |  97     |  13     ----------------------------<  95     3.6     |  28     |  0.67     Ca    8.4        24 Jun 2020 06:40  Phos  3.2       24 Jun 2020 06:40  Mg     2.0       24 Jun 2020 06:40      PT/INR - ( 24 Jun 2020 06:40 )   PT: 27.2 sec;   INR: 2.36 ratio         PTT - ( 23 Jun 2020 07:01 )  PTT:43.8 sec    Culture Results:   No growth to date. (06-20 @ 22:27)  Culture Results:   No growth to date. (06-20 @ 22:26)      CARDIAC MARKERS ( 20 Jun 2020 15:47 )  .026 ng/mL / x     / x     / x     / x            Culture - Blood (collected 20 Jun 2020 22:27)  Source: .Blood Blood  Preliminary Report (21 Jun 2020 23:01):    No growth to date.    Culture - Blood (collected 20 Jun 2020 22:26)  Source: .Blood Blood  Preliminary Report (21 Jun 2020 23:01):    No growth to date.       Anemia Panel:      Thyroid Panel:  T4, Serum: 6.2 ug/dL (06-21-20 @ 11:44)  Thyroid Stimulating Hormone, Serum: 1.33 uIU/mL (06-21-20 @ 05:27)            Serum Pro-Brain Natriuretic Peptide: 5624 pg/mL (06-21-20 @ 05:27)  Serum Pro-Brain Natriuretic Peptide: 5405 pg/mL (06-20-20 @ 15:47)      RADIOLOGY & ADDITIONAL TESTS:      HEALTH ISSUES - PROBLEM Dx:  Varicose veins of bilateral lower extremities with pain: Varicose veins of bilateral lower extremities with pain  Stasis dermatitis with ulcer of right lower extremity due to peripheral venous hypertension: Stasis dermatitis with ulcer of right lower extremity due to peripheral venous hypertension  Chronic venous stasis dermatitis of both lower extremities: Chronic venous stasis dermatitis of both lower extremities  CHF (congestive heart failure): CHF (congestive heart failure)  Hyponatremia: Hyponatremia  Need for prophylactic measure: Need for prophylactic measure  Acute on chronic congestive heart failure, unspecified heart failure type: Acute on chronic congestive heart failure, unspecified heart failure type  Cellulitis of lower extremity, unspecified laterality: Cellulitis of lower extremity, unspecified laterality  Stasis dermatitis with ulcer of left lower extremity due to peripheral venous hypertension: Stasis dermatitis with ulcer of left lower extremity due to peripheral venous hypertension  Atrial fibrillation: Atrial fibrillation  Severe aortic stenosis: Severe aortic stenosis  Systolic CHF: Systolic CHF  PVD (peripheral vascular disease): PVD (peripheral vascular disease)        Consultant(s) Notes Reviewed:  [ x ] YES     Care Discussed with [ x ] Consultants, [ x ] Patient, [x  ] Family,-  sons [  ] HCP, [  ] , [  ] Social Service, [ x ] RN, [  ] Physical Therapy, [  ] Palliative Care Team  DVT PPX: [  ] Lovenox, [  ] SC Heparin, [ x ] Coumadin, [  ] Xarelto, [  ] Eliquis, [  ] Pradaxa, [  ] IV Heparin drip, [  ] SCD, [  ] Ambulation, [  ] Contraindicated 2/2 GI Bleed, [  ] Contraindicated 2/2  Bleed, [  ] Contraindicated 2/2 Brain Bleed  Advanced Directive: [ x ] None, [  ] DNR/DNI Patient is a 87y old  Male who presents with a chief complaint of b/l LE weeping edema (23 Jun 2020 12:26)    HPI:  86 y/o M with hx of , afib (on coumadin), 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 CHF , PVD  presents to ED for worsening edema with  b/l weeping skin & erythema with pain that bothers him to walk  . Pt reports that his sons made him come into the ED. Reports has been having b/l LE edema with  weeping legs  for around 1 1/2 year. Has gotten worse in the past few day with increasing erythema, skin blisters +/- pus with occasional bouts of pain. Denies any other complaints, no fever, No chills ,No  cp, sob, palpitations, abdominal pain, dizziness, palpitations.  Pt was transferred from [place] ER after he got IV Vanco & IV Lasix, IV Cardizem     Given IV Cardizem and started on Cardizem gtt in the ED for rapid afib (20 Jun 2020 19:26)    INTERVAL HPI:  6/22/2020: Patient seen and examined at bedside. Low, stable H/H. INR 6.6. Hyponatremia, Na 132 --> 129 today. Dc Vancomycin. Rash appears nonpurulent and rapidly improving so will de-escalate to Ceftriaxone 1 gram IV Q-day and potential to finish course with cefuroxime 500mg PO BID with last day 6/25. Stop Cardizem gtt. Started Cardizem 30 mg PO q6h. Vascular studies pending. Wound care evaluated patient - start silver alginate and dry dressings every other day, drainage dependant, and ace wraps if vascular studies permit.  6/23/2020: Patient seen and examined at bedside. Low, stable H/H. INR 3.55. Hyponatremia improving. On Rocephin. DC Cardizem. Start Lopressor 25 mg PO BID. 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 Single-vessel runoff via the posterior tibial artery. 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 Vascular surgery (Dr. Arias) - plan for RLE angiogram, possible stent/angioplasty on Thursday in IR. HOLD AC  6/24/2020: Patient seen and examined at bedside. Low, stable H/H. INR 2.36. Hyponatremia improving. Pending RLE angiogram, possible stent/angioplasty in IR tomorrow. C coumadin at this time, goal INR for procedure is 1.5 or less.     OVERNIGHT EVENTS: NONE    Home Medications:  Coumadin 5 mg oral tablet: 1 tab(s) orally 2 times a day (22 Jun 2020 18:12)      MEDICATIONS  (STANDING):  cefTRIAXone   IVPB      cefTRIAXone   IVPB 1000 milliGRAM(s) IV Intermittent every 24 hours  furosemide   Injectable 20 milliGRAM(s) IV Push daily  gentamicin 0.3% Ophthalmic Solution 1 Drop(s) Both EYES five times a day  metoprolol tartrate 25 milliGRAM(s) Oral two times a day  potassium chloride   Powder 40 milliEquivalent(s) Oral once    MEDICATIONS  (PRN):  acetaminophen   Tablet .. 650 milliGRAM(s) Oral every 6 hours PRN Temp greater or equal to 38C (100.4F), Mild Pain (1 - 3)      No Known Allergies      Social History:  Never smoker, denies etoh and drug abuse  , Lives alone, Retired  (20 Jun 2020 19:26)      REVIEW OF SYSTEMS:   CONSTITUTIONAL: No fever, No chills, No fatigue, No myalgia, No Body ache, No Weakness  EYES: No eye pain,  No visual disturbances, No discharge, No Redness  ENMT: No ear pain, No nose bleed, No vertigo; No sinus pain, No throat pain, No Congestion  NECK: No pain, No stiffness  RESPIRATORY: No cough, No wheezing, No hemoptysis, No shortness of breath  CARDIOVASCULAR: No chest pain, No palpitations  GASTROINTESTINAL: No abdominal pain, No epigastric pain. No nausea, No vomiting, No diarrhea, No constipation; [ x ] BM  GENITOURINARY: No dysuria, No frequency, No urgency, No hematuria, No incontinence  NEUROLOGICAL: No headaches, No dizziness, No numbness, No tingling, No tremors, No weakness  EXTREMITIES: No Swelling, No Pain, No Edema  SKIN: [ x ] Redness lower extremities b/l   MUSCULOSKELETAL: No joint pain, No joint swelling; No muscle pain, No back pain, No extremity pain  PSYCHIATRIC: No depression, No anxiety, No mood swings, No difficulty sleeping at night  PAIN SCALE: [ x ] None  [  ] Other-  ROS Unable to obtain due to: [  ] Dementia  [  ] Lethargy  [  ] Sedated  [  ] Non verbal  REST OF REVIEW OF SYSTEMS: [ x ] Normal     Vital Signs Last 24 Hrs  T(C): 36.8 (24 Jun 2020 04:26), Max: 36.8 (23 Jun 2020 16:05)  T(F): 98.2 (24 Jun 2020 04:26), Max: 98.2 (23 Jun 2020 16:05)  HR: 84 (24 Jun 2020 04:26) (72 - 84)  BP: 120/76 (24 Jun 2020 04:26) (117/80 - 131/86)  BP(mean): --  RR: 18 (24 Jun 2020 04:26) (18 - 18)  SpO2: 97% (24 Jun 2020 04:26) (96% - 98%)    CAPILLARY BLOOD GLUCOSE          I&O's Summary    23 Jun 2020 07:01  -  24 Jun 2020 07:00  --------------------------------------------------------  IN: 0 mL / OUT: 775 mL / NET: -775 mL      PHYSICAL EXAM:  GENERAL:  [ x ] NAD, [ x ] Well appearing, [  ] Agitated, [  ] Drowsy, [  ] Lethargy, [  ] Confused   HEAD:  [ x ] Normal, [  ] Other  EYES:  [ x ] EOMI, [ x ] PERRLA, [ x ] Conjunctiva and sclera clear normal, [  ] Other, [  ] Pallor, [  ] Discharge  ENMT:  [ x ] Normal, [ x ] Moist mucous membranes, [ x ] Good dentition, [ x ] No thrush  NECK:  [ x ] Supple, [ x ] No JVD, [ x ] Normal thyroid, [ x ] Lymphadenopathy, [  ] Other  CHEST/LUNG:  [ x ] Clear to auscultation bilaterally, [ x ] Breath Sounds equal B/L, [  ] Poor effort, [ x ] No rales, [ x ] No rhonchi, [ x ] No wheezing  HEART:  [ x ] Regular rate and rhythm, [  ] Tachycardia, [  ] Bradycardia, [  ] Irregular, [ x ] 3/6 murmurs, No rubs, No gallops, [  ] PPM in place (Mfr:  )  ABDOMEN:  [ x ] Soft, [ x ] Nontender, [ x ] Nondistended, [ x ] No mass, [  x] Bowel sounds present, [  ] Obese  NERVOUS SYSTEM:  [ x ] Alert & Oriented x3, [ x ] Nonfocal, [  ] Confusion, [  ] Encephalopathic, [  ] Sedated, [  ] Unable to assess, [  ] Dementia, [  ] Other-  EXTREMITIES:  [ x ] 2+ Peripheral Pulses, No clubbing, No cyanosis,  [ x ] Mild Edema B/L lower EXT, [ x ] PVD stasis skin changes B/L lower EXT, [ x ] b/l le erythema, dried ulcers, yellowish slough , no foul smell, no warmth, no discharge.  LYMPH:  No lymphadenopathy noted  SKIN:  [ x ] No rashes or lesions, [  ] Pressure ulcers, [  ] Ecchymosis, [  ] Skin tears    DIET: Diet, DASH/TLC:   Sodium & Cholesterol Restricted  1500mL Fluid Restriction ([id number]) (06-22-20 @ 08:06)      LABS:                        11.9   8.08  )-----------( 265      ( 24 Jun 2020 06:40 )             34.8     24 Jun 2020 06:40    133    |  97     |  13     ----------------------------<  95     3.6     |  28     |  0.67     Ca    8.4        24 Jun 2020 06:40  Phos  3.2       24 Jun 2020 06:40  Mg     2.0       24 Jun 2020 06:40      PT/INR - ( 24 Jun 2020 06:40 )   PT: 27.2 sec;   INR: 2.36 ratio         PTT - ( 23 Jun 2020 07:01 )  PTT:43.8 sec    Culture Results:   No growth to date. (06-20 @ 22:27)  Culture Results:   No growth to date. (06-20 @ 22:26)      CARDIAC MARKERS ( 20 Jun 2020 15:47 )  .026 ng/mL / x     / x     / x     / x            Culture - Blood (collected 20 Jun 2020 22:27)  Source: .Blood Blood  Preliminary Report (21 Jun 2020 23:01):    No growth to date.    Culture - Blood (collected 20 Jun 2020 22:26)  Source: .Blood Blood  Preliminary Report (21 Jun 2020 23:01):    No growth to date.       Anemia Panel:      Thyroid Panel:  T4, Serum: 6.2 ug/dL (06-21-20 @ 11:44)  Thyroid Stimulating Hormone, Serum: 1.33 uIU/mL (06-21-20 @ 05:27)            Serum Pro-Brain Natriuretic Peptide: 5624 pg/mL (06-21-20 @ 05:27)  Serum Pro-Brain Natriuretic Peptide: 5405 pg/mL (06-20-20 @ 15:47)      RADIOLOGY & ADDITIONAL TESTS:      HEALTH ISSUES - PROBLEM Dx:  Varicose veins of bilateral lower extremities with pain: Varicose veins of bilateral lower extremities with pain  Stasis dermatitis with ulcer of right lower extremity due to peripheral venous hypertension: Stasis dermatitis with ulcer of right lower extremity due to peripheral venous hypertension  Chronic venous stasis dermatitis of both lower extremities: Chronic venous stasis dermatitis of both lower extremities  CHF (congestive heart failure): CHF (congestive heart failure)  Hyponatremia: Hyponatremia  Need for prophylactic measure: Need for prophylactic measure  Acute on chronic congestive heart failure, unspecified heart failure type: Acute on chronic congestive heart failure, unspecified heart failure type  Cellulitis of lower extremity, unspecified laterality: Cellulitis of lower extremity, unspecified laterality  Stasis dermatitis with ulcer of left lower extremity due to peripheral venous hypertension: Stasis dermatitis with ulcer of left lower extremity due to peripheral venous hypertension  Atrial fibrillation: Atrial fibrillation  Severe aortic stenosis: Severe aortic stenosis  Systolic CHF: Systolic CHF  PVD (peripheral vascular disease): PVD (peripheral vascular disease)        Consultant(s) Notes Reviewed:  [ x ] YES     Care Discussed with [ x ] Consultants, [ x ] Patient, [x  ] Family,-  sons [  ] HCP, [  ] , [  ] Social Service, [ x ] RN, [  ] Physical Therapy, [  ] Palliative Care Team  DVT PPX: [  ] Lovenox, [  ] SC Heparin, [ x ] Coumadin, [  ] Xarelto, [  ] Eliquis, [  ] Pradaxa, [  ] IV Heparin drip, [  ] SCD, [  ] Ambulation, [  ] Contraindicated 2/2 GI Bleed, [  ] Contraindicated 2/2  Bleed, [  ] Contraindicated 2/2 Brain Bleed  Advanced Directive: [ x ] None, [  ] DNR/DNI Patient is a 87y old  Male who presents with a chief complaint of b/l LE weeping edema (23 Jun 2020 12:26)    HPI:  86 y/o M with hx of , afib (on coumadin), 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 CHF , PVD  presents to ED for worsening edema with  b/l weeping skin & erythema with pain that bothers him to walk  . Pt reports that his sons made him come into the ED. Reports has been having b/l LE edema with  weeping legs  for around 1 1/2 year. Has gotten worse in the past few day with increasing erythema, skin blisters +/- pus with occasional bouts of pain. Denies any other complaints, no fever, No chills ,No  cp, sob, palpitations, abdominal pain, dizziness, palpitations.  Pt was transferred from [place] ER after he got IV Vanco & IV Lasix, IV Cardizem     Given IV Cardizem and started on Cardizem gtt in the ED for rapid afib (20 Jun 2020 19:26)    INTERVAL HPI:  6/22/2020: Patient seen and examined at bedside. Low, stable H/H. INR 6.6. Hyponatremia, Na 132 --> 129 today. Dc Vancomycin. Rash appears nonpurulent and rapidly improving so will de-escalate to Ceftriaxone 1 gram IV Q-day and potential to finish course with cefuroxime 500mg PO BID with last day 6/25. Stop Cardizem gtt. Started Cardizem 30 mg PO q6h. Vascular studies pending. Wound care evaluated patient - start silver alginate and dry dressings every other day, drainage dependant, and ace wraps if vascular studies permit.  6/23/2020: Patient seen and examined at bedside. Low, stable H/H. INR 3.55. Hyponatremia improving. On Rocephin. DC Cardizem. Start Lopressor 25 mg PO BID. 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 Single-vessel runoff via the posterior tibial artery. 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 Vascular surgery (Dr. Arias) - plan for RLE angiogram, possible stent/angioplasty on Thursday in IR. HOLD AC  6/24/2020: Patient seen and examined at bedside. Low, stable H/H. INR 2.36. Hyponatremia improving. Pending RLE angiogram, possible stent/angioplasty in IR tomorrow. C coumadin at this time, goal INR for procedure is 1.5 or less. Start Lisinopril 2.5 mg PO qd.    OVERNIGHT EVENTS: NONE    Home Medications:  Coumadin 5 mg oral tablet: 1 tab(s) orally 2 times a day (22 Jun 2020 18:12)      MEDICATIONS  (STANDING):  cefTRIAXone   IVPB      cefTRIAXone   IVPB 1000 milliGRAM(s) IV Intermittent every 24 hours  furosemide   Injectable 20 milliGRAM(s) IV Push daily  gentamicin 0.3% Ophthalmic Solution 1 Drop(s) Both EYES five times a day  metoprolol tartrate 25 milliGRAM(s) Oral two times a day  potassium chloride   Powder 40 milliEquivalent(s) Oral once    MEDICATIONS  (PRN):  acetaminophen   Tablet .. 650 milliGRAM(s) Oral every 6 hours PRN Temp greater or equal to 38C (100.4F), Mild Pain (1 - 3)      No Known Allergies      Social History:  Never smoker, denies etoh and drug abuse  , Lives alone, Retired  (20 Jun 2020 19:26)      REVIEW OF SYSTEMS:   CONSTITUTIONAL: No fever, No chills, No fatigue, No myalgia, No Body ache, No Weakness  EYES: No eye pain,  No visual disturbances, No discharge, No Redness  ENMT: No ear pain, No nose bleed, No vertigo; No sinus pain, No throat pain, No Congestion  NECK: No pain, No stiffness  RESPIRATORY: No cough, No wheezing, No hemoptysis, No shortness of breath  CARDIOVASCULAR: No chest pain, No palpitations  GASTROINTESTINAL: No abdominal pain, No epigastric pain. No nausea, No vomiting, No diarrhea, No constipation; [ x ] BM  GENITOURINARY: No dysuria, No frequency, No urgency, No hematuria, No incontinence  NEUROLOGICAL: No headaches, No dizziness, No numbness, No tingling, No tremors, No weakness  EXTREMITIES: No Swelling, No Pain, No Edema  SKIN: [ x ] Redness lower extremities b/l   MUSCULOSKELETAL: No joint pain, No joint swelling; No muscle pain, No back pain, No extremity pain  PSYCHIATRIC: No depression, No anxiety, No mood swings, No difficulty sleeping at night  PAIN SCALE: [ x ] None  [  ] Other-  ROS Unable to obtain due to: [  ] Dementia  [  ] Lethargy  [  ] Sedated  [  ] Non verbal  REST OF REVIEW OF SYSTEMS: [ x ] Normal     Vital Signs Last 24 Hrs  T(C): 36.8 (24 Jun 2020 04:26), Max: 36.8 (23 Jun 2020 16:05)  T(F): 98.2 (24 Jun 2020 04:26), Max: 98.2 (23 Jun 2020 16:05)  HR: 84 (24 Jun 2020 04:26) (72 - 84)  BP: 120/76 (24 Jun 2020 04:26) (117/80 - 131/86)  BP(mean): --  RR: 18 (24 Jun 2020 04:26) (18 - 18)  SpO2: 97% (24 Jun 2020 04:26) (96% - 98%)    CAPILLARY BLOOD GLUCOSE          I&O's Summary    23 Jun 2020 07:01  -  24 Jun 2020 07:00  --------------------------------------------------------  IN: 0 mL / OUT: 775 mL / NET: -775 mL      PHYSICAL EXAM:  GENERAL:  [ x ] NAD, [ x ] Well appearing, [  ] Agitated, [  ] Drowsy, [  ] Lethargy, [  ] Confused   HEAD:  [ x ] Normal, [  ] Other  EYES:  [ x ] EOMI, [ x ] PERRLA, [ x ] Conjunctiva and sclera clear normal, [  ] Other, [  ] Pallor, [  ] Discharge  ENMT:  [ x ] Normal, [ x ] Moist mucous membranes, [ x ] Good dentition, [ x ] No thrush  NECK:  [ x ] Supple, [ x ] No JVD, [ x ] Normal thyroid, [ x ] Lymphadenopathy, [  ] Other  CHEST/LUNG:  [ x ] Clear to auscultation bilaterally, [ x ] Breath Sounds equal B/L, [  ] Poor effort, [ x ] No rales, [ x ] No rhonchi, [ x ] No wheezing  HEART:  [ x ] Regular rate and rhythm, [  ] Tachycardia, [  ] Bradycardia, [  ] Irregular, [ x ] 3/6 murmurs, No rubs, No gallops, [  ] PPM in place (Mfr:  )  ABDOMEN:  [ x ] Soft, [ x ] Nontender, [ x ] Nondistended, [ x ] No mass, [  x] Bowel sounds present, [  ] Obese  NERVOUS SYSTEM:  [ x ] Alert & Oriented x3, [ x ] Nonfocal, [  ] Confusion, [  ] Encephalopathic, [  ] Sedated, [  ] Unable to assess, [  ] Dementia, [  ] Other-  EXTREMITIES:  [ x ] 2+ Peripheral Pulses, No clubbing, No cyanosis,  [ x ] Mild Edema B/L lower EXT, [ x ] PVD stasis skin changes B/L lower EXT, [ x ] b/l le erythema, dried ulcers, yellowish slough , no foul smell, no warmth, no discharge.  LYMPH:  No lymphadenopathy noted  SKIN:  [ x ] No rashes or lesions, [  ] Pressure ulcers, [  ] Ecchymosis, [  ] Skin tears    DIET: Diet, DASH/TLC:   Sodium & Cholesterol Restricted  1500mL Fluid Restriction ([id number]) (06-22-20 @ 08:06)      LABS:                        11.9   8.08  )-----------( 265      ( 24 Jun 2020 06:40 )             34.8     24 Jun 2020 06:40    133    |  97     |  13     ----------------------------<  95     3.6     |  28     |  0.67     Ca    8.4        24 Jun 2020 06:40  Phos  3.2       24 Jun 2020 06:40  Mg     2.0       24 Jun 2020 06:40      PT/INR - ( 24 Jun 2020 06:40 )   PT: 27.2 sec;   INR: 2.36 ratio         PTT - ( 23 Jun 2020 07:01 )  PTT:43.8 sec    Culture Results:   No growth to date. (06-20 @ 22:27)  Culture Results:   No growth to date. (06-20 @ 22:26)      CARDIAC MARKERS ( 20 Jun 2020 15:47 )  .026 ng/mL / x     / x     / x     / x            Culture - Blood (collected 20 Jun 2020 22:27)  Source: .Blood Blood  Preliminary Report (21 Jun 2020 23:01):    No growth to date.    Culture - Blood (collected 20 Jun 2020 22:26)  Source: .Blood Blood  Preliminary Report (21 Jun 2020 23:01):    No growth to date.       Anemia Panel:      Thyroid Panel:  T4, Serum: 6.2 ug/dL (06-21-20 @ 11:44)  Thyroid Stimulating Hormone, Serum: 1.33 uIU/mL (06-21-20 @ 05:27)            Serum Pro-Brain Natriuretic Peptide: 5624 pg/mL (06-21-20 @ 05:27)  Serum Pro-Brain Natriuretic Peptide: 5405 pg/mL (06-20-20 @ 15:47)      RADIOLOGY & ADDITIONAL TESTS:      HEALTH ISSUES - PROBLEM Dx:  Varicose veins of bilateral lower extremities with pain: Varicose veins of bilateral lower extremities with pain  Stasis dermatitis with ulcer of right lower extremity due to peripheral venous hypertension: Stasis dermatitis with ulcer of right lower extremity due to peripheral venous hypertension  Chronic venous stasis dermatitis of both lower extremities: Chronic venous stasis dermatitis of both lower extremities  CHF (congestive heart failure): CHF (congestive heart failure)  Hyponatremia: Hyponatremia  Need for prophylactic measure: Need for prophylactic measure  Acute on chronic congestive heart failure, unspecified heart failure type: Acute on chronic congestive heart failure, unspecified heart failure type  Cellulitis of lower extremity, unspecified laterality: Cellulitis of lower extremity, unspecified laterality  Stasis dermatitis with ulcer of left lower extremity due to peripheral venous hypertension: Stasis dermatitis with ulcer of left lower extremity due to peripheral venous hypertension  Atrial fibrillation: Atrial fibrillation  Severe aortic stenosis: Severe aortic stenosis  Systolic CHF: Systolic CHF  PVD (peripheral vascular disease): PVD (peripheral vascular disease)        Consultant(s) Notes Reviewed:  [ x ] YES     Care Discussed with [ x ] Consultants, [ x ] Patient, [x  ] Family,-  sons [  ] HCP, [  ] , [  ] Social Service, [ x ] RN, [  ] Physical Therapy, [  ] Palliative Care Team  DVT PPX: [  ] Lovenox, [  ] SC Heparin, [ x ] Coumadin, [  ] Xarelto, [  ] Eliquis, [  ] Pradaxa, [  ] IV Heparin drip, [  ] SCD, [  ] Ambulation, [  ] Contraindicated 2/2 GI Bleed, [  ] Contraindicated 2/2  Bleed, [  ] Contraindicated 2/2 Brain Bleed  Advanced Directive: [ x ] None, [  ] DNR/DNI Patient is a 87y old  Male who presents with a chief complaint of b/l LE weeping edema (23 Jun 2020 12:26)    HPI:  86 y/o M with hx of , afib (on coumadin), 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 CHF , PVD  presents to ED for worsening edema with  b/l weeping skin & erythema with pain that bothers him to walk  . Pt reports that his sons made him come into the ED. Reports has been having b/l LE edema with  weeping legs  for around 1 1/2 year. Has gotten worse in the past few day with increasing erythema, skin blisters +/- pus with occasional bouts of pain. Denies any other complaints, no fever, No chills ,No  cp, sob, palpitations, abdominal pain, dizziness, palpitations.  Pt was transferred from [facility] after he got IV Vanco & IV Lasix, IV Cardizem     Given IV Cardizem and started on Cardizem gtt in the ED for rapid afib (20 Jun 2020 19:26)    INTERVAL HPI:  6/22/2020: Patient seen and examined at bedside. Low, stable H/H. INR 6.6. Hyponatremia, Na 132 --> 129 today. Dc Vancomycin. Rash appears nonpurulent and rapidly improving so will de-escalate to Ceftriaxone 1 gram IV Q-day and potential to finish course with cefuroxime 500mg PO BID with last day 6/25. Stop Cardizem gtt. Started Cardizem 30 mg PO q6h. Vascular studies pending. Wound care evaluated patient - start silver alginate and dry dressings every other day, drainage dependant, and ace wraps if vascular studies permit.  6/23/2020: Patient seen and examined at bedside. Low, stable H/H. INR 3.55. Hyponatremia improving. On Rocephin. DC Cardizem. Start Lopressor 25 mg PO BID. 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 Single-vessel runoff via the posterior tibial artery. 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 Vascular surgery (Dr. Arias) - plan for RLE angiogram, possible stent/angioplasty on Thursday in IR. HOLD AC  6/24/2020: Patient seen and examined at bedside. Low, stable H/H. INR 2.36. Hyponatremia improving. Pending RLE angiogram, possible stent/angioplasty in IR tomorrow. C coumadin at this time, goal INR for procedure is 1.5 or less. HOLD  Lisinopril 2.5 mg PO qd for Angio.    OVERNIGHT EVENTS: NONE    Home Medications:  Coumadin 5 mg oral tablet: 1 tab(s) orally 2 times a day (22 Jun 2020 18:12)      MEDICATIONS  (STANDING):  cefTRIAXone   IVPB      cefTRIAXone   IVPB 1000 milliGRAM(s) IV Intermittent every 24 hours  furosemide   Injectable 20 milliGRAM(s) IV Push daily  gentamicin 0.3% Ophthalmic Solution 1 Drop(s) Both EYES five times a day  metoprolol tartrate 25 milliGRAM(s) Oral two times a day  potassium chloride   Powder 40 milliEquivalent(s) Oral once    MEDICATIONS  (PRN):  acetaminophen   Tablet .. 650 milliGRAM(s) Oral every 6 hours PRN Temp greater or equal to 38C (100.4F), Mild Pain (1 - 3)      No Known Allergies      Social History:  Never smoker, denies etoh and drug abuse  , Lives alone, Retired  (20 Jun 2020 19:26)      REVIEW OF SYSTEMS: i feel fine   CONSTITUTIONAL: No fever, No chills, No fatigue, No myalgia, No Body ache, No Weakness  EYES: No eye pain,  No visual disturbances, No discharge, No Redness  ENMT: No ear pain, No nose bleed, No vertigo; No sinus pain, No throat pain, No Congestion  NECK: No pain, No stiffness  RESPIRATORY: No cough, No wheezing, No hemoptysis, No shortness of breath  CARDIOVASCULAR: No chest pain, No palpitations  GASTROINTESTINAL: No abdominal pain, No epigastric pain. No nausea, No vomiting, No diarrhea, No constipation; [ x ] BM  GENITOURINARY: No dysuria, No frequency, No urgency, No hematuria, No incontinence  NEUROLOGICAL: No headaches, No dizziness, No numbness, No tingling, No tremors, No weakness  EXTREMITIES: No Swelling, No Pain, No Edema  SKIN: [ x ] Redness lower extremities b/l   MUSCULOSKELETAL: No joint pain, No joint swelling; No muscle pain, No back pain, No extremity pain  PSYCHIATRIC: No depression, No anxiety, No mood swings, No difficulty sleeping at night  PAIN SCALE: [ x ] None  [  ] Other-  ROS Unable to obtain due to: [  ] Dementia  [  ] Lethargy  [  ] Sedated  [  ] Non verbal  REST OF REVIEW OF SYSTEMS: [ x ] Normal     Vital Signs Last 24 Hrs  T(C): 36.8 (24 Jun 2020 04:26), Max: 36.8 (23 Jun 2020 16:05)  T(F): 98.2 (24 Jun 2020 04:26), Max: 98.2 (23 Jun 2020 16:05)  HR: 84 (24 Jun 2020 04:26) (72 - 84)  BP: 120/76 (24 Jun 2020 04:26) (117/80 - 131/86)  BP(mean): --  RR: 18 (24 Jun 2020 04:26) (18 - 18)  SpO2: 97% (24 Jun 2020 04:26) (96% - 98%)    CAPILLARY BLOOD GLUCOSE          I&O's Summary    23 Jun 2020 07:01  -  24 Jun 2020 07:00  --------------------------------------------------------  IN: 0 mL / OUT: 775 mL / NET: -775 mL      PHYSICAL EXAM:  GENERAL:  [ x ] NAD, [ x ] Well appearing, [  ] Agitated, [  ] Drowsy, [  ] Lethargy, [  ] Confused   HEAD:  [ x ] Normal, [  ] Other  EYES:  [ x ] EOMI, [ x ] PERRLA, [ x ] Conjunctiva and sclera clear normal, [  ] Other, [  ] Pallor, [  ] Discharge  ENMT:  [ x ] Normal, [ x ] Moist mucous membranes, [ x ] Good dentition, [ x ] No thrush  NECK:  [ x ] Supple, [ x ] No JVD, [ x ] Normal thyroid, [ x ] Lymphadenopathy, [  ] Other  CHEST/LUNG:  [ x ] Clear to auscultation bilaterally, [ x ] Breath Sounds equal B/L, [  ] Poor effort, [ x ] No rales, [ x ] No rhonchi, [ x ] No wheezing  HEART:  [ x ] Regular rate and rhythm, [  ] Tachycardia, [  ] Bradycardia, [  ] Irregular, [ x ] 3/6 murmurs, No rubs, No gallops, [  ] PPM in place (Mfr:  )  ABDOMEN:  [ x ] Soft, [ x ] Nontender, [ x ] Nondistended, [ x ] No mass, [  x] Bowel sounds present, [  ] Obese  NERVOUS SYSTEM:  [ x ] Alert & Oriented x3, [ x ] Nonfocal, [  ] Confusion, [  ] Encephalopathic, [  ] Sedated, [  ] Unable to assess, [  ] Dementia, [  ] Other-  EXTREMITIES:  [ x ] 2+ Peripheral Pulses, No clubbing, No cyanosis,  [ x ] Mild Edema B/L lower EXT, [ x ] PVD stasis skin changes B/L lower EXT, [ x ] b/l le erythema, dried ulcers, yellowish slough , no foul smell, no warmth, no discharge.  LYMPH:  No lymphadenopathy noted  SKIN:  [ x ] No rashes or lesions, [  ] Pressure ulcers, [  ] Ecchymosis, [  ] Skin tears    DIET: Diet, DASH/TLC:   Sodium & Cholesterol Restricted  1500mL Fluid Restriction ([id number]) (06-22-20 @ 08:06)      LABS:                        11.9   8.08  )-----------( 265      ( 24 Jun 2020 06:40 )             34.8     24 Jun 2020 06:40    133    |  97     |  13     ----------------------------<  95     3.6     |  28     |  0.67     Ca    8.4        24 Jun 2020 06:40  Phos  3.2       24 Jun 2020 06:40  Mg     2.0       24 Jun 2020 06:40      PT/INR - ( 24 Jun 2020 06:40 )   PT: 27.2 sec;   INR: 2.36 ratio         PTT - ( 23 Jun 2020 07:01 )  PTT:43.8 sec    Culture Results:   No growth to date. (06-20 @ 22:27)  Culture Results:   No growth to date. (06-20 @ 22:26)      CARDIAC MARKERS ( 20 Jun 2020 15:47 )  .026 ng/mL / x     / x     / x     / x            Culture - Blood (collected 20 Jun 2020 22:27)  Source: .Blood Blood  Preliminary Report (21 Jun 2020 23:01):    No growth to date.    Culture - Blood (collected 20 Jun 2020 22:26)  Source: .Blood Blood  Preliminary Report (21 Jun 2020 23:01):    No growth to date.       Anemia Panel:      Thyroid Panel:  T4, Serum: 6.2 ug/dL (06-21-20 @ 11:44)  Thyroid Stimulating Hormone, Serum: 1.33 uIU/mL (06-21-20 @ 05:27)      Serum Pro-Brain Natriuretic Peptide: 5624 pg/mL (06-21-20 @ 05:27)  Serum Pro-Brain Natriuretic Peptide: 5405 pg/mL (06-20-20 @ 15:47)      RADIOLOGY & ADDITIONAL TESTS: none      HEALTH ISSUES - PROBLEM Dx:  Varicose veins of bilateral lower extremities with pain: Varicose veins of bilateral lower extremities with pain  Stasis dermatitis with ulcer of right lower extremity due to peripheral venous hypertension: Stasis dermatitis with ulcer of right lower extremity due to peripheral venous hypertension  Chronic venous stasis dermatitis of both lower extremities: Chronic venous stasis dermatitis of both lower extremities  CHF (congestive heart failure): CHF (congestive heart failure)  Hyponatremia: Hyponatremia  Need for prophylactic measure: Need for prophylactic measure  Acute on chronic congestive heart failure, unspecified heart failure type: Acute on chronic congestive heart failure, unspecified heart failure type  Cellulitis of lower extremity, unspecified laterality: Cellulitis of lower extremity, unspecified laterality  Stasis dermatitis with ulcer of left lower extremity due to peripheral venous hypertension: Stasis dermatitis with ulcer of left lower extremity due to peripheral venous hypertension  Atrial fibrillation: Atrial fibrillation  Severe aortic stenosis: Severe aortic stenosis  Systolic CHF: Systolic CHF  PVD (peripheral vascular disease): PVD (peripheral vascular disease)        Consultant(s) Notes Reviewed:  [ x ] YES     Care Discussed with [ x ] Consultants, [ x ] Patient, [  ] Family,-   [  ] HCP, [  ] , [  ] Social Service, [ x ] RN, [  ] Physical Therapy, [  ] Palliative Care Team  DVT PPX: [  ] Lovenox, [  ] SC Heparin, [ x ] Coumadin, [  ] Xarelto, [  ] Eliquis, [  ] Pradaxa, [  ] IV Heparin drip, [  ] SCD, [  ] Ambulation, [  ] Contraindicated 2/2 GI Bleed, [  ] Contraindicated 2/2  Bleed, [  ] Contraindicated 2/2 Brain Bleed  Advanced Directive: [ x ] None, [  ] DNR/DNI

## 2020-06-24 NOTE — PROGRESS NOTE ADULT - PROBLEM SELECTOR PLAN 2
Cellulitis b/l LE marked erythema, with Oozing clear liquids/skin blister with yellowish slough  with severe chronic venous stasis with possible +/- cellulitis, fluid over load with edema - Venous Stasis ulcers, afebrile, NL WBC  - s/p vanco  - Cellulitis appears nonpurulent and rapidly improving so de-escalated to Ceftriaxone 1 gram IV Q-day (started 6/23) and potential to finish course with cefuroxime 500mg PO BID with last day 6/25.   - PVD, keep b/l lower EXT elevated  - Blood c/sx 2   - Pain control  - Vascular studies: RLE: MAYNOR is .54, pulse waveforms are diffusely monophasic and diminished in the right foot. LLE: MAYNOR is .56, pulse waveforms are diffusely monophasic and severely diminished in the left foot.  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 Single-vessel runoff via the posterior tibial artery. 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 Otherwise, no focal stenosis or occlusion in the left leg.  - Vascular Surgery (Dr. Arias) consulted, recs appreciated - plan for right LE angiogram, possible stent/angioplasty on Thursday in IR  - Wound care (Dr. Chong) consulted, recs appreciated - Skin changes attributed to bilateral stasis dermatitis and bilateral venous hypertension with edema and venous stasis ulcers and weeping - silver alginate and dry dressings every other day, drainage dependant, and ace wraps  - ID consult (Dr gonzalez/Dr Simms) consulted, recs appreciated - suspect most of this is chronic, with venous stasis ulcers and dermatitis

## 2020-06-24 NOTE — PROGRESS NOTE ADULT - PROBLEM SELECTOR PLAN 1
Rapid A Fib - Pt stopped home Cardizem on his own, non compliant for Cardiology follow up & meds  - Dc Cardizem gtt 5 mg and Cardizem 30 mg PO q6h (6/23)  - Continue Lopressor 25 mg PO BID (started 6/23)  - Consider starting lisinopril 2.5 mg qd tomorrow   - INR therapeutic   - Echo (6/22) - Mild concentric LVH with moderate global LV systolic dysfunction with LVEF of 40%, grossly normal RV size and systolic functio, calcified trileaflet aortic valve with severe aortic stenosis, mild to moderate MR, mild TR.     - Continue to monitor on telemetry   - TSH wnl, low T3 Rapid A Fib - Pt stopped home Cardizem on his own, non compliant for Cardiology follow up & meds  - Dc Cardizem gtt 5 mg and Cardizem 30 mg PO q6h (6/23)  - Continue Lopressor 25 mg PO BID (started 6/23)  - Start lisinopril 2.5 mg qd   - INR therapeutic   - Echo (6/22) - Mild concentric LVH with moderate global LV systolic dysfunction with LVEF of 40%, grossly normal RV size and systolic functio, calcified trileaflet aortic valve with severe aortic stenosis, mild to moderate MR, mild TR.     - Continue to monitor on telemetry   - TSH wnl, low T3

## 2020-06-24 NOTE — PROGRESS NOTE ADULT - ASSESSMENT
88 y/o M with hx of CHF, afib (on coumadin), lung ca s/p resection presents to ED for b/l weeping edema. Admitted for b/l LE cellulitis, Rapid A Fib, and acute exacerbation of systolic CHF, now pending RLE angiogram (6/25/2020).

## 2020-06-24 NOTE — PROGRESS NOTE ADULT - PROBLEM SELECTOR PLAN 3
2/2 Rapid A Fib, pt with elevated BNP, Hyponatremia & leg edema  - unclear about previous echo, last seen cardio >20 yrs ago.  - Echo (6/22) - Mild concentric LVH with moderate global LV systolic dysfunction with LVEF of 40%, grossly normal RV size and systolic functio, calcified trileaflet aortic valve with severe aortic stenosis, mild to moderate MR, mild TR.   - continue Lasix 20 mg IV daily  - i/os, daily weights  - Cardio consulted (torrey), recs appreciated

## 2020-06-24 NOTE — PROGRESS NOTE ADULT - PROBLEM SELECTOR PLAN 7
DVT ppx: on coumadin    DC planning to [facility] DVT ppx: no coumadin at this time, goal INR for procedure is 1.5 or less.     DC planning to [facility]

## 2020-06-25 DIAGNOSIS — T80.1XXA VASCULAR COMPLICATIONS FOLLOWING INFUSION, TRANSFUSION AND THERAPEUTIC INJECTION, INITIAL ENCOUNTER: ICD-10-CM

## 2020-06-25 LAB
ANION GAP SERPL CALC-SCNC: 6 MMOL/L — SIGNIFICANT CHANGE UP (ref 5–17)
APTT BLD: 34.9 SEC — SIGNIFICANT CHANGE UP (ref 28.5–37)
APTT BLD: 35 SEC — SIGNIFICANT CHANGE UP (ref 28.5–37)
BUN SERPL-MCNC: 14 MG/DL — SIGNIFICANT CHANGE UP (ref 7–23)
CALCIUM SERPL-MCNC: 8.6 MG/DL — SIGNIFICANT CHANGE UP (ref 8.5–10.1)
CHLORIDE SERPL-SCNC: 96 MMOL/L — SIGNIFICANT CHANGE UP (ref 96–108)
CO2 SERPL-SCNC: 30 MMOL/L — SIGNIFICANT CHANGE UP (ref 22–31)
CREAT SERPL-MCNC: 0.72 MG/DL — SIGNIFICANT CHANGE UP (ref 0.5–1.3)
CULTURE RESULTS: SIGNIFICANT CHANGE UP
CULTURE RESULTS: SIGNIFICANT CHANGE UP
GLUCOSE SERPL-MCNC: 100 MG/DL — HIGH (ref 70–99)
HCT VFR BLD CALC: 38.3 % — LOW (ref 39–50)
HGB BLD-MCNC: 12.8 G/DL — LOW (ref 13–17)
INR BLD: 1.84 RATIO — HIGH (ref 0.88–1.16)
INR BLD: 1.87 RATIO — HIGH (ref 0.88–1.16)
INR BLD: 1.89 RATIO — HIGH (ref 0.88–1.16)
MCHC RBC-ENTMCNC: 28.9 PG — SIGNIFICANT CHANGE UP (ref 27–34)
MCHC RBC-ENTMCNC: 33.4 GM/DL — SIGNIFICANT CHANGE UP (ref 32–36)
MCV RBC AUTO: 86.5 FL — SIGNIFICANT CHANGE UP (ref 80–100)
NRBC # BLD: 0 /100 WBCS — SIGNIFICANT CHANGE UP (ref 0–0)
PLATELET # BLD AUTO: 299 K/UL — SIGNIFICANT CHANGE UP (ref 150–400)
POTASSIUM SERPL-MCNC: 4 MMOL/L — SIGNIFICANT CHANGE UP (ref 3.5–5.3)
POTASSIUM SERPL-SCNC: 4 MMOL/L — SIGNIFICANT CHANGE UP (ref 3.5–5.3)
PROTHROM AB SERPL-ACNC: 21 SEC — HIGH (ref 10–12.9)
PROTHROM AB SERPL-ACNC: 21.3 SEC — HIGH (ref 10–12.9)
PROTHROM AB SERPL-ACNC: 21.6 SEC — HIGH (ref 10–12.9)
RBC # BLD: 4.43 M/UL — SIGNIFICANT CHANGE UP (ref 4.2–5.8)
RBC # FLD: 15.8 % — HIGH (ref 10.3–14.5)
SODIUM SERPL-SCNC: 132 MMOL/L — LOW (ref 135–145)
SPECIMEN SOURCE: SIGNIFICANT CHANGE UP
SPECIMEN SOURCE: SIGNIFICANT CHANGE UP
WBC # BLD: 9.34 K/UL — SIGNIFICANT CHANGE UP (ref 3.8–10.5)
WBC # FLD AUTO: 9.34 K/UL — SIGNIFICANT CHANGE UP (ref 3.8–10.5)

## 2020-06-25 PROCEDURE — 99233 SBSQ HOSP IP/OBS HIGH 50: CPT

## 2020-06-25 RX ORDER — WARFARIN SODIUM 2.5 MG/1
2.5 TABLET ORAL ONCE
Refills: 0 | Status: DISCONTINUED | OUTPATIENT
Start: 2020-06-25 | End: 2020-06-25

## 2020-06-25 RX ORDER — ENOXAPARIN SODIUM 100 MG/ML
80 INJECTION SUBCUTANEOUS EVERY 12 HOURS
Refills: 0 | Status: DISCONTINUED | OUTPATIENT
Start: 2020-06-25 | End: 2020-06-26

## 2020-06-25 RX ORDER — FUROSEMIDE 40 MG
20 TABLET ORAL ONCE
Refills: 0 | Status: COMPLETED | OUTPATIENT
Start: 2020-06-25 | End: 2020-06-25

## 2020-06-25 RX ADMIN — Medication 25 MILLIGRAM(S): at 17:36

## 2020-06-25 RX ADMIN — Medication 25 MILLIGRAM(S): at 05:06

## 2020-06-25 RX ADMIN — GENTAMICIN SULFATE 1 DROP(S): 3 SOLUTION/ DROPS OPHTHALMIC at 15:51

## 2020-06-25 RX ADMIN — CEFTRIAXONE 100 MILLIGRAM(S): 500 INJECTION, POWDER, FOR SOLUTION INTRAMUSCULAR; INTRAVENOUS at 15:48

## 2020-06-25 RX ADMIN — GENTAMICIN SULFATE 1 DROP(S): 3 SOLUTION/ DROPS OPHTHALMIC at 22:05

## 2020-06-25 RX ADMIN — Medication 20 MILLIGRAM(S): at 07:24

## 2020-06-25 RX ADMIN — Medication 20 MILLIGRAM(S): at 05:06

## 2020-06-25 RX ADMIN — GENTAMICIN SULFATE 1 DROP(S): 3 SOLUTION/ DROPS OPHTHALMIC at 07:24

## 2020-06-25 NOTE — PROGRESS NOTE ADULT - PROBLEM SELECTOR PLAN 1
Rapid A Fib - Pt stopped home Cardizem on his own, non compliant for Cardiology follow up & meds  - Dc Cardizem gtt 5 mg and Cardizem 30 mg PO q6h (6/23)  - Continue Lopressor 25 mg PO BID (started 6/23)  - Hold lisinopril 2.5 mg qd for angio  - INR therapeutic   - Echo (6/22) - Mild concentric LVH with moderate global LV systolic dysfunction with LVEF of 40%, grossly normal RV size and systolic functio, calcified trileaflet aortic valve with severe aortic stenosis, mild to moderate MR, mild TR.     - Continue to monitor on telemetry   - TSH wnl, low T3

## 2020-06-25 NOTE — PROGRESS NOTE ADULT - PROBLEM SELECTOR PLAN 2
Cellulitis b/l LE marked erythema, with Oozing clear liquids/skin blister with yellowish slough  with severe chronic venous stasis with possible +/- cellulitis, fluid over load with edema - Venous Stasis ulcers, afebrile, NL WBC  - s/p vanco  - Cellulitis appears nonpurulent and rapidly improving so de-escalated to Ceftriaxone 1 gram IV Q-day (started 6/23) and potential to finish course with cefuroxime 500mg PO BID with last day 6/25.   - PVD, keep b/l lower EXT elevated  - Blood c/sx 2   - Pain control  - Vascular studies: RLE: MAYNOR is .54, pulse waveforms are diffusely monophasic and diminished in the right foot. LLE: MAYNOR is .56, pulse waveforms are diffusely monophasic and severely diminished in the left foot.  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 Single-vessel runoff via the posterior tibial artery. 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 Otherwise, no focal stenosis or occlusion in the left leg.  - Vascular Surgery (Dr. Arias) consulted, recs appreciated - plan for right LE angiogram, possible stent/angioplasty 6/25  - Wound care (Dr. Chong) consulted, recs appreciated - Skin changes attributed to bilateral stasis dermatitis and bilateral venous hypertension with edema and venous stasis ulcers and weeping - silver alginate and dry dressings every other day, drainage dependant, and ace wraps  - ID consult (Dr gonzalez/Dr Simms) consulted, recs appreciated - suspect most of this is chronic, with venous stasis ulcers and dermatitis IV site infiltration L U Ext while giving FFP today prior to angiogram  -Keep KIRIT Ext elevated, Warm soaks PRN  -N/V intact

## 2020-06-25 NOTE — PROGRESS NOTE ADULT - ATTENDING COMMENTS
Pt seen, Examined, case & care plan  d/w  pt, resident at detail.  NPO for Angiogram today, Pt seen, Examined, case & care plan  d/w  pt, resident at detail.  -D/W Dr Goodwin at detail. she is on vacation next week,  Pt wants to await the whole next week in hospital while Dr Goodwin is away for the Angiogram /Vascular procedure for the following week.  D/W Pt at detail. D/W Charge RN /ANM

## 2020-06-25 NOTE — PROGRESS NOTE ADULT - PROBLEM SELECTOR PLAN 7
DVT ppx: no coumadin at this time, goal INR for procedure is 1.5 or less.     DC planning to [facility] Improving  - 2/2 volume overloaded   - IV Lasix 20 mg daily

## 2020-06-25 NOTE — PROGRESS NOTE ADULT - PROBLEM SELECTOR PLAN 4
- Echo (6/22) - Mild concentric LVH with moderate global LV systolic dysfunction with LVEF of 40%, grossly normal RV size and systolic functio, calcified trileaflet aortic valve with severe aortic stenosis, mild to moderate MR, mild TR.   - Cardio DR Mcelroy d/w both sons at bed side at detail about ECHO results & different treatment options including TAVR as out pt. Likely Ac on chronic Systolic CHF  -2/2 Rapid A Fib, pt with elevated BNP, Hyponatremia & leg edema  - unclear about previous echo, last seen cardio >20 yrs ago.  - Echo (6/22) - Mild concentric LVH with moderate global LV systolic dysfunction with LVEF of 40%, grossly normal RV size and systolic functio, calcified trileaflet aortic valve with severe aortic stenosis, mild to moderate MR, mild TR.   - continue Lasix 20 mg IV daily  - i/os, daily weights  - Cardio consulted (torrey), recs appreciated

## 2020-06-25 NOTE — PROGRESS NOTE ADULT - SUBJECTIVE AND OBJECTIVE BOX
Hospital day: 5    87y Male admitted with Cellulitis of extremity  .    Patient seen and examined bedside resting comfortably.  Pt was supposed to have an angio today, but due to issues with INR, and the FFP not perfusing, it was cancelled.  Pt at this time would like to wait to have it done until it can be done by Dr. Ngo in about a week or so.  Denies fevers, chills, SOB.      T(F): 97.7 (06-25-20 @ 16:07), Max: 98 (06-25-20 @ 12:25)  HR: 76 (06-25-20 @ 16:07) (76 - 97)  BP: 99/61 (06-25-20 @ 16:07) (99/61 - 133/67)  RR: 18 (06-25-20 @ 16:07) (17 - 18)  SpO2: 97% (06-25-20 @ 16:07) (97% - 99%)  Wt(kg): --  CAPILLARY BLOOD GLUCOSE          PHYSICAL EXAM:  General: NAD  Neuro:  Alert & oriented x 3  Extremities: B/L feet in bandages- not removed for exam.    Left arm: + Swelling from hand up to above elbow.  Mild ecchymosis seen at proximal elbow.   Right arm with IV- no issues.     LABS:                        12.8   9.34  )-----------( 299      ( 25 Jun 2020 06:37 )             38.3     06-25    132<L>  |  96  |  14  ----------------------------<  100<H>  4.0   |  30  |  0.72    Ca    8.6      25 Jun 2020 06:37  Phos  3.2     06-24  Mg     2.0     06-24      PT/INR - ( 25 Jun 2020 13:31 )   PT: 21.0 sec;   INR: 1.84 ratio         PTT - ( 25 Jun 2020 11:31 )  PTT:34.9 sec  I&O's Detail    25 Jun 2020 07:01  -  25 Jun 2020 17:10  --------------------------------------------------------  IN:    Plasma: 300 mL  Total IN: 300 mL    OUT:    Voided: 500 mL  Total OUT: 500 mL    Total NET: -200 mL          Impression: 87y Male admitted with Cellulitis of extremity  S/P Cancelled angio.      Plan:  -continue VTE prophylaxis- possible restart of coumadin as patient has A-fib and currently INR is subtherapeutic.    -Increase activity with PT, OOB, Ambulate  -Patient instructed on and encouraged incentive spirometry use  -continue local wound care  -Continue to monitor INR  -Continue medical management  -No surgical intervention in near future, but possibly when Mira Avila returns in a little over a week.  Patient would like to discuss options again with Dr. Ngo tomorrow.

## 2020-06-25 NOTE — PROGRESS NOTE ADULT - ASSESSMENT
88 y/o M with hx of CHF, afib (on coumadin), lung ca s/p resection presents to ED for b/l weeping edema. Admitted for b/l LE cellulitis, Rapid A Fib with RVR, volume overload    Afib with RVR  -controlled  - HR: 97 (06-25-20 @ 05:00) (81 - 97)  - BP: 124/83 (06-25-20 @ 05:00) (115/80 - 131/76)  -S/P cardizem gtt, and cardizem  30mg po q 6 hours, will D/C in light of the EF and AS  -CW lopressor 25 mg twice daily with hold parameters   - Dose coumadin to maintain INR of 2-3   - Labs pending f/u INR    HFrEF, Severe AS  -bnp 5624  -continue iv lasix   -Will give lasix 20 mg IV x1 now   -strict intake and output  - IN: 0 mL / OUT: 775 mL / NET: -775 mL  - echo w/ severe AS, mod global LVDF EF 40 % mild LVh   -troponin negative  - will need outpt evaluation for AS. ?TAVR.   - will start  lisinopril 2.5 mg daily with hold parameters this PM    chronic venous stasis with dermatitis   -as per primary, fu id, wound consult    -  Patient is considered a high risk but optimized for a non-cardiac procedure. His structural heart disease is not modifiable nor is is overall risk of any invasive procedure. Monitor routine hemodynamics     Monitor and replete electrolytes. Keep K>4.0 and Mg>2.0.  Matt Kohli DNP, ANP-c  Cardiology   Spectra #[phone] [phone] 86 y/o M with hx of CHF, afib (on coumadin), lung ca s/p resection presents to ED for b/l weeping edema. Admitted for b/l LE cellulitis, Rapid A Fib with RVR, volume overload    Afib with RVR  - controlled  - HR: 97 (06-25-20 @ 05:00) (81 - 97)  - BP: 124/83 (06-25-20 @ 05:00) (115/80 - 131/76)  - S/P cardizem gtt, and cardizem  30mg po q 6 hours, will D/C in light of the EF and AS  - CW lopressor 25 mg twice daily with hold parameters   - Dose coumadin to maintain INR of 2-3   - Labs pending f/u INR    HFrEF, Severe AS  - bnp 5624  - continue iv lasix   - Will give lasix 20 mg IV x1 now   - strict intake and output  - IN: 0 mL / OUT: 775 mL / NET: -775 mL  - echo w/ severe AS, mod global LVDF EF 40 % mild LVh   - troponin negative  - will need outpt evaluation for AS. ?TAVR.   - will start lisinopril 2.5 mg daily with hold parameters this PM    chronic venous stasis with dermatitis   -as per primary, fu id, wound consult    -  Patient is considered a high risk but optimized for a non-cardiac procedure. His structural heart disease is not modifiable nor is is overall risk of any invasive procedure. Monitor routine hemodynamics     Monitor and replete electrolytes. Keep K>4.0 and Mg>2.0.  Matt Kohli DNP, ANP-c  Cardiology   Spectra #[phone]

## 2020-06-25 NOTE — PROGRESS NOTE ADULT - PROBLEM SELECTOR PLAN 5
Chronic PVD b/l lower EXT with venous stasis dermatitis & skin blistering-dry now   - Wound care (Dr. Chong) consulted, recs appreciated - Skin changes attributed to bilateral stasis dermatitis and bilateral venous hypertension with edema and venous stasis ulcers and weeping - silver alginate and dry dressings every other day, drainage dependant, and ace wraps  - Vascular studies: RLE: MAYNOR is .54, pulse waveforms are diffusely monophasic and diminished in the right foot. LLE: MAYNOR is .56, pulse waveforms are diffusely monophasic and severely diminished in the left foot.  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 Single-vessel runoff via the posterior tibial artery. 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 Otherwise, no focal stenosis or occlusion in the left leg.  - Vascular Surgery (Dr. Arias) consulted, recs appreciated - plan for right LE angiogram, possible stent/angioplasty on 6/25 - Echo (6/22) - Mild concentric LVH with moderate global LV systolic dysfunction with LVEF of 40%, grossly normal RV size and systolic functio, calcified trileaflet aortic valve with severe aortic stenosis, mild to moderate MR, mild TR.   - Cardio DR Mcelroy d/w both sons at bed side at detail about ECHO results & different treatment options including TAVR as out pt.

## 2020-06-25 NOTE — PROGRESS NOTE ADULT - ASSESSMENT
86 y/o M with hx of CHF, afib (on coumadin), lung ca s/p resection presents to ED for b/l weeping edema. Admitted for b/l LE cellulitis, Rapid A Fib, and acute exacerbation of systolic CHF, now pending RLE angiogram (6/25/2020).

## 2020-06-25 NOTE — PROGRESS NOTE ADULT - SUBJECTIVE AND OBJECTIVE BOX
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 CHF , PVD  presents to ED for worsening edema with  b/l weeping skin & erythema with pain that bothers him to walk  . Pt reports that his sons made him come into the ED. Reports has been having b/l LE edema with  weeping legs  for around 1 1/2 year. Has gotten worse in the past few day with increasing erythema, skin blisters +/- pus with occasional bouts of pain. Denies any other complaints, no fever, No chills ,No  cp, sob, palpitations, abdominal pain, dizziness, palpitations.  Pt was transferred from [place] ER after he got IV Vanco & IV Lasix, IV Cardizem     Given IV Cardizem and started on Cardizem gtt in the ED for rapid afib (20 Jun 2020 19:26)    INTERVAL HPI:  6/22/2020: Patient seen and examined at bedside. Low, stable H/H. INR 6.6. Hyponatremia, Na 132 --> 129 today. Dc Vancomycin. Rash appears nonpurulent and rapidly improving so will de-escalate to Ceftriaxone 1 gram IV Q-day and potential to finish course with cefuroxime 500mg PO BID with last day 6/25. Stop Cardizem gtt. Started Cardizem 30 mg PO q6h. Vascular studies pending. Wound care evaluated patient - start silver alginate and dry dressings every other day, drainage dependant, and ace wraps if vascular studies permit.  6/23/2020: Patient seen and examined at bedside. Low, stable H/H. INR 3.55. Hyponatremia improving. On Rocephin. DC Cardizem. Start Lopressor 25 mg PO BID. 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 Single-vessel runoff via the posterior tibial artery. 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 Vascular surgery (Dr. Arias) - plan for RLE angiogram, possible stent/angioplasty on Thursday in IR. HOLD AC  6/24/2020: Patient seen and examined at bedside. Low, stable H/H. INR 2.36. Hyponatremia improving. Pending RLE angiogram, possible stent/angioplasty in IR tomorrow. Hold Coumadin at this time, goal INR for procedure is 1.5 or less. HOLD Lisinopril 2.5 mg PO qd for Angio.  6/25/2020: Patient seen and examined at bedside. Low, stable H/H. INR 1.89. Hyponatremia improving. RLE angiogram, possible stent/angioplasty in IR today.    OVERNIGHT EVENTS: NONE    Home Medications:  Coumadin 5 mg oral tablet: 1 tab(s) orally 2 times a day (22 Jun 2020 18:12)      MEDICATIONS  (STANDING):  cefTRIAXone   IVPB      cefTRIAXone   IVPB 1000 milliGRAM(s) IV Intermittent every 24 hours  furosemide   Injectable 20 milliGRAM(s) IV Push daily  gentamicin 0.3% Ophthalmic Solution 1 Drop(s) Both EYES five times a day  metoprolol tartrate 25 milliGRAM(s) Oral two times a day    MEDICATIONS  (PRN):  acetaminophen   Tablet .. 650 milliGRAM(s) Oral every 6 hours PRN Temp greater or equal to 38C (100.4F), Mild Pain (1 - 3)      No Known Allergies      Social History:  Never smoker, denies etoh and drug abuse  , Lives alone, Retired  (20 Jun 2020 19:26)      REVIEW OF SYSTEMS:   CONSTITUTIONAL: No fever, No chills, No fatigue, No myalgia, No Body ache, No Weakness  EYES: No eye pain,  No visual disturbances, No discharge, No Redness  ENMT: No ear pain, No nose bleed, No vertigo; No sinus pain, No throat pain, No Congestion  NECK: No pain, No stiffness  RESPIRATORY: No cough, No wheezing, No hemoptysis, No shortness of breath  CARDIOVASCULAR: No chest pain, No palpitations  GASTROINTESTINAL: No abdominal pain, No epigastric pain. No nausea, No vomiting, No diarrhea, No constipation; [ x ] BM  GENITOURINARY: No dysuria, No frequency, No urgency, No hematuria, No incontinence  NEUROLOGICAL: No headaches, No dizziness, No numbness, No tingling, No tremors, No weakness  EXTREMITIES: No Swelling, No Pain, No Edema  SKIN: [ x ] Redness lower extremities b/l   MUSCULOSKELETAL: No joint pain, No joint swelling; No muscle pain, No back pain, No extremity pain  PSYCHIATRIC: No depression, No anxiety, No mood swings, No difficulty sleeping at night  PAIN SCALE: [ x ] None  [  ] Other-  ROS Unable to obtain due to: [  ] Dementia  [  ] Lethargy  [  ] Sedated  [  ] Non verbal  REST OF REVIEW OF SYSTEMS: [ x ] Normal       Vital Signs Last 24 Hrs  T(C): 36.4 (24 Jun 2020 15:25), Max: 36.4 (24 Jun 2020 15:25)  T(F): 97.5 (24 Jun 2020 15:25), Max: 97.5 (24 Jun 2020 15:25)  HR: 97 (25 Jun 2020 05:00) (81 - 97)  BP: 124/83 (25 Jun 2020 05:00) (115/80 - 131/76)  BP(mean): --  RR: 17 (25 Jun 2020 05:00) (17 - 18)  SpO2: 97% (25 Jun 2020 05:00) (96% - 97%)    CAPILLARY BLOOD GLUCOSE          I&O's Summary    PHYSICAL EXAM:  GENERAL:  [ x ] NAD, [ x ] Well appearing, [  ] Agitated, [  ] Drowsy, [  ] Lethargy, [  ] Confused   HEAD:  [ x ] Normal, [  ] Other  EYES:  [ x ] EOMI, [ x ] PERRLA, [ x ] Conjunctiva and sclera clear normal, [  ] Other, [  ] Pallor, [  ] Discharge  ENMT:  [ x ] Normal, [ x ] Moist mucous membranes, [ x ] Good dentition, [ x ] No thrush  NECK:  [ x ] Supple, [ x ] No JVD, [ x ] Normal thyroid, [ x ] Lymphadenopathy, [  ] Other  CHEST/LUNG:  [ x ] Clear to auscultation bilaterally, [ x ] Breath Sounds equal B/L, [  ] Poor effort, [ x ] No rales, [ x ] No rhonchi, [ x ] No wheezing  HEART:  [ x ] Regular rate and rhythm, [  ] Tachycardia, [  ] Bradycardia, [  ] Irregular, [ x ] 3/6 murmurs, No rubs, No gallops, [  ] PPM in place (Mfr:  )  ABDOMEN:  [ x ] Soft, [ x ] Nontender, [ x ] Nondistended, [ x ] No mass, [  x] Bowel sounds present, [  ] Obese  NERVOUS SYSTEM:  [ x ] Alert & Oriented x3, [ x ] Nonfocal, [  ] Confusion, [  ] Encephalopathic, [  ] Sedated, [  ] Unable to assess, [  ] Dementia, [  ] Other-  EXTREMITIES:  [ x ] 2+ Peripheral Pulses, No clubbing, No cyanosis,  [ x ] Mild Edema B/L lower EXT, [ x ] PVD stasis skin changes B/L lower EXT, [ x ] b/l le erythema, dried ulcers, yellowish slough , no foul smell, no warmth, no discharge.  LYMPH:  No lymphadenopathy noted  SKIN:  [ x ] No rashes or lesions, [  ] Pressure ulcers, [  ] Ecchymosis, [  ] Skin tears    DIET: Diet, DASH/TLC:   Sodium & Cholesterol Restricted  1500mL Fluid Restriction ([id number]) (06-22-20 @ 08:06)  Diet, NPO after Midnight:      NPO Start Date: 24-Jun-2020,   NPO Start Time: 23:59  Except Medications (06-24-20 @ 10:11)      LABS:                        12.8   9.34  )-----------( 299      ( 25 Jun 2020 06:37 )             38.3     25 Jun 2020 06:37    132    |  96     |  14     ----------------------------<  100    4.0     |  30     |  0.72     Ca    8.6        25 Jun 2020 06:37      PT/INR - ( 25 Jun 2020 06:37 )   PT: 21.6 sec;   INR: 1.89 ratio         PTT - ( 25 Jun 2020 06:37 )  PTT:35.0 sec    Culture Results:   No growth to date. (06-20 @ 22:27)  Culture Results:   No growth to date. (06-20 @ 22:26)      CARDIAC MARKERS ( 20 Jun 2020 15:47 )  .026 ng/mL / x     / x     / x     / x            Culture - Blood (collected 20 Jun 2020 22:27)  Source: .Blood Blood  Preliminary Report (21 Jun 2020 23:01):    No growth to date.    Culture - Blood (collected 20 Jun 2020 22:26)  Source: .Blood Blood  Preliminary Report (21 Jun 2020 23:01):    No growth to date.       Anemia Panel:      Thyroid Panel:  T4, Serum: 6.2 ug/dL (06-21-20 @ 11:44)  Thyroid Stimulating Hormone, Serum: 1.33 uIU/mL (06-21-20 @ 05:27)            Serum Pro-Brain Natriuretic Peptide: 5624 pg/mL (06-21-20 @ 05:27)  Serum Pro-Brain Natriuretic Peptide: 5405 pg/mL (06-20-20 @ 15:47)      RADIOLOGY & ADDITIONAL TESTS:      HEALTH ISSUES - PROBLEM Dx:  Varicose veins of bilateral lower extremities with pain: Varicose veins of bilateral lower extremities with pain  Stasis dermatitis with ulcer of right lower extremity due to peripheral venous hypertension: Stasis dermatitis with ulcer of right lower extremity due to peripheral venous hypertension  Chronic venous stasis dermatitis of both lower extremities: Chronic venous stasis dermatitis of both lower extremities  CHF (congestive heart failure): CHF (congestive heart failure)  Hyponatremia: Hyponatremia  Need for prophylactic measure: Need for prophylactic measure  Acute on chronic congestive heart failure, unspecified heart failure type: Acute on chronic congestive heart failure, unspecified heart failure type  Cellulitis of lower extremity, unspecified laterality: Cellulitis of lower extremity, unspecified laterality  Stasis dermatitis with ulcer of left lower extremity due to peripheral venous hypertension: Stasis dermatitis with ulcer of left lower extremity due to peripheral venous hypertension  Atrial fibrillation: Atrial fibrillation  Severe aortic stenosis: Severe aortic stenosis  Systolic CHF: Systolic CHF  PVD (peripheral vascular disease): PVD (peripheral vascular disease)        Consultant(s) Notes Reviewed:  [ x ] YES     Care Discussed with [ x ] Consultants, [ x ] Patient, [  ] Family,-   [  ] HCP, [  ] , [  ] Social Service, [ x ] RN, [  ] Physical Therapy, [  ] Palliative Care Team  DVT PPX: [  ] Lovenox, [  ] SC Heparin, [ x ] Coumadin, [  ] Xarelto, [  ] Eliquis, [  ] Pradaxa, [  ] IV Heparin drip, [  ] SCD, [  ] Ambulation, [  ] Contraindicated 2/2 GI Bleed, [  ] Contraindicated 2/2  Bleed, [  ] Contraindicated 2/2 Brain Bleed  Advanced Directive: [ x ] None, [  ] DNR/DNI Patient is a 87y old  Male who presents with a chief complaint of b/l LE weeping edema (25 Jun 2020 06:43)    HPI:  88 y/o M with hx of , afib (on coumadin), 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 CHF , PVD  presents to ED for worsening edema with  b/l weeping skin & erythema with pain that bothers him to walk  . Pt reports that his sons made him come into the ED. Reports has been having b/l LE edema with  weeping legs  for around 1 1/2 year. Has gotten worse in the past few day with increasing erythema, skin blisters +/- pus with occasional bouts of pain. Denies any other complaints, no fever, No chills ,No  cp, sob, palpitations, abdominal pain, dizziness, palpitations.  Pt was transferred from [place] ER after he got IV Vanco & IV Lasix, IV Cardizem     Given IV Cardizem and started on Cardizem gtt in the ED for rapid afib (20 Jun 2020 19:26)    INTERVAL HPI:  6/22/2020: Patient seen and examined at bedside. Low, stable H/H. INR 6.6. Hyponatremia, Na 132 --> 129 today. Dc Vancomycin. Rash appears nonpurulent and rapidly improving so will de-escalate to Ceftriaxone 1 gram IV Q-day and potential to finish course with cefuroxime 500mg PO BID with last day 6/25. Stop Cardizem gtt. Started Cardizem 30 mg PO q6h. Vascular studies pending. Wound care evaluated patient - start silver alginate and dry dressings every other day, drainage dependant, and ace wraps if vascular studies permit.  6/23/2020: Patient seen and examined at bedside. Low, stable H/H. INR 3.55. Hyponatremia improving. On Rocephin. DC Cardizem. Start Lopressor 25 mg PO BID. 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 Single-vessel runoff via the posterior tibial artery. 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 Vascular surgery (Dr. Arias) - plan for RLE angiogram, possible stent/angioplasty on Thursday in IR. HOLD AC  6/24/2020: Patient seen and examined at bedside. Low, stable H/H. INR 2.36. Hyponatremia improving. Pending RLE angiogram, possible stent/angioplasty in IR tomorrow. Hold Coumadin at this time, goal INR for procedure is 1.5 or less. HOLD Lisinopril 2.5 mg PO qd for Angio.  6/25/2020: Patient seen and examined at bedside. Low, stable H/H. INR 1.89. Hyponatremia improving. RLE angiogram, possible stent/angioplasty in IR today. Procedure was cancelled in IR as pt got upset with KIRIT Ext IV site Infiltration with FFP causing swelling of L U EXT.    OVERNIGHT EVENTS: NONE    Home Medications:  Coumadin 5 mg oral tablet: 1 tab(s) orally 2 times a day (22 Jun 2020 18:12)      MEDICATIONS  (STANDING):  cefTRIAXone   IVPB      cefTRIAXone   IVPB 1000 milliGRAM(s) IV Intermittent every 24 hours  furosemide   Injectable 20 milliGRAM(s) IV Push daily  gentamicin 0.3% Ophthalmic Solution 1 Drop(s) Both EYES five times a day  metoprolol tartrate 25 milliGRAM(s) Oral two times a day    MEDICATIONS  (PRN):  acetaminophen   Tablet .. 650 milliGRAM(s) Oral every 6 hours PRN Temp greater or equal to 38C (100.4F), Mild Pain (1 - 3)      No Known Allergies      Social History:  Never smoker, denies etoh and drug abuse  , Lives alone, Retired  (20 Jun 2020 19:26)      REVIEW OF SYSTEMS: i am OK  CONSTITUTIONAL: No fever, No chills, No fatigue, No myalgia, No Body ache, No Weakness  EYES: No eye pain,  No visual disturbances, No discharge, No Redness  ENMT: No ear pain, No nose bleed, No vertigo; No sinus pain, No throat pain, No Congestion  NECK: No pain, No stiffness  RESPIRATORY: No cough, No wheezing, No hemoptysis, No shortness of breath  CARDIOVASCULAR: No chest pain, No palpitations  GASTROINTESTINAL: No abdominal pain, No epigastric pain. No nausea, No vomiting, No diarrhea, No constipation; [ x ] BM  GENITOURINARY: No dysuria, No frequency, No urgency, No hematuria, No incontinence  NEUROLOGICAL: No headaches, No dizziness, No numbness, No tingling, No tremors, No weakness  EXTREMITIES: No Swelling, No Pain, No Edema  SKIN: [ x ] Redness lower extremities b/l   MUSCULOSKELETAL: No joint pain, No joint swelling; No muscle pain, No back pain, No extremity pain  PSYCHIATRIC: No depression, No anxiety, No mood swings, No difficulty sleeping at night  PAIN SCALE: [ x ] None  [  ] Other-  ROS Unable to obtain due to: [  ] Dementia  [  ] Lethargy  [  ] Sedated  [  ] Non verbal  REST OF REVIEW OF SYSTEMS: [ x ] Normal       Vital Signs Last 24 Hrs  T(C): 36.4 (24 Jun 2020 15:25), Max: 36.4 (24 Jun 2020 15:25)  T(F): 97.5 (24 Jun 2020 15:25), Max: 97.5 (24 Jun 2020 15:25)  HR: 97 (25 Jun 2020 05:00) (81 - 97)  BP: 124/83 (25 Jun 2020 05:00) (115/80 - 131/76)  BP(mean): --  RR: 17 (25 Jun 2020 05:00) (17 - 18)  SpO2: 97% (25 Jun 2020 05:00) (96% - 97%)    CAPILLARY BLOOD GLUCOSE          I&O's Summary    PHYSICAL EXAM:  GENERAL:  [ x ] NAD, [ x ] Well appearing, [  ] Agitated, [  ] Drowsy, [  ] Lethargy, [  ] Confused   HEAD:  [ x ] Normal, [  ] Other  EYES:  [ x ] EOMI, [ x ] PERRLA, [ x ] Conjunctiva and sclera clear normal, [  ] Other, [  ] Pallor, [  ] Discharge  ENMT:  [ x ] Normal, [ x ] Moist mucous membranes, [ x ] Good dentition, [ x ] No thrush  NECK:  [ x ] Supple, [ x ] No JVD, [ x ] Normal thyroid, [ x ] Lymphadenopathy, [  ] Other  CHEST/LUNG:  [ x ] Clear to auscultation bilaterally, [ x ] Breath Sounds equal B/L, [  ] Poor effort, [ x ] No rales, [ x ] No rhonchi, [ x ] No wheezing  HEART:  [ x ] Regular rate and rhythm, [  ] Tachycardia, [  ] Bradycardia, [  ] Irregular, [ x ] 3/6 murmurs, No rubs, No gallops, [  ] PPM in place (Mfr:  )  ABDOMEN:  [ x ] Soft, [ x ] Nontender, [ x ] Nondistended, [ x ] No mass, [  x] Bowel sounds present, [  ] Obese  NERVOUS SYSTEM:  [ x ] Alert & Oriented x3, [ x ] Nonfocal, [  ] Confusion, [  ] Encephalopathic, [  ] Sedated, [  ] Unable to assess, [  ] Dementia, [  ] Other-  EXTREMITIES:  [ x ] 2+ Peripheral Pulses, No clubbing, No cyanosis,  [ x ] Mild Edema B/L lower EXT, [ x ] PVD stasis skin changes B/L lower EXT, [ x ] b/l le erythema, dried ulcers, yellowish slough , no foul smell, no warmth, no discharge.  LYMPH:  No lymphadenopathy noted, [ x ] LUEXT  & Hand swelling , No pain .N/V Intact, +ROM  SKIN:  [ x ] No rashes or lesions, [  ] Pressure ulcers, [  ] Ecchymosis, [  ] Skin tears    DIET: Diet, DASH/TLC:   Sodium & Cholesterol Restricted  1500mL Fluid Restriction ([id number]) (06-22-20 @ 08:06)  Diet, NPO after Midnight:      NPO Start Date: 24-Jun-2020,   NPO Start Time: 23:59  Except Medications (06-24-20 @ 10:11)      LABS:                        12.8   9.34  )-----------( 299      ( 25 Jun 2020 06:37 )             38.3     25 Jun 2020 06:37    132    |  96     |  14     ----------------------------<  100    4.0     |  30     |  0.72     Ca    8.6        25 Jun 2020 06:37      PT/INR - ( 25 Jun 2020 06:37 )   PT: 21.6 sec;   INR: 1.89 ratio         PTT - ( 25 Jun 2020 06:37 )  PTT:35.0 sec    Culture Results:   No growth to date. (06-20 @ 22:27)  Culture Results:   No growth to date. (06-20 @ 22:26)      CARDIAC MARKERS ( 20 Jun 2020 15:47 )  .026 ng/mL / x     / x     / x     / x            Culture - Blood (collected 20 Jun 2020 22:27)  Source: .Blood Blood  Preliminary Report (21 Jun 2020 23:01):    No growth to date.    Culture - Blood (collected 20 Jun 2020 22:26)  Source: .Blood Blood  Preliminary Report (21 Jun 2020 23:01):    No growth to date.       Anemia Panel:      Thyroid Panel:  T4, Serum: 6.2 ug/dL (06-21-20 @ 11:44)  Thyroid Stimulating Hormone, Serum: 1.33 uIU/mL (06-21-20 @ 05:27)      Serum Pro-Brain Natriuretic Peptide: 5624 pg/mL (06-21-20 @ 05:27)  Serum Pro-Brain Natriuretic Peptide: 5405 pg/mL (06-20-20 @ 15:47)      RADIOLOGY & ADDITIONAL TESTS: NONE      HEALTH ISSUES - PROBLEM Dx:  Varicose veins of bilateral lower extremities with pain: Varicose veins of bilateral lower extremities with pain  Stasis dermatitis with ulcer of right lower extremity due to peripheral venous hypertension: Stasis dermatitis with ulcer of right lower extremity due to peripheral venous hypertension  Chronic venous stasis dermatitis of both lower extremities: Chronic venous stasis dermatitis of both lower extremities  CHF (congestive heart failure): CHF (congestive heart failure)  Hyponatremia: Hyponatremia  Need for prophylactic measure: Need for prophylactic measure  Acute on chronic congestive heart failure, unspecified heart failure type: Acute on chronic congestive heart failure, unspecified heart failure type  Cellulitis of lower extremity, unspecified laterality: Cellulitis of lower extremity, unspecified laterality  Stasis dermatitis with ulcer of left lower extremity due to peripheral venous hypertension: Stasis dermatitis with ulcer of left lower extremity due to peripheral venous hypertension  Atrial fibrillation: Atrial fibrillation  Severe aortic stenosis: Severe aortic stenosis  Systolic CHF: Systolic CHF  PVD (peripheral vascular disease): PVD (peripheral vascular disease)        Consultant(s) Notes Reviewed:  [ x ] YES     Care Discussed with [ x ] Consultants, [ x ] Patient, [  ] Family,-   [  ] HCP, [  ] , [  ] Social Service, [ x ] RN, [  ] Physical Therapy, [  ] Palliative Care Team  DVT PPX: [  ] Lovenox, [  ] SC Heparin, [ x ] Coumadin, [  ] Xarelto, [  ] Eliquis, [  ] Pradaxa, [  ] IV Heparin drip, [  ] SCD, [  ] Ambulation, [  ] Contraindicated 2/2 GI Bleed, [  ] Contraindicated 2/2  Bleed, [  ] Contraindicated 2/2 Brain Bleed  Advanced Directive: [ x ] None, [  ] DNR/DNI

## 2020-06-25 NOTE — PROGRESS NOTE ADULT - ASSESSMENT
Patient was brought down to IR for planned BLE angio and potential RLE intervention.   He was received 2 Units of FFP via LUE IVs prior to arrival to treat an INR of 1.89.   Upon arrival to IR patient complaint of pain in the LUE and stated it had been like this ever since FFPs were given. He complaint that nobody checked his arm before.   Patients arm was elevated and IVs were removed. A new IV was placed in the RUE. An INR was sent and it came back 1.8 representing that all FFPs were likely to be given in an infiltrated IV.   Patient was very upset about the situation with UE pain. He decided to cancel the procedure.   I explained to him the situation and apologized.   He wants to have the intervention done next week to allow the INR to go down by itself. I did explain that I was not going to be around next week and that my partners could do it for me. He says he rather wait for me.   I will talk to patient again tomorrow.     Álvaro, patients son was called by me and is aware of the current situation.

## 2020-06-25 NOTE — PROGRESS NOTE ADULT - SUBJECTIVE AND OBJECTIVE BOX
[facility] -- Radha Boyle, Vonnie, Lilibeth, Joel Pulido Savella  Office # [phone]      Follow Up:    HFrEF, severe AS,   Subjective/Observations:   No events overnight resting comfortably in chair.  No complaints of chest pain, dyspnea, or palpitations reported. No signs of orthopnea or PND.     REVIEW OF SYSTEMS: All other review of systems is negative unless indicated above    PAST MEDICAL & SURGICAL HISTORY:  PVD (peripheral vascular disease)  Lung cancer: Left lung Sx for removal of Tumor, Rt Lung Cryo therapy for recurrence  Atrial fibrillation: on AC  Atrial fibrillation, unspecified type  S/P lobectomy of lung: Tumor removal from Lower Lobe, NO CT, NO RT      MEDICATIONS  (STANDING):  cefTRIAXone   IVPB      cefTRIAXone   IVPB 1000 milliGRAM(s) IV Intermittent every 24 hours  furosemide   Injectable 20 milliGRAM(s) IV Push daily  gentamicin 0.3% Ophthalmic Solution 1 Drop(s) Both EYES five times a day  metoprolol tartrate 25 milliGRAM(s) Oral two times a day    MEDICATIONS  (PRN):  acetaminophen   Tablet .. 650 milliGRAM(s) Oral every 6 hours PRN Temp greater or equal to 38C (100.4F), Mild Pain (1 - 3)      Allergies    No Known Allergies    Intolerances        Vital Signs Last 24 Hrs  T(C): 36.4 (24 Jun 2020 15:25), Max: 36.4 (24 Jun 2020 15:25)  T(F): 97.5 (24 Jun 2020 15:25), Max: 97.5 (24 Jun 2020 15:25)  HR: 97 (25 Jun 2020 05:00) (81 - 97)  BP: 124/83 (25 Jun 2020 05:00) (115/80 - 131/76)  BP(mean): --  RR: 17 (25 Jun 2020 05:00) (17 - 18)  SpO2: 97% (25 Jun 2020 05:00) (96% - 97%)    I&O's Summary    23 Jun 2020 07:01  -  24 Jun 2020 07:00  --------------------------------------------------------  IN: 0 mL / OUT: 775 mL / NET: -775 mL          PHYSICAL EXAM:  TELE: Afib   Constitutional: NAD, awake and alert, well-developed  HEENT: Moist Mucous Membranes, Anicteric  Pulmonary: Non-labored, breath sounds with Bilaterally fine crackles at bases  No  wheezes, or rhonchi   Cardiovascular: Regular, S1 and S2 nl, + murmur No rubs, gallops or clicks   Gastrointestinal: Bowel Sounds present, soft, nontender.   Lymph: No lymphadenopathy. + Bilat LE peripheral edema.   Skin: No visible rashes or ulcers.  Psych:  Mood & affect appropriate    LABS: All Labs Reviewed:                        12.8   9.34  )-----------( 299      ( 25 Jun 2020 06:37 )             38.3                         11.9   8.08  )-----------( 265      ( 24 Jun 2020 06:40 )             34.8                         11.3   7.48  )-----------( 243      ( 23 Jun 2020 07:01 )             34.1     24 Jun 2020 06:40    133    |  97     |  13     ----------------------------<  95     3.6     |  28     |  0.67   23 Jun 2020 07:01    131    |  96     |  12     ----------------------------<  99     3.7     |  30     |  0.72   22 Jun 2020 06:48    129    |  94     |  12     ----------------------------<  98     3.6     |  28     |  0.57     Ca    8.4        24 Jun 2020 06:40  Ca    8.5        23 Jun 2020 07:01  Ca    8.4        22 Jun 2020 06:48  Phos  3.2       24 Jun 2020 06:40  Phos  2.9       23 Jun 2020 07:01  Mg     2.0       24 Jun 2020 06:40  Mg     2.0       23 Jun 2020 07:01      PT/INR - ( 24 Jun 2020 06:40 )   PT: 27.2 sec;   INR: 2.36 ratio         PTT - ( 23 Jun 2020 07:01 )  PTT:43.8 sec         ECG:  < from: 12 Lead ECG (06.20.20 @ 14:49) >  Ventricular Rate 104 BPM    Atrial Rate 117 BPM    QRS Duration 114 ms    Q-T Interval 310 ms    QTC Calculation(Bezet) 407 ms    R Axis -28 degrees    T Axis 72 degrees    Diagnosis Line Atrial fibrillation  Abnormal ECG  No previous ECGs available  Confirmed by Marlene Peoples MD ([id number]) on 6/22/2020 9:42:20 AM    < end of copied text >    Echo:  < from: TTE Echo Complete w/o Contrast w/ Doppler (06.21.20 @ 13:37) >   EXAM:  ECHO TTE WO CON COMP W DOPP         PROCEDURE DATE:  06/21/2020        INTERPRETATION:  INDICATION: Heart failure  Sonographer AS    Blood Pressure 124/57    Height unavailable     Weight 83.9 kg    Dimensions:    LA 3.8       Normal Values: 2.0 - 4.0 cm    Ao 3.0        Normal Values: 2.0 - 3.8 cm  SEPTUM 1.2       Normal Values: 0.6 - 1.2 cm  PWT 1.3       Normal Values: 0.6 - 1.1 cm  LVIDd 4.7         Normal Values: 3.0 - 5.6 cm  LVIDs Normal Values: 1.8 - 4.0 cm      OBSERVATIONS:  Technically difficult study  Mitral Valve: Thickened leaflets, mild to moderate MR.  Aortic Valve/Aorta: Calcified trileaflet aortic valve with decreased opening. Peak transaortic valve gradient of 72.9 mmHg with a mean transaortic valve gradient of 41 mmHg. The aortic valve area is calculated to be 0.36 sq cm. This is consistent with severe aortic stenosis  Tricuspid Valve: normal with mild TR.  Pulmonic Valve: Trace PI  Left Atrium: normal  Right Atrium: Not well visualized  Left Ventricle: Moderate global left ventricular systolic dysfunction with an estimated LVEF of 40%. Mild LVH  Right Ventricle: Grossly normal size and systolic function.  Pericardium/Pleura: normal, no significant pericardial effusion.  Pulmonary/RV Pressure: estimated PA systolic pressure of 28.4mmHg assuming an RA pressure of 3 mmHg.      Conclusion:   Technically difficult study  Mild concentric LVH with moderate global left ventricular systolic dysfunction with an estimated LVEF of 40%  Grossly normal RV size and systolic function.   Calcified trileaflet aortic valve with severe aortic stenosis  Mild to moderate MR   Mild TR.     No significant pericardial effusion.                  KARIE LONGO   This document has been electronically signed. Jun 22 2020  1:08PM    < end of copied text >    Radiology:  < from: Xray Chest 1 View- PORTABLE-Urgent (06.20.20 @ 16:03) >  EXAM:  XR CHEST PORTABLE URGENT 1V                                  PROCEDURE DATE:  06/20/2020          INTERPRETATION:  PROCEDURE: AP view of the chest.    CLINICAL INFORMATION: Leg swelling.    COMPARISON: None.    FINDINGS:    Lungs: Peripherallinear atelectasis versus scarring in the right upper lobe. No focal consolidation.  Heart: The heart is normal in size. Surgical clips overlie the heart.  Mediastinum: The mediastinum is within normal limits.    IMPRESSION:  Peripheral linear atelectasis versus scarring in the right upper lobe. No focal consolidation.                  MADELINE SRIVASTAVA M.D., ATTENDING RADIOLOGIST  This document has been electronically signed. Jun 20 2020  4:10PM    < end of copied text >

## 2020-06-25 NOTE — PROGRESS NOTE ADULT - PROBLEM SELECTOR PLAN 6
Improving  - 2/2 volume overloaded   - IV Lasix 20 mg daily Chronic PVD b/l lower EXT with venous stasis dermatitis & skin blistering-dry now   - Wound care (Dr. Chong) consulted, recs appreciated - Skin changes attributed to bilateral stasis dermatitis and bilateral venous hypertension with edema and venous stasis ulcers and weeping - silver alginate and dry dressings every other day, drainage dependant, and ace wraps  - Vascular studies: RLE: MAYNOR is .54, pulse waveforms are diffusely monophasic and diminished in the right foot. LLE: MAYNOR is .56, pulse waveforms are diffusely monophasic and severely diminished in the left foot.  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 Single-vessel runoff via the posterior tibial artery. 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 Otherwise, no focal stenosis or occlusion in the left leg.  - Vascular Surgery (Dr. Arias) consulted, recs appreciated - plan for right LE angiogram, possible stent/angioplasty on 6/25

## 2020-06-25 NOTE — PROGRESS NOTE ADULT - PROBLEM SELECTOR PLAN 3
2/2 Rapid A Fib, pt with elevated BNP, Hyponatremia & leg edema  - unclear about previous echo, last seen cardio >20 yrs ago.  - Echo (6/22) - Mild concentric LVH with moderate global LV systolic dysfunction with LVEF of 40%, grossly normal RV size and systolic functio, calcified trileaflet aortic valve with severe aortic stenosis, mild to moderate MR, mild TR.   - continue Lasix 20 mg IV daily  - i/os, daily weights  - Cardio consulted (torrey), recs appreciated Improved -Cellulitis b/l LE marked erythema, s/p Oozing clear liquids/skin blister with yellowish slough  with severe chronic venous stasis with possible +/- cellulitis, fluid over load with edema - Venous Stasis ulcers, afebrile, NL WBC  - s/p vanco  - Cellulitis appears nonpurulent and rapidly improving so de-escalated to Ceftriaxone 1 gram IV Q-day (started 6/23) and potential to finish course  with last day 6/25.   - PVD, keep b/l lower EXT elevated  - Blood c/sx 2 - Neg  - Pain control  - Vascular studies: RLE: MAYNOR is .54, pulse waveforms are diffusely monophasic and diminished in the right foot. LLE: MAYNOR is .56, pulse waveforms are diffusely monophasic and severely diminished in the left foot.  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 Single-vessel runoff via the posterior tibial artery. 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 Otherwise, no focal stenosis or occlusion in the left leg.  - Vascular Surgery (Dr. Arias) consulted, recs appreciated - plan for right LE angiogram, possible stent/angioplasty 6/25  - Wound care (Dr. Chong) consulted, recs appreciated - Skin changes attributed to bilateral stasis dermatitis and bilateral venous hypertension with edema and venous stasis ulcers and weeping - silver alginate and dry dressings every other day, drainage dependant, and ace wraps  - ID consult (Dr gonzalez/Dr Simms) consulted, recs appreciated - suspect most of this is chronic, with venous stasis ulcers and dermatitis

## 2020-06-26 LAB
ANION GAP SERPL CALC-SCNC: 8 MMOL/L — SIGNIFICANT CHANGE UP (ref 5–17)
APTT BLD: 31 SEC — SIGNIFICANT CHANGE UP (ref 28.5–37)
BUN SERPL-MCNC: 16 MG/DL — SIGNIFICANT CHANGE UP (ref 7–23)
CALCIUM SERPL-MCNC: 8.7 MG/DL — SIGNIFICANT CHANGE UP (ref 8.5–10.1)
CHLORIDE SERPL-SCNC: 97 MMOL/L — SIGNIFICANT CHANGE UP (ref 96–108)
CO2 SERPL-SCNC: 29 MMOL/L — SIGNIFICANT CHANGE UP (ref 22–31)
CREAT SERPL-MCNC: 0.7 MG/DL — SIGNIFICANT CHANGE UP (ref 0.5–1.3)
GLUCOSE SERPL-MCNC: 91 MG/DL — SIGNIFICANT CHANGE UP (ref 70–99)
HCT VFR BLD CALC: 36.7 % — LOW (ref 39–50)
HGB BLD-MCNC: 12.3 G/DL — LOW (ref 13–17)
INR BLD: 1.64 RATIO — HIGH (ref 0.88–1.16)
MCHC RBC-ENTMCNC: 28.8 PG — SIGNIFICANT CHANGE UP (ref 27–34)
MCHC RBC-ENTMCNC: 33.5 GM/DL — SIGNIFICANT CHANGE UP (ref 32–36)
MCV RBC AUTO: 85.9 FL — SIGNIFICANT CHANGE UP (ref 80–100)
NRBC # BLD: 0 /100 WBCS — SIGNIFICANT CHANGE UP (ref 0–0)
PLATELET # BLD AUTO: 249 K/UL — SIGNIFICANT CHANGE UP (ref 150–400)
POTASSIUM SERPL-MCNC: 3.5 MMOL/L — SIGNIFICANT CHANGE UP (ref 3.5–5.3)
POTASSIUM SERPL-SCNC: 3.5 MMOL/L — SIGNIFICANT CHANGE UP (ref 3.5–5.3)
PROTHROM AB SERPL-ACNC: 18.6 SEC — HIGH (ref 10–12.9)
RBC # BLD: 4.27 M/UL — SIGNIFICANT CHANGE UP (ref 4.2–5.8)
RBC # FLD: 16.1 % — HIGH (ref 10.3–14.5)
SODIUM SERPL-SCNC: 134 MMOL/L — LOW (ref 135–145)
WBC # BLD: 8.09 K/UL — SIGNIFICANT CHANGE UP (ref 3.8–10.5)
WBC # FLD AUTO: 8.09 K/UL — SIGNIFICANT CHANGE UP (ref 3.8–10.5)

## 2020-06-26 PROCEDURE — 99232 SBSQ HOSP IP/OBS MODERATE 35: CPT

## 2020-06-26 RX ORDER — POTASSIUM CHLORIDE 20 MEQ
40 PACKET (EA) ORAL ONCE
Refills: 0 | Status: COMPLETED | OUTPATIENT
Start: 2020-06-26 | End: 2020-06-26

## 2020-06-26 RX ORDER — WARFARIN SODIUM 2.5 MG/1
2.5 TABLET ORAL ONCE
Refills: 0 | Status: COMPLETED | OUTPATIENT
Start: 2020-06-26 | End: 2020-06-26

## 2020-06-26 RX ADMIN — Medication 40 MILLIEQUIVALENT(S): at 08:39

## 2020-06-26 RX ADMIN — Medication 650 MILLIGRAM(S): at 15:31

## 2020-06-26 RX ADMIN — GENTAMICIN SULFATE 1 DROP(S): 3 SOLUTION/ DROPS OPHTHALMIC at 12:35

## 2020-06-26 RX ADMIN — GENTAMICIN SULFATE 1 DROP(S): 3 SOLUTION/ DROPS OPHTHALMIC at 01:07

## 2020-06-26 RX ADMIN — Medication 25 MILLIGRAM(S): at 06:20

## 2020-06-26 RX ADMIN — GENTAMICIN SULFATE 1 DROP(S): 3 SOLUTION/ DROPS OPHTHALMIC at 15:32

## 2020-06-26 RX ADMIN — WARFARIN SODIUM 2.5 MILLIGRAM(S): 2.5 TABLET ORAL at 01:07

## 2020-06-26 RX ADMIN — Medication 20 MILLIGRAM(S): at 06:20

## 2020-06-26 RX ADMIN — Medication 25 MILLIGRAM(S): at 17:12

## 2020-06-26 RX ADMIN — GENTAMICIN SULFATE 1 DROP(S): 3 SOLUTION/ DROPS OPHTHALMIC at 08:39

## 2020-06-26 NOTE — CHART NOTE - NSCHARTNOTEFT_GEN_A_CORE
Called by RN for Pt refusing full dose Lovenox. Pt seen and evaluated at bedside. I personally discussion in length with the patient the importance of anticoagulation, the reasoning behind switching from home Coumadin to full dose Lovenox, discussed the risk of potential emboli, CVA, PE, delay of surgical intervention. The patient expressed verbal understanding and expressed his refusal for any injections, including full dose Lovenox. The patient is awake and alert, demonstrates understanding of all risks, when asked to communicate risks, Pt states he knows he can "have a stroke and not breath or die." He demonstrates competence to make medical decisions.  - D/w Dr. Wylie, will d/c full dose Lovenox and give 2.5mg Coumadin with daily dosing and INRs until further decision is made on surgical intervention. Called by RN for Pt refusing full dose Lovenox. Pt seen and evaluated at bedside. I personally discussed at length with the patient the importance of anticoagulation, the reasoning behind switching from home Coumadin to full dose Lovenox, discussed the risk of potential emboli, CVA, PE, delay of surgical intervention and death. The patient demonstrates understanding and continued to express his refusal for any injections, including full dose Lovenox. The patient is awake and alert, demonstrates understanding of all risks, when asked to communicate risks, Pt states he knows he can "have a stroke and not breath or die." He demonstrates competence to make medical decisions.  - D/w Dr. Wylie, will d/c full dose Lovenox and give 2.5mg Coumadin with daily dosing and INRs until further decision is made on surgical intervention.  - RN to call w/ any changes.

## 2020-06-26 NOTE — PROGRESS NOTE ADULT - SUBJECTIVE AND OBJECTIVE BOX
[facility] -- Radha Boyle, Lilibeth Shankar, Joel Pulido Savella, [place]  Office # [phone]    Follow Up:  Afib, Preop Optimization    Subjective/Observations: Awake and alert , ambulating around the bed with a walker.  Comfortable on RA.  Denies any form of discomfort    REVIEW OF SYSTEMS: All other review of systems is negative unless indicated above  PAST MEDICAL & SURGICAL HISTORY:  PVD (peripheral vascular disease)  Lung cancer: Left lung Sx for removal of Tumor, Rt Lung Cryo therapy for recurrence  Atrial fibrillation: on AC  Atrial fibrillation, unspecified type  S/P lobectomy of lung: Tumor removal from Lower Lobe, NO CT, NO RT    MEDICATIONS  (STANDING):  furosemide   Injectable 20 milliGRAM(s) IV Push daily  gentamicin 0.3% Ophthalmic Solution 1 Drop(s) Both EYES five times a day  metoprolol tartrate 25 milliGRAM(s) Oral two times a day    MEDICATIONS  (PRN):  acetaminophen   Tablet .. 650 milliGRAM(s) Oral every 6 hours PRN Temp greater or equal to 38C (100.4F), Mild Pain (1 - 3)    Allergies    No Known Allergies    Intolerances    Vital Signs Last 24 Hrs  T(C): 36.4 (26 Jun 2020 07:26), Max: 36.7 (25 Jun 2020 12:25)  T(F): 97.5 (26 Jun 2020 07:26), Max: 98 (25 Jun 2020 12:25)  HR: 82 (26 Jun 2020 08:27) (74 - 87)  BP: 108/63 (26 Jun 2020 08:34) (99/61 - 133/67)  BP(mean): --  RR: 18 (26 Jun 2020 07:26) (18 - 18)  SpO2: 95% (26 Jun 2020 07:26) (95% - 99%)  I&O's Summary    25 Jun 2020 07:01  -  26 Jun 2020 07:00  --------------------------------------------------------  IN: 980 mL / OUT: 1275 mL / NET: -295 mL      PHYSICAL EXAM:  TELE: Afib  Constitutional: NAD, awake and alert, obese  HEENT: Moist Mucous Membranes, Anicteric  Pulmonary: Non-labored, breath sounds are clear but diminished bilaterally, No wheezing, rales or rhonchi  Cardiovascular: IRRR, S1 and S2, + murmurs.  No rubs, gallops or clicks  Gastrointestinal: Bowel Sounds present, soft, nontender.   Lymph: No peripheral edema. No lymphadenopathy.  Skin: No visible rashes.  BLE ulcers with dressing dry and intact  Psych:  Mood & affect appropriate  LABS: All Labs Reviewed:                        12.3   8.09  )-----------( 249      ( 26 Jun 2020 06:47 )             36.7                         12.8   9.34  )-----------( 299      ( 25 Jun 2020 06:37 )             38.3                         11.9   8.08  )-----------( 265      ( 24 Jun 2020 06:40 )             34.8     26 Jun 2020 06:47    134    |  97     |  16     ----------------------------<  91     3.5     |  29     |  0.70   25 Jun 2020 06:37    132    |  96     |  14     ----------------------------<  100    4.0     |  30     |  0.72   24 Jun 2020 06:40    133    |  97     |  13     ----------------------------<  95     3.6     |  28     |  0.67     Ca    8.7        26 Jun 2020 06:47  Ca    8.6        25 Jun 2020 06:37  Ca    8.4        24 Jun 2020 06:40  Phos  3.2       24 Jun 2020 06:40  Mg     2.0       24 Jun 2020 06:40    PT/INR - ( 26 Jun 2020 06:47 )   PT: 18.6 sec;   INR: 1.64 ratio      PTT - ( 26 Jun 2020 06:47 )  PTT:31.0 sec    < from: TTE Echo Complete w/o Contrast w/ Doppler (06.21.20 @ 13:37) >     EXAM:  ECHO TTE WO CON COMP W DOPP         PROCEDURE DATE:  06/21/2020        INTERPRETATION:  INDICATION: Heart failure  Sonographer AS    Blood Pressure 124/57    Height unavailable     Weight 83.9 kg    Dimensions:    LA 3.8       Normal Values: 2.0 - 4.0 cm    Ao 3.0        Normal Values: 2.0 - 3.8 cm  SEPTUM 1.2       Normal Values: 0.6 - 1.2 cm  PWT 1.3       Normal Values: 0.6 - 1.1 cm  LVIDd 4.7         Normal Values: 3.0 - 5.6 cm  LVIDs Normal Values: 1.8 - 4.0 cm      OBSERVATIONS:  Technically difficult study  Mitral Valve: Thickened leaflets, mild to moderate MR.  Aortic Valve/Aorta: Calcified trileaflet aortic valve with decreased opening. Peak transaortic valve gradient of 72.9 mmHg with a mean transaortic valve gradient of 41 mmHg. The aortic valve area is calculated to be 0.36 sq cm. This is consistent with severe aortic stenosis  Tricuspid Valve: normal with mild TR.  Pulmonic Valve: Trace PI  Left Atrium: normal  Right Atrium: Not well visualized  Left Ventricle: Moderate global left ventricular systolic dysfunction with an estimated LVEF of 40%. Mild LVH  Right Ventricle: Grossly normal size and systolic function.  Pericardium/Pleura: normal, no significant pericardial effusion.  Pulmonary/RV Pressure: estimated PA systolic pressure of 28.4mmHg assuming an RA pressure of 3 mmHg.    Conclusion:   Technically difficult study  Mild concentric LVH with moderate global left ventricular systolic dysfunction with an estimated LVEF of 40%  Grossly normal RV size and systolic function.   Calcified trileaflet aortic valve with severe aortic stenosis  Mild to moderate MR   Mild TR.     No significant pericardial effusion.      KARIE LONGO   This document has been electronically signed. Jun 22 2020  1:08PM      < end of copied text >    < from: Xray Chest 1 View- PORTABLE-Urgent (06.20.20 @ 16:03) >    EXAM:  XR CHEST PORTABLE URGENT 1V                          PROCEDURE DATE:  06/20/2020      INTERPRETATION:  PROCEDURE: AP view of the chest.    CLINICAL INFORMATION: Leg swelling.    COMPARISON: None.    FINDINGS:    Lungs: Peripherallinear atelectasis versus scarring in the right upper lobe. No focal consolidation.  Heart: The heart is normal in size. Surgical clips overlie the heart.  Mediastinum: The mediastinum is within normal limits.    IMPRESSION:  Peripheral linear atelectasis versus scarring in the right upper lobe. No focal consolidation.    MADELINE SRIVASTAVA M.D., ATTENDING RADIOLOGIST  This document has been electronically signed. Jun 20 2020  4:10PM     < end of copied text >    < from: 12 Lead ECG (06.20.20 @ 14:49) >    Ventricular Rate 104 BPM    Atrial Rate 117 BPM    QRS Duration 114 ms    Q-T Interval 310 ms    QTC Calculation(Bezet) 407 ms    R Axis -28 degrees    T Axis 72 degrees    Diagnosis Line Atrial fibrillation  Abnormal ECG  No previous ECGs available  Confirmed by Richelle BROWN, Marlene ([id number]) on 6/22/2020 9:42:20 AM    < end of copied text >

## 2020-06-26 NOTE — PHARMACOTHERAPY INTERVENTION NOTE - COMMENTS
d/w Dr. Wylie regarding options for anticoagulation;  MD aware of full dose options are limited to sq lovenox or heparin infusion. Intermittent iv bolus i.e. heparin  ivpush q8h without heparin gtt, is not an option as not indicated per medical references and not FDA approved route of admin for full dose treatment. To my knowledge No other protocols regarding intermittent bolus admin of heparin without heparin gtt do not exist in the health system database.   Discussed with patient of any drug information questions regarding the treatment options suggested by the physician and patient denies any further questions about the medications.

## 2020-06-26 NOTE — PROGRESS NOTE ADULT - PROBLEM SELECTOR PLAN 4
Likely Ac on chronic Systolic CHF  - 2/2 Rapid A Fib, pt with elevated BNP, Hyponatremia & leg edema  - unclear about previous echo, last seen cardio >20 yrs ago.  - Echo (6/22) - Mild concentric LVH with moderate global LV systolic dysfunction with LVEF of 40%, grossly normal RV size and systolic functio, calcified trileaflet aortic valve with severe aortic stenosis, mild to moderate MR, mild TR.   - continue Lasix 20 mg IV daily  - i/os, daily weights  - Cardio consulted (torrey), recs appreciated

## 2020-06-26 NOTE — PROGRESS NOTE ADULT - ASSESSMENT
88 y/o M with hx of CHF, afib (on coumadin), lung ca s/p resection presents to ED for b/l weeping edema. Admitted for b/l LE cellulitis, Rapid A Fib with RVR, volume overload    Afib with RVR  - Remains in Afib with mostly rate-controlled,   - S/P Cardizem gtt.  Will keep off of CCB in the setting of moderately reduced LVF, EF 40%  - Continue lopressor 25 mg twice daily with hold parameters.  Can switch to long-acting if rate remains stable  - Hold Coumadin for now in anticipation of peripheral bypass (likely Monday or Tuesday)  - Would continue FD Lovenox.  However, patient is refusing it for now.  Discussed an alternative with Heparin drip but strongly refused both.  Aware about his elevated risk for stroke but states, it can wait for 2-3 days   - Monitor electrolytes, replete to keep K>4 and Mag>2    HFrEF, Severe AS  - bnp [id number].  Euvolemic on exam but would continue IV Lasix 20 g daily for now  - TTE w/ severe AS, mod global LVDF EF 40 % mild LVh   - Will need outpt evaluation for AS. ?TAVR.   - Would start ACEI when BP tolerates.     Chronic venous stasis/PAD  - Vascular on board  - Possible peripheral bypass early next week  - Follow Vascular recs    Further cardiac w/u pending clinical course  All other w/u per Primary and Vascular  Will continue to follow    Zahra Costa DNP, NP-C  Cardiology   Spectra #[phone]

## 2020-06-26 NOTE — PROGRESS NOTE ADULT - PROBLEM SELECTOR PLAN 3
Improved - Cellulitis b/l LE marked erythema, s/p Oozing clear liquids/skin blister with yellowish slough  with severe chronic venous stasis with possible +/- cellulitis, fluid over load with edema - Venous Stasis ulcers, afebrile, NL WBC  - s/p vanco  - Cellulitis appears nonpurulent and rapidly improving so de-escalated to Ceftriaxone 1 gram IV Q-day (started 6/23) and finish course with last day 6/25.   - PVD, keep b/l lower EXT elevated  - Blood c/sx 2 - Neg  - Pain control  - Vascular studies: RLE: MAYNOR is .54, pulse waveforms are diffusely monophasic and diminished in the right foot. LLE: MAYNOR is .56, pulse waveforms are diffusely monophasic and severely diminished in the left foot.  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 Single-vessel runoff via the posterior tibial artery. 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 Otherwise, no focal stenosis or occlusion in the left leg.  - Vascular Surgery (Dr. Arias) consulted, recs appreciated - angiogram cancelled per patient's request; patient would like procedure done next week  - Wound care (Dr. Chong) consulted, recs appreciated - Skin changes attributed to bilateral stasis dermatitis and bilateral venous hypertension with edema and venous stasis ulcers and weeping - silver alginate and dry dressings every other day, drainage dependant, and ace wraps  - ID consult (Dr gonzalez/Dr Simms) consulted, recs appreciated - suspect most of this is chronic, with venous stasis ulcers and dermatitis Improved - Cellulitis b/l LE marked erythema, s/p Oozing clear liquids/skin blister with yellowish slough  with severe chronic venous stasis with possible +/- cellulitis, fluid over load with edema - Venous Stasis ulcers, afebrile, NL WBC  - s/p vanco  - s/p IV Rocephin (6/23-6/25)  - PVD, keep b/l lower EXT elevated  - Blood c/sx 2 - Neg  - Pain control  - Vascular studies: RLE: MAYNOR is .54, pulse waveforms are diffusely monophasic and diminished in the right foot. LLE: MAYNOR is .56, pulse waveforms are diffusely monophasic and severely diminished in the left foot.  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 Single-vessel runoff via the posterior tibial artery. 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 Otherwise, no focal stenosis or occlusion in the left leg.  - Vascular Surgery (Dr. Arias) consulted, recs appreciated - angiogram cancelled per patient's request; patient would like procedure done next week  - Wound care (Dr. Chong) consulted, recs appreciated - Skin changes attributed to bilateral stasis dermatitis and bilateral venous hypertension with edema and venous stasis ulcers and weeping - silver alginate and dry dressings every other day, drainage dependant, and ace wraps  - ID consult (Dr gonzalez/Dr Simms) consulted, recs appreciated - suspect most of this is chronic, with venous stasis ulcers and dermatitis

## 2020-06-26 NOTE — PROGRESS NOTE ADULT - PROBLEM SELECTOR PLAN 6
Chronic PVD b/l lower EXT with venous stasis dermatitis & skin blistering-dry now   - Wound care (Dr. Chong) consulted, recs appreciated - Skin changes attributed to bilateral stasis dermatitis and bilateral venous hypertension with edema and venous stasis ulcers and weeping - silver alginate and dry dressings every other day, drainage dependant, and ace wraps  - Vascular studies: RLE: MAYNOR is .54, pulse waveforms are diffusely monophasic and diminished in the right foot. LLE: MAYNOR is .56, pulse waveforms are diffusely monophasic and severely diminished in the left foot.  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 Single-vessel runoff via the posterior tibial artery. 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 Otherwise, no focal stenosis or occlusion in the left leg.  - Vascular Surgery (Dr. Arias) consulted, recs appreciated - angiogram cancelled per patient's request; patient would like procedure done next week

## 2020-06-26 NOTE — PROGRESS NOTE ADULT - SUBJECTIVE AND OBJECTIVE BOX
Patient is a 87y old  Male who presents with a chief complaint of b/l LE weeping edema (26 Jun 2020 07:23)    HPI:  88 y/o M with hx of , afib (on coumadin), 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 CHF , PVD  presents to ED for worsening edema with  b/l weeping skin & erythema with pain that bothers him to walk  . Pt reports that his sons made him come into the ED. Reports has been having b/l LE edema with  weeping legs  for around 1 1/2 year. Has gotten worse in the past few day with increasing erythema, skin blisters +/- pus with occasional bouts of pain. Denies any other complaints, no fever, No chills ,No  cp, sob, palpitations, abdominal pain, dizziness, palpitations.  Pt was transferred from [place] ER after he got IV Vanco & IV Lasix, IV Cardizem     Given IV Cardizem and started on Cardizem gtt in the ED for rapid afib (20 Jun 2020 19:26)    INTERVAL HPI:  6/22/2020: Patient seen and examined at bedside. Low, stable H/H. INR 6.6. Hyponatremia, Na 132 --> 129 today. Dc Vancomycin. Rash appears nonpurulent and rapidly improving so will de-escalate to Ceftriaxone 1 gram IV Q-day and potential to finish course with cefuroxime 500mg PO BID with last day 6/25. Stop Cardizem gtt. Started Cardizem 30 mg PO q6h. Vascular studies pending. Wound care evaluated patient - start silver alginate and dry dressings every other day, drainage dependant, and ace wraps if vascular studies permit.  6/23/2020: Patient seen and examined at bedside. Low, stable H/H. INR 3.55. Hyponatremia improving. On Rocephin. DC Cardizem. Start Lopressor 25 mg PO BID. 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 Single-vessel runoff via the posterior tibial artery. 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 Vascular surgery (Dr. Arias) - plan for RLE angiogram, possible stent/angioplasty on Thursday in IR. HOLD AC  6/24/2020: Patient seen and examined at bedside. Low, stable H/H. INR 2.36. Hyponatremia improving. Pending RLE angiogram, possible stent/angioplasty in IR tomorrow. Hold Coumadin at this time, goal INR for procedure is 1.5 or less. HOLD Lisinopril 2.5 mg PO qd for Angio.  6/25/2020: Patient seen and examined at bedside. Low, stable H/H. INR 1.89. Hyponatremia improving. RLE angiogram, possible stent/angioplasty in IR today. Procedure was cancelled in IR as pt got upset with KIRIT Ext IV site Infiltration with FFP causing swelling of L U EXT. Given Coumadin 2.5 mg PO x1. Last day abx.   6/26/2020: Patient seen and examined at bedside. Low, stable H/H. INR 1.64. Off IV antibiotics.    OVERNIGHT EVENTS: NONE    Home Medications:  Coumadin 5 mg oral tablet: 1 tab(s) orally 2 times a day (22 Jun 2020 18:12)      MEDICATIONS  (STANDING):  cefTRIAXone   IVPB      cefTRIAXone   IVPB 1000 milliGRAM(s) IV Intermittent every 24 hours  furosemide   Injectable 20 milliGRAM(s) IV Push daily  gentamicin 0.3% Ophthalmic Solution 1 Drop(s) Both EYES five times a day  metoprolol tartrate 25 milliGRAM(s) Oral two times a day    MEDICATIONS  (PRN):  acetaminophen   Tablet .. 650 milliGRAM(s) Oral every 6 hours PRN Temp greater or equal to 38C (100.4F), Mild Pain (1 - 3)      No Known Allergies      Social History:  Never smoker, denies etoh and drug abuse  , Lives alone, Retired  (20 Jun 2020 19:26)      REVIEW OF SYSTEMS: i am OK  CONSTITUTIONAL: No fever, No chills, No fatigue, No myalgia, No Body ache, No Weakness  EYES: No eye pain,  No visual disturbances, No discharge, No Redness  ENMT: No ear pain, No nose bleed, No vertigo; No sinus pain, No throat pain, No Congestion  NECK: No pain, No stiffness  RESPIRATORY: No cough, No wheezing, No hemoptysis, No shortness of breath  CARDIOVASCULAR: No chest pain, No palpitations  GASTROINTESTINAL: No abdominal pain, No epigastric pain. No nausea, No vomiting, No diarrhea, No constipation; [ x ] BM  GENITOURINARY: No dysuria, No frequency, No urgency, No hematuria, No incontinence  NEUROLOGICAL: No headaches, No dizziness, No numbness, No tingling, No tremors, No weakness  EXTREMITIES: No Swelling, No Pain, No Edema  SKIN: [ x ] Redness lower extremities b/l   MUSCULOSKELETAL: No joint pain, No joint swelling; No muscle pain, No back pain, No extremity pain  PSYCHIATRIC: No depression, No anxiety, No mood swings, No difficulty sleeping at night  PAIN SCALE: [ x ] None  [  ] Other-  ROS Unable to obtain due to: [  ] Dementia  [  ] Lethargy  [  ] Sedated  [  ] Non verbal  REST OF REVIEW OF SYSTEMS: [ x ] Normal     Vital Signs Last 24 Hrs  T(C): 36.4 (26 Jun 2020 07:26), Max: 36.7 (25 Jun 2020 12:25)  T(F): 97.5 (26 Jun 2020 07:26), Max: 98 (25 Jun 2020 12:25)  HR: 82 (26 Jun 2020 08:27) (74 - 87)  BP: 108/63 (26 Jun 2020 08:34) (99/61 - 133/67)  BP(mean): --  RR: 18 (26 Jun 2020 07:26) (18 - 18)  SpO2: 95% (26 Jun 2020 07:26) (95% - 99%)    CAPILLARY BLOOD GLUCOSE          I&O's Summary    25 Jun 2020 07:01  -  26 Jun 2020 07:00  --------------------------------------------------------  IN: 980 mL / OUT: 1275 mL / NET: -295 mL      PHYSICAL EXAM:  GENERAL:  [ x ] NAD, [ x ] Well appearing, [  ] Agitated, [  ] Drowsy, [  ] Lethargy, [  ] Confused   HEAD:  [ x ] Normal, [  ] Other  EYES:  [ x ] EOMI, [ x ] PERRLA, [ x ] Conjunctiva and sclera clear normal, [  ] Other, [  ] Pallor, [  ] Discharge  ENMT:  [ x ] Normal, [ x ] Moist mucous membranes, [ x ] Good dentition, [ x ] No thrush  NECK:  [ x ] Supple, [ x ] No JVD, [ x ] Normal thyroid, [ x ] Lymphadenopathy, [  ] Other  CHEST/LUNG:  [ x ] Clear to auscultation bilaterally, [ x ] Breath Sounds equal B/L, [  ] Poor effort, [ x ] No rales, [ x ] No rhonchi, [ x ] No wheezing  HEART:  [ x ] Regular rate and rhythm, [  ] Tachycardia, [  ] Bradycardia, [  ] Irregular, [ x ] 3/6 murmurs, No rubs, No gallops, [  ] PPM in place (Mfr:  )  ABDOMEN:  [ x ] Soft, [ x ] Nontender, [ x ] Nondistended, [ x ] No mass, [  x] Bowel sounds present, [  ] Obese  NERVOUS SYSTEM:  [ x ] Alert & Oriented x3, [ x ] Nonfocal, [  ] Confusion, [  ] Encephalopathic, [  ] Sedated, [  ] Unable to assess, [  ] Dementia, [  ] Other-  EXTREMITIES:  [ x ] 2+ Peripheral Pulses, No clubbing, No cyanosis,  [ x ] Mild Edema B/L lower EXT, [ x ] PVD stasis skin changes B/L lower EXT, [ x ] b/l le erythema, dried ulcers, yellowish slough , no foul smell, no warmth, no discharge.  LYMPH:  No lymphadenopathy noted, [ x ] LUEXT  & Hand swelling,  No pain .N/V Intact, +ROM  SKIN:  [ x ] No rashes or lesions, [  ] Pressure ulcers, [  ] Ecchymosis, [  ] Skin tears    DIET: Diet, DASH/TLC:   Sodium & Cholesterol Restricted  1500mL Fluid Restriction ([id number]) (06-22-20 @ 08:06)      LABS:                        12.3   8.09  )-----------( 249      ( 26 Jun 2020 06:47 )             36.7     26 Jun 2020 06:47    134    |  97     |  16     ----------------------------<  91     3.5     |  29     |  0.70     Ca    8.7        26 Jun 2020 06:47      PT/INR - ( 26 Jun 2020 06:47 )   PT: 18.6 sec;   INR: 1.64 ratio         PTT - ( 26 Jun 2020 06:47 )  PTT:31.0 sec    Culture Results:   No Growth Final (06-20 @ 22:27)  Culture Results:   No Growth Final (06-20 @ 22:26)      CARDIAC MARKERS ( 20 Jun 2020 15:47 )  .026 ng/mL / x     / x     / x     / x            Culture - Blood (collected 20 Jun 2020 22:27)  Source: .Blood Blood  Final Report (25 Jun 2020 23:32):    No Growth Final    Culture - Blood (collected 20 Jun 2020 22:26)  Source: .Blood Blood  Final Report (25 Jun 2020 23:32):    No Growth Final       Anemia Panel:      Thyroid Panel:  T4, Serum: 6.2 ug/dL (06-21-20 @ 11:44)  Thyroid Stimulating Hormone, Serum: 1.33 uIU/mL (06-21-20 @ 05:27)            Serum Pro-Brain Natriuretic Peptide: 5624 pg/mL (06-21-20 @ 05:27)  Serum Pro-Brain Natriuretic Peptide: 5405 pg/mL (06-20-20 @ 15:47)      RADIOLOGY & ADDITIONAL TESTS:      HEALTH ISSUES - PROBLEM Dx:  Varicose veins of bilateral lower extremities with pain: Varicose veins of bilateral lower extremities with pain  Stasis dermatitis with ulcer of right lower extremity due to peripheral venous hypertension: Stasis dermatitis with ulcer of right lower extremity due to peripheral venous hypertension  Chronic venous stasis dermatitis of both lower extremities: Chronic venous stasis dermatitis of both lower extremities  CHF (congestive heart failure): CHF (congestive heart failure)  Hyponatremia: Hyponatremia  Need for prophylactic measure: Need for prophylactic measure  Acute on chronic congestive heart failure, unspecified heart failure type: Acute on chronic congestive heart failure, unspecified heart failure type  Cellulitis of lower extremity, unspecified laterality: Cellulitis of lower extremity, unspecified laterality  Stasis dermatitis with ulcer of left lower extremity due to peripheral venous hypertension: Stasis dermatitis with ulcer of left lower extremity due to peripheral venous hypertension  Atrial fibrillation: Atrial fibrillation  Severe aortic stenosis: Severe aortic stenosis  Systolic CHF: Systolic CHF  PVD (peripheral vascular disease): PVD (peripheral vascular disease)  IV infiltration: IV infiltration        Consultant(s) Notes Reviewed:  [ x ] YES     Care Discussed with [ x ] Consultants, [ x ] Patient, [  ] Family,-   [  ] HCP, [  ] , [  ] Social Service, [ x ] RN, [  ] Physical Therapy, [  ] Palliative Care Team  DVT PPX: [  ] Lovenox, [  ] SC Heparin, [ x ] Coumadin, [  ] Xarelto, [  ] Eliquis, [  ] Pradaxa, [  ] IV Heparin drip, [  ] SCD, [  ] Ambulation, [  ] Contraindicated 2/2 GI Bleed, [  ] Contraindicated 2/2  Bleed, [  ] Contraindicated 2/2 Brain Bleed  Advanced Directive: [ x ] None, [  ] DNR/DNI Patient is a 87y old  Male who presents with a chief complaint of b/l LE weeping edema (26 Jun 2020 07:23)    HPI:  88 y/o M with hx of , afib (on coumadin), 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 CHF , PVD  presents to ED for worsening edema with  b/l weeping skin & erythema with pain that bothers him to walk  . Pt reports that his sons made him come into the ED. Reports has been having b/l LE edema with  weeping legs  for around 1 1/2 year. Has gotten worse in the past few day with increasing erythema, skin blisters +/- pus with occasional bouts of pain. Denies any other complaints, no fever, No chills ,No  cp, sob, palpitations, abdominal pain, dizziness, palpitations.  Pt was transferred from [place] ER after he got IV Vanco & IV Lasix, IV Cardizem     Given IV Cardizem and started on Cardizem gtt in the ED for rapid afib (20 Jun 2020 19:26)    INTERVAL HPI:  6/22/2020: Patient seen and examined at bedside. Low, stable H/H. INR 6.6. Hyponatremia, Na 132 --> 129 today. Dc Vancomycin. Rash appears nonpurulent and rapidly improving so will de-escalate to Ceftriaxone 1 gram IV Q-day and potential to finish course with cefuroxime 500mg PO BID with last day 6/25. Stop Cardizem gtt. Started Cardizem 30 mg PO q6h. Vascular studies pending. Wound care evaluated patient - start silver alginate and dry dressings every other day, drainage dependant, and ace wraps if vascular studies permit.  6/23/2020: Patient seen and examined at bedside. Low, stable H/H. INR 3.55. Hyponatremia improving. On Rocephin. DC Cardizem. Start Lopressor 25 mg PO BID. 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 Single-vessel runoff via the posterior tibial artery. 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 Vascular surgery (Dr. Arias) - plan for RLE angiogram, possible stent/angioplasty on Thursday in IR. HOLD AC  6/24/2020: Patient seen and examined at bedside. Low, stable H/H. INR 2.36. Hyponatremia improving. Pending RLE angiogram, possible stent/angioplasty in IR tomorrow. Hold Coumadin at this time, goal INR for procedure is 1.5 or less. HOLD Lisinopril 2.5 mg PO qd for Angio.  6/25/2020: Patient seen and examined at bedside. Low, stable H/H. INR 1.89. Hyponatremia improving. RLE angiogram, possible stent/angioplasty in IR today. Procedure was cancelled in IR as pt got upset with KIRIT Ext IV site Infiltration with FFP causing swelling of L U EXT. Given Coumadin 2.5 mg PO x1. Last day abx.   6/26/2020: Patient seen and examined at bedside. Low, stable H/H. INR 1.64. Off IV antibiotics.    OVERNIGHT EVENTS: NONE    Home Medications:  Coumadin 5 mg oral tablet: 1 tab(s) orally 2 times a day (22 Jun 2020 18:12)      MEDICATIONS  (STANDING):  cefTRIAXone   IVPB      cefTRIAXone   IVPB 1000 milliGRAM(s) IV Intermittent every 24 hours  furosemide   Injectable 20 milliGRAM(s) IV Push daily  gentamicin 0.3% Ophthalmic Solution 1 Drop(s) Both EYES five times a day  metoprolol tartrate 25 milliGRAM(s) Oral two times a day    MEDICATIONS  (PRN):  acetaminophen   Tablet .. 650 milliGRAM(s) Oral every 6 hours PRN Temp greater or equal to 38C (100.4F), Mild Pain (1 - 3)      No Known Allergies      Social History:  Never smoker, denies etoh and drug abuse  , Lives alone, Retired  (20 Jun 2020 19:26)      REVIEW OF SYSTEMS: i am OK  CONSTITUTIONAL: No fever, No chills, No fatigue, No myalgia, No Body ache, No Weakness  EYES: No eye pain,  No visual disturbances, No discharge, No Redness  ENMT: No ear pain, No nose bleed, No vertigo; No sinus pain, No throat pain, No Congestion  NECK: No pain, No stiffness  RESPIRATORY: No cough, No wheezing, No hemoptysis, No shortness of breath  CARDIOVASCULAR: No chest pain, No palpitations  GASTROINTESTINAL: No abdominal pain, No epigastric pain. No nausea, No vomiting, No diarrhea, No constipation; [ x ] BM  GENITOURINARY: No dysuria, No frequency, No urgency, No hematuria, No incontinence  NEUROLOGICAL: No headaches, No dizziness, No numbness, No tingling, No tremors, No weakness  EXTREMITIES: No Swelling, No Pain, No Edema  SKIN: [ x ] Redness lower extremities b/l   MUSCULOSKELETAL: No joint pain, No joint swelling; No muscle pain, No back pain, No extremity pain  PSYCHIATRIC: No depression, No anxiety, No mood swings, No difficulty sleeping at night  PAIN SCALE: [ x ] None  [  ] Other-  ROS Unable to obtain due to: [  ] Dementia  [  ] Lethargy  [  ] Sedated  [  ] Non verbal  REST OF REVIEW OF SYSTEMS: [ x ] Normal     Vital Signs Last 24 Hrs  T(C): 36.4 (26 Jun 2020 07:26), Max: 36.7 (25 Jun 2020 12:25)  T(F): 97.5 (26 Jun 2020 07:26), Max: 98 (25 Jun 2020 12:25)  HR: 82 (26 Jun 2020 08:27) (74 - 87)  BP: 108/63 (26 Jun 2020 08:34) (99/61 - 133/67)  BP(mean): --  RR: 18 (26 Jun 2020 07:26) (18 - 18)  SpO2: 95% (26 Jun 2020 07:26) (95% - 99%)    CAPILLARY BLOOD GLUCOSE          I&O's Summary    25 Jun 2020 07:01  -  26 Jun 2020 07:00  --------------------------------------------------------  IN: 980 mL / OUT: 1275 mL / NET: -295 mL      PHYSICAL EXAM:  GENERAL:  [ x ] NAD, [ x ] Well appearing, [  ] Agitated, [  ] Drowsy, [  ] Lethargy, [  ] Confused   HEAD:  [ x ] Normal, [  ] Other  EYES:  [ x ] EOMI, [ x ] PERRLA, [ x ] Conjunctiva and sclera clear normal, [  ] Other, [  ] Pallor, [  ] Discharge  ENMT:  [ x ] Normal, [ x ] Moist mucous membranes, [ x ] Good dentition, [ x ] No thrush  NECK:  [ x ] Supple, [ x ] No JVD, [ x ] Normal thyroid, [ x ] Lymphadenopathy, [  ] Other  CHEST/LUNG:  [ x ] Clear to auscultation bilaterally, [ x ] Breath Sounds equal B/L, [  ] Poor effort, [ x ] No rales, [ x ] No rhonchi, [ x ] No wheezing  HEART:  [ x ] Regular rate and rhythm, [  ] Tachycardia, [  ] Bradycardia, [  ] Irregular, [ x ] 3/6 murmurs, No rubs, No gallops, [  ] PPM in place (Mfr:  )  ABDOMEN:  [ x ] Soft, [ x ] Nontender, [ x ] Nondistended, [ x ] No mass, [  x] Bowel sounds present, [  ] Obese  NERVOUS SYSTEM:  [ x ] Alert & Oriented x3, [ x ] Nonfocal, [  ] Confusion, [  ] Encephalopathic, [  ] Sedated, [  ] Unable to assess, [  ] Dementia, [  ] Other-  EXTREMITIES:  [ x ] 2+ Peripheral Pulses, No clubbing, No cyanosis,  [ x ] Mild Edema B/L lower EXT, [ x ] PVD stasis skin changes B/L lower EXT, [ x ] b/l le erythema, dried ulcers, yellowish slough , no foul smell, no warmth, no discharge.  LYMPH:  No lymphadenopathy noted, [ x ] LUEXT  & Hand swelling,  No pain .N/V Intact, +ROM  SKIN:  [ x ] No rashes or lesions, [  ] Pressure ulcers, [  ] Ecchymosis, [  ] Skin tears    DIET: Diet, DASH/TLC:   Sodium & Cholesterol Restricted  1500mL Fluid Restriction ([id number]) (06-22-20 @ 08:06)      LABS:                        12.3   8.09  )-----------( 249      ( 26 Jun 2020 06:47 )             36.7     26 Jun 2020 06:47    134    |  97     |  16     ----------------------------<  91     3.5     |  29     |  0.70     Ca    8.7        26 Jun 2020 06:47      PT/INR - ( 26 Jun 2020 06:47 )   PT: 18.6 sec;   INR: 1.64 ratio         PTT - ( 26 Jun 2020 06:47 )  PTT:31.0 sec    Culture Results:   No Growth Final (06-20 @ 22:27)  Culture Results:   No Growth Final (06-20 @ 22:26)      CARDIAC MARKERS ( 20 Jun 2020 15:47 )  .026 ng/mL / x     / x     / x     / x            Culture - Blood (collected 20 Jun 2020 22:27)  Source: .Blood Blood  Final Report (25 Jun 2020 23:32):    No Growth Final    Culture - Blood (collected 20 Jun 2020 22:26)  Source: .Blood Blood  Final Report (25 Jun 2020 23:32):    No Growth Final    Thyroid Panel:  T4, Serum: 6.2 ug/dL (06-21-20 @ 11:44)  Thyroid Stimulating Hormone, Serum: 1.33 uIU/mL (06-21-20 @ 05:27)      Serum Pro-Brain Natriuretic Peptide: 5624 pg/mL (06-21-20 @ 05:27)  Serum Pro-Brain Natriuretic Peptide: 5405 pg/mL (06-20-20 @ 15:47)      RADIOLOGY & ADDITIONAL TESTS: none      HEALTH ISSUES - PROBLEM Dx:  Varicose veins of bilateral lower extremities with pain: Varicose veins of bilateral lower extremities with pain  Stasis dermatitis with ulcer of right lower extremity due to peripheral venous hypertension: Stasis dermatitis with ulcer of right lower extremity due to peripheral venous hypertension  Chronic venous stasis dermatitis of both lower extremities: Chronic venous stasis dermatitis of both lower extremities  CHF (congestive heart failure): CHF (congestive heart failure)  Hyponatremia: Hyponatremia  Need for prophylactic measure: Need for prophylactic measure  Acute on chronic congestive heart failure, unspecified heart failure type: Acute on chronic congestive heart failure, unspecified heart failure type  Cellulitis of lower extremity, unspecified laterality: Cellulitis of lower extremity, unspecified laterality  Stasis dermatitis with ulcer of left lower extremity due to peripheral venous hypertension: Stasis dermatitis with ulcer of left lower extremity due to peripheral venous hypertension  Atrial fibrillation: Atrial fibrillation  Severe aortic stenosis: Severe aortic stenosis  Systolic CHF: Systolic CHF  PVD (peripheral vascular disease): PVD (peripheral vascular disease)  IV infiltration: IV infiltration        Consultant(s) Notes Reviewed:  [ x ] YES     Care Discussed with [ x ] Consultants, [ x ] Patient, [x  ] Family,- son   [  ] HCP, [  ] , [  ] Social Service, [ x ] RN, [  ] Physical Therapy, [  ] Palliative Care Team  DVT PPX: [  ] Lovenox, [  ] SC Heparin, [ x ] Coumadin, [  ] Xarelto, [  ] Eliquis, [  ] Pradaxa, [  ] IV Heparin drip, [  ] SCD, [  ] Ambulation, [  ] Contraindicated 2/2 GI Bleed, [  ] Contraindicated 2/2  Bleed, [  ] Contraindicated 2/2 Brain Bleed  Advanced Directive: [ x ] None, [  ] DNR/DNI Patient is a 87y old  Male who presents with a chief complaint of b/l LE weeping edema (26 Jun 2020 07:23)    HPI:  88 y/o M with hx of , afib (on coumadin), 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 CHF , PVD  presents to ED for worsening edema with  b/l weeping skin & erythema with pain that bothers him to walk  . Pt reports that his sons made him come into the ED. Reports has been having b/l LE edema with  weeping legs  for around 1 1/2 year. Has gotten worse in the past few day with increasing erythema, skin blisters +/- pus with occasional bouts of pain. Denies any other complaints, no fever, No chills ,No  cp, sob, palpitations, abdominal pain, dizziness, palpitations.  Pt was transferred from [place] ER after he got IV Vanco & IV Lasix, IV Cardizem     Given IV Cardizem and started on Cardizem gtt in the ED for rapid afib (20 Jun 2020 19:26)    INTERVAL HPI:  6/22/2020: Patient seen and examined at bedside. Low, stable H/H. INR 6.6. Hyponatremia, Na 132 --> 129 today. Dc Vancomycin. Rash appears nonpurulent and rapidly improving so will de-escalate to Ceftriaxone 1 gram IV Q-day and potential to finish course with cefuroxime 500mg PO BID with last day 6/25. Stop Cardizem gtt. Started Cardizem 30 mg PO q6h. Vascular studies pending. Wound care evaluated patient - start silver alginate and dry dressings every other day, drainage dependant, and ace wraps if vascular studies permit.  6/23/2020: Patient seen and examined at bedside. Low, stable H/H. INR 3.55. Hyponatremia improving. On Rocephin. DC Cardizem. Start Lopressor 25 mg PO BID. 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 Single-vessel runoff via the posterior tibial artery. 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 Vascular surgery (Dr. Arias) - plan for RLE angiogram, possible stent/angioplasty on Thursday in IR. HOLD AC  6/24/2020: Patient seen and examined at bedside. Low, stable H/H. INR 2.36. Hyponatremia improving. Pending RLE angiogram, possible stent/angioplasty in IR tomorrow. Hold Coumadin at this time, goal INR for procedure is 1.5 or less. HOLD Lisinopril 2.5 mg PO qd for Angio.  6/25/2020: Patient seen and examined at bedside. Low, stable H/H. INR 1.89. Hyponatremia improving. RLE angiogram, possible stent/angioplasty in IR today. Procedure was cancelled in IR as pt got upset with KIRIT Ext IV site Infiltration with FFP causing swelling of L U EXT. Given Coumadin 2.5 mg PO x1. Last day abx.   6/26/2020: Patient seen and examined at bedside. Low, stable H/H. INR 1.64. Off IV antibiotics.Pt is refusing Lovenox & IV Heparin as AC, until when angiogram is done next week.    OVERNIGHT EVENTS: NONE    Home Medications:  Coumadin 5 mg oral tablet: 1 tab(s) orally 2 times a day (22 Jun 2020 18:12)      MEDICATIONS  (STANDING):  cefTRIAXone   IVPB      cefTRIAXone   IVPB 1000 milliGRAM(s) IV Intermittent every 24 hours  furosemide   Injectable 20 milliGRAM(s) IV Push daily  gentamicin 0.3% Ophthalmic Solution 1 Drop(s) Both EYES five times a day  metoprolol tartrate 25 milliGRAM(s) Oral two times a day    MEDICATIONS  (PRN):  acetaminophen   Tablet .. 650 milliGRAM(s) Oral every 6 hours PRN Temp greater or equal to 38C (100.4F), Mild Pain (1 - 3)      No Known Allergies      Social History:  Never smoker, denies etoh and drug abuse  , Lives alone, Retired  (20 Jun 2020 19:26)      REVIEW OF SYSTEMS: i am OK  CONSTITUTIONAL: No fever, No chills, No fatigue, No myalgia, No Body ache, No Weakness  EYES: No eye pain,  No visual disturbances, No discharge, No Redness  ENMT: No ear pain, No nose bleed, No vertigo; No sinus pain, No throat pain, No Congestion  NECK: No pain, No stiffness  RESPIRATORY: No cough, No wheezing, No hemoptysis, No shortness of breath  CARDIOVASCULAR: No chest pain, No palpitations  GASTROINTESTINAL: No abdominal pain, No epigastric pain. No nausea, No vomiting, No diarrhea, No constipation; [ x ] BM  GENITOURINARY: No dysuria, No frequency, No urgency, No hematuria, No incontinence  NEUROLOGICAL: No headaches, No dizziness, No numbness, No tingling, No tremors, No weakness  EXTREMITIES: No Swelling, No Pain, No Edema  SKIN: [ x ] Redness lower extremities b/l   MUSCULOSKELETAL: No joint pain, No joint swelling; No muscle pain, No back pain, No extremity pain  PSYCHIATRIC: No depression, No anxiety, No mood swings, No difficulty sleeping at night  PAIN SCALE: [ x ] None  [  ] Other-  ROS Unable to obtain due to: [  ] Dementia  [  ] Lethargy  [  ] Sedated  [  ] Non verbal  REST OF REVIEW OF SYSTEMS: [ x ] Normal     Vital Signs Last 24 Hrs  T(C): 36.4 (26 Jun 2020 07:26), Max: 36.7 (25 Jun 2020 12:25)  T(F): 97.5 (26 Jun 2020 07:26), Max: 98 (25 Jun 2020 12:25)  HR: 82 (26 Jun 2020 08:27) (74 - 87)  BP: 108/63 (26 Jun 2020 08:34) (99/61 - 133/67)  BP(mean): --  RR: 18 (26 Jun 2020 07:26) (18 - 18)  SpO2: 95% (26 Jun 2020 07:26) (95% - 99%)    CAPILLARY BLOOD GLUCOSE          I&O's Summary    25 Jun 2020 07:01  -  26 Jun 2020 07:00  --------------------------------------------------------  IN: 980 mL / OUT: 1275 mL / NET: -295 mL      PHYSICAL EXAM:  GENERAL:  [ x ] NAD, [ x ] Well appearing, [  ] Agitated, [  ] Drowsy, [  ] Lethargy, [  ] Confused   HEAD:  [ x ] Normal, [  ] Other  EYES:  [ x ] EOMI, [ x ] PERRLA, [ x ] Conjunctiva and sclera clear normal, [  ] Other, [  ] Pallor, [  ] Discharge  ENMT:  [ x ] Normal, [ x ] Moist mucous membranes, [ x ] Good dentition, [ x ] No thrush  NECK:  [ x ] Supple, [ x ] No JVD, [ x ] Normal thyroid, [ x ] Lymphadenopathy, [  ] Other  CHEST/LUNG:  [ x ] Clear to auscultation bilaterally, [ x ] Breath Sounds equal B/L, [  ] Poor effort, [ x ] No rales, [ x ] No rhonchi, [ x ] No wheezing  HEART:  [ x ] Regular rate and rhythm, [  ] Tachycardia, [  ] Bradycardia, [  ] Irregular, [ x ] 3/6 murmurs, No rubs, No gallops, [  ] PPM in place (Mfr:  )  ABDOMEN:  [ x ] Soft, [ x ] Nontender, [ x ] Nondistended, [ x ] No mass, [  x] Bowel sounds present, [  ] Obese  NERVOUS SYSTEM:  [ x ] Alert & Oriented x3, [ x ] Nonfocal, [  ] Confusion, [  ] Encephalopathic, [  ] Sedated, [  ] Unable to assess, [  ] Dementia, [  ] Other-  EXTREMITIES:  [ x ] 2+ Peripheral Pulses, No clubbing, No cyanosis,  [ x ] Mild Edema B/L lower EXT, [ x ] PVD stasis skin changes B/L lower EXT, [ x ] b/l le erythema, dried ulcers, yellowish slough , no foul smell, no warmth, no discharge.  LYMPH:  No lymphadenopathy noted, [ x ] LUEXT  & Hand swelling,  No pain .N/V Intact, +ROM  SKIN:  [ x ] No rashes or lesions, [  ] Pressure ulcers, [  ] Ecchymosis, [  ] Skin tears    DIET: Diet, DASH/TLC:   Sodium & Cholesterol Restricted  1500mL Fluid Restriction ([id number]) (06-22-20 @ 08:06)      LABS:                        12.3   8.09  )-----------( 249      ( 26 Jun 2020 06:47 )             36.7     26 Jun 2020 06:47    134    |  97     |  16     ----------------------------<  91     3.5     |  29     |  0.70     Ca    8.7        26 Jun 2020 06:47      PT/INR - ( 26 Jun 2020 06:47 )   PT: 18.6 sec;   INR: 1.64 ratio         PTT - ( 26 Jun 2020 06:47 )  PTT:31.0 sec    Culture Results:   No Growth Final (06-20 @ 22:27)  Culture Results:   No Growth Final (06-20 @ 22:26)      CARDIAC MARKERS ( 20 Jun 2020 15:47 )  .026 ng/mL / x     / x     / x     / x            Culture - Blood (collected 20 Jun 2020 22:27)  Source: .Blood Blood  Final Report (25 Jun 2020 23:32):    No Growth Final    Culture - Blood (collected 20 Jun 2020 22:26)  Source: .Blood Blood  Final Report (25 Jun 2020 23:32):    No Growth Final    Thyroid Panel:  T4, Serum: 6.2 ug/dL (06-21-20 @ 11:44)  Thyroid Stimulating Hormone, Serum: 1.33 uIU/mL (06-21-20 @ 05:27)      Serum Pro-Brain Natriuretic Peptide: 5624 pg/mL (06-21-20 @ 05:27)  Serum Pro-Brain Natriuretic Peptide: 5405 pg/mL (06-20-20 @ 15:47)      RADIOLOGY & ADDITIONAL TESTS: none      HEALTH ISSUES - PROBLEM Dx:  Varicose veins of bilateral lower extremities with pain: Varicose veins of bilateral lower extremities with pain  Stasis dermatitis with ulcer of right lower extremity due to peripheral venous hypertension: Stasis dermatitis with ulcer of right lower extremity due to peripheral venous hypertension  Chronic venous stasis dermatitis of both lower extremities: Chronic venous stasis dermatitis of both lower extremities  CHF (congestive heart failure): CHF (congestive heart failure)  Hyponatremia: Hyponatremia  Need for prophylactic measure: Need for prophylactic measure  Acute on chronic congestive heart failure, unspecified heart failure type: Acute on chronic congestive heart failure, unspecified heart failure type  Cellulitis of lower extremity, unspecified laterality: Cellulitis of lower extremity, unspecified laterality  Stasis dermatitis with ulcer of left lower extremity due to peripheral venous hypertension: Stasis dermatitis with ulcer of left lower extremity due to peripheral venous hypertension  Atrial fibrillation: Atrial fibrillation  Severe aortic stenosis: Severe aortic stenosis  Systolic CHF: Systolic CHF  PVD (peripheral vascular disease): PVD (peripheral vascular disease)  IV infiltration: IV infiltration        Consultant(s) Notes Reviewed:  [ x ] YES     Care Discussed with [ x ] Consultants, [ x ] Patient, [x  ] Family,- son   [  ] HCP, [  ] , [  ] Social Service, [ x ] RN, [  ] Physical Therapy, [  ] Palliative Care Team  DVT PPX: [  ] Lovenox, [  ] SC Heparin, [ x ] Coumadin, [  ] Xarelto, [  ] Eliquis, [  ] Pradaxa, [  ] IV Heparin drip, [  ] SCD, [  ] Ambulation, [  ] Contraindicated 2/2 GI Bleed, [  ] Contraindicated 2/2  Bleed, [  ] Contraindicated 2/2 Brain Bleed  Advanced Directive: [ x ] None, [  ] DNR/DNI

## 2020-06-26 NOTE — PROGRESS NOTE ADULT - PROBLEM SELECTOR PLAN 1
Rapid A Fib - Pt stopped home Cardizem on his own, non compliant for Cardiology follow up & meds  - Dc Cardizem gtt 5 mg and Cardizem 30 mg PO q6h (6/23)  - Continue Lopressor 25 mg PO BID (started 6/23)  - Hold lisinopril 2.5 mg qd for angio  - INR therapeutic   - Echo (6/22) - Mild concentric LVH with moderate global LV systolic dysfunction with LVEF of 40%, grossly normal RV size and systolic functio, calcified trileaflet aortic valve with severe aortic stenosis, mild to moderate MR, mild TR.     - Continue to monitor on telemetry   - TSH wnl, low T3 Other, specify...

## 2020-06-26 NOTE — PROGRESS NOTE ADULT - PROBLEM SELECTOR PLAN 8
DVT ppx: Coumadin    DC planning to [facility] DVT ppx: Will hold Coumadin in anticipation of angiogram, will give Lovenox 80 mg subQ q12h although patient currently refusing     DC planning to [facility]

## 2020-06-26 NOTE — GOALS OF CARE CONVERSATION - ADVANCED CARE PLANNING - CONVERSATION DETAILS
met pt, unsure if he has a hcp, educated on hcp. pt will discuss with his son, Álvaro, hcp form & PC RN contact # given. PC will follow as needed.

## 2020-06-26 NOTE — PROGRESS NOTE ADULT - SUBJECTIVE AND OBJECTIVE BOX
SUBJECTIVE:  88 y/o M seen and examined at bedside. Last night's events noted.  Patient c/o b/l LE pain. He states his RUE is much better. Patient denies any fevers, chills, chest pain, shortness of breath, nausea, vomiting or diarrhea.    Vital Signs Last 24 Hrs  T(C): 36.6 (26 Jun 2020 04:22), Max: 36.7 (25 Jun 2020 12:25)  T(F): 97.8 (26 Jun 2020 04:22), Max: 98 (25 Jun 2020 12:25)  HR: 87 (26 Jun 2020 04:22) (76 - 87)  BP: 119/68 (26 Jun 2020 04:22) (99/61 - 133/67)  BP(mean): --  RR: 18 (26 Jun 2020 04:22) (18 - 18)  SpO2: 96% (26 Jun 2020 04:22) (96% - 99%)    PHYSICAL EXAM:  GENERAL: NAD, OOB in chair with arm elevated on pillow  HEAD:  Atraumatic, Normocephalic  EXT: B/L LE dressed with ACE wraps. PT and DP pulses dopplerable. RUE with improving edema.  NEUROLOGY: A&O x 3    I&O's Summary    25 Jun 2020 07:01  -  26 Jun 2020 07:00  --------------------------------------------------------  IN: 500 mL / OUT: 1275 mL / NET: -775 mL      MEDICATIONS  (STANDING):  cefTRIAXone   IVPB      cefTRIAXone   IVPB 1000 milliGRAM(s) IV Intermittent every 24 hours  furosemide   Injectable 20 milliGRAM(s) IV Push daily  gentamicin 0.3% Ophthalmic Solution 1 Drop(s) Both EYES five times a day  metoprolol tartrate 25 milliGRAM(s) Oral two times a day    MEDICATIONS  (PRN):  acetaminophen   Tablet .. 650 milliGRAM(s) Oral every 6 hours PRN Temp greater or equal to 38C (100.4F), Mild Pain (1 - 3)    LABS:                        12.3   8.09  )-----------( 249      ( 26 Jun 2020 06:47 )             36.7     06-26    134<L>  |  97  |  16  ----------------------------<  91  3.5   |  29  |  0.70    Ca    8.7      26 Jun 2020 06:47      PT/INR - ( 26 Jun 2020 06:47 )   PT: 18.6 sec;   INR: 1.64 ratio         PTT - ( 26 Jun 2020 06:47 )  PTT:31.0 sec      ASSESSMENT  88 y/o M with PVD with worsening bilateral LE peripheral edema, venous ulcerations, moderate PAD/ stenosis in right SFA and LLE, refused angio 6/25/20 after infiltrated IV    PLAN  - Continue care as per primary team  - pain control, supportive care, OOB  - Regular diet   - continue IV abx  - Refusing lovenox - continue AC with coumadin and monitor INR  - local wound care  - elevation of RUE  - No vascular intervention in immediate future. Dr. Mesa to discuss angio options with patient today  - Will discuss with Dr. Mesa    Surgical Team Contact Information  Spectralink: Ext: [phone] or [phone]  Pager: [phone]

## 2020-06-26 NOTE — PROGRESS NOTE ADULT - PROBLEM SELECTOR PLAN 2
IV site infiltration L U Ext while giving FFP on 6/25 prior to angiogram, angiogram cancelled per patient's request  -Keep KIRIT Ext elevated, Warm soaks PRN  -N/V intact

## 2020-06-26 NOTE — PROGRESS NOTE ADULT - ATTENDING COMMENTS
Patient was evaluated at the bedside. LUE +3swelling. Mod pain.   Discussed future plans with him. I gave him the option of arranging angiogram as outpatient vs having it done by one of my partners. He first insisted in wanting to wait for me admitted but then he realized it was not possible and agreed in having it done next week. He is not willing to receive FFPs and wants to wait till his arm improves. Would recommend to continue anticoagulation: Lovenox or PO to avoid frequent blood draws.

## 2020-06-26 NOTE — PROGRESS NOTE ADULT - ASSESSMENT
88 y/o M with hx of CHF, afib (on coumadin), lung ca s/p resection presents to ED for b/l weeping edema. Admitted for b/l LE cellulitis, Rapid A Fib, and acute exacerbation of systolic CHF.

## 2020-06-27 LAB
ANION GAP SERPL CALC-SCNC: 5 MMOL/L — SIGNIFICANT CHANGE UP (ref 5–17)
APTT BLD: 30.9 SEC — SIGNIFICANT CHANGE UP (ref 28.5–37)
BUN SERPL-MCNC: 15 MG/DL — SIGNIFICANT CHANGE UP (ref 7–23)
CALCIUM SERPL-MCNC: 8.8 MG/DL — SIGNIFICANT CHANGE UP (ref 8.5–10.1)
CHLORIDE SERPL-SCNC: 98 MMOL/L — SIGNIFICANT CHANGE UP (ref 96–108)
CO2 SERPL-SCNC: 29 MMOL/L — SIGNIFICANT CHANGE UP (ref 22–31)
CREAT SERPL-MCNC: 0.68 MG/DL — SIGNIFICANT CHANGE UP (ref 0.5–1.3)
GLUCOSE SERPL-MCNC: 97 MG/DL — SIGNIFICANT CHANGE UP (ref 70–99)
HCT VFR BLD CALC: 35.7 % — LOW (ref 39–50)
HGB BLD-MCNC: 11.7 G/DL — LOW (ref 13–17)
INR BLD: 1.58 RATIO — HIGH (ref 0.88–1.16)
MCHC RBC-ENTMCNC: 28.2 PG — SIGNIFICANT CHANGE UP (ref 27–34)
MCHC RBC-ENTMCNC: 32.8 GM/DL — SIGNIFICANT CHANGE UP (ref 32–36)
MCV RBC AUTO: 86 FL — SIGNIFICANT CHANGE UP (ref 80–100)
NRBC # BLD: 0 /100 WBCS — SIGNIFICANT CHANGE UP (ref 0–0)
PLATELET # BLD AUTO: 238 K/UL — SIGNIFICANT CHANGE UP (ref 150–400)
POTASSIUM SERPL-MCNC: 3.7 MMOL/L — SIGNIFICANT CHANGE UP (ref 3.5–5.3)
POTASSIUM SERPL-SCNC: 3.7 MMOL/L — SIGNIFICANT CHANGE UP (ref 3.5–5.3)
PROTHROM AB SERPL-ACNC: 17.9 SEC — HIGH (ref 10–12.9)
RBC # BLD: 4.15 M/UL — LOW (ref 4.2–5.8)
RBC # FLD: 16 % — HIGH (ref 10.3–14.5)
SARS-COV-2 RNA SPEC QL NAA+PROBE: SIGNIFICANT CHANGE UP
SODIUM SERPL-SCNC: 132 MMOL/L — LOW (ref 135–145)
WBC # BLD: 7.55 K/UL — SIGNIFICANT CHANGE UP (ref 3.8–10.5)
WBC # FLD AUTO: 7.55 K/UL — SIGNIFICANT CHANGE UP (ref 3.8–10.5)

## 2020-06-27 PROCEDURE — 99232 SBSQ HOSP IP/OBS MODERATE 35: CPT

## 2020-06-27 RX ADMIN — Medication 650 MILLIGRAM(S): at 21:15

## 2020-06-27 RX ADMIN — Medication 25 MILLIGRAM(S): at 05:10

## 2020-06-27 RX ADMIN — Medication 20 MILLIGRAM(S): at 07:33

## 2020-06-27 RX ADMIN — Medication 650 MILLIGRAM(S): at 03:03

## 2020-06-27 RX ADMIN — Medication 25 MILLIGRAM(S): at 17:22

## 2020-06-27 NOTE — DIETITIAN INITIAL EVALUATION ADULT. - PROBLEM SELECTOR PLAN 2
Rapid A Fib, -Pt stopped home Cardizem on his own, Non compliant for Cardiology follow up & meds  - continue Cardizem gtt, 5 mg   - INR supra therapeutic, check daily INR AM   - telemetry   - check thyroid panel

## 2020-06-27 NOTE — DIETITIAN INITIAL EVALUATION ADULT. - PROBLEM SELECTOR PLAN 1
Cellulitis b/l LE marked erythema, with Oozing clear liquids /skin blister  with yellowish slough  with severe chronic venous stasis with possible +/- cellulitis, fluid over load with edema -Venous Stasis ulcers, afebrile, NL WBC  - s/p vanco in the ED  - Will continue IV  Vanc for now, Vanco level  - PVD, check ABIs, , keep b/l lower EXT elevated  - Blood c/sx 2   - pain control, wound care Eval   - ID consult -DR gonzalez/DR Simms

## 2020-06-27 NOTE — PROGRESS NOTE ADULT - PROBLEM SELECTOR PLAN 1
Rapid A Fib - Pt stopped home Cardizem on his own, non compliant for Cardiology follow up & meds  - Dc Cardizem gtt 5 mg and Cardizem 30 mg PO q6h (6/23)  - Continue Lopressor 25 mg PO BID (started 6/23)  - Hold lisinopril 2.5 mg qd for angio  - INR therapeutic   - Echo (6/22) - Mild concentric LVH with moderate global LV systolic dysfunction with LVEF of 40%, grossly normal RV size and systolic functio, calcified trileaflet aortic valve with severe aortic stenosis, mild to moderate MR, mild TR.     - Continue to monitor on telemetry   - TSH wnl, low T3 Rapid A Fib - HR stable   - Pt stopped home Cardizem on his own, non compliant for Cardiology follow up & meds  - Continue Lopressor 25 mg PO BID (started 6/23)  - Hold lisinopril 2.5 mg qd for angio  -HOLD Coumadin for Angiogram by IR/Vascular on Monday  - Echo (6/22) - Mild concentric LVH with moderate global LV systolic dysfunction with LVEF of 40%, grossly normal RV size and systolic functio, calcified trileaflet aortic valve with severe aortic stenosis, mild to moderate MR, mild TR.     - Continue to monitor on telemetry   - TSH wnl, low T3

## 2020-06-27 NOTE — PROGRESS NOTE ADULT - ATTENDING COMMENTS
pt seen, examined, case & care plan d/w pt, residents at detail.  AM labs  D/W pt & Son Álvaro at detail Need to start Full dose Lovenox while his INR is < 2  as d/w Cardiology & Vascular Sx , Coumadin on HOLD for possible Angiogram on Monday next week ,Pt is Refusing Lovenox & IV Heparin drip,  I have d/w pt at detail about increase risks of CVA with A Fib, DVT & PE, while off AC. Pt states that he DOES NOT want any Lovenox or IV Heparin, D/W Son Álvaro who is a physician at detail about pt refusing LOVENOX/IV Heparin. I have d/w pharmacy at detail, as per pharmacy there is NO protocol to do intermittent heparin Bolus IV Pushes for treatment or DVT prophylaxis. pt seen, examined, case & care plan d/w pt, at detail.  AM labs  D/W pt & Son Álvaro at detail Need to start Full dose Lovenox while his INR is < 2  as d/w Cardiology & Vascular Sx , Coumadin on HOLD for possible Angiogram on Monday next week ,Pt is Refusing Lovenox & IV Heparin drip,  I have d/w pt at detail about increase risks of CVA with A Fib, DVT & PE, while off AC. Pt states that he DOES NOT want any Lovenox or IV Heparin, D/W Son Álvaro who is a physician at detail about pt refusing LOVENOX/IV Heparin. I have d/w pharmacy at detail, as per pharmacy there is NO protocol to do intermittent heparin Bolus IV Pushes for treatment or DVT prophylaxis.

## 2020-06-27 NOTE — PROGRESS NOTE ADULT - SUBJECTIVE AND OBJECTIVE BOX
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 650 milliGRAM(s) Oral every 6 hours PRN Temp greater or equal to 38C (100.4F), Mild Pain (1 - 3)    Allergies    No Known Allergies    Intolerances    Vital Signs Last 24 Hrs  T(C): 36.4 (27 Jun 2020 07:25), Max: 36.4 (26 Jun 2020 17:10)  T(F): 97.5 (27 Jun 2020 07:25), Max: 97.5 (26 Jun 2020 17:10)  HR: 80 (27 Jun 2020 07:25) (78 - 92)  BP: 112/73 (27 Jun 2020 07:25) (107/69 - 114/68)  BP(mean): --  RR: 18 (27 Jun 2020 07:25) (18 - 21)  SpO2: 98% (27 Jun 2020 07:25) (94% - 98%)  I&O's Summary      LABS: All Labs Reviewed:                        11.7   7.55  )-----------( 238      ( 27 Jun 2020 07:32 )             35.7                         12.3   8.09  )-----------( 249      ( 26 Jun 2020 06:47 )             36.7                         12.8   9.34  )-----------( 299      ( 25 Jun 2020 06:37 )             38.3     27 Jun 2020 07:32    132    |  98     |  15     ----------------------------<  97     3.7     |  29     |  0.68   26 Jun 2020 06:47    134    |  97     |  16     ----------------------------<  91     3.5     |  29     |  0.70   25 Jun 2020 06:37    132    |  96     |  14     ----------------------------<  100    4.0     |  30     |  0.72     Ca    8.8        27 Jun 2020 07:32  Ca    8.7        26 Jun 2020 06:47  Ca    8.6        25 Jun 2020 06:37    PT/INR - ( 27 Jun 2020 07:32 )   PT: 17.9 sec;   INR: 1.58 ratio      PTT - ( 27 Jun 2020 07:32 )  PTT:30.9 sec [facility] -- Radha Boyle, Vonnie, Lilibeth, Joel Pulido Savella, Goodger  Office # [phone]    Follow Up:  Afib, Preop Optimization    Subjective/Observations: Awake and alert, denies any respiratory or cardiac discomfort.  On Ra.  However, c/o right foot pain during ambulation.  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 650 milliGRAM(s) Oral every 6 hours PRN Temp greater or equal to 38C (100.4F), Mild Pain (1 - 3)    Allergies    No Known Allergies    Intolerances    Vital Signs Last 24 Hrs  T(C): 36.4 (27 Jun 2020 07:25), Max: 36.4 (26 Jun 2020 17:10)  T(F): 97.5 (27 Jun 2020 07:25), Max: 97.5 (26 Jun 2020 17:10)  HR: 80 (27 Jun 2020 07:25) (78 - 92)  BP: 112/73 (27 Jun 2020 07:25) (107/69 - 114/68)  BP(mean): --  RR: 18 (27 Jun 2020 07:25) (18 - 21)  SpO2: 98% (27 Jun 2020 07:25) (94% - 98%)  I&O's Summary     PHYSICAL EXAM:  TELE: Afib  Constitutional: NAD, awake and alert, obese  HEENT: Moist Mucous Membranes, Anicteric  Pulmonary: Non-labored, breath sounds are clear but diminished bilaterally, No wheezing, rales or rhonchi  Cardiovascular: IRRR, S1 and S2, + murmurs.  No rubs, gallops or clicks  Gastrointestinal: Bowel Sounds present, soft, nontender.   Lymph: No peripheral edema. No lymphadenopathy.  Skin: No visible rashes.  BLE ulcers with dressing dry and intact  Psych:  Mood & affect appropriate     LABS: All Labs Reviewed:                        11.7   7.55  )-----------( 238      ( 27 Jun 2020 07:32 )             35.7                         12.3   8.09  )-----------( 249      ( 26 Jun 2020 06:47 )             36.7                         12.8   9.34  )-----------( 299      ( 25 Jun 2020 06:37 )             38.3     27 Jun 2020 07:32    132    |  98     |  15     ----------------------------<  97     3.7     |  29     |  0.68   26 Jun 2020 06:47    134    |  97     |  16     ----------------------------<  91     3.5     |  29     |  0.70   25 Jun 2020 06:37    132    |  96     |  14     ----------------------------<  100    4.0     |  30     |  0.72     Ca    8.8        27 Jun 2020 07:32  Ca    8.7        26 Jun 2020 06:47  Ca    8.6        25 Jun 2020 06:37    PT/INR - ( 27 Jun 2020 07:32 )   PT: 17.9 sec;   INR: 1.58 ratio      PTT - ( 27 Jun 2020 07:32 )  PTT:30.9 sec    < from: TTE Echo Complete w/o Contrast w/ Doppler (06.21.20 @ 13:37) >     EXAM:  ECHO TTE WO CON COMP W DOPP         PROCEDURE DATE:  06/21/2020        INTERPRETATION:  INDICATION: Heart failure  Sonographer AS    Blood Pressure 124/57    Height unavailable     Weight 83.9 kg    Dimensions:    LA 3.8       Normal Values: 2.0 - 4.0 cm    Ao 3.0        Normal Values: 2.0 - 3.8 cm  SEPTUM 1.2       Normal Values: 0.6 - 1.2 cm  PWT 1.3       Normal Values: 0.6 - 1.1 cm  LVIDd 4.7         Normal Values: 3.0 - 5.6 cm  LVIDs Normal Values: 1.8 - 4.0 cm      OBSERVATIONS:  Technically difficult study  Mitral Valve: Thickened leaflets, mild to moderate MR.  Aortic Valve/Aorta: Calcified trileaflet aortic valve with decreased opening. Peak transaortic valve gradient of 72.9 mmHg with a mean transaortic valve gradient of 41 mmHg. The aortic valve area is calculated to be 0.36 sq cm. This is consistent with severe aortic stenosis  Tricuspid Valve: normal with mild TR.  Pulmonic Valve: Trace PI  Left Atrium: normal  Right Atrium: Not well visualized  Left Ventricle: Moderate global left ventricular systolic dysfunction with an estimated LVEF of 40%. Mild LVH  Right Ventricle: Grossly normal size and systolic function.  Pericardium/Pleura: normal, no significant pericardial effusion.  Pulmonary/RV Pressure: estimated PA systolic pressure of 28.4mmHg assuming an RA pressure of 3 mmHg.    Conclusion:   Technically difficult study  Mild concentric LVH with moderate global left ventricular systolic dysfunction with an estimated LVEF of 40%  Grossly normal RV size and systolic function.   Calcified trileaflet aortic valve with severe aortic stenosis  Mild to moderate MR   Mild TR.     No significant pericardial effusion.      KARIE LONGO   This document has been electronically signed. Jun 22 2020  1:08PM      < end of copied text >    < from: Xray Chest 1 View- PORTABLE-Urgent (06.20.20 @ 16:03) >    EXAM:  XR CHEST PORTABLE URGENT 1V                          PROCEDURE DATE:  06/20/2020      INTERPRETATION:  PROCEDURE: AP view of the chest.    CLINICAL INFORMATION: Leg swelling.    COMPARISON: None.    FINDINGS:    Lungs: Peripherallinear atelectasis versus scarring in the right upper lobe. No focal consolidation.  Heart: The heart is normal in size. Surgical clips overlie the heart.  Mediastinum: The mediastinum is within normal limits.    IMPRESSION:  Peripheral linear atelectasis versus scarring in the right upper lobe. No focal consolidation.    MADELINE SRIVASTAVA M.D., ATTENDING RADIOLOGIST  This document has been electronically signed. Jun 20 2020  4:10PM     < end of copied text >    < from: 12 Lead ECG (06.20.20 @ 14:49) >    Ventricular Rate 104 BPM    Atrial Rate 117 BPM    QRS Duration 114 ms    Q-T Interval 310 ms    QTC Calculation(Bezet) 407 ms    R Axis -28 degrees    T Axis 72 degrees    Diagnosis Line Atrial fibrillation  Abnormal ECG  No previous ECGs available  Confirmed by Marlene Peoples MD ([id number]) on 6/22/2020 9:42:20 AM    < end of copied text >

## 2020-06-27 NOTE — PROGRESS NOTE ADULT - PROBLEM SELECTOR PLAN 3
Improved - Cellulitis b/l LE marked erythema, s/p Oozing clear liquids/skin blister with yellowish slough  with severe chronic venous stasis with possible +/- cellulitis, fluid over load with edema - Venous Stasis ulcers, afebrile, NL WBC  - s/p vanco  - s/p IV Rocephin (6/23-6/25)  - PVD, keep b/l lower EXT elevated  - Blood c/sx 2 - Neg  - Pain control  - Vascular studies: RLE: MAYNOR is .54, pulse waveforms are diffusely monophasic and diminished in the right foot. LLE: MAYNOR is .56, pulse waveforms are diffusely monophasic and severely diminished in the left foot.  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 Single-vessel runoff via the posterior tibial artery. 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 Otherwise, no focal stenosis or occlusion in the left leg.  - Vascular Surgery (Dr. Arias) consulted, recs appreciated - angiogram cancelled per patient's request; patient would like procedure done next week  - Wound care (Dr. Chong) consulted, recs appreciated - Skin changes attributed to bilateral stasis dermatitis and bilateral venous hypertension with edema and venous stasis ulcers and weeping - silver alginate and dry dressings every other day, drainage dependant, and ace wraps  - ID consult (Dr gonzalez/Dr Simms) consulted, recs appreciated - suspect most of this is chronic, with venous stasis ulcers and dermatitis Improved - Cellulitis b/l LE  -marked erythema, s/p Oozing clear liquids/skin blister with yellowish slough  with severe chronic venous stasis with possible +/- cellulitis &  fluid over load with edema - Venous Stasis ulcers, afebrile, NL WBC- s/p vanco  - s/p IV Rocephin (6/23-6/25)  - PVD, keep b/l lower EXT elevated  - Blood c/sx 2 - Neg  - Pain control  - Vascular Surgery (Dr. Arias) follow up  - Wound care (Dr. Chong) consulted, recs appreciated - Skin changes attributed to bilateral stasis dermatitis and bilateral venous hypertension with edema and venous stasis ulcers and weeping - silver alginate and dry dressings every other day, drainage dependant, and ace wraps  - ID consult (Dr gonzalez/Dr Simms) consulted, recs appreciated - suspect most of this is chronic, with venous stasis ulcers and dermatitis

## 2020-06-27 NOTE — DIETITIAN INITIAL EVALUATION ADULT. - CONTINUE CURRENT NUTRITION CARE PLAN
Provide food preferences as requested, monitor po intake, wt, labs, diet tolerance/acceptance, skin integrity, bowel function./yes

## 2020-06-27 NOTE — PROGRESS NOTE ADULT - ASSESSMENT
86 y/o M with hx of CHF, afib (on coumadin), lung ca s/p resection presents to ED for b/l weeping edema. Admitted for b/l LE cellulitis, Rapid A Fib with RVR, volume overload    Afib with RVR  - Remains in Afib but rate-controlled  - S/P Cardizem gtt.  Will keep off of CCB in the setting of moderately reduced LVF, EF 40%  - Continue lopressor 25 mg twice daily with hold parameters.  Can switch to long-acting if rate remains stable  - Hold Coumadin for now in anticipation of peripheral angiogram with possible intervention on Monday  - Would continue FD Lovenox.  However, patient is still refusing it till now.  Discussed an alternative with Heparin drip but strongly refused both.  Reminded about his elevated risk for stroke but he is not concerned  - Monitor electrolytes, replete to keep K>4 and Mag>2    HFrEF, Severe AS  - Pro-BNP= 5624.  Euvolemic on exam but would continue IV Lasix 20 g daily for now  - TTE w/ severe AS, mod global LVDF EF 40 % mild LVh   - Will need outpt evaluation for AS for possible TAVR.   - Would start ACEI when BP tolerates.     Chronic venous stasis/PAD  - Vascular on board  - Planned for peripheral angiogram on Monday  - Follow Vascular recs    DVT ppx  - Per Primary    Further cardiac w/u pending clinical course  All other w/u per Primary and Vascular  Will continue to follow    Zahra Costa DNP, NP-C  Cardiology   Spectra #[phone]

## 2020-06-27 NOTE — DIETITIAN INITIAL EVALUATION ADULT. - PROBLEM SELECTOR PLAN 3
2/2 Rapid A Fib , pt with elevated BNP, Hyponatremia & leg edema  - unclear about previous echo, last seen cardio >20 yrs ago,   - continue lasix 20 mg iv daily  - i/os, daily weights  - check TTE  - cardio consult ([facility])

## 2020-06-27 NOTE — DIETITIAN INITIAL EVALUATION ADULT. - OTHER INFO
86 y/o M with hx of CHF, afib (on coumadin), lung ca s/p resection presents to ED for b/l weeping edema. Admitted for b/l LE cellulitis, Rapid A Fib, and acute exacerbation of systolic CHF.  Pt denies weight loss, states UBW ~185# within this month.  Reports long term hx of trouble swallowing, needs to cut up foods very small and chew thoroughly.  States has never been a big eater.  Lives alone, at home eats 2 meals a day, usually cold cereal for bfst and dinners vary but are small.  Sometimes cheese,  sometimes tunafish, or portions of meals family brings.  Doesn't take any vitamins at home- stopped a while ago b/c claims one was making him dizzy at night so stopped them all.  Endorses height of 5'9" ( used to be 5'11"). Does not follow low Na diet at home.  Reports BM 2 days ago.

## 2020-06-27 NOTE — PROGRESS NOTE ADULT - SUBJECTIVE AND OBJECTIVE BOX
Patient is a 87y old  Male who presents with a chief complaint of b/l LE weeping edema (27 Jun 2020 11:44)    HPI:  86 y/o M with hx of , afib (on coumadin), 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 CHF , PVD  presents to ED for worsening edema with  b/l weeping skin & erythema with pain that bothers him to walk  . Pt reports that his sons made him come into the ED. Reports has been having b/l LE edema with  weeping legs  for around 1 1/2 year. Has gotten worse in the past few day with increasing erythema, skin blisters +/- pus with occasional bouts of pain. Denies any other complaints, no fever, No chills ,No  cp, sob, palpitations, abdominal pain, dizziness, palpitations.  Pt was transferred from [place] ER after he got IV Vanco & IV Lasix, IV Cardizem     Given IV Cardizem and started on Cardizem gtt in the ED for rapid afib (20 Jun 2020 19:26)    INTERVAL HPI:  6/22/2020: Patient seen and examined at bedside. Low, stable H/H. INR 6.6. Hyponatremia, Na 132 --> 129 today. Dc Vancomycin. Rash appears nonpurulent and rapidly improving so will de-escalate to Ceftriaxone 1 gram IV Q-day and potential to finish course with cefuroxime 500mg PO BID with last day 6/25. Stop Cardizem gtt. Started Cardizem 30 mg PO q6h. Vascular studies pending. Wound care evaluated patient - start silver alginate and dry dressings every other day, drainage dependant, and ace wraps if vascular studies permit.  6/23/2020: Patient seen and examined at bedside. Low, stable H/H. INR 3.55. Hyponatremia improving. On Rocephin. DC Cardizem. Start Lopressor 25 mg PO BID. 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 Single-vessel runoff via the posterior tibial artery. 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 Vascular surgery (Dr. Arias) - plan for RLE angiogram, possible stent/angioplasty on Thursday in IR. HOLD AC  6/24/2020: Patient seen and examined at bedside. Low, stable H/H. INR 2.36. Hyponatremia improving. Pending RLE angiogram, possible stent/angioplasty in IR tomorrow. Hold Coumadin at this time, goal INR for procedure is 1.5 or less. HOLD Lisinopril 2.5 mg PO qd for Angio.  6/25/2020: Patient seen and examined at bedside. Low, stable H/H. INR 1.89. Hyponatremia improving. RLE angiogram, possible stent/angioplasty in IR today. Procedure was cancelled in IR as pt got upset with KIRIT Ext IV site Infiltration with FFP causing swelling of L U EXT. Given Coumadin 2.5 mg PO x1. Last day abx.   6/26/2020: Patient seen and examined at bedside. Low, stable H/H. INR 1.64. Off IV antibiotics.Pt is refusing Lovenox & IV Heparin as AC, until when angiogram is done next week.  6/27/2020: Pt seen,       OVERNIGHT EVENTS:    Home Medications:  Coumadin 5 mg oral tablet: 1 tab(s) orally 2 times a day (22 Jun 2020 18:12)      MEDICATIONS  (STANDING):  furosemide   Injectable 20 milliGRAM(s) IV Push daily  metoprolol tartrate 25 milliGRAM(s) Oral two times a day    MEDICATIONS  (PRN):  acetaminophen   Tablet .. 650 milliGRAM(s) Oral every 6 hours PRN Temp greater or equal to 38C (100.4F), Mild Pain (1 - 3)      Allergies    No Known Allergies    Intolerances        Social History:  Never smoker, denies etoh and drug abuse  , Lives alone, Retired  (20 Jun 2020 19:26)      REVIEW OF SYSTEMS:  CONSTITUTIONAL: No fever, No chills, No fatigue, No myalgia, No Body ache, No Weakness  EYES: No eye pain,  No visual disturbances, No discharge, NO Redness  ENMT:  No ear pain, No nose bleed, No vertigo; No sinus pain, NO throat pain, No Congestion  NECK: No pain, No stiffness  RESPIRATORY: No cough, NO wheezing, No  hemoptysis, NO  shortness of breath  CARDIOVASCULAR: No chest pain, palpitations  GASTROINTESTINAL: No abdominal pain, NO epigastric pain. No nausea, No vomiting; No diarrhea, No constipation. [  ] BM  GENITOURINARY: No dysuria, No frequency, No urgency, No hematuria, NO incontinence  NEUROLOGICAL: No headaches, No dizziness, No numbness, No tingling, No tremors, No weakness  EXT: No Swelling, No Pain, No Edema  SKIN:  [  ] No itching, burning, rashes, or lesions   MUSCULOSKELETAL: No joint pain ,No Jt swelling; No muscle pain, No back pain, No extremity pain  PSYCHIATRIC: No depression,  No anxiety,  No mood swings ,No difficulty sleeping at night  PAIN SCALE: [  ] None  [  ] Other-  ROS Unable to obtain due to - [  ] Dementia  [  ] Lethargy [  ] Drowsy [  ] Sedated [  ] non verbal  REST OF REVIEW Of SYSTEM - [  ] Normal     Vital Signs Last 24 Hrs  T(C): 36.4 (27 Jun 2020 07:25), Max: 36.4 (26 Jun 2020 17:10)  T(F): 97.5 (27 Jun 2020 07:25), Max: 97.5 (26 Jun 2020 17:10)  HR: 80 (27 Jun 2020 07:25) (78 - 92)  BP: 112/73 (27 Jun 2020 07:25) (107/69 - 114/68)  BP(mean): --  RR: 18 (27 Jun 2020 07:25) (18 - 21)  SpO2: 98% (27 Jun 2020 07:25) (94% - 98%)  Finger Stick          PHYSICAL EXAM:  GENERAL:  [  ] NAD , [  ] well appearing, [  ] Agitated, [  ] Drowsy,  [  ] Lethargy, [  ] confused   HEAD:  [  ] Normal, [  ] Other  EYES:  [  ] EOMI, [  ] PERRLA, [  ] conjunctiva and sclera clear normal, [  ] Other,  [  ] Pallor,[  ] Discharge  ENMT:  [  ] Normal, [  ] Moist mucous membranes, [  ] Good dentition, [  ] No Thrush  NECK:  [  ] Supple, [  ] No JVD, [  ] Normal thyroid, [  ] Lymphadenopathy [  ] Other  CHEST/LUNG:  [  ] Clear to auscultation bilaterally, [  ] Breath Sounds equal B/L / Decrease, [  ] poor effort  [  ] No rales, [  ] No rhonchi  [  ]  No wheezing,   HEART:  [  ] Regular rate and rhythm, [  ] tachycardia, [  ] Bradycardia,  [  ] irregular  [  ] No murmurs, No rubs, No gallops, [  ] PPM in place (Mfr:  )  ABDOMEN:  [  ] Soft, [  ] Nontender, [  ] Nondistended, [  ] No mass, [  ] Bowel sounds present, [  ] obese  NERVOUS SYSTEM:  [  ] Alert & Oriented X3, [  ] Nonfocal  [  ] Confusion  [  ] Encephalopathic [  ] Sedated [  ] Unable to assess, [  ] Dementia [  ] Other-  EXTREMITIES: [  ] 2+ Peripheral Pulses, No clubbing, No cyanosis,  [  ] edema B/L lower EXT. [  ] PVD stasis skin changes B/L Lower EXT, [  ] wound  LYMPH: No lymphadenopathy noted  SKIN:  [  ] No rashes or lesions, [  ] Pressure Ulcers, [  ] ecchymosis, [  ] Skin Tears, [  ] Other    DIET: Diet, DASH/TLC:   Sodium & Cholesterol Restricted  1500mL Fluid Restriction ([id number]) (06-22-20 @ 08:06)      LABS:                        11.7   7.55  )-----------( 238      ( 27 Jun 2020 07:32 )             35.7     27 Jun 2020 07:32    132    |  98     |  15     ----------------------------<  97     3.7     |  29     |  0.68     Ca    8.8        27 Jun 2020 07:32      PT/INR - ( 27 Jun 2020 07:32 )   PT: 17.9 sec;   INR: 1.58 ratio         PTT - ( 27 Jun 2020 07:32 )  PTT:30.9 sec      Culture Results:   No Growth Final (06-20 @ 22:27)  Culture Results:   No Growth Final (06-20 @ 22:26)          CARDIAC MARKERS ( 20 Jun 2020 15:47 )  .026 ng/mL / x     / x     / x     / x              Culture - Blood (collected 20 Jun 2020 22:27)  Source: .Blood Blood  Final Report (25 Jun 2020 23:32):    No Growth Final    Culture - Blood (collected 20 Jun 2020 22:26)  Source: .Blood Blood  Final Report (25 Jun 2020 23:32):    No Growth Final         Anemia Panel:      Thyroid Panel:  T4, Serum: 6.2 ug/dL (06-21-20 @ 11:44)  Thyroid Stimulating Hormone, Serum: 1.33 uIU/mL (06-21-20 @ 05:27)      Serum Pro-Brain Natriuretic Peptide: 5624 pg/mL (06-21-20 @ 05:27)  Serum Pro-Brain Natriuretic Peptide: 5405 pg/mL (06-20-20 @ 15:47)      RADIOLOGY & ADDITIONAL TESTS:      HEALTH ISSUES - PROBLEM Dx:  IV infiltration: IV infiltration  Severe aortic stenosis: Severe aortic stenosis  Systolic CHF: Systolic CHF  Varicose veins of bilateral lower extremities with pain: Varicose veins of bilateral lower extremities with pain  Stasis dermatitis with ulcer of left lower extremity due to peripheral venous hypertension: Stasis dermatitis with ulcer of left lower extremity due to peripheral venous hypertension  Stasis dermatitis with ulcer of right lower extremity due to peripheral venous hypertension: Stasis dermatitis with ulcer of right lower extremity due to peripheral venous hypertension  Chronic venous stasis dermatitis of both lower extremities: Chronic venous stasis dermatitis of both lower extremities  CHF (congestive heart failure): CHF (congestive heart failure)  PVD (peripheral vascular disease): PVD (peripheral vascular disease)  Hyponatremia: Hyponatremia  Need for prophylactic measure: Need for prophylactic measure  Atrial fibrillation: Atrial fibrillation  Acute on chronic congestive heart failure, unspecified heart failure type: Acute on chronic congestive heart failure, unspecified heart failure type  Cellulitis of lower extremity, unspecified laterality: Cellulitis of lower extremity, unspecified laterality          Consultant(s) Notes Reviewed:  [  ] YES     Care Discussed with [X] Consultants  [  ] Patient  [  ] Family [  ] HCP [  ]   [  ] Social Service  [  ] RN, [  ] Physical Therapy,[  ] Palliative care team  DVT PPX: [  ] Lovenox, [  ] S C Heparin, [  ] Coumadin, [  ] Xarelto, [  ] Eliquis, [  ] Pradaxa, [  ] IV Heparin drip, [  ] SCD [  ] Contraindication 2 to GI Bleed,[  ] Ambulation [  ] Contraindicated 2 to  bleed [  ] Contraindicated 2 to Brain Bleed  Advanced directive: [  ] None, [  ] DNR/DNI Patient is a 87y old  Male who presents with a chief complaint of b/l LE weeping edema (27 Jun 2020 11:44)    HPI:  88 y/o M with hx of , afib (on coumadin), 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 CHF , PVD  presents to ED for worsening edema with  b/l weeping skin & erythema with pain that bothers him to walk  . Pt reports that his sons made him come into the ED. Reports has been having b/l LE edema with  weeping legs  for around 1 1/2 year. Has gotten worse in the past few day with increasing erythema, skin blisters +/- pus with occasional bouts of pain. Denies any other complaints, no fever, No chills ,No  cp, sob, palpitations, abdominal pain, dizziness, palpitations.  Pt was transferred from [place] ER after he got IV Vanco & IV Lasix, IV Cardizem     Given IV Cardizem and started on Cardizem gtt in the ED for rapid afib (20 Jun 2020 19:26)    INTERVAL HPI:  6/22/2020: Patient seen and examined at bedside. Low, stable H/H. INR 6.6. Hyponatremia, Na 132 --> 129 today. Dc Vancomycin. Rash appears nonpurulent and rapidly improving so will de-escalate to Ceftriaxone 1 gram IV Q-day and potential to finish course with cefuroxime 500mg PO BID with last day 6/25. Stop Cardizem gtt. Started Cardizem 30 mg PO q6h. Vascular studies pending. Wound care evaluated patient - start silver alginate and dry dressings every other day, drainage dependant, and ace wraps if vascular studies permit.  6/23/2020: Patient seen and examined at bedside. Low, stable H/H. INR 3.55. Hyponatremia improving. On Rocephin. DC Cardizem. Start Lopressor 25 mg PO BID. 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 Single-vessel runoff via the posterior tibial artery. 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 Vascular surgery (Dr. Arias) - plan for RLE angiogram, possible stent/angioplasty on Thursday in IR. HOLD AC  6/24/2020: Patient seen and examined at bedside. Low, stable H/H. INR 2.36. Hyponatremia improving. Pending RLE angiogram, possible stent/angioplasty in IR tomorrow. Hold Coumadin at this time, goal INR for procedure is 1.5 or less. HOLD Lisinopril 2.5 mg PO qd for Angio.  6/25/2020: Patient seen and examined at bedside. Low, stable H/H. INR 1.89. Hyponatremia improving. RLE angiogram, possible stent/angioplasty in IR today. Procedure was cancelled in IR as pt got upset with KIRIT Ext IV site Infiltration with FFP causing swelling of L U EXT. Given Coumadin 2.5 mg PO x1. Last day abx.   6/26/2020: Patient seen and examined at bedside. Low, stable H/H. INR 1.64. Off IV antibiotics.Pt is refusing Lovenox & IV Heparin as AC, until when angiogram is done next week.  6/27/2020: Pt seen, examined, NO Complaints, feels much better. Awaiting angiogram on Monday.6/29, AC on HOLD. Completed Abx       OVERNIGHT EVENTS: NONE    Home Medications:  Coumadin 5 mg oral tablet: 1 tab(s) orally 2 times a day (22 Jun 2020 18:12)      MEDICATIONS  (STANDING):  furosemide   Injectable 20 milliGRAM(s) IV Push daily  metoprolol tartrate 25 milliGRAM(s) Oral two times a day    MEDICATIONS  (PRN):  acetaminophen   Tablet .. 650 milliGRAM(s) Oral every 6 hours PRN Temp greater or equal to 38C (100.4F), Mild Pain (1 - 3)      Allergies    No Known Allergies    Intolerances        Social History:  Never smoker, denies etoh and drug abuse  , Lives alone, Retired  (20 Jun 2020 19:26)      REVIEW OF SYSTEMS: legs feels better  CONSTITUTIONAL: No fever, No chills, No fatigue, No myalgia, No Body ache, No Weakness  EYES: No eye pain,  No visual disturbances, No discharge, NO Redness  ENMT:  No ear pain, No nose bleed, No vertigo; No sinus pain, NO throat pain, No Congestion  NECK: No pain, No stiffness  RESPIRATORY: No cough, NO wheezing, No  hemoptysis, NO  shortness of breath  CARDIOVASCULAR: No chest pain, palpitations  GASTROINTESTINAL: No abdominal pain, NO epigastric pain. No nausea, No vomiting; No diarrhea, No constipation. [  ] BM  GENITOURINARY: No dysuria, No frequency, No urgency, No hematuria, NO incontinence  NEUROLOGICAL: No headaches, No dizziness, No numbness, No tingling, No tremors, No weakness  EXT: No Swelling, No Pain, No Edema  SKIN:  [ x ] No itching, burning, rashes, or lesions   MUSCULOSKELETAL: No joint pain ,No Jt swelling; No muscle pain, No back pain, No extremity pain  PSYCHIATRIC: No depression,  No anxiety,  No mood swings ,No difficulty sleeping at night  PAIN SCALE: [ x ] None  [  ] Other-  ROS Unable to obtain due to - [  ] Dementia  [  ] Lethargy [  ] Drowsy [  ] Sedated [  ] non verbal  REST OF REVIEW Of SYSTEM - [ x ] Normal     Vital Signs Last 24 Hrs  T(C): 36.4 (27 Jun 2020 07:25), Max: 36.4 (26 Jun 2020 17:10)  T(F): 97.5 (27 Jun 2020 07:25), Max: 97.5 (26 Jun 2020 17:10)  HR: 80 (27 Jun 2020 07:25) (78 - 92)  BP: 112/73 (27 Jun 2020 07:25) (107/69 - 114/68)  BP(mean): --  RR: 18 (27 Jun 2020 07:25) (18 - 21)  SpO2: 98% (27 Jun 2020 07:25) (94% - 98%)  Finger Stick        PHYSICAL EXAM:  GENERAL:  [ x ] NAD, [ x ] Well appearing, [  ] Agitated, [  ] Drowsy, [  ] Lethargy, [  ] Confused   HEAD:  [ x ] Normal, [  ] Other  EYES:  [ x ] EOMI, [ x ] PERRLA, [ x ] Conjunctiva and sclera clear normal, [  ] Other, [  ] Pallor, [  ] Discharge  ENMT:  [ x ] Normal, [ x ] Moist mucous membranes, [ x ] Good dentition, [ x ] No thrush  NECK:  [ x ] Supple, [ x ] No JVD, [ x ] Normal thyroid, [ x ] Lymphadenopathy, [  ] Other  CHEST/LUNG:  [ x ] Clear to auscultation bilaterally, [ x ] Breath Sounds equal B/L, [  ] Poor effort, [ x ] No rales, [ x ] No rhonchi, [ x ] No wheezing  HEART:  [ x ] Regular rate and rhythm, [  ] Tachycardia, [  ] Bradycardia, [  ] Irregular, [ x ] 3/6 murmurs, No rubs, No gallops, [  ] PPM in place (Mfr:  )  ABDOMEN:  [ x ] Soft, [ x ] Nontender, [ x ] Nondistended, [ x ] No mass, [  x] Bowel sounds present, [  ] Obese  NERVOUS SYSTEM:  [ x ] Alert & Oriented x3, [ x ] Nonfocal, [  ] Confusion, [  ] Encephalopathic, [  ] Sedated, [  ] Unable to assess, [  ] Dementia, [  ] Other-  EXTREMITIES:  [ x ] 2+ Peripheral Pulses, No clubbing, No cyanosis,  [ x ] Mild Edema B/L lower EXT, [ x ] PVD stasis skin changes B/L lower EXT, [ x ] b/l le erythema, dried ulcers, yellowish slough , no foul smell, no warmth, no discharge.  LYMPH:  No lymphadenopathy noted, [ x ] LUEXT  & Hand swelling- improving ,  No pain .N/V Intact, +ROM  SKIN:  [ x ] No rashes or lesions, [  ] Pressure ulcers, [  ] Ecchymosis, [  ] Skin tears    DIET: Diet, DASH/TLC:   Sodium & Cholesterol Restricted  1500mL Fluid Restriction ([id number]) (06-22-20 @ 08:06)      LABS:                        11.7   7.55  )-----------( 238      ( 27 Jun 2020 07:32 )             35.7     27 Jun 2020 07:32    132    |  98     |  15     ----------------------------<  97     3.7     |  29     |  0.68     Ca    8.8        27 Jun 2020 07:32      PT/INR - ( 27 Jun 2020 07:32 )   PT: 17.9 sec;   INR: 1.58 ratio         PTT - ( 27 Jun 2020 07:32 )  PTT:30.9 sec      Culture Results:   No Growth Final (06-20 @ 22:27)  Culture Results:   No Growth Final (06-20 @ 22:26)          CARDIAC MARKERS ( 20 Jun 2020 15:47 )  .026 ng/mL / x     / x     / x     / x              Culture - Blood (collected 20 Jun 2020 22:27)  Source: .Blood Blood  Final Report (25 Jun 2020 23:32):    No Growth Final    Culture - Blood (collected 20 Jun 2020 22:26)  Source: .Blood Blood  Final Report (25 Jun 2020 23:32):    No Growth Final         Anemia Panel:      Thyroid Panel:  T4, Serum: 6.2 ug/dL (06-21-20 @ 11:44)  Thyroid Stimulating Hormone, Serum: 1.33 uIU/mL (06-21-20 @ 05:27)      Serum Pro-Brain Natriuretic Peptide: 5624 pg/mL (06-21-20 @ 05:27)  Serum Pro-Brain Natriuretic Peptide: 5405 pg/mL (06-20-20 @ 15:47)      RADIOLOGY & ADDITIONAL TESTS: NONE      HEALTH ISSUES - PROBLEM Dx:  IV infiltration: IV infiltration  Severe aortic stenosis: Severe aortic stenosis  Systolic CHF: Systolic CHF  Varicose veins of bilateral lower extremities with pain: Varicose veins of bilateral lower extremities with pain  Stasis dermatitis with ulcer of left lower extremity due to peripheral venous hypertension: Stasis dermatitis with ulcer of left lower extremity due to peripheral venous hypertension  Stasis dermatitis with ulcer of right lower extremity due to peripheral venous hypertension: Stasis dermatitis with ulcer of right lower extremity due to peripheral venous hypertension  Chronic venous stasis dermatitis of both lower extremities: Chronic venous stasis dermatitis of both lower extremities  CHF (congestive heart failure): CHF (congestive heart failure)  PVD (peripheral vascular disease): PVD (peripheral vascular disease)  Hyponatremia: Hyponatremia  Need for prophylactic measure: Need for prophylactic measure  Atrial fibrillation: Atrial fibrillation  Acute on chronic congestive heart failure, unspecified heart failure type: Acute on chronic congestive heart failure, unspecified heart failure type  Cellulitis of lower extremity, unspecified laterality: Cellulitis of lower extremity, unspecified laterality      Consultant(s) Notes Reviewed:  [ x YES     Care Discussed with [X] Consultants  [ x ] Patient  [  ] Family [  ] HCP [  ]   [  ] Social Service  [ x] RN, [  ] Physical Therapy,[  ] Palliative care team  DVT PPX: [  ] Lovenox, [  ] S C Heparin, [  ] Coumadin, [  ] Xarelto, [  ] Eliquis, [  ] Pradaxa, [  ] IV Heparin drip, [  ] SCD [  ] Contraindication 2 to GI Bleed,[  ] Ambulation [  ] Contraindicated 2 to  bleed [  ] Contraindicated 2 to Brain Bleed, Pt is Refusing any form of AC  Advanced directive: [ x ] None, [  ] DNR/DNI

## 2020-06-27 NOTE — PROGRESS NOTE ADULT - PROBLEM SELECTOR PLAN 8
DVT ppx: Will hold Coumadin in anticipation of angiogram, will give Lovenox 80 mg subQ q12h although patient currently refusing     DC planning to [facility]

## 2020-06-27 NOTE — PROGRESS NOTE ADULT - PROBLEM SELECTOR PLAN 4
Likely Ac on chronic Systolic CHF  - 2/2 Rapid A Fib, pt with elevated BNP, Hyponatremia & leg edema  - unclear about previous echo, last seen cardio >20 yrs ago.  - Echo (6/22) - Mild concentric LVH with moderate global LV systolic dysfunction with LVEF of 40%, grossly normal RV size and systolic functio, calcified trileaflet aortic valve with severe aortic stenosis, mild to moderate MR, mild TR.   - continue Lasix 20 mg IV daily  - i/os, daily weights  - Cardio consulted ([facility]), recs appreciated Likely Ac on chronic Systolic CHF  - 2/2 Rapid A Fib, pt with elevated BNP, Hyponatremia & leg edema  - unclear about previous echo, last seen cardio >20 yrs ago.  - Echo (6/22) - Mild concentric LVH with moderate global LV systolic dysfunction with LVEF of 40%, grossly normal RV size and systolic functio, calcified trileaflet aortic valve with severe aortic stenosis, mild to moderate MR, mild TR.   - continue Lasix 20 mg IV daily  - i/os, daily weights  - Cardio -([facility]),follow up

## 2020-06-28 ENCOUNTER — TRANSCRIPTION ENCOUNTER (OUTPATIENT)
Age: 85
End: 2020-06-28

## 2020-06-28 LAB
ALBUMIN SERPL ELPH-MCNC: 3 G/DL — LOW (ref 3.3–5)
ALP SERPL-CCNC: 68 U/L — SIGNIFICANT CHANGE UP (ref 40–120)
ALT FLD-CCNC: 17 U/L — SIGNIFICANT CHANGE UP (ref 12–78)
ANION GAP SERPL CALC-SCNC: 6 MMOL/L — SIGNIFICANT CHANGE UP (ref 5–17)
AST SERPL-CCNC: 11 U/L — LOW (ref 15–37)
BILIRUB SERPL-MCNC: 0.9 MG/DL — SIGNIFICANT CHANGE UP (ref 0.2–1.2)
BUN SERPL-MCNC: 14 MG/DL — SIGNIFICANT CHANGE UP (ref 7–23)
CALCIUM SERPL-MCNC: 8.6 MG/DL — SIGNIFICANT CHANGE UP (ref 8.5–10.1)
CHLORIDE SERPL-SCNC: 98 MMOL/L — SIGNIFICANT CHANGE UP (ref 96–108)
CO2 SERPL-SCNC: 29 MMOL/L — SIGNIFICANT CHANGE UP (ref 22–31)
CREAT SERPL-MCNC: 0.67 MG/DL — SIGNIFICANT CHANGE UP (ref 0.5–1.3)
GLUCOSE SERPL-MCNC: 91 MG/DL — SIGNIFICANT CHANGE UP (ref 70–99)
HCT VFR BLD CALC: 36.9 % — LOW (ref 39–50)
HGB BLD-MCNC: 12.3 G/DL — LOW (ref 13–17)
INR BLD: 1.45 RATIO — HIGH (ref 0.88–1.16)
MCHC RBC-ENTMCNC: 28.5 PG — SIGNIFICANT CHANGE UP (ref 27–34)
MCHC RBC-ENTMCNC: 33.3 GM/DL — SIGNIFICANT CHANGE UP (ref 32–36)
MCV RBC AUTO: 85.4 FL — SIGNIFICANT CHANGE UP (ref 80–100)
NRBC # BLD: 0 /100 WBCS — SIGNIFICANT CHANGE UP (ref 0–0)
PLATELET # BLD AUTO: 246 K/UL — SIGNIFICANT CHANGE UP (ref 150–400)
POTASSIUM SERPL-MCNC: 3.8 MMOL/L — SIGNIFICANT CHANGE UP (ref 3.5–5.3)
POTASSIUM SERPL-SCNC: 3.8 MMOL/L — SIGNIFICANT CHANGE UP (ref 3.5–5.3)
PROT SERPL-MCNC: 6.2 G/DL — SIGNIFICANT CHANGE UP (ref 6–8.3)
PROTHROM AB SERPL-ACNC: 16.4 SEC — HIGH (ref 10–12.9)
RBC # BLD: 4.32 M/UL — SIGNIFICANT CHANGE UP (ref 4.2–5.8)
RBC # FLD: 16 % — HIGH (ref 10.3–14.5)
SODIUM SERPL-SCNC: 133 MMOL/L — LOW (ref 135–145)
WBC # BLD: 9.06 K/UL — SIGNIFICANT CHANGE UP (ref 3.8–10.5)
WBC # FLD AUTO: 9.06 K/UL — SIGNIFICANT CHANGE UP (ref 3.8–10.5)

## 2020-06-28 PROCEDURE — 99232 SBSQ HOSP IP/OBS MODERATE 35: CPT

## 2020-06-28 RX ORDER — CEFAZOLIN SODIUM 1 G
2000 VIAL (EA) INJECTION ONCE
Refills: 0 | Status: DISCONTINUED | OUTPATIENT
Start: 2020-06-28 | End: 2020-06-30

## 2020-06-28 RX ADMIN — Medication 20 MILLIGRAM(S): at 08:35

## 2020-06-28 RX ADMIN — Medication 25 MILLIGRAM(S): at 17:53

## 2020-06-28 RX ADMIN — Medication 25 MILLIGRAM(S): at 08:35

## 2020-06-28 RX ADMIN — Medication 650 MILLIGRAM(S): at 17:53

## 2020-06-28 NOTE — PROGRESS NOTE ADULT - ATTENDING COMMENTS
pt seen, examined, case & care plan d/w pt, at detail.  AM labs  NPO after midnight  Pt is schedule for Angiogram on 6/29/2020.

## 2020-06-28 NOTE — PROGRESS NOTE ADULT - SUBJECTIVE AND OBJECTIVE BOX
[facility] -- Radha Boyle, Lilibeth Shankar, Joel Pulido [place], [place]  Office # [phone]    Follow Up:  Afib    Subjective/Observations:     REVIEW OF SYSTEMS: All other review of systems is negative unless indicated above  PAST MEDICAL & SURGICAL HISTORY:  PVD (peripheral vascular disease)  Lung cancer: Left lung Sx for removal of Tumor, Rt Lung Cryo therapy for recurrence  Atrial fibrillation: on AC  Atrial fibrillation, unspecified type  S/P lobectomy of lung: Tumor removal from Lower Lobe, NO CT, NO RT    MEDICATIONS  (STANDING):  ceFAZolin   IVPB 2000 milliGRAM(s) IV Intermittent once  furosemide   Injectable 20 milliGRAM(s) IV Push daily  metoprolol tartrate 25 milliGRAM(s) Oral two times a day    MEDICATIONS  (PRN):  acetaminophen   Tablet .. 650 milliGRAM(s) Oral every 6 hours PRN Temp greater or equal to 38C (100.4F), Mild Pain (1 - 3)    Allergies    No Known Allergies    Intolerances      Vital Signs Last 24 Hrs  T(C): 36.5 (28 Jun 2020 07:15), Max: 36.6 (27 Jun 2020 15:33)  T(F): 97.7 (28 Jun 2020 07:15), Max: 97.9 (27 Jun 2020 15:33)  HR: 84 (28 Jun 2020 07:15) (68 - 84)  BP: 132/91 (28 Jun 2020 07:15) (101/58 - 132/91)  BP(mean): --  RR: 18 (28 Jun 2020 07:15) (18 - 20)  SpO2: 97% (28 Jun 2020 07:15) (97% - 98%)  I&O's Summary        LABS: All Labs Reviewed:                        12.3   9.06  )-----------( 246      ( 28 Jun 2020 06:47 )             36.9                         11.7   7.55  )-----------( 238      ( 27 Jun 2020 07:32 )             35.7                         12.3   8.09  )-----------( 249      ( 26 Jun 2020 06:47 )             36.7     28 Jun 2020 06:47    133    |  98     |  14     ----------------------------<  91     3.8     |  29     |  0.67   27 Jun 2020 07:32    132    |  98     |  15     ----------------------------<  97     3.7     |  29     |  0.68   26 Jun 2020 06:47    134    |  97     |  16     ----------------------------<  91     3.5     |  29     |  0.70     Ca    8.6        28 Jun 2020 06:47  Ca    8.8        27 Jun 2020 07:32  Ca    8.7        26 Jun 2020 06:47    TPro  6.2    /  Alb  3.0    /  TBili  0.9    /  DBili  x      /  AST  11     /  ALT  17     /  AlkPhos  68     28 Jun 2020 06:47    PT/INR - ( 28 Jun 2020 06:47 )   PT: 16.4 sec;   INR: 1.45 ratio      PTT - ( 27 Jun 2020 07:32 )  PTT:30.9 sec    < from: TTE Echo Complete w/o Contrast w/ Doppler (06.21.20 @ 13:37) >     EXAM:  ECHO TTE WO CON COMP W DOPP         PROCEDURE DATE:  06/21/2020        INTERPRETATION:  INDICATION: Heart failure  Sonographer AS    Blood Pressure 124/57    Height unavailable     Weight 83.9 kg    Dimensions:    LA 3.8       Normal Values: 2.0 - 4.0 cm    Ao 3.0        Normal Values: 2.0 - 3.8 cm  SEPTUM 1.2       Normal Values: 0.6 - 1.2 cm  PWT 1.3       Normal Values: 0.6 - 1.1 cm  LVIDd 4.7         Normal Values: 3.0 - 5.6 cm  LVIDs Normal Values: 1.8 - 4.0 cm      OBSERVATIONS:  Technically difficult study  Mitral Valve: Thickened leaflets, mild to moderate MR.  Aortic Valve/Aorta: Calcified trileaflet aortic valve with decreased opening. Peak transaortic valve gradient of 72.9 mmHg with a mean transaortic valve gradient of 41 mmHg. The aortic valve area is calculated to be 0.36 sq cm. This is consistent with severe aortic stenosis  Tricuspid Valve: normal with mild TR.  Pulmonic Valve: Trace PI  Left Atrium: normal  Right Atrium: Not well visualized  Left Ventricle: Moderate global left ventricular systolic dysfunction with an estimated LVEF of 40%. Mild LVH  Right Ventricle: Grossly normal size and systolic function.  Pericardium/Pleura: normal, no significant pericardial effusion.  Pulmonary/RV Pressure: estimated PA systolic pressure of 28.4mmHg assuming an RA pressure of 3 mmHg.    Conclusion:   Technically difficult study  Mild concentric LVH with moderate global left ventricular systolic dysfunction with an estimated LVEF of 40%  Grossly normal RV size and systolic function.   Calcified trileaflet aortic valve with severe aortic stenosis  Mild to moderate MR   Mild TR.     No significant pericardial effusion.      KARIE LONGO   This document has been electronically signed. Jun 22 2020  1:08PM      < end of copied text >    < from: Xray Chest 1 View- PORTABLE-Urgent (06.20.20 @ 16:03) >    EXAM:  XR CHEST PORTABLE URGENT 1V                          PROCEDURE DATE:  06/20/2020      INTERPRETATION:  PROCEDURE: AP view of the chest.    CLINICAL INFORMATION: Leg swelling.    COMPARISON: None.    FINDINGS:    Lungs: Peripherallinear atelectasis versus scarring in the right upper lobe. No focal consolidation.  Heart: The heart is normal in size. Surgical clips overlie the heart.  Mediastinum: The mediastinum is within normal limits.    IMPRESSION:  Peripheral linear atelectasis versus scarring in the right upper lobe. No focal consolidation.    MADELINE SRIVASTAVA M.D., ATTENDING RADIOLOGIST  This document has been electronically signed. Jun 20 2020  4:10PM     < end of copied text >    < from: 12 Lead ECG (06.20.20 @ 14:49) >    Ventricular Rate 104 BPM    Atrial Rate 117 BPM    QRS Duration 114 ms    Q-T Interval 310 ms    QTC Calculation(Bezet) 407 ms    R Axis -28 degrees    T Axis 72 degrees    Diagnosis Line Atrial fibrillation  Abnormal ECG  No previous ECGs available  Confirmed by Marlene Peoples MD ([id number]) on 6/22/2020 9:42:20 AM    < end of copied text > [facility] -- Radha Boyle, Vonnie, Lilibeth, Joel Pulido Savella, [place]  Office # [phone]    Follow Up:  Afib    Subjective/Observations: Sleeping soundly.  Remains comfortable on RA.  No respiratory or cardiac discomfort.  With intermittent right foot pain    REVIEW OF SYSTEMS: All other review of systems is negative unless indicated above  PAST MEDICAL & SURGICAL HISTORY:  PVD (peripheral vascular disease)  Lung cancer: Left lung Sx for removal of Tumor, Rt Lung Cryo therapy for recurrence  Atrial fibrillation: on AC  Atrial fibrillation, unspecified type  S/P lobectomy of lung: Tumor removal from Lower Lobe, NO CT, NO RT    MEDICATIONS  (STANDING):  ceFAZolin   IVPB 2000 milliGRAM(s) IV Intermittent once  furosemide   Injectable 20 milliGRAM(s) IV Push daily  metoprolol tartrate 25 milliGRAM(s) Oral two times a day    MEDICATIONS  (PRN):  acetaminophen   Tablet .. 650 milliGRAM(s) Oral every 6 hours PRN Temp greater or equal to 38C (100.4F), Mild Pain (1 - 3)    Allergies    No Known Allergies    Intolerances    Vital Signs Last 24 Hrs  T(C): 36.5 (28 Jun 2020 07:15), Max: 36.6 (27 Jun 2020 15:33)  T(F): 97.7 (28 Jun 2020 07:15), Max: 97.9 (27 Jun 2020 15:33)  HR: 84 (28 Jun 2020 07:15) (68 - 84)  BP: 132/91 (28 Jun 2020 07:15) (101/58 - 132/91)  BP(mean): --  RR: 18 (28 Jun 2020 07:15) (18 - 20)  SpO2: 97% (28 Jun 2020 07:15) (97% - 98%)  I&O's Summary    PHYSICAL EXAM:  TELE: Afib  Constitutional: NAD, awake and alert, obese  HEENT: Moist Mucous Membranes, Anicteric  Pulmonary: Non-labored, breath sounds are clear but diminished bilaterally, No wheezing, rales or rhonchi  Cardiovascular: IRRR, S1 and S2, + murmurs.  No rubs, gallops or clicks  Gastrointestinal: Bowel Sounds present, soft, nontender.   Lymph: No peripheral edema. No lymphadenopathy.  Skin: No visible rashes.  BLE ulcers with dressing dry and intact  Psych:  Mood & affect appropriate    LABS: All Labs Reviewed:                        12.3   9.06  )-----------( 246      ( 28 Jun 2020 06:47 )             36.9                         11.7   7.55  )-----------( 238      ( 27 Jun 2020 07:32 )             35.7                         12.3   8.09  )-----------( 249      ( 26 Jun 2020 06:47 )             36.7     28 Jun 2020 06:47    133    |  98     |  14     ----------------------------<  91     3.8     |  29     |  0.67   27 Jun 2020 07:32    132    |  98     |  15     ----------------------------<  97     3.7     |  29     |  0.68   26 Jun 2020 06:47    134    |  97     |  16     ----------------------------<  91     3.5     |  29     |  0.70     Ca    8.6        28 Jun 2020 06:47  Ca    8.8        27 Jun 2020 07:32  Ca    8.7        26 Jun 2020 06:47    TPro  6.2    /  Alb  3.0    /  TBili  0.9    /  DBili  x      /  AST  11     /  ALT  17     /  AlkPhos  68     28 Jun 2020 06:47    PT/INR - ( 28 Jun 2020 06:47 )   PT: 16.4 sec;   INR: 1.45 ratio      PTT - ( 27 Jun 2020 07:32 )  PTT:30.9 sec    < from: TTE Echo Complete w/o Contrast w/ Doppler (06.21.20 @ 13:37) >     EXAM:  ECHO TTE WO CON COMP W DOPP         PROCEDURE DATE:  06/21/2020        INTERPRETATION:  INDICATION: Heart failure  Sonographer AS    Blood Pressure 124/57    Height unavailable     Weight 83.9 kg    Dimensions:    LA 3.8       Normal Values: 2.0 - 4.0 cm    Ao 3.0        Normal Values: 2.0 - 3.8 cm  SEPTUM 1.2       Normal Values: 0.6 - 1.2 cm  PWT 1.3       Normal Values: 0.6 - 1.1 cm  LVIDd 4.7         Normal Values: 3.0 - 5.6 cm  LVIDs Normal Values: 1.8 - 4.0 cm      OBSERVATIONS:  Technically difficult study  Mitral Valve: Thickened leaflets, mild to moderate MR.  Aortic Valve/Aorta: Calcified trileaflet aortic valve with decreased opening. Peak transaortic valve gradient of 72.9 mmHg with a mean transaortic valve gradient of 41 mmHg. The aortic valve area is calculated to be 0.36 sq cm. This is consistent with severe aortic stenosis  Tricuspid Valve: normal with mild TR.  Pulmonic Valve: Trace PI  Left Atrium: normal  Right Atrium: Not well visualized  Left Ventricle: Moderate global left ventricular systolic dysfunction with an estimated LVEF of 40%. Mild LVH  Right Ventricle: Grossly normal size and systolic function.  Pericardium/Pleura: normal, no significant pericardial effusion.  Pulmonary/RV Pressure: estimated PA systolic pressure of 28.4mmHg assuming an RA pressure of 3 mmHg.    Conclusion:   Technically difficult study  Mild concentric LVH with moderate global left ventricular systolic dysfunction with an estimated LVEF of 40%  Grossly normal RV size and systolic function.   Calcified trileaflet aortic valve with severe aortic stenosis  Mild to moderate MR   Mild TR.     No significant pericardial effusion.      KARIE LONGO   This document has been electronically signed. Jun 22 2020  1:08PM      < end of copied text >    < from: Xray Chest 1 View- PORTABLE-Urgent (06.20.20 @ 16:03) >    EXAM:  XR CHEST PORTABLE URGENT 1V                          PROCEDURE DATE:  06/20/2020      INTERPRETATION:  PROCEDURE: AP view of the chest.    CLINICAL INFORMATION: Leg swelling.    COMPARISON: None.    FINDINGS:    Lungs: Peripherallinear atelectasis versus scarring in the right upper lobe. No focal consolidation.  Heart: The heart is normal in size. Surgical clips overlie the heart.  Mediastinum: The mediastinum is within normal limits.    IMPRESSION:  Peripheral linear atelectasis versus scarring in the right upper lobe. No focal consolidation.    MADELINE SRIVASTAVA M.D., ATTENDING RADIOLOGIST  This document has been electronically signed. Jun 20 2020  4:10PM     < end of copied text >    < from: 12 Lead ECG (06.20.20 @ 14:49) >    Ventricular Rate 104 BPM    Atrial Rate 117 BPM    QRS Duration 114 ms    Q-T Interval 310 ms    QTC Calculation(Bezet) 407 ms    R Axis -28 degrees    T Axis 72 degrees    Diagnosis Line Atrial fibrillation  Abnormal ECG  No previous ECGs available  Confirmed by Marlene Peoples MD ([id number]) on 6/22/2020 9:42:20 AM    < end of copied text >

## 2020-06-28 NOTE — PROGRESS NOTE ADULT - SUBJECTIVE AND OBJECTIVE BOX
Patient is a 87y old  Male who presents with a chief complaint of b/l LE weeping edema (28 Jun 2020 10:57)    HPI:  88 y/o M with hx of , afib (on coumadin), 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 CHF , PVD  presents to ED for worsening edema with  b/l weeping skin & erythema with pain that bothers him to walk  . Pt reports that his sons made him come into the ED. Reports has been having b/l LE edema with  weeping legs  for around 1 1/2 year. Has gotten worse in the past few day with increasing erythema, skin blisters +/- pus with occasional bouts of pain. Denies any other complaints, no fever, No chills ,No  cp, sob, palpitations, abdominal pain, dizziness, palpitations.  Pt was transferred from [place] ER after he got IV Vanco & IV Lasix, IV Cardizem     Given IV Cardizem and started on Cardizem gtt in the ED for rapid afib (20 Jun 2020 19:26)    INTERVAL HPI:  6/24/2020: Patient seen and examined at bedside. Low, stable H/H. INR 2.36. Hyponatremia improving. Pending RLE angiogram, possible stent/angioplasty in IR tomorrow. Hold Coumadin at this time, goal INR for procedure is 1.5 or less. HOLD Lisinopril 2.5 mg PO qd for Angio.  6/25/2020: Patient seen and examined at bedside. Low, stable H/H. INR 1.89. Hyponatremia improving. RLE angiogram, possible stent/angioplasty in IR today. Procedure was cancelled in IR as pt got upset with KIRIT Ext IV site Infiltration with FFP causing swelling of L U EXT. Given Coumadin 2.5 mg PO x1. Last day abx.   6/26/2020: Patient seen and examined at bedside. Low, stable H/H. INR 1.64. Off IV antibiotics. Pt is refusing Lovenox & IV Heparin as AC, until when angiogram is done next week.  6/27/2020: Pt seen, examined, NO Complaints, feels much better. Awaiting angiogram on Monday.6/29, AC on HOLD. Completed Abx   6/28/2020: Pt seen, examined, C/O leg pain with dressing changes. HOLD AC . pt is schedule for Angiogram on 6/29/2020.    OVERNIGHT EVENTS: none    Home Medications:  Coumadin 5 mg oral tablet: 1 tab(s) orally 2 times a day (22 Jun 2020 18:12)      MEDICATIONS  (STANDING):  ceFAZolin   IVPB 2000 milliGRAM(s) IV Intermittent once  furosemide   Injectable 20 milliGRAM(s) IV Push daily  metoprolol tartrate 25 milliGRAM(s) Oral two times a day    MEDICATIONS  (PRN):  acetaminophen   Tablet .. 650 milliGRAM(s) Oral every 6 hours PRN Temp greater or equal to 38C (100.4F), Mild Pain (1 - 3)      Allergies    No Known Allergies    Intolerances        Social History:  Never smoker, denies etoh and drug abuse  , Lives alone, Retired  (20 Jun 2020 19:26)      REVIEW OF SYSTEMS: i Am OK, pain when dressing changes,  CONSTITUTIONAL: No fever, No chills, No fatigue, No myalgia, No Body ache, No Weakness  EYES: No eye pain,  No visual disturbances, No discharge, NO Redness  ENMT:  No ear pain, No nose bleed, No vertigo; No sinus pain, NO throat pain, No Congestion  NECK: No pain, No stiffness  RESPIRATORY: No cough, NO wheezing, No  hemoptysis, NO  shortness of breath  CARDIOVASCULAR: No chest pain, palpitations  GASTROINTESTINAL: No abdominal pain, NO epigastric pain. No nausea, No vomiting; No diarrhea, No constipation. [  ] BM  GENITOURINARY: No dysuria, No frequency, No urgency, No hematuria, NO incontinence  NEUROLOGICAL: No headaches, No dizziness, No numbness, No tingling, No tremors, No weakness  EXT: No Swelling, No Pain, No Edema  SKIN:  [x  ] No itching, burning, rashes, or lesions   MUSCULOSKELETAL: No joint pain ,No Jt swelling; No muscle pain, No back pain, No extremity pain  PSYCHIATRIC: No depression,  No anxiety,  No mood swings ,No difficulty sleeping at night  PAIN SCALE: [  ] None  [ x ] Other- 5/10, leg ulcers  ROS Unable to obtain due to - [  ] Dementia  [  ] Lethargy [  ] Drowsy [  ] Sedated [  ] non verbal  REST OF REVIEW Of SYSTEM - [x  ] Normal     Vital Signs Last 24 Hrs  T(C): 36.3 (28 Jun 2020 16:47), Max: 36.5 (28 Jun 2020 07:15)  T(F): 97.4 (28 Jun 2020 16:47), Max: 97.7 (28 Jun 2020 07:15)  HR: 94 (28 Jun 2020 16:47) (80 - 94)  BP: 120/79 (28 Jun 2020 16:47) (101/58 - 132/91)  BP(mean): --  RR: 19 (28 Jun 2020 16:47) (18 - 20)  SpO2: 95% (28 Jun 2020 16:47) (95% - 97%)  Finger Stick      PHYSICAL EXAM:  GENERAL:  [ x ] NAD, [ x ] Well appearing, [  ] Agitated, [  ] Drowsy, [  ] Lethargy, [  ] Confused   HEAD:  [ x ] Normal, [  ] Other  EYES:  [ x ] EOMI, [ x ] PERRLA, [ x ] Conjunctiva and sclera clear normal, [  ] Other, [  ] Pallor, [  ] Discharge  ENMT:  [ x ] Normal, [ x ] Moist mucous membranes, [ x ] Good dentition, [ x ] No thrush  NECK:  [ x ] Supple, [ x ] No JVD, [ x ] Normal thyroid, [ x ] Lymphadenopathy, [  ] Other  CHEST/LUNG:  [ x ] Clear to auscultation bilaterally, [ x ] Breath Sounds equal B/L, [  ] Poor effort, [ x ] No rales, [ x ] No rhonchi, [ x ] No wheezing  HEART:  [ x ] Regular rate and rhythm, [  ] Tachycardia, [  ] Bradycardia, [  ] Irregular, [ x ] 3/6 murmurs, No rubs, No gallops, [  ] PPM in place (Mfr:  )  ABDOMEN:  [ x ] Soft, [ x ] Nontender, [ x ] Nondistended, [ x ] No mass, [  x] Bowel sounds present, [  ] Obese  NERVOUS SYSTEM:  [ x ] Alert & Oriented x3, [ x ] Nonfocal, [  ] Confusion, [  ] Encephalopathic, [  ] Sedated, [  ] Unable to assess, [  ] Dementia, [  ] Other-  EXTREMITIES:  [ x ] 2+ Peripheral Pulses, No clubbing, No cyanosis,  [ x ] Mild Edema B/L lower EXT, [ x ] PVD stasis skin changes B/L lower EXT, [ x ] b/l lower Ext erythema, open ulcers,  some yellowish slough , no foul smell, no warmth, no discharge.  LYMPH:  No lymphadenopathy noted, [ x ] LUEXT  & Hand swelling- improving ,  No pain .N/V Intact, +ROM  SKIN:  [ x ] No rashes or lesions, [  ] Pressure ulcers, [ x ] Ecchymosis - B/L Upper EXT, [  ] Skin tears      DIET: Diet, DASH/TLC:   Sodium & Cholesterol Restricted  1500mL Fluid Restriction ([id number]) (06-22-20 @ 08:06)  Diet, NPO after Midnight:      NPO Start Date: 28-Jun-2020,   NPO Start Time: 23:59 (06-28-20 @ 09:07)      LABS:                        12.3   9.06  )-----------( 246      ( 28 Jun 2020 06:47 )             36.9     28 Jun 2020 06:47    133    |  98     |  14     ----------------------------<  91     3.8     |  29     |  0.67     Ca    8.6        28 Jun 2020 06:47    TPro  6.2    /  Alb  3.0    /  TBili  0.9    /  DBili  x      /  AST  11     /  ALT  17     /  AlkPhos  68     28 Jun 2020 06:47    PT/INR - ( 28 Jun 2020 06:47 )   PT: 16.4 sec;   INR: 1.45 ratio         PTT - ( 27 Jun 2020 07:32 )  PTT:30.9 sec      RADIOLOGY & ADDITIONAL TESTS: none      HEALTH ISSUES - PROBLEM Dx:  IV infiltration: IV infiltration  Severe aortic stenosis: Severe aortic stenosis  Systolic CHF: Systolic CHF  Varicose veins of bilateral lower extremities with pain: Varicose veins of bilateral lower extremities with pain  Stasis dermatitis with ulcer of left lower extremity due to peripheral venous hypertension: Stasis dermatitis with ulcer of left lower extremity due to peripheral venous hypertension  Stasis dermatitis with ulcer of right lower extremity due to peripheral venous hypertension: Stasis dermatitis with ulcer of right lower extremity due to peripheral venous hypertension  Chronic venous stasis dermatitis of both lower extremities: Chronic venous stasis dermatitis of both lower extremities  CHF (congestive heart failure): CHF (congestive heart failure)  PVD (peripheral vascular disease): PVD (peripheral vascular disease)  Hyponatremia: Hyponatremia  Need for prophylactic measure: Need for prophylactic measure  Atrial fibrillation: Atrial fibrillation  Acute on chronic congestive heart failure, unspecified heart failure type: Acute on chronic congestive heart failure, unspecified heart failure type  Cellulitis of lower extremity, unspecified laterality: Cellulitis of lower extremity, unspecified laterality        Consultant(s) Notes Reviewed:  [ x] YES     Care Discussed with [X] Consultants  [ x ] Patient  [ x] Family [  ] HCP [  ]   [  ] Social Service  [x  ] RN, [  ] Physical Therapy,[  ] Palliative care team  DVT PPX: [  ] Lovenox, [  ] S C Heparin, [  ] Coumadin, [  ] Xarelto, [  ] Eliquis, [  ] Pradaxa, [  ] IV Heparin drip, [  ] SCD [  ] Contraindication 2 to GI Bleed,[  ] Ambulation [  ] Contraindicated 2 to  bleed [  ] Contraindicated 2 to Brain Bleed, [ x ] Pt refusing DVT ppx & SCD  Advanced directive: [ x ] None, [  ] DNR/DNI

## 2020-06-28 NOTE — PROGRESS NOTE ADULT - PROBLEM SELECTOR PLAN 1
Rapid A Fib - HR stable   - Pt stopped home Cardizem on his own, non compliant for Cardiology follow up & meds  - Continue Lopressor 25 mg PO BID (started 6/23)  - Hold lisinopril 2.5 mg qd for angio  -HOLD Coumadin for Angiogram by IR/Vascular on Monday  - Echo (6/22) - Mild concentric LVH with moderate global LV systolic dysfunction with LVEF of 40%, grossly normal RV size and systolic functio, calcified trileaflet aortic valve with severe aortic stenosis, mild to moderate MR, mild TR.     - Continue to monitor on telemetry   - TSH wnl, low T3

## 2020-06-28 NOTE — PROGRESS NOTE ADULT - SUBJECTIVE AND OBJECTIVE BOX
SUBJECTIVE:  88 y/o M seen and examined at bedside. No overnight events. No changes in status.     Vital Signs Last 24 Hrs  T(C): 36.5 (28 Jun 2020 07:15), Max: 36.6 (27 Jun 2020 15:33)  T(F): 97.7 (28 Jun 2020 07:15), Max: 97.9 (27 Jun 2020 15:33)  HR: 84 (28 Jun 2020 07:15) (68 - 84)  BP: 132/91 (28 Jun 2020 07:15) (101/58 - 132/91)  BP(mean): --  RR: 18 (28 Jun 2020 07:15) (18 - 20)  SpO2: 97% (28 Jun 2020 07:15) (97% - 98%)    PHYSICAL EXAM:  GENERAL: NAD, OOB to chair   HEAD:  Atraumatic, Normocephalic  EXT: B/L LE dressed with ACE wraps. RLE: PT and DP pulses dopplerable. LLE: Dopplerable PT pulse. RUE with improving edema.  NEUROLOGY: A&O x 3    LABS:                        12.3   9.06  )-----------( 246      ( 28 Jun 2020 06:47 )             36.9     06-28    133<L>  |  98  |  14  ----------------------------<  91  3.8   |  29  |  0.67    Ca    8.6      28 Jun 2020 06:47    TPro  6.2  /  Alb  3.0<L>  /  TBili  0.9  /  DBili  x   /  AST  11<L>  /  ALT  17  /  AlkPhos  68  06-28    PT/INR - ( 28 Jun 2020 06:47 )   PT: 16.4 sec;   INR: 1.45 ratio         PTT - ( 27 Jun 2020 07:32 )  PTT:30.9 sec      ASSESSMENT  88 y/o M with PVD with worsening bilateral LE peripheral edema, venous ulcerations, moderate PAD/ stenosis in right SFA and LLE, refused angio 6/25/20 after infiltrated IV    PLAN  - Continue care as per primary team  - pain control, supportive care, OOB  - Regular diet, NPOM for angio tomorrow AM  - Refusing lovenox - continue AC with coumadin and monitor INR. Goal INR <1.5 for angio tomorrow   - local wound care  - elevation of RUE  - to undergo LE Angiogram AM tomorrow, 6/29/20 with Dr. Arzola     Surgical Team Contact Information  Spectralink: Ext: [phone] or [phone]  Pager: [phone]

## 2020-06-28 NOTE — PROGRESS NOTE ADULT - PROBLEM SELECTOR PLAN 4
Likely Ac on chronic Systolic CHF  - 2/2 Rapid A Fib, pt with elevated BNP, Hyponatremia & leg edema  - unclear about previous echo, last seen cardio >20 yrs ago.  - Echo (6/22) - Mild concentric LVH with moderate global LV systolic dysfunction with LVEF of 40%, grossly normal RV size and systolic functio, calcified trileaflet aortic valve with severe aortic stenosis, mild to moderate MR, mild TR.   - continue Lasix 20 mg IV daily  - i/os, daily weights  - Cardio -(torrey),follow up

## 2020-06-28 NOTE — PROGRESS NOTE ADULT - PROBLEM SELECTOR PLAN 2
Improved - Cellulitis b/l LE  -marked erythema, s/p Oozing clear liquids/skin blister with yellowish slough  with severe chronic venous stasis with possible +/- cellulitis &  fluid over load with edema - Venous Stasis ulcers, afebrile, NL WBC- s/p vanco  - s/p IV Rocephin (6/23-6/25)  - PVD, keep b/l lower EXT elevated  - Blood c/sx 2 - Neg  - Pain control  - Vascular Surgery (Dr. Arias) follow up  - Wound care (Dr. Chong) consulted, recs appreciated - Skin changes attributed to bilateral stasis dermatitis and bilateral venous hypertension with edema and venous stasis ulcers and weeping - silver alginate and dry dressings every other day, drainage dependant, and ace wraps  - ID consult (Dr gonzalez/Dr Simms) consulted, recs appreciated - suspect most of this is chronic, with venous stasis ulcers and dermatitis

## 2020-06-28 NOTE — PROGRESS NOTE ADULT - ASSESSMENT
88 y/o M with hx of CHF, afib (on coumadin), lung ca s/p resection presents to ED for b/l weeping edema. Admitted for b/l LE cellulitis, Rapid A Fib with RVR, volume overload    Afib with RVR  - Remains in Afib but rate-controlled.  Can discontinue telemetry  - S/P Cardizem gtt.  Will keep off of CCB in the setting of moderately reduced LVF, EF 40%  - Continue lopressor 25 mg twice daily with hold parameters.  Can switch to long-acting   - Continue to hold Coumadin in anticipation of peripheral angiogram with possible intervention in am  - Still refusing either FD Lovenox or Heparin.  Very much aware about his elevated risk for stroke but he is not concerned.  His INR is now 1.45  - Monitor electrolytes, replete to keep K>4 and Mag>2    HFrEF, Severe AS  - Pro-BNP= 5624.  Euvolemic on exam but would continue IV Lasix 20 g daily for now  - TTE w/ severe AS, mod global LVDF EF 40 % mild LVh   - Will need outpt evaluation for AS for possible TAVR.   - Would start ACEI when BP tolerates.     Chronic venous stasis/PAD  - Vascular on board  - Planned for peripheral angiogram in am.  He has a known severe AS and Afib but he has no clinical volume overload, no significant tachycardia.  No life-threatening arrhythmias, no ischemic symptoms.  He has no modifiable risk factors at this time.  However, given his severe AS. he is at an elevated risk but otherwise, optimized for planned procedure.    - Routine hemodynamic monitoring recommended  - Follow Vascular recs    DVT ppx  - Per Primary    Further cardiac w/u pending clinical course  All other w/u per Primary and Vascular  Will continue to follow    Zahra Costa DNP, NP-C  Cardiology   Spectra #[phone]

## 2020-06-29 LAB
ANION GAP SERPL CALC-SCNC: 5 MMOL/L — SIGNIFICANT CHANGE UP (ref 5–17)
BUN SERPL-MCNC: 14 MG/DL — SIGNIFICANT CHANGE UP (ref 7–23)
CALCIUM SERPL-MCNC: 8.7 MG/DL — SIGNIFICANT CHANGE UP (ref 8.5–10.1)
CHLORIDE SERPL-SCNC: 99 MMOL/L — SIGNIFICANT CHANGE UP (ref 96–108)
CO2 SERPL-SCNC: 30 MMOL/L — SIGNIFICANT CHANGE UP (ref 22–31)
CREAT SERPL-MCNC: 0.69 MG/DL — SIGNIFICANT CHANGE UP (ref 0.5–1.3)
GLUCOSE SERPL-MCNC: 95 MG/DL — SIGNIFICANT CHANGE UP (ref 70–99)
HCT VFR BLD CALC: 35.2 % — LOW (ref 39–50)
HGB BLD-MCNC: 11.8 G/DL — LOW (ref 13–17)
INR BLD: 1.36 RATIO — HIGH (ref 0.88–1.16)
MCHC RBC-ENTMCNC: 29 PG — SIGNIFICANT CHANGE UP (ref 27–34)
MCHC RBC-ENTMCNC: 33.5 GM/DL — SIGNIFICANT CHANGE UP (ref 32–36)
MCV RBC AUTO: 86.5 FL — SIGNIFICANT CHANGE UP (ref 80–100)
NRBC # BLD: 0 /100 WBCS — SIGNIFICANT CHANGE UP (ref 0–0)
PLATELET # BLD AUTO: 198 K/UL — SIGNIFICANT CHANGE UP (ref 150–400)
POTASSIUM SERPL-MCNC: 4.4 MMOL/L — SIGNIFICANT CHANGE UP (ref 3.5–5.3)
POTASSIUM SERPL-SCNC: 4.4 MMOL/L — SIGNIFICANT CHANGE UP (ref 3.5–5.3)
PROTHROM AB SERPL-ACNC: 15.4 SEC — HIGH (ref 10–12.9)
RBC # BLD: 4.07 M/UL — LOW (ref 4.2–5.8)
RBC # FLD: 15.9 % — HIGH (ref 10.3–14.5)
SODIUM SERPL-SCNC: 134 MMOL/L — LOW (ref 135–145)
WBC # BLD: 8.02 K/UL — SIGNIFICANT CHANGE UP (ref 3.8–10.5)
WBC # FLD AUTO: 8.02 K/UL — SIGNIFICANT CHANGE UP (ref 3.8–10.5)

## 2020-06-29 PROCEDURE — 99232 SBSQ HOSP IP/OBS MODERATE 35: CPT

## 2020-06-29 RX ORDER — APIXABAN 2.5 MG/1
1 TABLET, FILM COATED ORAL
Qty: 60 | Refills: 0
Start: 2020-06-29 | End: 2020-07-28

## 2020-06-29 RX ORDER — WARFARIN SODIUM 2.5 MG/1
2.5 TABLET ORAL ONCE
Refills: 0 | Status: DISCONTINUED | OUTPATIENT
Start: 2020-06-29 | End: 2020-06-29

## 2020-06-29 RX ORDER — APIXABAN 2.5 MG/1
5 TABLET, FILM COATED ORAL
Refills: 0 | Status: DISCONTINUED | OUTPATIENT
Start: 2020-06-29 | End: 2020-06-30

## 2020-06-29 RX ORDER — FUROSEMIDE 40 MG
20 TABLET ORAL DAILY
Refills: 0 | Status: DISCONTINUED | OUTPATIENT
Start: 2020-06-30 | End: 2020-06-30

## 2020-06-29 RX ORDER — APIXABAN 2.5 MG/1
1 TABLET, FILM COATED ORAL
Qty: 0 | Refills: 0 | DISCHARGE
Start: 2020-06-29

## 2020-06-29 RX ADMIN — Medication 25 MILLIGRAM(S): at 17:00

## 2020-06-29 RX ADMIN — Medication 25 MILLIGRAM(S): at 05:06

## 2020-06-29 RX ADMIN — APIXABAN 5 MILLIGRAM(S): 2.5 TABLET, FILM COATED ORAL at 17:00

## 2020-06-29 NOTE — PROGRESS NOTE ADULT - SUBJECTIVE AND OBJECTIVE BOX
[facility] -- Radha Boyle, Vonnie, Lilibeth, Joel Pulido Savella  Office # [phone]      Follow Up:    afib     Subjective/Observations:   No events overnight resting comfortably in bed.  No complaints of chest pain, dyspnea, or palpitations reported. No signs of orthopnea or PND.      REVIEW OF SYSTEMS: All other review of systems is negative unless indicated above    PAST MEDICAL & SURGICAL HISTORY:  PVD (peripheral vascular disease)  Lung cancer: Left lung Sx for removal of Tumor, Rt Lung Cryo therapy for recurrence  Atrial fibrillation: on AC  Atrial fibrillation, unspecified type  S/P lobectomy of lung: Tumor removal from Lower Lobe, NO CT, NO RT      MEDICATIONS  (STANDING):  ceFAZolin   IVPB 2000 milliGRAM(s) IV Intermittent once  furosemide   Injectable 20 milliGRAM(s) IV Push daily  metoprolol tartrate 25 milliGRAM(s) Oral two times a day    MEDICATIONS  (PRN):  acetaminophen   Tablet .. 650 milliGRAM(s) Oral every 6 hours PRN Temp greater or equal to 38C (100.4F), Mild Pain (1 - 3)      Allergies    No Known Allergies    Intolerances        Vital Signs Last 24 Hrs  T(C): 36.3 (29 Jun 2020 07:07), Max: 36.4 (28 Jun 2020 23:32)  T(F): 97.3 (29 Jun 2020 07:07), Max: 97.6 (28 Jun 2020 23:32)  HR: 71 (29 Jun 2020 07:07) (61 - 94)  BP: 120/78 (29 Jun 2020 07:07) (112/74 - 120/79)  BP(mean): --  RR: 18 (29 Jun 2020 07:07) (18 - 19)  SpO2: 95% (29 Jun 2020 07:07) (95% - 97%)    I&O's Summary        PHYSICAL EXAM:  TELE: afib   Constitutional: NAD, awake and alert, well-developed  HEENT: Moist Mucous Membranes, Anicteric  Pulmonary: Non-labored, breath sounds are clear bilaterally, No wheezing, crackles or rhonchi  Cardiovascular: IRRegular, S1 and S2 nl, No murmurs, rubs, gallops or clicks  Gastrointestinal: Bowel Sounds present, soft, nontender.   Lymph: No lymphadenopathy. No peripheral edema.  Skin: chronic venous stasis   Psych:  Mood & affect appropriate    LABS: All Labs Reviewed:                        11.8   8.02  )-----------( 198      ( 29 Jun 2020 06:37 )             35.2                         12.3   9.06  )-----------( 246      ( 28 Jun 2020 06:47 )             36.9                         11.7   7.55  )-----------( 238      ( 27 Jun 2020 07:32 )             35.7     29 Jun 2020 06:37    134    |  99     |  14     ----------------------------<  95     4.4     |  30     |  0.69   28 Jun 2020 06:47    133    |  98     |  14     ----------------------------<  91     3.8     |  29     |  0.67   27 Jun 2020 07:32    132    |  98     |  15     ----------------------------<  97     3.7     |  29     |  0.68     Ca    8.7        29 Jun 2020 06:37  Ca    8.6        28 Jun 2020 06:47  Ca    8.8        27 Jun 2020 07:32    TPro  6.2    /  Alb  3.0    /  TBili  0.9    /  DBili  x      /  AST  11     /  ALT  17     /  AlkPhos  68     28 Jun 2020 06:47    PT/INR - ( 29 Jun 2020 06:37 )   PT: 15.4 sec;   INR: 1.36 ratio                  ECG:  < from: 12 Lead ECG (06.20.20 @ 14:49) >    Ventricular Rate 104 BPM    Atrial Rate 117 BPM    QRS Duration 114 ms    Q-T Interval 310 ms    QTC Calculation(Bezet) 407 ms    R Axis -28 degrees    T Axis 72 degrees    Diagnosis Line Atrial fibrillation  Abnormal ECG  No previous ECGs available    < end of copied text >    Echo:  < from: TTE Echo Complete w/o Contrast w/ Doppler (06.21.20 @ 13:37) >    OBSERVATIONS:  Technically difficult study  Mitral Valve: Thickened leaflets, mild to moderate MR.  Aortic Valve/Aorta: Calcified trileaflet aortic valve with decreased opening. Peak transaortic valve gradient of 72.9 mmHg with a mean transaortic valve gradient of 41 mmHg. The aortic valve area is calculated to be 0.36 sq cm. This is consistent with severe aortic stenosis  Tricuspid Valve: normal with mild TR.  Pulmonic Valve: Trace PI  Left Atrium: normal  Right Atrium: Not well visualized  Left Ventricle: Moderate global left ventricular systolic dysfunction with an estimated LVEF of 40%. Mild LVH  Right Ventricle: Grossly normal size and systolic function.  Pericardium/Pleura: normal, no significant pericardial effusion.  Pulmonary/RV Pressure: estimated PA systolic pressure of 28.4mmHg assuming an RA pressure of 3 mmHg.      Conclusion:   Technically difficult study  Mild concentric LVH with moderate global left ventricular systolic dysfunction with an estimated LVEF of 40%  Grossly normal RV size and systolic function.   Calcified trileaflet aortic valve with severe aortic stenosis  Mild to moderate MR   Mild TR.     No significant pericardial effusion.    Radiology:  < from: Xray Chest 1 View- PORTABLE-Urgent (06.20.20 @ 16:03) >    Lungs: Peripherallinear atelectasis versus scarring in the right upper lobe. No focal consolidation.  Heart: The heart is normal in size. Surgical clips overlie the heart.  Mediastinum: The mediastinum is within normal limits.    IMPRESSION:  Peripheral linear atelectasis versus scarring in the right upper lobe. No focal consolidation.    < end of copied text >

## 2020-06-29 NOTE — PROGRESS NOTE ADULT - PROBLEM SELECTOR PLAN 7
Improving  - 2/2 volume overloaded   - IV Lasix 20 mg daily Improving  - 2/2 volume overloaded   - IV Lasix 20 PO daily Improving  - 2/2 volume overloaded   - Lasix 20 PO daily

## 2020-06-29 NOTE — PROGRESS NOTE ADULT - ATTENDING COMMENTS
Pt seen, examined, case & care plan d/w pt, residents at detail.  D/W Vascular Sx PA -stable to start Eliquis from today PM, Stable for D/C to [facility]  Out pt wound care follow up & Podiatry follow up.  AM labs start Eliquis 5 mg 2x day starting 6 PM  D/W Social service & Son -Álvaro at detail.   D/C planning to [facility].

## 2020-06-29 NOTE — PROGRESS NOTE ADULT - PROBLEM SELECTOR PLAN 2
Improved - Cellulitis b/l LE  - marked erythema, s/p oozing clear liquids/skin blister with yellowish slough  with severe chronic venous stasis with possible +/- cellulitis &  fluid over load with edema - Venous Stasis ulcers, afebrile, NL WBC- s/p vanco  - s/p IV Rocephin (6/23-6/25)  - PVD, keep b/l lower EXT elevated  - Blood c/sx 2 - neg  - Pain control  - Vascular Surgery (Dr. Arias) consulted, recs appreciated - angiogram by IR/Vascular on Monday  - Wound care (Dr. Chong) consulted, recs appreciated - Skin changes attributed to bilateral stasis dermatitis and bilateral venous hypertension with edema and venous stasis ulcers and weeping - silver alginate and dry dressings every other day, drainage dependant, and ace wraps  - ID consult (Dr gonzalez/Dr Simms) consulted, recs appreciated - suspect most of this is chronic, with venous stasis ulcers and dermatitis Improved - Cellulitis b/l LE  - marked erythema, s/p oozing clear liquids/skin blister with yellowish slough  with severe chronic venous stasis with possible +/- cellulitis &  fluid over load with edema - Venous Stasis ulcers, afebrile, NL WBC- s/p vanco  - s/p IV Rocephin (6/23-6/25)  - PVD, keep b/l lower EXT elevated  - Blood c/sx 2 - neg  - Pain control  - Vascular Surgery (Dr. Arias) consulted, recs appreciated - LE angiogram by IR/Vascular today  - Wound care (Dr. Chong) consulted, recs appreciated - Skin changes attributed to bilateral stasis dermatitis and bilateral venous hypertension with edema and venous stasis ulcers and weeping - silver alginate and dry dressings every other day, drainage dependant, and ace wraps  - ID consult (Dr gonzalez/Dr Simms) consulted, recs appreciated - suspect most of this is chronic, with venous stasis ulcers and dermatitis Improved - Cellulitis b/l LE  - marked erythema, s/p oozing clear liquids/skin blister with yellowish slough  with severe chronic venous stasis with possible +/- cellulitis &  fluid over load with edema - Venous Stasis ulcers, afebrile, NL WBC- s/p vanco  - s/p IV Rocephin (6/23-6/25)  - PVD, keep b/l lower EXT elevated  - Blood c/sx 2 - neg  - Pain control  - s/p B/L LE angiogram (6/29/2020) showing occluded SFA and popliteal right leg and occluded popliteal left leg  - Vascular Surgery (Dr. Arias) consulted, recs appreciated  - Wound care (Dr. Chong) consulted, recs appreciated - Skin changes attributed to bilateral stasis dermatitis and bilateral venous hypertension with edema and venous stasis ulcers and weeping - silver alginate and dry dressings every other day, drainage dependant, and ace wraps  - ID consult (Dr gonzalez/Dr Simms) consulted, recs appreciated - suspect most of this is chronic, with venous stasis ulcers and dermatitis Improved - Cellulitis b/l LE  - marked erythema, s/p oozing clear liquids/skin blister with yellowish slough  with severe chronic venous stasis with possible +/- cellulitis &  fluid over load with edema - Venous Stasis ulcers, afebrile, NL WBC- s/p vanco  - s/p IV Rocephin (6/23-6/25)  - PVD, keep b/l lower EXT elevated  - Blood c/sx 2 - neg  - Pain control  - s/p R LE angiogram (6/29/2020) showing occluded SFA and popliteal right leg and occluded popliteal left leg  - Vascular Surgery (Dr. Arias) consulted, recs appreciated  - Wound care (Dr. Chong) consulted, recs appreciated - Skin changes attributed to bilateral stasis dermatitis and bilateral venous hypertension with edema and venous stasis ulcers and weeping - silver alginate and dry dressings every other day, drainage dependant, and ace wraps  - ID consult (Dr gonzalez/Dr Simms) consulted, recs appreciated - suspect most of this is chronic, with venous stasis ulcers and dermatitis

## 2020-06-29 NOTE — PROGRESS NOTE ADULT - PROBLEM SELECTOR PLAN 1
Rapid A Fib - HR stable   - Pt stopped home Cardizem on his own, non compliant for Cardiology follow up & meds  - Continue Lopressor 25 mg PO BID (started 6/23)  - Hold lisinopril 2.5 mg qd for angio  - HOLD Coumadin for Angiogram by IR/Vascular on Monday  - Echo (6/22) - Mild concentric LVH with moderate global LV systolic dysfunction with LVEF of 40%, grossly normal RV size and systolic functio, calcified trileaflet aortic valve with severe aortic stenosis, mild to moderate MR, mild TR.     - Continue to monitor on telemetry   - TSH wnl, low T3 Rapid A Fib - HR stable   - Pt stopped home Cardizem on his own, non compliant for Cardiology follow up & meds  - Continue Lopressor 25 mg PO BID (started 6/23)  - Hold lisinopril 2.5 mg qd for angio  - HOLD Coumadin for Angiogram by IR/Vascular. S/p B/L LE angiogram (6/29/2020) showing occluded SFA and popliteal right leg and occluded popliteal left leg.   - Echo (6/22) - Mild concentric LVH with moderate global LV systolic dysfunction with LVEF of 40%, grossly normal RV size and systolic functio, calcified trileaflet aortic valve with severe aortic stenosis, mild to moderate MR, mild TR.     - Continue to monitor on telemetry   - TSH wnl, low T3 Rapid A Fib - HR stable   - Pt stopped home Cardizem on his own, non compliant for Cardiology follow up & meds  - Continue Lopressor 25 mg PO BID (started 6/23)  - Discontinue Coumadin  - Start Eliquis 2.5 mg PO BID (6/29/2020)  - S/p B/L LE angiogram (6/29/2020) showing occluded SFA and popliteal right leg and occluded popliteal left leg.   - Echo (6/22) - Mild concentric LVH with moderate global LV systolic dysfunction with LVEF of 40%, grossly normal RV size and systolic functio, calcified trileaflet aortic valve with severe aortic stenosis, mild to moderate MR, mild TR.     - TSH wnl, low T3 Rapid A Fib - HR stable   - Pt stopped home Cardizem on his own, non compliant for Cardiology follow up & meds  - Continue Lopressor 25 mg PO BID (started 6/23)  - Discontinue Coumadin  - Start Eliquis 5 mg PO BID (6/29/2020)  - S/p B/L LE angiogram (6/29/2020) showing occluded SFA and popliteal right leg and occluded popliteal left leg.   - Echo (6/22) - Mild concentric LVH with moderate global LV systolic dysfunction with LVEF of 40%, grossly normal RV size and systolic functio, calcified trileaflet aortic valve with severe aortic stenosis, mild to moderate MR, mild TR.     - TSH wnl, low T3 Rapid A Fib - HR stable   - Pt stopped home Cardizem on his own, non compliant for Cardiology follow up & meds  - Continue Lopressor 25 mg PO BID (started 6/23)  - Discontinue Coumadin  - Start Eliquis 5 mg PO BID (6/29/2020)  - Echo (6/22) - Mild concentric LVH with moderate global LV systolic dysfunction with LVEF of 40%, grossly normal RV size and systolic functio, calcified trileaflet aortic valve with severe aortic stenosis, mild to moderate MR, mild TR.     - TSH wnl, low T3 Rapid A Fib - HR stable   - Pt stopped home Cardizem on his own, non compliant for Cardiology follow up & meds  - Continue Lopressor 25 mg PO BID (started 6/23)  - Discontinue Coumadin. Start Eliquis 5 mg PO BID (6/29/2020)  - Echo (6/22) - Mild concentric LVH with moderate global LV systolic dysfunction with LVEF of 40%, grossly normal RV size and systolic functio, calcified trileaflet aortic valve with severe aortic stenosis, mild to moderate MR, mild TR.     - TSH wnl, low T3

## 2020-06-29 NOTE — PROGRESS NOTE ADULT - PROBLEM SELECTOR PLAN 6
Improved  -IV site infiltration L U Ext while giving FFP on 6/25 prior to angiogram, angiogram cancelled per patient's request  -Keep KIRIT Ext elevated, Warm soaks PRN  -N/V intact Improved  -IV site infiltration L U Ext while giving FFP on 6/25 prior to angiogram, angiogram cancelled per patient's request and performed today 6/29/2020  -Keep KIRIT Ext elevated, Warm soaks PRN  -N/V intact

## 2020-06-29 NOTE — PROGRESS NOTE ADULT - ASSESSMENT
88 y/o M with hx of CHF, afib (on coumadin), lung ca s/p resection presents to ED for b/l weeping edema. Admitted for b/l LE cellulitis, Rapid A Fib, and acute exacerbation of systolic CHF. 86 y/o M with hx of CHF, afib (on coumadin), lung ca s/p resection presents to ED for b/l weeping edema. Admitted for b/l LE cellulitis, Rapid A Fib, and acute exacerbation of systolic CHF, pending LE angiogram. 88 y/o M with hx of CHF, afib (on coumadin), lung ca s/p resection presents to ED for b/l weeping edema. Admitted for b/l LE cellulitis, Rapid A Fib, and acute exacerbation of systolic CHF, s/p B/L LE angiogram (6/29/2020) showing occluded SFA and popliteal right leg and occluded popliteal left leg. 86 y/o M with hx of CHF, afib, lung ca s/p resection presents to ED for b/l weeping edema. Admitted for b/l LE cellulitis, Rapid A Fib, and acute exacerbation of systolic CHF, s/p B/L LE angiogram (6/29/2020) showing occluded SFA and popliteal right leg and occluded popliteal left leg, now changed from Coumadin to Eliquis. 88 y/o M with hx of CHF, afib, lung ca s/p resection presents to ED for b/l weeping edema. Admitted for b/l LE cellulitis, Rapid A Fib, and acute exacerbation of systolic CHF, s/p R LE angiogram (6/29/2020) showing occluded SFA and popliteal right leg and occluded popliteal left leg, now changed from Coumadin to Eliquis.

## 2020-06-29 NOTE — PROGRESS NOTE ADULT - SUBJECTIVE AND OBJECTIVE BOX
Post Operative Note  Patient: JENA VARGAS 87y (12-Mar-1933) Male   MRN: [id number]  Location: [facility] [address] W1  Visit: 06-20-20 Inpatient  Date: 06-29-20 @ 21:47    Procedure: S/p bilateral LE angio with findings of occluded sfa and pop right leg, and occluded popliteal left leg    Subjective:   Patient seen and examined at bedside, patient offers no complaints at this time.    Objective:  Vitals: T(F): 97.7 (06-29-20 @ 14:36), Max: 97.7 (06-29-20 @ 14:36)  HR: 96 (06-29-20 @ 16:56)  BP: 106/71 (06-29-20 @ 16:56) (106/71 - 120/78)  RR: 17 (06-29-20 @ 14:36)  SpO2: 95% (06-29-20 @ 14:36)    PHYSICAL EXAM  GENERAL:  Elderly male sitting comfortably in a chair in NAD  HEAD:  Normocephalic, atraumatic  EXTREMITIES:  Left groin access site dressed with gauze and tegaderm, clean and dry, no hematoma noted, nontender to palpation.  Right and left lower extremities wrapped in ace bandage.  Bilateral PT pulses identified via doppler.  NEURO:  A&O x 3    Medications: [Standing]  acetaminophen   Tablet .. 650 milliGRAM(s) Oral every 6 hours PRN  apixaban 5 milliGRAM(s) Oral two times a day  ceFAZolin   IVPB 2000 milliGRAM(s) IV Intermittent once  metoprolol tartrate 25 milliGRAM(s) Oral two times a day    Medications: [PRN]  acetaminophen   Tablet .. 650 milliGRAM(s) Oral every 6 hours PRN  apixaban 5 milliGRAM(s) Oral two times a day  ceFAZolin   IVPB 2000 milliGRAM(s) IV Intermittent once  metoprolol tartrate 25 milliGRAM(s) Oral two times a day    Labs:                        11.8   8.02  )-----------( 198      ( 29 Jun 2020 06:37 )             35.2     06-29    134<L>  |  99  |  14  ----------------------------<  95  4.4   |  30  |  0.69    Ca    8.7      29 Jun 2020 06:37    TPro  6.2  /  Alb  3.0<L>  /  TBili  0.9  /  DBili  x   /  AST  11<L>  /  ALT  17  /  AlkPhos  68  06-28    PT/INR - ( 29 Jun 2020 06:37 )   PT: 15.4 sec;   INR: 1.36 ratio      Imaging:  No post-op imaging studies    Assessment:  87 year old male with PVD with worsening bilateral LE peripheral edema, venous ulcerations, moderate PAD/stenosis in right SFA and LLE, s/p bilateral LE angio with findings of occluded sfa and pop right leg, and occluded popliteal left leg.    Plan:  - Routine groin and vascular checks  - Pain control PRN  - Local wound care  - Continue regular diet  - Started on eliquis per medicine  - DASH diet with fluid restriction per medicine  - Will continue to monitor Post Operative Note  Patient: JENA VARGAS 87y (12-Mar-1933) Male   MRN: [id number]  Location: [facility] [address]1  Visit: 06-20-20 Inpatient  Date: 06-29-20 @ 21:47    Procedure: S/p bilateral LE angio with findings of occluded sfa and pop right leg, and occluded popliteal left leg    Subjective:   Patient seen and examined at bedside, patient offers no complaints at this time.    Objective:  Vitals: T(F): 97.7 (06-29-20 @ 14:36), Max: 97.7 (06-29-20 @ 14:36)  HR: 96 (06-29-20 @ 16:56)  BP: 106/71 (06-29-20 @ 16:56) (106/71 - 120/78)  RR: 17 (06-29-20 @ 14:36)  SpO2: 95% (06-29-20 @ 14:36)    PHYSICAL EXAM  GENERAL:  Elderly male sitting comfortably in a chair in NAD  HEAD:  Normocephalic, atraumatic  EXTREMITIES:  Left groin access site dressed with gauze and tegaderm, clean and dry, no hematoma noted, nontender to palpation.  Right and left lower extremities wrapped in ace bandage.  Bilateral PT pulses identified via doppler.  NEURO:  A&O x 3    Medications: [Standing]  acetaminophen   Tablet .. 650 milliGRAM(s) Oral every 6 hours PRN  apixaban 5 milliGRAM(s) Oral two times a day  ceFAZolin   IVPB 2000 milliGRAM(s) IV Intermittent once  metoprolol tartrate 25 milliGRAM(s) Oral two times a day    Medications: [PRN]  acetaminophen   Tablet .. 650 milliGRAM(s) Oral every 6 hours PRN  apixaban 5 milliGRAM(s) Oral two times a day  ceFAZolin   IVPB 2000 milliGRAM(s) IV Intermittent once  metoprolol tartrate 25 milliGRAM(s) Oral two times a day    Labs:                        11.8   8.02  )-----------( 198      ( 29 Jun 2020 06:37 )             35.2     06-29    134<L>  |  99  |  14  ----------------------------<  95  4.4   |  30  |  0.69    Ca    8.7      29 Jun 2020 06:37    TPro  6.2  /  Alb  3.0<L>  /  TBili  0.9  /  DBili  x   /  AST  11<L>  /  ALT  17  /  AlkPhos  68  06-28    PT/INR - ( 29 Jun 2020 06:37 )   PT: 15.4 sec;   INR: 1.36 ratio      Imaging:  No post-op imaging studies    Assessment:  87 year old male with PVD with worsening bilateral LE peripheral edema, venous ulcerations, moderate PAD/stenosis in right SFA and LLE, s/p bilateral LE angio with findings of occluded sfa and pop right leg, and occluded popliteal left leg.    Plan:  - Routine groin and vascular checks  - Pain control PRN  - Local wound care  - Started on eliquis per medicine  - DASH diet with fluid restriction per medicine  - Will continue to monitor Post Operative Note  Patient: JENA VARGAS 87y (12-Mar-1933) Male   MRN: [id number]  Location: [facility] [address]  Visit: 06-20-20 Inpatient  Date: 06-29-20 @ 21:47    Procedure: S/p bilateral LE angio with findings of occluded sfa and pop right leg, and occluded popliteal left leg    Subjective:   Patient seen and examined at bedside, patient offers no complaints at this time.    Objective:  Vitals: T(F): 97.7 (06-29-20 @ 14:36), Max: 97.7 (06-29-20 @ 14:36)  HR: 96 (06-29-20 @ 16:56)  BP: 106/71 (06-29-20 @ 16:56) (106/71 - 120/78)  RR: 17 (06-29-20 @ 14:36)  SpO2: 95% (06-29-20 @ 14:36)    PHYSICAL EXAM  GENERAL:  Elderly male sitting comfortably in a chair in NAD  HEAD:  Normocephalic, atraumatic  EXTREMITIES:  Left groin access site dressed with gauze and tegaderm, clean and dry, no hematoma noted, nontender to palpation.  Right and left lower extremities wrapped in ace bandage.  Bilateral PT pulses identified via doppler.  NEURO:  A&O x 3    Medications: [Standing]  acetaminophen   Tablet .. 650 milliGRAM(s) Oral every 6 hours PRN  apixaban 5 milliGRAM(s) Oral two times a day  ceFAZolin   IVPB 2000 milliGRAM(s) IV Intermittent once  metoprolol tartrate 25 milliGRAM(s) Oral two times a day    Medications: [PRN]  acetaminophen   Tablet .. 650 milliGRAM(s) Oral every 6 hours PRN  apixaban 5 milliGRAM(s) Oral two times a day  ceFAZolin   IVPB 2000 milliGRAM(s) IV Intermittent once  metoprolol tartrate 25 milliGRAM(s) Oral two times a day    Labs:                        11.8   8.02  )-----------( 198      ( 29 Jun 2020 06:37 )             35.2     06-29    134<L>  |  99  |  14  ----------------------------<  95  4.4   |  30  |  0.69    Ca    8.7      29 Jun 2020 06:37    TPro  6.2  /  Alb  3.0<L>  /  TBili  0.9  /  DBili  x   /  AST  11<L>  /  ALT  17  /  AlkPhos  68  06-28    PT/INR - ( 29 Jun 2020 06:37 )   PT: 15.4 sec;   INR: 1.36 ratio      Imaging:  No post-op imaging studies    Assessment:  87 year old male with PVD with worsening bilateral LE peripheral edema, venous ulcerations, moderate PAD/stenosis in right SFA and LLE, s/p bilateral LE angio with findings of occluded sfa and pop right leg, and occluded popliteal left leg.    Plan:  - Routine groin and vascular checks  - Pain control PRN  - Local wound care  - Started on eliquis per medicine  - DASH diet with fluid restriction per medicine  - AM labs  - Will continue to monitor

## 2020-06-29 NOTE — BRIEF OPERATIVE NOTE - NSICDXBRIEFPROCEDURE_GEN_ALL_CORE_FT
PROCEDURES:  Angiogram, extremity, left 29-Jun-2020 11:10:55  Gonzalez Dockery  Angiogram, extremity, right 29-Jun-2020 11:10:21  Gonzalez Dockery

## 2020-06-29 NOTE — PROGRESS NOTE ADULT - PROBLEM SELECTOR PLAN 8
DVT ppx: Will hold Coumadin in anticipation of angiogram, will give Lovenox 80 mg subQ q12h although patient currently refusing     DC planning to [facility] DVT ppx: Will hold Coumadin in anticipation of [facility] angiogram, will give Lovenox 80 mg subQ q12h although patient currently refusing     DC planning to [facility] DVT ppx: Discontinued Coumadin, switched to Eliquis 2.5 mg PO BID (6/29/2020)    DC planning to [facility] DVT ppx: Discontinued Coumadin, switched to Eliquis 5 mg PO BID (6/29/2020)    DC planning to [facility]

## 2020-06-29 NOTE — PROGRESS NOTE ADULT - SUBJECTIVE AND OBJECTIVE BOX
Patient is a 87y old  Male who presents with a chief complaint of b/l LE weeping edema (28 Jun 2020 17:44)    HPI:  88 y/o M with hx of , afib (on coumadin), 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 CHF , PVD  presents to ED for worsening edema with  b/l weeping skin & erythema with pain that bothers him to walk  . Pt reports that his sons made him come into the ED. Reports has been having b/l LE edema with  weeping legs  for around 1 1/2 year. Has gotten worse in the past few day with increasing erythema, skin blisters +/- pus with occasional bouts of pain. Denies any other complaints, no fever, No chills ,No  cp, sob, palpitations, abdominal pain, dizziness, palpitations.  Pt was transferred from [place] ER after he got IV Vanco & IV Lasix, IV Cardizem     Given IV Cardizem and started on Cardizem gtt in the ED for rapid afib (20 Jun 2020 19:26)    INTERVAL HPI:    OVERNIGHT EVENTS:    Home Medications:  Coumadin 5 mg oral tablet: 1 tab(s) orally 2 times a day (22 Jun 2020 18:12)      MEDICATIONS  (STANDING):  ceFAZolin   IVPB 2000 milliGRAM(s) IV Intermittent once  furosemide   Injectable 20 milliGRAM(s) IV Push daily  metoprolol tartrate 25 milliGRAM(s) Oral two times a day    MEDICATIONS  (PRN):  acetaminophen   Tablet .. 650 milliGRAM(s) Oral every 6 hours PRN Temp greater or equal to 38C (100.4F), Mild Pain (1 - 3)      No Known Allergies      Social History:  Never smoker, denies etoh and drug abuse  , Lives alone, Retired  (20 Jun 2020 19:26)      REVIEW OF SYSTEMS:  CONSTITUTIONAL: No fever, No chills, No fatigue, No myalgia, No Body ache, No Weakness  EYES: No eye pain,  No visual disturbances, No discharge, No Redness  ENMT: No ear pain, No nose bleed, No vertigo; No sinus pain, No throat pain, No Congestion  NECK: No pain, No stiffness  RESPIRATORY: No cough, No wheezing, No hemoptysis, No shortness of breath  CARDIOVASCULAR: No chest pain, No palpitations  GASTROINTESTINAL: No abdominal pain, No epigastric pain. No nausea, No vomiting, No diarrhea, No constipation; [  ] BM  GENITOURINARY: No dysuria, No frequency, No urgency, No hematuria, No incontinence  NEUROLOGICAL: No headaches, No dizziness, No numbness, No tingling, No tremors, No weakness  EXTREMITIES: No Swelling, No Pain, No Edema  SKIN: [  ] No itching, burning, rashes, or lesions   MUSCULOSKELETAL: No joint pain, No joint swelling; No muscle pain, No back pain, No extremity pain  PSYCHIATRIC: No depression, No anxiety, No mood swings, No difficulty sleeping at night  PAIN SCALE: [  ] None  [  ] Other-  ROS Unable to obtain due to: [  ] Dementia  [  ] Lethargy  [  ] Sedated  [  ] Non verbal  REST OF REVIEW OF SYSTEMS: [  ] Normal     Vital Signs Last 24 Hrs  T(C): 36.3 (29 Jun 2020 04:59), Max: 36.4 (28 Jun 2020 23:32)  T(F): 97.4 (29 Jun 2020 04:59), Max: 97.6 (28 Jun 2020 23:32)  HR: 61 (29 Jun 2020 04:59) (61 - 94)  BP: 115/70 (29 Jun 2020 04:59) (112/74 - 120/79)  BP(mean): --  RR: 18 (29 Jun 2020 04:59) (18 - 19)  SpO2: 96% (29 Jun 2020 04:59) (95% - 97%)    CAPILLARY BLOOD GLUCOSE          I&O's Summary    PHYSICAL EXAM:  GENERAL:  [  ] NAD, [  ] Well appearing, [  ] Agitated, [  ] Drowsy, [  ] Lethargy, [  ] Confused   HEAD:  [  ] Normal, [  ] Other  EYES:  [  ] EOMI, [  ] PERRLA, [  ] Conjunctiva and sclera clear normal, [  ] Other, [  ] Pallor, [  ] Discharge  ENMT:  [  ] Normal, [  ] Moist mucous membranes, [  ] Good dentition, [  ] No thrush  NECK:  [  ] Supple, [  ] No JVD, [  ] Normal thyroid, [  ] Lymphadenopathy, [  ] Other  CHEST/LUNG:  [  ] Clear to auscultation bilaterally, [  ] Breath Sounds equal B/L / decreased, [  ] Poor effort, [  ] No rales, [  ] No rhonchi, [  ] No wheezing  HEART:  [  ] Regular rate and rhythm, [  ] Tachycardia, [  ] Bradycardia, [  ] Irregular, [  ] No murmurs, No rubs, No gallops, [  ] PPM in place (Mfr:  )  ABDOMEN:  [  ] Soft, [  ] Nontender, [  ] Nondistended, [  ] No mass, [  ] Bowel sounds present, [  ] Obese  NERVOUS SYSTEM:  [  ] Alert & Oriented x3, [  ] Nonfocal, [  ] Confusion, [  ] Encephalopathic, [  ] Sedated, [  ] Unable to assess, [  ] Dementia, [  ] Other-  EXTREMITIES:  [  ] 2+ Peripheral Pulses, No clubbing, No cyanosis,  [  ] Edema B/L lower EXT, [  ] PVD stasis skin changes B/L lower EXT, [  ] Wound  LYMPH:  No lymphadenopathy noted  SKIN:  [  ] No rashes or lesions, [  ] Pressure ulcers, [  ] Ecchymosis, [  ] Skin tears, [  ] Other    DIET: Diet, DASH/TLC:   Sodium & Cholesterol Restricted  1500mL Fluid Restriction ([id number]) (06-22-20 @ 08:06)  Diet, NPO after Midnight:      NPO Start Date: 28-Jun-2020,   NPO Start Time: 23:59 (06-28-20 @ 09:07)      LABS:                        11.8   8.02  )-----------( 198      ( 29 Jun 2020 06:37 )             35.2     29 Jun 2020 06:37    134    |  99     |  14     ----------------------------<  95     4.4     |  30     |  0.69     Ca    8.7        29 Jun 2020 06:37      PT/INR - ( 29 Jun 2020 06:37 )   PT: 15.4 sec;   INR: 1.36 ratio                        Anemia Panel:      Thyroid Panel:                RADIOLOGY & ADDITIONAL TESTS:      HEALTH ISSUES - PROBLEM Dx:  Varicose veins of bilateral lower extremities with pain: Varicose veins of bilateral lower extremities with pain  Stasis dermatitis with ulcer of right lower extremity due to peripheral venous hypertension: Stasis dermatitis with ulcer of right lower extremity due to peripheral venous hypertension  Chronic venous stasis dermatitis of both lower extremities: Chronic venous stasis dermatitis of both lower extremities  CHF (congestive heart failure): CHF (congestive heart failure)  Hyponatremia: Hyponatremia  Need for prophylactic measure: Need for prophylactic measure  Acute on chronic congestive heart failure, unspecified heart failure type: Acute on chronic congestive heart failure, unspecified heart failure type  Cellulitis of lower extremity, unspecified laterality: Cellulitis of lower extremity, unspecified laterality  Stasis dermatitis with ulcer of left lower extremity due to peripheral venous hypertension: Stasis dermatitis with ulcer of left lower extremity due to peripheral venous hypertension  Atrial fibrillation: Atrial fibrillation  Severe aortic stenosis: Severe aortic stenosis  Systolic CHF: Systolic CHF  PVD (peripheral vascular disease): PVD (peripheral vascular disease)  IV infiltration: IV infiltration        Consultant(s) Notes Reviewed:  [  ] YES     Care Discussed with [ x ] Consultants, [  ] Patient, [  ] Family, [  ] HCP, [  ] , [  ] Social Service, [  ] RN, [  ] Physical Therapy, [  ] Palliative Care Team  DVT PPX: [  ] Lovenox, [  ] SC Heparin, [  ] Coumadin, [  ] Xarelto, [  ] Eliquis, [  ] Pradaxa, [  ] IV Heparin drip, [  ] SCD, [  ] Ambulation, [  ] Contraindicated 2/2 GI Bleed, [  ] Contraindicated 2/2  Bleed, [  ] Contraindicated 2/2 Brain Bleed  Advanced Directive: [  ] None, [  ] DNR/DNI Patient is a 87y old  Male who presents with a chief complaint of b/l LE weeping edema (28 Jun 2020 17:44)    HPI:  86 y/o M with hx of , afib (on coumadin), 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 CHF , PVD  presents to ED for worsening edema with  b/l weeping skin & erythema with pain that bothers him to walk  . Pt reports that his sons made him come into the ED. Reports has been having b/l LE edema with  weeping legs  for around 1 1/2 year. Has gotten worse in the past few day with increasing erythema, skin blisters +/- pus with occasional bouts of pain. Denies any other complaints, no fever, No chills ,No  cp, sob, palpitations, abdominal pain, dizziness, palpitations.  Pt was transferred from [place] ER after he got IV Vanco & IV Lasix, IV Cardizem     Given IV Cardizem and started on Cardizem gtt in the ED for rapid afib (20 Jun 2020 19:26)    INTERVAL HPI:  6/24/2020: Patient seen and examined at bedside. Low, stable H/H. INR 2.36. Hyponatremia improving. Pending RLE angiogram, possible stent/angioplasty in IR tomorrow. Hold Coumadin at this time, goal INR for procedure is 1.5 or less. HOLD Lisinopril 2.5 mg PO qd for Angio.  6/25/2020: Patient seen and examined at bedside. Low, stable H/H. INR 1.89. Hyponatremia improving. RLE angiogram, possible stent/angioplasty in IR today. Procedure was cancelled in IR as pt got upset with KIRIT Ext IV site Infiltration with FFP causing swelling of L U EXT. Given Coumadin 2.5 mg PO x1. Last day abx.   6/26/2020: Patient seen and examined at bedside. Low, stable H/H. INR 1.64. Off IV antibiotics. Pt is refusing Lovenox & IV Heparin as AC, until when angiogram is done next week.  6/27/2020: Pt seen, examined, NO Complaints, feels much better. Awaiting angiogram on Monday.6/29, AC on HOLD. Completed Abx   6/28/2020: Pt seen, examined, C/O leg pain with dressing changes. HOLD AC. Pt is schedule for Angiogram on 6/29/2020.  6/29/2020: Pt seen, examined. Pt is medically optimized for LE Angiogram with Dr. Arzola today.    OVERNIGHT EVENTS: NONE    Home Medications:  Coumadin 5 mg oral tablet: 1 tab(s) orally 2 times a day (22 Jun 2020 18:12)      MEDICATIONS  (STANDING):  ceFAZolin   IVPB 2000 milliGRAM(s) IV Intermittent once  furosemide   Injectable 20 milliGRAM(s) IV Push daily  metoprolol tartrate 25 milliGRAM(s) Oral two times a day    MEDICATIONS  (PRN):  acetaminophen   Tablet .. 650 milliGRAM(s) Oral every 6 hours PRN Temp greater or equal to 38C (100.4F), Mild Pain (1 - 3)      No Known Allergies      Social History:  Never smoker, denies etoh and drug abuse  , Lives alone, Retired  (20 Jun 2020 19:26)      REVIEW OF SYSTEMS:  CONSTITUTIONAL: No fever, No chills, No fatigue, No myalgia, No Body ache, No Weakness  EYES: No eye pain,  No visual disturbances, No discharge, NO Redness  ENMT:  No ear pain, No nose bleed, No vertigo; No sinus pain, NO throat pain, No Congestion  NECK: No pain, No stiffness  RESPIRATORY: No cough, NO wheezing, No  hemoptysis, NO  shortness of breath  CARDIOVASCULAR: No chest pain, palpitations  GASTROINTESTINAL: No abdominal pain, NO epigastric pain. No nausea, No vomiting; No diarrhea, No constipation. [  ] BM  GENITOURINARY: No dysuria, No frequency, No urgency, No hematuria, NO incontinence  NEUROLOGICAL: No headaches, No dizziness, No numbness, No tingling, No tremors, No weakness  EXT: No Swelling, No Pain, No Edema  SKIN:  [x  ] No itching, burning, rashes, or lesions   MUSCULOSKELETAL: No joint pain ,No Jt swelling; No muscle pain, No back pain, No extremity pain  PSYCHIATRIC: No depression,  No anxiety,  No mood swings ,No difficulty sleeping at night  PAIN SCALE: [  ] None  [ x ] Other- 5/10, leg ulcers  ROS Unable to obtain due to - [  ] Dementia  [  ] Lethargy [  ] Drowsy [  ] Sedated [  ] non verbal  REST OF REVIEW Of SYSTEM - [x  ] Normal     Vital Signs Last 24 Hrs  T(C): 36.3 (29 Jun 2020 04:59), Max: 36.4 (28 Jun 2020 23:32)  T(F): 97.4 (29 Jun 2020 04:59), Max: 97.6 (28 Jun 2020 23:32)  HR: 61 (29 Jun 2020 04:59) (61 - 94)  BP: 115/70 (29 Jun 2020 04:59) (112/74 - 120/79)  BP(mean): --  RR: 18 (29 Jun 2020 04:59) (18 - 19)  SpO2: 96% (29 Jun 2020 04:59) (95% - 97%)    CAPILLARY BLOOD GLUCOSE          I&O's Summary    PHYSICAL EXAM:  GENERAL:  [ x ] NAD, [ x ] Well appearing, [  ] Agitated, [  ] Drowsy, [  ] Lethargy, [  ] Confused   HEAD:  [ x ] Normal, [  ] Other  EYES:  [ x ] EOMI, [ x ] PERRLA, [ x ] Conjunctiva and sclera clear normal, [  ] Other, [  ] Pallor, [  ] Discharge  ENMT:  [ x ] Normal, [ x ] Moist mucous membranes, [ x ] Good dentition, [ x ] No thrush  NECK:  [ x ] Supple, [ x ] No JVD, [ x ] Normal thyroid, [ x ] Lymphadenopathy, [  ] Other  CHEST/LUNG:  [ x ] Clear to auscultation bilaterally, [ x ] Breath Sounds equal B/L, [  ] Poor effort, [ x ] No rales, [ x ] No rhonchi, [ x ] No wheezing  HEART:  [ x ] Regular rate and rhythm, [  ] Tachycardia, [  ] Bradycardia, [  ] Irregular, [ x ] 3/6 murmurs, No rubs, No gallops, [  ] PPM in place (Mfr:  )  ABDOMEN:  [ x ] Soft, [ x ] Nontender, [ x ] Nondistended, [ x ] No mass, [  x] Bowel sounds present, [  ] Obese  NERVOUS SYSTEM:  [ x ] Alert & Oriented x3, [ x ] Nonfocal, [  ] Confusion, [  ] Encephalopathic, [  ] Sedated, [  ] Unable to assess, [  ] Dementia, [  ] Other-  EXTREMITIES:  [ x ] 2+ Peripheral Pulses, No clubbing, No cyanosis,  [ x ] Mild Edema B/L lower EXT, [ x ] PVD stasis skin changes B/L lower EXT, [ x ] b/l lower Ext erythema, open ulcers,  some yellowish slough , no foul smell, no warmth, no discharge.  LYMPH:  No lymphadenopathy noted, [ x ] LUEXT  & Hand swelling- improving ,  No pain .N/V Intact, +ROM  SKIN:  [ x ] No rashes or lesions, [  ] Pressure ulcers, [ x ] Ecchymosis - B/L Upper EXT, [  ] Skin tears    DIET: Diet, DASH/TLC:   Sodium & Cholesterol Restricted  1500mL Fluid Restriction ([id number]) (06-22-20 @ 08:06)  Diet, NPO after Midnight:      NPO Start Date: 28-Jun-2020,   NPO Start Time: 23:59 (06-28-20 @ 09:07)      LABS:                        11.8   8.02  )-----------( 198      ( 29 Jun 2020 06:37 )             35.2     29 Jun 2020 06:37    134    |  99     |  14     ----------------------------<  95     4.4     |  30     |  0.69     Ca    8.7        29 Jun 2020 06:37      PT/INR - ( 29 Jun 2020 06:37 )   PT: 15.4 sec;   INR: 1.36 ratio                        Anemia Panel:      Thyroid Panel:                RADIOLOGY & ADDITIONAL TESTS:      HEALTH ISSUES - PROBLEM Dx:  Varicose veins of bilateral lower extremities with pain: Varicose veins of bilateral lower extremities with pain  Stasis dermatitis with ulcer of right lower extremity due to peripheral venous hypertension: Stasis dermatitis with ulcer of right lower extremity due to peripheral venous hypertension  Chronic venous stasis dermatitis of both lower extremities: Chronic venous stasis dermatitis of both lower extremities  CHF (congestive heart failure): CHF (congestive heart failure)  Hyponatremia: Hyponatremia  Need for prophylactic measure: Need for prophylactic measure  Acute on chronic congestive heart failure, unspecified heart failure type: Acute on chronic congestive heart failure, unspecified heart failure type  Cellulitis of lower extremity, unspecified laterality: Cellulitis of lower extremity, unspecified laterality  Stasis dermatitis with ulcer of left lower extremity due to peripheral venous hypertension: Stasis dermatitis with ulcer of left lower extremity due to peripheral venous hypertension  Atrial fibrillation: Atrial fibrillation  Severe aortic stenosis: Severe aortic stenosis  Systolic CHF: Systolic CHF  PVD (peripheral vascular disease): PVD (peripheral vascular disease)  IV infiltration: IV infiltration        Consultant(s) Notes Reviewed:  [ x] YES     Care Discussed with [X] Consultants  [ x ] Patient  [ x] Family [  ] HCP [  ]   [  ] Social Service  [x  ] RN, [  ] Physical Therapy,[  ] Palliative care team  DVT PPX: [  ] Lovenox, [  ] S C Heparin, [  ] Coumadin, [  ] Xarelto, [  ] Eliquis, [  ] Pradaxa, [  ] IV Heparin drip, [  ] SCD [  ] Contraindication 2 to GI Bleed,[  ] Ambulation [  ] Contraindicated 2 to  bleed [  ] Contraindicated 2 to Brain Bleed, [ x ] Pt refusing DVT ppx & SCD  Advanced directive: [ x ] None, [  ] DNR/DNI Patient is a 87y old  Male who presents with a chief complaint of b/l LE weeping edema (28 Jun 2020 17:44)    HPI:  88 y/o M with hx of , afib (on coumadin), 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 CHF , PVD  presents to ED for worsening edema with  b/l weeping skin & erythema with pain that bothers him to walk  . Pt reports that his sons made him come into the ED. Reports has been having b/l LE edema with  weeping legs  for around 1 1/2 year. Has gotten worse in the past few day with increasing erythema, skin blisters +/- pus with occasional bouts of pain. Denies any other complaints, no fever, No chills ,No  cp, sob, palpitations, abdominal pain, dizziness, palpitations.  Pt was transferred from [place] ER after he got IV Vanco & IV Lasix, IV Cardizem     Given IV Cardizem and started on Cardizem gtt in the ED for rapid afib (20 Jun 2020 19:26)    INTERVAL HPI:  6/24/2020: Patient seen and examined at bedside. Low, stable H/H. INR 2.36. Hyponatremia improving. Pending RLE angiogram, possible stent/angioplasty in IR tomorrow. Hold Coumadin at this time, goal INR for procedure is 1.5 or less. HOLD Lisinopril 2.5 mg PO qd for Angio.  6/25/2020: Patient seen and examined at bedside. Low, stable H/H. INR 1.89. Hyponatremia improving. RLE angiogram, possible stent/angioplasty in IR today. Procedure was cancelled in IR as pt got upset with KIRIT Ext IV site Infiltration with FFP causing swelling of L U EXT. Given Coumadin 2.5 mg PO x1. Last day abx.   6/26/2020: Patient seen and examined at bedside. Low, stable H/H. INR 1.64. Off IV antibiotics. Pt is refusing Lovenox & IV Heparin as AC, until when angiogram is done next week.  6/27/2020: Pt seen, examined, NO Complaints, feels much better. Awaiting angiogram on Monday.6/29, AC on HOLD. Completed Abx   6/28/2020: Pt seen, examined, C/O leg pain with dressing changes. HOLD AC. Pt is schedule for Angiogram on 6/29/2020.  6/29/2020: Pt seen, examined. S/p B/L LE Angiogram today showing occluded SFA and popliteal right leg and occluded popliteal left leg.     OVERNIGHT EVENTS: NONE    Home Medications:  Coumadin 5 mg oral tablet: 1 tab(s) orally 2 times a day (22 Jun 2020 18:12)      MEDICATIONS  (STANDING):  ceFAZolin   IVPB 2000 milliGRAM(s) IV Intermittent once  furosemide   Injectable 20 milliGRAM(s) IV Push daily  metoprolol tartrate 25 milliGRAM(s) Oral two times a day    MEDICATIONS  (PRN):  acetaminophen   Tablet .. 650 milliGRAM(s) Oral every 6 hours PRN Temp greater or equal to 38C (100.4F), Mild Pain (1 - 3)      No Known Allergies      Social History:  Never smoker, denies etoh and drug abuse  , Lives alone, Retired  (20 Jun 2020 19:26)      REVIEW OF SYSTEMS:  CONSTITUTIONAL: No fever, No chills, No fatigue, No myalgia, No Body ache, No Weakness  EYES: No eye pain,  No visual disturbances, No discharge, NO Redness  ENMT:  No ear pain, No nose bleed, No vertigo; No sinus pain, NO throat pain, No Congestion  NECK: No pain, No stiffness  RESPIRATORY: No cough, NO wheezing, No  hemoptysis, NO  shortness of breath  CARDIOVASCULAR: No chest pain, palpitations  GASTROINTESTINAL: No abdominal pain, NO epigastric pain. No nausea, No vomiting; No diarrhea, No constipation. [  ] BM  GENITOURINARY: No dysuria, No frequency, No urgency, No hematuria, NO incontinence  NEUROLOGICAL: No headaches, No dizziness, No numbness, No tingling, No tremors, No weakness  EXT: No Swelling, No Pain, No Edema  SKIN:  [x  ] No itching, burning, rashes, or lesions   MUSCULOSKELETAL: No joint pain ,No Jt swelling; No muscle pain, No back pain, No extremity pain  PSYCHIATRIC: No depression,  No anxiety,  No mood swings ,No difficulty sleeping at night  PAIN SCALE: [  ] None  [ x ] Other- 5/10, leg ulcers  ROS Unable to obtain due to - [  ] Dementia  [  ] Lethargy [  ] Drowsy [  ] Sedated [  ] non verbal  REST OF REVIEW Of SYSTEM - [x  ] Normal     Vital Signs Last 24 Hrs  T(C): 36.3 (29 Jun 2020 04:59), Max: 36.4 (28 Jun 2020 23:32)  T(F): 97.4 (29 Jun 2020 04:59), Max: 97.6 (28 Jun 2020 23:32)  HR: 61 (29 Jun 2020 04:59) (61 - 94)  BP: 115/70 (29 Jun 2020 04:59) (112/74 - 120/79)  BP(mean): --  RR: 18 (29 Jun 2020 04:59) (18 - 19)  SpO2: 96% (29 Jun 2020 04:59) (95% - 97%)    CAPILLARY BLOOD GLUCOSE          I&O's Summary    PHYSICAL EXAM:  GENERAL:  [ x ] NAD, [ x ] Well appearing, [  ] Agitated, [  ] Drowsy, [  ] Lethargy, [  ] Confused   HEAD:  [ x ] Normal, [  ] Other  EYES:  [ x ] EOMI, [ x ] PERRLA, [ x ] Conjunctiva and sclera clear normal, [  ] Other, [  ] Pallor, [  ] Discharge  ENMT:  [ x ] Normal, [ x ] Moist mucous membranes, [ x ] Good dentition, [ x ] No thrush  NECK:  [ x ] Supple, [ x ] No JVD, [ x ] Normal thyroid, [ x ] Lymphadenopathy, [  ] Other  CHEST/LUNG:  [ x ] Clear to auscultation bilaterally, [ x ] Breath Sounds equal B/L, [  ] Poor effort, [ x ] No rales, [ x ] No rhonchi, [ x ] No wheezing  HEART:  [ x ] Regular rate and rhythm, [  ] Tachycardia, [  ] Bradycardia, [  ] Irregular, [ x ] 3/6 murmurs, No rubs, No gallops, [  ] PPM in place (Mfr:  )  ABDOMEN:  [ x ] Soft, [ x ] Nontender, [ x ] Nondistended, [ x ] No mass, [  x] Bowel sounds present, [  ] Obese  NERVOUS SYSTEM:  [ x ] Alert & Oriented x3, [ x ] Nonfocal, [  ] Confusion, [  ] Encephalopathic, [  ] Sedated, [  ] Unable to assess, [  ] Dementia, [  ] Other-  EXTREMITIES:  [ x ] 2+ Peripheral Pulses, No clubbing, No cyanosis,  [ x ] Mild Edema B/L lower EXT, [ x ] PVD stasis skin changes B/L lower EXT, [ x ] b/l lower Ext erythema, open ulcers,  some yellowish slough , no foul smell, no warmth, no discharge.  LYMPH:  No lymphadenopathy noted, [ x ] LUEXT  & Hand swelling- improving ,  No pain .N/V Intact, +ROM  SKIN:  [ x ] No rashes or lesions, [  ] Pressure ulcers, [ x ] Ecchymosis - B/L Upper EXT, [  ] Skin tears    DIET: Diet, DASH/TLC:   Sodium & Cholesterol Restricted  1500mL Fluid Restriction ([id number]) (06-22-20 @ 08:06)  Diet, NPO after Midnight:      NPO Start Date: 28-Jun-2020,   NPO Start Time: 23:59 (06-28-20 @ 09:07)      LABS:                        11.8   8.02  )-----------( 198      ( 29 Jun 2020 06:37 )             35.2     29 Jun 2020 06:37    134    |  99     |  14     ----------------------------<  95     4.4     |  30     |  0.69     Ca    8.7        29 Jun 2020 06:37      PT/INR - ( 29 Jun 2020 06:37 )   PT: 15.4 sec;   INR: 1.36 ratio                        Anemia Panel:      Thyroid Panel:                RADIOLOGY & ADDITIONAL TESTS:      HEALTH ISSUES - PROBLEM Dx:  Varicose veins of bilateral lower extremities with pain: Varicose veins of bilateral lower extremities with pain  Stasis dermatitis with ulcer of right lower extremity due to peripheral venous hypertension: Stasis dermatitis with ulcer of right lower extremity due to peripheral venous hypertension  Chronic venous stasis dermatitis of both lower extremities: Chronic venous stasis dermatitis of both lower extremities  CHF (congestive heart failure): CHF (congestive heart failure)  Hyponatremia: Hyponatremia  Need for prophylactic measure: Need for prophylactic measure  Acute on chronic congestive heart failure, unspecified heart failure type: Acute on chronic congestive heart failure, unspecified heart failure type  Cellulitis of lower extremity, unspecified laterality: Cellulitis of lower extremity, unspecified laterality  Stasis dermatitis with ulcer of left lower extremity due to peripheral venous hypertension: Stasis dermatitis with ulcer of left lower extremity due to peripheral venous hypertension  Atrial fibrillation: Atrial fibrillation  Severe aortic stenosis: Severe aortic stenosis  Systolic CHF: Systolic CHF  PVD (peripheral vascular disease): PVD (peripheral vascular disease)  IV infiltration: IV infiltration        Consultant(s) Notes Reviewed:  [ x] YES     Care Discussed with [X] Consultants  [ x ] Patient  [ x] Family [  ] HCP [  ]   [  ] Social Service  [x  ] RN, [  ] Physical Therapy,[  ] Palliative care team  DVT PPX: [  ] Lovenox, [  ] S C Heparin, [  ] Coumadin, [  ] Xarelto, [  ] Eliquis, [  ] Pradaxa, [  ] IV Heparin drip, [  ] SCD [  ] Contraindication 2 to GI Bleed,[  ] Ambulation [  ] Contraindicated 2 to  bleed [  ] Contraindicated 2 to Brain Bleed, [ x ] Pt refusing DVT ppx & SCD  Advanced directive: [ x ] None, [  ] DNR/DNI Patient is a 87y old  Male who presents with a chief complaint of b/l LE weeping edema (28 Jun 2020 17:44)    HPI:  86 y/o M with hx of , afib (on coumadin), 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 CHF , PVD  presents to ED for worsening edema with  b/l weeping skin & erythema with pain that bothers him to walk  . Pt reports that his sons made him come into the ED. Reports has been having b/l LE edema with  weeping legs  for around 1 1/2 year. Has gotten worse in the past few day with increasing erythema, skin blisters +/- pus with occasional bouts of pain. Denies any other complaints, no fever, No chills ,No  cp, sob, palpitations, abdominal pain, dizziness, palpitations.  Pt was transferred from [place] [facility] after he got IV Vanco & IV Lasix, IV Cardizem     Given IV Cardizem and started on Cardizem gtt in the ED for rapid afib (20 Jun 2020 19:26)    INTERVAL HPI:  6/24/2020: Patient seen and examined at bedside. Low, stable H/H. INR 2.36. Hyponatremia improving. Pending RLE angiogram, possible stent/angioplasty in IR tomorrow. Hold Coumadin at this time, goal INR for procedure is 1.5 or less. HOLD Lisinopril 2.5 mg PO qd for Angio.  6/25/2020: Patient seen and examined at bedside. Low, stable H/H. INR 1.89. Hyponatremia improving. RLE angiogram, possible stent/angioplasty in IR today. Procedure was cancelled in IR as pt got upset with KIRIT Ext IV site Infiltration with FFP causing swelling of L U EXT. Given Coumadin 2.5 mg PO x1. Last day abx.   6/26/2020: Patient seen and examined at bedside. Low, stable H/H. INR 1.64. Off IV antibiotics. Pt is refusing Lovenox & IV Heparin as AC, until when angiogram is done next week.  6/27/2020: Pt seen, examined, NO Complaints, feels much better. Awaiting angiogram on Monday.6/29, AC on HOLD. Completed Abx   6/28/2020: Pt seen, examined, C/O leg pain with dressing changes. HOLD AC. Pt is schedule for Angiogram on 6/29/2020.  6/29/2020: Pt seen, examined. S/p B/L LE Angiogram today showing occluded SFA and popliteal right leg and occluded popliteal left leg. Start Eliquis this evening.     OVERNIGHT EVENTS: NONE    Home Medications:  Coumadin 5 mg oral tablet: 1 tab(s) orally 2 times a day (22 Jun 2020 18:12)      MEDICATIONS  (STANDING):  ceFAZolin   IVPB 2000 milliGRAM(s) IV Intermittent once  furosemide   Injectable 20 milliGRAM(s) IV Push daily  metoprolol tartrate 25 milliGRAM(s) Oral two times a day    MEDICATIONS  (PRN):  acetaminophen   Tablet .. 650 milliGRAM(s) Oral every 6 hours PRN Temp greater or equal to 38C (100.4F), Mild Pain (1 - 3)      No Known Allergies      Social History:  Never smoker, denies etoh and drug abuse  , Lives alone, Retired  (20 Jun 2020 19:26)      REVIEW OF SYSTEMS:  CONSTITUTIONAL: No fever, No chills, No fatigue, No myalgia, No Body ache, No Weakness  EYES: No eye pain,  No visual disturbances, No discharge, NO Redness  ENMT:  No ear pain, No nose bleed, No vertigo; No sinus pain, NO throat pain, No Congestion  NECK: No pain, No stiffness  RESPIRATORY: No cough, NO wheezing, No  hemoptysis, NO  shortness of breath  CARDIOVASCULAR: No chest pain, palpitations  GASTROINTESTINAL: No abdominal pain, NO epigastric pain. No nausea, No vomiting; No diarrhea, No constipation. [  ] BM  GENITOURINARY: No dysuria, No frequency, No urgency, No hematuria, NO incontinence  NEUROLOGICAL: No headaches, No dizziness, No numbness, No tingling, No tremors, No weakness  EXT: No Swelling, No Pain, No Edema  SKIN:  [x  ] No itching, burning, rashes, or lesions   MUSCULOSKELETAL: No joint pain ,No Jt swelling; No muscle pain, No back pain, No extremity pain  PSYCHIATRIC: No depression,  No anxiety,  No mood swings ,No difficulty sleeping at night  PAIN SCALE: [  ] None  [ x ] Other- 5/10, leg ulcers  ROS Unable to obtain due to - [  ] Dementia  [  ] Lethargy [  ] Drowsy [  ] Sedated [  ] non verbal  REST OF REVIEW Of SYSTEM - [x  ] Normal     Vital Signs Last 24 Hrs  T(C): 36.3 (29 Jun 2020 04:59), Max: 36.4 (28 Jun 2020 23:32)  T(F): 97.4 (29 Jun 2020 04:59), Max: 97.6 (28 Jun 2020 23:32)  HR: 61 (29 Jun 2020 04:59) (61 - 94)  BP: 115/70 (29 Jun 2020 04:59) (112/74 - 120/79)  BP(mean): --  RR: 18 (29 Jun 2020 04:59) (18 - 19)  SpO2: 96% (29 Jun 2020 04:59) (95% - 97%)    CAPILLARY BLOOD GLUCOSE          I&O's Summary    PHYSICAL EXAM:  GENERAL:  [ x ] NAD, [ x ] Well appearing, [  ] Agitated, [  ] Drowsy, [  ] Lethargy, [  ] Confused   HEAD:  [ x ] Normal, [  ] Other  EYES:  [ x ] EOMI, [ x ] PERRLA, [ x ] Conjunctiva and sclera clear normal, [  ] Other, [  ] Pallor, [  ] Discharge  ENMT:  [ x ] Normal, [ x ] Moist mucous membranes, [ x ] Good dentition, [ x ] No thrush  NECK:  [ x ] Supple, [ x ] No JVD, [ x ] Normal thyroid, [ x ] Lymphadenopathy, [  ] Other  CHEST/LUNG:  [ x ] Clear to auscultation bilaterally, [ x ] Breath Sounds equal B/L, [  ] Poor effort, [ x ] No rales, [ x ] No rhonchi, [ x ] No wheezing  HEART:  [ x ] Regular rate and rhythm, [  ] Tachycardia, [  ] Bradycardia, [  ] Irregular, [ x ] 3/6 murmurs, No rubs, No gallops, [  ] PPM in place (Mfr:  )  ABDOMEN:  [ x ] Soft, [ x ] Nontender, [ x ] Nondistended, [ x ] No mass, [  x] Bowel sounds present, [  ] Obese  NERVOUS SYSTEM:  [ x ] Alert & Oriented x3, [ x ] Nonfocal, [  ] Confusion, [  ] Encephalopathic, [  ] Sedated, [  ] Unable to assess, [  ] Dementia, [  ] Other-  EXTREMITIES:  [ x ] 2+ Peripheral Pulses, No clubbing, No cyanosis,  [ x ] Mild Edema B/L lower EXT, [ x ] PVD stasis skin changes B/L lower EXT, [ x ] b/l lower Ext erythema, open ulcers,  some yellowish slough , no foul smell, no warmth, no discharge.  LYMPH:  No lymphadenopathy noted, [ x ] LUEXT  & Hand swelling- improving ,  No pain .N/V Intact, +ROM  SKIN:  [ x ] No rashes or lesions, [  ] Pressure ulcers, [ x ] Ecchymosis - B/L Upper EXT, [  ] Skin tears    DIET: Diet, DASH/TLC:   Sodium & Cholesterol Restricted  1500mL Fluid Restriction ([id number]) (06-22-20 @ 08:06)  Diet, NPO after Midnight:      NPO Start Date: 28-Jun-2020,   NPO Start Time: 23:59 (06-28-20 @ 09:07)      LABS:                        11.8   8.02  )-----------( 198      ( 29 Jun 2020 06:37 )             35.2     29 Jun 2020 06:37    134    |  99     |  14     ----------------------------<  95     4.4     |  30     |  0.69     Ca    8.7        29 Jun 2020 06:37      PT/INR - ( 29 Jun 2020 06:37 )   PT: 15.4 sec;   INR: 1.36 ratio                        Anemia Panel:      Thyroid Panel:                RADIOLOGY & ADDITIONAL TESTS:      HEALTH ISSUES - PROBLEM Dx:  Varicose veins of bilateral lower extremities with pain: Varicose veins of bilateral lower extremities with pain  Stasis dermatitis with ulcer of right lower extremity due to peripheral venous hypertension: Stasis dermatitis with ulcer of right lower extremity due to peripheral venous hypertension  Chronic venous stasis dermatitis of both lower extremities: Chronic venous stasis dermatitis of both lower extremities  CHF (congestive heart failure): CHF (congestive heart failure)  Hyponatremia: Hyponatremia  Need for prophylactic measure: Need for prophylactic measure  Acute on chronic congestive heart failure, unspecified heart failure type: Acute on chronic congestive heart failure, unspecified heart failure type  Cellulitis of lower extremity, unspecified laterality: Cellulitis of lower extremity, unspecified laterality  Stasis dermatitis with ulcer of left lower extremity due to peripheral venous hypertension: Stasis dermatitis with ulcer of left lower extremity due to peripheral venous hypertension  Atrial fibrillation: Atrial fibrillation  Severe aortic stenosis: Severe aortic stenosis  Systolic CHF: Systolic CHF  PVD (peripheral vascular disease): PVD (peripheral vascular disease)  IV infiltration: IV infiltration        Consultant(s) Notes Reviewed:  [ x] YES     Care Discussed with [X] Consultants  [ x ] Patient  [ x] Family [  ] HCP [  ]   [  ] Social Service  [x  ] RN, [  ] Physical Therapy,[  ] Palliative care team  DVT PPX: [  ] Lovenox, [  ] S C Heparin, [  ] Coumadin, [  ] Xarelto, [  ] Eliquis, [  ] Pradaxa, [  ] IV Heparin drip, [  ] SCD [  ] Contraindication 2 to GI Bleed,[  ] Ambulation [  ] Contraindicated 2 to  bleed [  ] Contraindicated 2 to Brain Bleed, [ x ] Pt refusing DVT ppx & SCD  Advanced directive: [ x ] None, [  ] DNR/DNI Patient is a 87y old  Male who presents with a chief complaint of b/l LE weeping edema (28 Jun 2020 17:44)    HPI:  86 y/o M with hx of , afib (on coumadin), 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 CHF , PVD  presents to ED for worsening edema with  b/l weeping skin & erythema with pain that bothers him to walk  . Pt reports that his sons made him come into the ED. Reports has been having b/l LE edema with  weeping legs  for around 1 1/2 year. Has gotten worse in the past few day with increasing erythema, skin blisters +/- pus with occasional bouts of pain. Denies any other complaints, no fever, No chills ,No  cp, sob, palpitations, abdominal pain, dizziness, palpitations.  Pt was transferred from [place] ER after he got IV Vanco & IV Lasix, IV Cardizem     Given IV Cardizem and started on Cardizem gtt in the ED for rapid afib (20 Jun 2020 19:26)    INTERVAL HPI:  6/24/2020: Patient seen and examined at bedside. Low, stable H/H. INR 2.36. Hyponatremia improving. Pending RLE angiogram, possible stent/angioplasty in IR tomorrow. Hold Coumadin at this time, goal INR for procedure is 1.5 or less. HOLD Lisinopril 2.5 mg PO qd for Angio.  6/25/2020: Patient seen and examined at bedside. Low, stable H/H. INR 1.89. Hyponatremia improving. RLE angiogram, possible stent/angioplasty in IR today. Procedure was cancelled in IR as pt got upset with KIRIT Ext IV site Infiltration with FFP causing swelling of L U EXT. Given Coumadin 2.5 mg PO x1. Last day abx.   6/26/2020: Patient seen and examined at bedside. Low, stable H/H. INR 1.64. Off IV antibiotics. Pt is refusing Lovenox & IV Heparin as AC, until when angiogram is done next week.  6/27/2020: Pt seen, examined, NO Complaints, feels much better. Awaiting angiogram on Monday.6/29, AC on HOLD. Completed Abx   6/28/2020: Pt seen, examined, C/O leg pain with dressing changes. HOLD AC. Pt is schedule for Angiogram on 6/29/2020.  6/29/2020: Pt seen, examined. S/p B/L LE Angiogram today showing occluded SFA and popliteal right leg and occluded popliteal left leg. Started Eliquis 5 mg PO BID this evening.     OVERNIGHT EVENTS: NONE    Home Medications:  Coumadin 5 mg oral tablet: 1 tab(s) orally 2 times a day (22 Jun 2020 18:12)      MEDICATIONS  (STANDING):  ceFAZolin   IVPB 2000 milliGRAM(s) IV Intermittent once  furosemide   Injectable 20 milliGRAM(s) IV Push daily  metoprolol tartrate 25 milliGRAM(s) Oral two times a day    MEDICATIONS  (PRN):  acetaminophen   Tablet .. 650 milliGRAM(s) Oral every 6 hours PRN Temp greater or equal to 38C (100.4F), Mild Pain (1 - 3)      No Known Allergies      Social History:  Never smoker, denies etoh and drug abuse  , Lives alone, Retired  (20 Jun 2020 19:26)      REVIEW OF SYSTEMS:  CONSTITUTIONAL: No fever, No chills, No fatigue, No myalgia, No Body ache, No Weakness  EYES: No eye pain,  No visual disturbances, No discharge, NO Redness  ENMT:  No ear pain, No nose bleed, No vertigo; No sinus pain, NO throat pain, No Congestion  NECK: No pain, No stiffness  RESPIRATORY: No cough, NO wheezing, No  hemoptysis, NO  shortness of breath  CARDIOVASCULAR: No chest pain, palpitations  GASTROINTESTINAL: No abdominal pain, NO epigastric pain. No nausea, No vomiting; No diarrhea, No constipation. [  ] BM  GENITOURINARY: No dysuria, No frequency, No urgency, No hematuria, NO incontinence  NEUROLOGICAL: No headaches, No dizziness, No numbness, No tingling, No tremors, No weakness  EXT: No Swelling, No Pain, No Edema  SKIN:  [x  ] No itching, burning, rashes, or lesions   MUSCULOSKELETAL: No joint pain ,No Jt swelling; No muscle pain, No back pain, No extremity pain  PSYCHIATRIC: No depression,  No anxiety,  No mood swings ,No difficulty sleeping at night  PAIN SCALE: [  ] None  [ x ] Other- 5/10, leg ulcers  ROS Unable to obtain due to - [  ] Dementia  [  ] Lethargy [  ] Drowsy [  ] Sedated [  ] non verbal  REST OF REVIEW Of SYSTEM - [x  ] Normal     Vital Signs Last 24 Hrs  T(C): 36.3 (29 Jun 2020 04:59), Max: 36.4 (28 Jun 2020 23:32)  T(F): 97.4 (29 Jun 2020 04:59), Max: 97.6 (28 Jun 2020 23:32)  HR: 61 (29 Jun 2020 04:59) (61 - 94)  BP: 115/70 (29 Jun 2020 04:59) (112/74 - 120/79)  BP(mean): --  RR: 18 (29 Jun 2020 04:59) (18 - 19)  SpO2: 96% (29 Jun 2020 04:59) (95% - 97%)    CAPILLARY BLOOD GLUCOSE          I&O's Summary    PHYSICAL EXAM:  GENERAL:  [ x ] NAD, [ x ] Well appearing, [  ] Agitated, [  ] Drowsy, [  ] Lethargy, [  ] Confused   HEAD:  [ x ] Normal, [  ] Other  EYES:  [ x ] EOMI, [ x ] PERRLA, [ x ] Conjunctiva and sclera clear normal, [  ] Other, [  ] Pallor, [  ] Discharge  ENMT:  [ x ] Normal, [ x ] Moist mucous membranes, [ x ] Good dentition, [ x ] No thrush  NECK:  [ x ] Supple, [ x ] No JVD, [ x ] Normal thyroid, [ x ] Lymphadenopathy, [  ] Other  CHEST/LUNG:  [ x ] Clear to auscultation bilaterally, [ x ] Breath Sounds equal B/L, [  ] Poor effort, [ x ] No rales, [ x ] No rhonchi, [ x ] No wheezing  HEART:  [ x ] Regular rate and rhythm, [  ] Tachycardia, [  ] Bradycardia, [  ] Irregular, [ x ] 3/6 murmurs, No rubs, No gallops, [  ] PPM in place (Mfr:  )  ABDOMEN:  [ x ] Soft, [ x ] Nontender, [ x ] Nondistended, [ x ] No mass, [  x] Bowel sounds present, [  ] Obese  NERVOUS SYSTEM:  [ x ] Alert & Oriented x3, [ x ] Nonfocal, [  ] Confusion, [  ] Encephalopathic, [  ] Sedated, [  ] Unable to assess, [  ] Dementia, [  ] Other-  EXTREMITIES:  [ x ] 2+ Peripheral Pulses, No clubbing, No cyanosis,  [ x ] Mild Edema B/L lower EXT, [ x ] PVD stasis skin changes B/L lower EXT, [ x ] b/l lower Ext erythema, open ulcers,  some yellowish slough , no foul smell, no warmth, no discharge.  LYMPH:  No lymphadenopathy noted, [ x ] LUEXT  & Hand swelling- improving ,  No pain .N/V Intact, +ROM  SKIN:  [ x ] No rashes or lesions, [  ] Pressure ulcers, [ x ] Ecchymosis - B/L Upper EXT, [  ] Skin tears    DIET: Diet, DASH/TLC:   Sodium & Cholesterol Restricted  1500mL Fluid Restriction ([id number]) (06-22-20 @ 08:06)  Diet, NPO after Midnight:      NPO Start Date: 28-Jun-2020,   NPO Start Time: 23:59 (06-28-20 @ 09:07)      LABS:                        11.8   8.02  )-----------( 198      ( 29 Jun 2020 06:37 )             35.2     29 Jun 2020 06:37    134    |  99     |  14     ----------------------------<  95     4.4     |  30     |  0.69     Ca    8.7        29 Jun 2020 06:37      PT/INR - ( 29 Jun 2020 06:37 )   PT: 15.4 sec;   INR: 1.36 ratio                        Anemia Panel:      Thyroid Panel:                RADIOLOGY & ADDITIONAL TESTS:      HEALTH ISSUES - PROBLEM Dx:  Varicose veins of bilateral lower extremities with pain: Varicose veins of bilateral lower extremities with pain  Stasis dermatitis with ulcer of right lower extremity due to peripheral venous hypertension: Stasis dermatitis with ulcer of right lower extremity due to peripheral venous hypertension  Chronic venous stasis dermatitis of both lower extremities: Chronic venous stasis dermatitis of both lower extremities  CHF (congestive heart failure): CHF (congestive heart failure)  Hyponatremia: Hyponatremia  Need for prophylactic measure: Need for prophylactic measure  Acute on chronic congestive heart failure, unspecified heart failure type: Acute on chronic congestive heart failure, unspecified heart failure type  Cellulitis of lower extremity, unspecified laterality: Cellulitis of lower extremity, unspecified laterality  Stasis dermatitis with ulcer of left lower extremity due to peripheral venous hypertension: Stasis dermatitis with ulcer of left lower extremity due to peripheral venous hypertension  Atrial fibrillation: Atrial fibrillation  Severe aortic stenosis: Severe aortic stenosis  Systolic CHF: Systolic CHF  PVD (peripheral vascular disease): PVD (peripheral vascular disease)  IV infiltration: IV infiltration        Consultant(s) Notes Reviewed:  [ x] YES     Care Discussed with [X] Consultants  [ x ] Patient  [ x] Family [  ] HCP [  ]   [  ] Social Service  [x  ] RN, [  ] Physical Therapy,[  ] Palliative care team  DVT PPX: [  ] Lovenox, [  ] S C Heparin, [  ] Coumadin, [  ] Xarelto, [  ] Eliquis, [  ] Pradaxa, [  ] IV Heparin drip, [  ] SCD [  ] Contraindication 2 to GI Bleed,[  ] Ambulation [  ] Contraindicated 2 to  bleed [  ] Contraindicated 2 to Brain Bleed, [ x ] Pt refusing DVT ppx & SCD  Advanced directive: [ x ] None, [  ] DNR/DNI Patient is a 87y old  Male who presents with a chief complaint of b/l LE weeping edema (28 Jun 2020 17:44)    HPI:  88 y/o M with hx of , afib (on coumadin), 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 CHF , PVD  presents to ED for worsening edema with  b/l weeping skin & erythema with pain that bothers him to walk  . Pt reports that his sons made him come into the ED. Reports has been having b/l LE edema with  weeping legs  for around 1 1/2 year. Has gotten worse in the past few day with increasing erythema, skin blisters +/- pus with occasional bouts of pain. Denies any other complaints, no fever, No chills ,No  cp, sob, palpitations, abdominal pain, dizziness, palpitations.  Pt was transferred from [place] ER after he got IV Vanco & IV Lasix, IV Cardizem     Given IV Cardizem and started on Cardizem gtt in the ED for rapid afib (20 Jun 2020 19:26)    INTERVAL HPI:  6/24/2020: Patient seen and examined at bedside. Low, stable H/H. INR 2.36. Hyponatremia improving. Pending RLE angiogram, possible stent/angioplasty in IR tomorrow. Hold Coumadin at this time, goal INR for procedure is 1.5 or less. HOLD Lisinopril 2.5 mg PO qd for Angio.  6/25/2020: Patient seen and examined at bedside. Low, stable H/H. INR 1.89. Hyponatremia improving. RLE angiogram, possible stent/angioplasty in IR today. Procedure was cancelled in IR as pt got upset with KIRIT Ext IV site Infiltration with FFP causing swelling of L U EXT. Given Coumadin 2.5 mg PO x1. Last day abx.   6/26/2020: Patient seen and examined at bedside. Low, stable H/H. INR 1.64. Off IV antibiotics. Pt is refusing Lovenox & IV Heparin as AC, until when angiogram is done next week.  6/27/2020: Pt seen, examined, NO Complaints, feels much better. Awaiting angiogram on Monday.6/29, AC on HOLD. Completed Abx   6/28/2020: Pt seen, examined, C/O leg pain with dressing changes. HOLD AC. Pt is schedule for Angiogram on 6/29/2020.  6/29/2020: Pt seen, examined. S/p R LE Angiogram today showing occluded SFA and popliteal right leg and occluded popliteal left leg. Started Eliquis 5 mg PO BID this evening.     OVERNIGHT EVENTS: NONE    Home Medications:  Coumadin 5 mg oral tablet: 1 tab(s) orally 2 times a day (22 Jun 2020 18:12)      MEDICATIONS  (STANDING):  ceFAZolin   IVPB 2000 milliGRAM(s) IV Intermittent once  furosemide   Injectable 20 milliGRAM(s) IV Push daily  metoprolol tartrate 25 milliGRAM(s) Oral two times a day    MEDICATIONS  (PRN):  acetaminophen   Tablet .. 650 milliGRAM(s) Oral every 6 hours PRN Temp greater or equal to 38C (100.4F), Mild Pain (1 - 3)      No Known Allergies      Social History:  Never smoker, denies etoh and drug abuse  , Lives alone, Retired  (20 Jun 2020 19:26)      REVIEW OF SYSTEMS:  CONSTITUTIONAL: No fever, No chills, No fatigue, No myalgia, No Body ache, No Weakness  EYES: No eye pain,  No visual disturbances, No discharge, NO Redness  ENMT:  No ear pain, No nose bleed, No vertigo; No sinus pain, NO throat pain, No Congestion  NECK: No pain, No stiffness  RESPIRATORY: No cough, NO wheezing, No  hemoptysis, NO  shortness of breath  CARDIOVASCULAR: No chest pain, palpitations  GASTROINTESTINAL: No abdominal pain, NO epigastric pain. No nausea, No vomiting; No diarrhea, No constipation. [  ] BM  GENITOURINARY: No dysuria, No frequency, No urgency, No hematuria, NO incontinence  NEUROLOGICAL: No headaches, No dizziness, No numbness, No tingling, No tremors, No weakness  EXT: No Swelling, No Pain, No Edema  SKIN:  [x  ] No itching, burning, rashes, or lesions   MUSCULOSKELETAL: No joint pain ,No Jt swelling; No muscle pain, No back pain, No extremity pain  PSYCHIATRIC: No depression,  No anxiety,  No mood swings ,No difficulty sleeping at night  PAIN SCALE: [  ] None  [ x ] Other- 5/10, leg ulcers  ROS Unable to obtain due to - [  ] Dementia  [  ] Lethargy [  ] Drowsy [  ] Sedated [  ] non verbal  REST OF REVIEW Of SYSTEM - [x  ] Normal     Vital Signs Last 24 Hrs  T(C): 36.3 (29 Jun 2020 04:59), Max: 36.4 (28 Jun 2020 23:32)  T(F): 97.4 (29 Jun 2020 04:59), Max: 97.6 (28 Jun 2020 23:32)  HR: 61 (29 Jun 2020 04:59) (61 - 94)  BP: 115/70 (29 Jun 2020 04:59) (112/74 - 120/79)  BP(mean): --  RR: 18 (29 Jun 2020 04:59) (18 - 19)  SpO2: 96% (29 Jun 2020 04:59) (95% - 97%)    CAPILLARY BLOOD GLUCOSE          I&O's Summary    PHYSICAL EXAM:  GENERAL:  [ x ] NAD, [ x ] Well appearing, [  ] Agitated, [  ] Drowsy, [  ] Lethargy, [  ] Confused   HEAD:  [ x ] Normal, [  ] Other  EYES:  [ x ] EOMI, [ x ] PERRLA, [ x ] Conjunctiva and sclera clear normal, [  ] Other, [  ] Pallor, [  ] Discharge  ENMT:  [ x ] Normal, [ x ] Moist mucous membranes, [ x ] Good dentition, [ x ] No thrush  NECK:  [ x ] Supple, [ x ] No JVD, [ x ] Normal thyroid, [ x ] Lymphadenopathy, [  ] Other  CHEST/LUNG:  [ x ] Clear to auscultation bilaterally, [ x ] Breath Sounds equal B/L, [  ] Poor effort, [ x ] No rales, [ x ] No rhonchi, [ x ] No wheezing  HEART:  [ x ] Regular rate and rhythm, [  ] Tachycardia, [  ] Bradycardia, [  ] Irregular, [ x ] 3/6 murmurs, No rubs, No gallops, [  ] PPM in place (Mfr:  )  ABDOMEN:  [ x ] Soft, [ x ] Nontender, [ x ] Nondistended, [ x ] No mass, [  x] Bowel sounds present, [  ] Obese  NERVOUS SYSTEM:  [ x ] Alert & Oriented x3, [ x ] Nonfocal, [  ] Confusion, [  ] Encephalopathic, [  ] Sedated, [  ] Unable to assess, [  ] Dementia, [  ] Other-  EXTREMITIES:  [ x ] 2+ Peripheral Pulses, No clubbing, No cyanosis,  [ x ] Mild Edema B/L lower EXT, [ x ] PVD stasis skin changes B/L lower EXT, [ x ] b/l lower Ext erythema, open ulcers,  some yellowish slough , no foul smell, no warmth, no discharge.  LYMPH:  No lymphadenopathy noted, [ x ] LUEXT  & Hand swelling- improving ,  No pain .N/V Intact, +ROM  SKIN:  [ x ] No rashes or lesions, [  ] Pressure ulcers, [ x ] Ecchymosis - B/L Upper EXT, [  ] Skin tears    DIET: Diet, DASH/TLC:   Sodium & Cholesterol Restricted  1500mL Fluid Restriction ([id number]) (06-22-20 @ 08:06)  Diet, NPO after Midnight:      NPO Start Date: 28-Jun-2020,   NPO Start Time: 23:59 (06-28-20 @ 09:07)      LABS:                        11.8   8.02  )-----------( 198      ( 29 Jun 2020 06:37 )             35.2     29 Jun 2020 06:37    134    |  99     |  14     ----------------------------<  95     4.4     |  30     |  0.69     Ca    8.7        29 Jun 2020 06:37      PT/INR - ( 29 Jun 2020 06:37 )   PT: 15.4 sec;   INR: 1.36 ratio                        Anemia Panel:      Thyroid Panel:                RADIOLOGY & ADDITIONAL TESTS:      HEALTH ISSUES - PROBLEM Dx:  Varicose veins of bilateral lower extremities with pain: Varicose veins of bilateral lower extremities with pain  Stasis dermatitis with ulcer of right lower extremity due to peripheral venous hypertension: Stasis dermatitis with ulcer of right lower extremity due to peripheral venous hypertension  Chronic venous stasis dermatitis of both lower extremities: Chronic venous stasis dermatitis of both lower extremities  CHF (congestive heart failure): CHF (congestive heart failure)  Hyponatremia: Hyponatremia  Need for prophylactic measure: Need for prophylactic measure  Acute on chronic congestive heart failure, unspecified heart failure type: Acute on chronic congestive heart failure, unspecified heart failure type  Cellulitis of lower extremity, unspecified laterality: Cellulitis of lower extremity, unspecified laterality  Stasis dermatitis with ulcer of left lower extremity due to peripheral venous hypertension: Stasis dermatitis with ulcer of left lower extremity due to peripheral venous hypertension  Atrial fibrillation: Atrial fibrillation  Severe aortic stenosis: Severe aortic stenosis  Systolic CHF: Systolic CHF  PVD (peripheral vascular disease): PVD (peripheral vascular disease)  IV infiltration: IV infiltration        Consultant(s) Notes Reviewed:  [ x] YES     Care Discussed with [X] Consultants  [ x ] Patient  [ x] Family [  ] HCP [  ]   [  ] Social Service  [x  ] RN, [  ] Physical Therapy,[  ] Palliative care team  DVT PPX: [  ] Lovenox, [  ] S C Heparin, [  ] Coumadin, [  ] Xarelto, [  ] Eliquis, [  ] Pradaxa, [  ] IV Heparin drip, [  ] SCD [  ] Contraindication 2 to GI Bleed,[  ] Ambulation [  ] Contraindicated 2 to  bleed [  ] Contraindicated 2 to Brain Bleed, [ x ] Pt refusing DVT ppx & SCD  Advanced directive: [ x ] None, [  ] DNR/DNI Patient is a 87y old  Male who presents with a chief complaint of b/l LE weeping edema (28 Jun 2020 17:44)    HPI:  86 y/o M with hx of , afib (on coumadin), 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 CHF , PVD  presents to ED for worsening edema with  b/l weeping skin & erythema with pain that bothers him to walk  . Pt reports that his sons made him come into the ED. Reports has been having b/l LE edema with  weeping legs  for around 1 1/2 year. Has gotten worse in the past few day with increasing erythema, skin blisters +/- pus with occasional bouts of pain. Denies any other complaints, no fever, No chills ,No  cp, sob, palpitations, abdominal pain, dizziness, palpitations.  Pt was transferred from [place] ER after he got IV Vanco & IV Lasix, IV Cardizem     Given IV Cardizem and started on Cardizem gtt in the ED for rapid afib (20 Jun 2020 19:26)    INTERVAL HPI:  6/24/2020: Patient seen and examined at bedside. Low, stable H/H. INR 2.36. Hyponatremia improving. Pending RLE angiogram, possible stent/angioplasty in IR tomorrow. Hold Coumadin at this time, goal INR for procedure is 1.5 or less. HOLD Lisinopril 2.5 mg PO qd for Angio.  6/25/2020: Patient seen and examined at bedside. Low, stable H/H. INR 1.89. Hyponatremia improving. RLE angiogram, possible stent/angioplasty in IR today. Procedure was cancelled in IR as pt got upset with KIRIT Ext IV site Infiltration with FFP causing swelling of L U EXT. Given Coumadin 2.5 mg PO x1. Last day abx.   6/26/2020: Patient seen and examined at bedside. Low, stable H/H. INR 1.64. Off IV antibiotics. Pt is refusing Lovenox & IV Heparin as AC, until when angiogram is done next week.  6/27/2020: Pt seen, examined, NO Complaints, feels much better. Awaiting angiogram on Monday.6/29, AC on HOLD. Completed Abx   6/28/2020: Pt seen, examined, C/O leg pain with dressing changes. HOLD AC. Pt is schedule for Angiogram on 6/29/2020.  6/29/2020: Pt seen, examined. S/p B/L LE Angiogram today showing occluded SFA and popliteal right leg and occluded popliteal left leg. Started Eliquis 5 mg PO BID this evening.     OVERNIGHT EVENTS: NONE    Home Medications:  Coumadin 5 mg oral tablet: 1 tab(s) orally 2 times a day (22 Jun 2020 18:12)      MEDICATIONS  (STANDING):  ceFAZolin   IVPB 2000 milliGRAM(s) IV Intermittent once  furosemide   Injectable 20 milliGRAM(s) IV Push daily  metoprolol tartrate 25 milliGRAM(s) Oral two times a day    MEDICATIONS  (PRN):  acetaminophen   Tablet .. 650 milliGRAM(s) Oral every 6 hours PRN Temp greater or equal to 38C (100.4F), Mild Pain (1 - 3)      No Known Allergies      Social History:  Never smoker, denies etoh and drug abuse  , Lives alone, Retired  (20 Jun 2020 19:26)      REVIEW OF SYSTEMS:  CONSTITUTIONAL: No fever, No chills, No fatigue, No myalgia, No Body ache, No Weakness  EYES: No eye pain,  No visual disturbances, No discharge, NO Redness  ENMT:  No ear pain, No nose bleed, No vertigo; No sinus pain, NO throat pain, No Congestion  NECK: No pain, No stiffness  RESPIRATORY: No cough, NO wheezing, No  hemoptysis, NO  shortness of breath  CARDIOVASCULAR: No chest pain, palpitations  GASTROINTESTINAL: No abdominal pain, NO epigastric pain. No nausea, No vomiting; No diarrhea, No constipation. [  ] BM  GENITOURINARY: No dysuria, No frequency, No urgency, No hematuria, NO incontinence  NEUROLOGICAL: No headaches, No dizziness, No numbness, No tingling, No tremors, No weakness  EXT: No Swelling, No Pain, No Edema  SKIN:  [x  ] No itching, burning, rashes, or lesions   MUSCULOSKELETAL: No joint pain ,No Jt swelling; No muscle pain, No back pain, No extremity pain  PSYCHIATRIC: No depression,  No anxiety,  No mood swings ,No difficulty sleeping at night  PAIN SCALE: [  ] None  [ x ] Other- 5/10, leg ulcers  ROS Unable to obtain due to - [  ] Dementia  [  ] Lethargy [  ] Drowsy [  ] Sedated [  ] non verbal  REST OF REVIEW Of SYSTEM - [x  ] Normal     Vital Signs Last 24 Hrs  T(C): 36.3 (29 Jun 2020 04:59), Max: 36.4 (28 Jun 2020 23:32)  T(F): 97.4 (29 Jun 2020 04:59), Max: 97.6 (28 Jun 2020 23:32)  HR: 61 (29 Jun 2020 04:59) (61 - 94)  BP: 115/70 (29 Jun 2020 04:59) (112/74 - 120/79)  BP(mean): --  RR: 18 (29 Jun 2020 04:59) (18 - 19)  SpO2: 96% (29 Jun 2020 04:59) (95% - 97%)    CAPILLARY BLOOD GLUCOSE          I&O's Summary    PHYSICAL EXAM:  GENERAL:  [ x ] NAD, [ x ] Well appearing, [  ] Agitated, [  ] Drowsy, [  ] Lethargy, [  ] Confused   HEAD:  [ x ] Normal, [  ] Other  EYES:  [ x ] EOMI, [ x ] PERRLA, [ x ] Conjunctiva and sclera clear normal, [  ] Other, [  ] Pallor, [  ] Discharge  ENMT:  [ x ] Normal, [ x ] Moist mucous membranes, [ x ] Good dentition, [ x ] No thrush  NECK:  [ x ] Supple, [ x ] No JVD, [ x ] Normal thyroid, [ x ] Lymphadenopathy, [  ] Other  CHEST/LUNG:  [ x ] Clear to auscultation bilaterally, [ x ] Breath Sounds equal B/L, [  ] Poor effort, [ x ] No rales, [ x ] No rhonchi, [ x ] No wheezing  HEART:  [ x ] Regular rate and rhythm, [  ] Tachycardia, [  ] Bradycardia, [  ] Irregular, [ x ] 3/6 murmurs, No rubs, No gallops, [  ] PPM in place (Mfr:  )  ABDOMEN:  [ x ] Soft, [ x ] Nontender, [ x ] Nondistended, [ x ] No mass, [  x] Bowel sounds present, [  ] Obese  NERVOUS SYSTEM:  [ x ] Alert & Oriented x3, [ x ] Nonfocal, [  ] Confusion, [  ] Encephalopathic, [  ] Sedated, [  ] Unable to assess, [  ] Dementia, [  ] Other-  EXTREMITIES:  [ x ] 2+ Peripheral Pulses, No clubbing, No cyanosis,  [ x ] Mild Edema B/L lower EXT, [ x ] PVD stasis skin changes B/L lower EXT, [ x ] b/l lower Ext erythema, open ulcers,  some yellowish slough , no foul smell, no warmth, no discharge.  LYMPH:  No lymphadenopathy noted, [ x ] LUEXT  & Hand swelling- improving ,  No pain .N/V Intact, +ROM  SKIN:  [ x ] No rashes or lesions, [  ] Pressure ulcers, [ x ] Ecchymosis - B/L Upper EXT, [  ] Skin tears    DIET: Diet, DASH/TLC:   Sodium & Cholesterol Restricted  1500mL Fluid Restriction ([id number]) (06-22-20 @ 08:06)  Diet, NPO after Midnight:      NPO Start Date: 28-Jun-2020,   NPO Start Time: 23:59 (06-28-20 @ 09:07)      LABS:                        11.8   8.02  )-----------( 198      ( 29 Jun 2020 06:37 )             35.2     29 Jun 2020 06:37    134    |  99     |  14     ----------------------------<  95     4.4     |  30     |  0.69     Ca    8.7        29 Jun 2020 06:37      PT/INR - ( 29 Jun 2020 06:37 )   PT: 15.4 sec;   INR: 1.36 ratio                        Anemia Panel:      Thyroid Panel:                RADIOLOGY & ADDITIONAL TESTS:      HEALTH ISSUES - PROBLEM Dx:  Varicose veins of bilateral lower extremities with pain: Varicose veins of bilateral lower extremities with pain  Stasis dermatitis with ulcer of right lower extremity due to peripheral venous hypertension: Stasis dermatitis with ulcer of right lower extremity due to peripheral venous hypertension  Chronic venous stasis dermatitis of both lower extremities: Chronic venous stasis dermatitis of both lower extremities  CHF (congestive heart failure): CHF (congestive heart failure)  Hyponatremia: Hyponatremia  Need for prophylactic measure: Need for prophylactic measure  Acute on chronic congestive heart failure, unspecified heart failure type: Acute on chronic congestive heart failure, unspecified heart failure type  Cellulitis of lower extremity, unspecified laterality: Cellulitis of lower extremity, unspecified laterality  Stasis dermatitis with ulcer of left lower extremity due to peripheral venous hypertension: Stasis dermatitis with ulcer of left lower extremity due to peripheral venous hypertension  Atrial fibrillation: Atrial fibrillation  Severe aortic stenosis: Severe aortic stenosis  Systolic CHF: Systolic CHF  PVD (peripheral vascular disease): PVD (peripheral vascular disease)  IV infiltration: IV infiltration        Consultant(s) Notes Reviewed:  [ x] YES     Care Discussed with [X] Consultants  [ x ] Patient  [ x] Family [  ] HCP [  ]   [  ] Social Service  [x  ] RN, [  ] Physical Therapy,[  ] Palliative care team  DVT PPX: [  ] Lovenox, [  ] S C Heparin, [  ] Coumadin, [  ] Xarelto, [  ] Eliquis, [  ] Pradaxa, [  ] IV Heparin drip, [  ] SCD [  ] Contraindication 2 to GI Bleed,[  ] Ambulation [  ] Contraindicated 2 to  bleed [  ] Contraindicated 2 to Brain Bleed, [ x ] Pt refusing DVT ppx & SCD  Advanced directive: [ x ] None, [  ] DNR/DNI

## 2020-06-29 NOTE — PROGRESS NOTE ADULT - PROBLEM SELECTOR PLAN 3
Chronic PVD b/l lower EXT with venous stasis dermatitis & skin blistering-dry now   - Wound care (Dr. Chong) consulted, recs appreciated - Skin changes attributed to bilateral stasis dermatitis and bilateral venous hypertension with edema and venous stasis ulcers and weeping - silver alginate and dry dressings every other day, drainage dependant, and ace wraps  - Vascular studies: RLE: MAYNOR is .54, pulse waveforms are diffusely monophasic and diminished in the right foot. LLE: MAYNOR is .56, pulse waveforms are diffusely monophasic and severely diminished in the left foot.  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 Single-vessel runoff via the posterior tibial artery. 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 Otherwise, no focal stenosis or occlusion in the left leg.  - Vascular Surgery (Dr. Arias) consulted, recs appreciated - angiogram by IR/Vascular on Monday Chronic PVD b/l lower EXT with venous stasis dermatitis & skin blistering-dry now   - Wound care (Dr. Chong) consulted, recs appreciated - Skin changes attributed to bilateral stasis dermatitis and bilateral venous hypertension with edema and venous stasis ulcers and weeping - silver alginate and dry dressings every other day, drainage dependant, and ace wraps  - Vascular studies: RLE: MAYNOR is .54, pulse waveforms are diffusely monophasic and diminished in the right foot. LLE: MAYNOR is .56, pulse waveforms are diffusely monophasic and severely diminished in the left foot.  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 Single-vessel runoff via the posterior tibial artery. 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 Otherwise, no focal stenosis or occlusion in the left leg.  - Vascular Surgery (Dr. Arias) consulted, recs appreciated - LE angiogram by IR/Vascular today Chronic PVD b/l lower EXT with venous stasis dermatitis & skin blistering-dry now   - Wound care (Dr. Chong) consulted, recs appreciated - Skin changes attributed to bilateral stasis dermatitis and bilateral venous hypertension with edema and venous stasis ulcers and weeping - silver alginate and dry dressings every other day, drainage dependant, and ace wraps  - Vascular studies: RLE: MAYNOR is .54, pulse waveforms are diffusely monophasic and diminished in the right foot. LLE: MAYNOR is .56, pulse waveforms are diffusely monophasic and severely diminished in the left foot.  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 Single-vessel runoff via the posterior tibial artery. 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 Otherwise, no focal stenosis or occlusion in the left leg.  - s/p B/L LE angiogram (6/29/2020) showing occluded SFA and popliteal right leg and occluded popliteal left leg  - Vascular Surgery (Dr. Arias) consulted, recs appreciated Chronic PVD b/l lower EXT with venous stasis dermatitis & skin blistering-dry now   - Wound care (Dr. Chong) consulted, recs appreciated - Skin changes attributed to bilateral stasis dermatitis and bilateral venous hypertension with edema and venous stasis ulcers and weeping - silver alginate and dry dressings every other day, drainage dependant, and ace wraps  - Vascular studies: RLE: MAYNOR is .54, pulse waveforms are diffusely monophasic and diminished in the right foot. LLE: MAYNOR is .56, pulse waveforms are diffusely monophasic and severely diminished in the left foot.  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 Single-vessel runoff via the posterior tibial artery. 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 Otherwise, no focal stenosis or occlusion in the left leg.  - s/p R LE angiogram (6/29/2020) showing occluded SFA and popliteal right leg and occluded popliteal left leg  - Vascular Surgery (Dr. Arias) consulted, recs appreciated

## 2020-06-29 NOTE — PROGRESS NOTE ADULT - ASSESSMENT
87 year old male with hx of CHF, afib (on coumadin), lung ca s/p resection presents to ED for b/l weeping edema. Admitted for b/l LE cellulitis, Rapid A Fib with RVR, volume overload    Afib with RVR  - Remains in Afib but rate-controlled 80-90bpm- Can discontinue telemetry  - S/P Cardizem gtt.  Will keep off of CCB in the setting of moderately reduced LVF, EF 40%  - Continue lopressor 25 mg twice daily with hold parameters. g  - Continue to hold Coumadin in anticipation of peripheral angiogram today   -refusing anticoagulation   - Monitor electrolytes, replete to keep K>4 and Mag>2    HFrEF, Severe AS  - Euvolemic on exam, switch lasix to 20 mg po daily   - TTE w/ severe AS, mod global LVDF EF 40 % mild LVh   - Will need outpt evaluation for AS for possible TAVR.   - if blood pressure can tolerate start ace in am tomorrow     Chronic venous stasis/PAD  - Vascular on board  - Planned for peripheral angiogram this am-.  He has a known severe AS and Afib but he has no clinical volume overload, no significant tachycardia.  No life-threatening arrhythmias, no ischemic symptoms.  He has no modifiable risk factors at this time.  However, given his severe AS. he is at an elevated risk but otherwise, optimized for planned procedure.    - Routine hemodynamic monitoring recommended  - Follow Vascular recs    DVT ppx  - Per Primary    Further cardiac w/u pending clinical course  All other w/u per Primary and Vascular  Will continue to follow    Beckie Oscar FNP-C  Cardiology NP  [facility] [address] [phone] 87 year old male with hx of CHF, afib (on coumadin), lung ca s/p resection presents to ED for b/l weeping edema. Admitted for b/l LE cellulitis, Rapid A Fib with RVR, volume overload    Afib with RVR  - Remains in Afib but rate-controlled 80-90bpm- Can discontinue telemetry  - S/P Cardizem gtt.  Will keep off of CCB in the setting of moderately reduced LVF, EF 40%  - Continue lopressor 25 mg twice daily with hold parameters.   - Continue to hold Coumadin in anticipation of peripheral angiogram today   -refusing anticoagulation   - Monitor electrolytes, replete to keep K>4 and Mag>2    HFrEF, Severe AS  - Euvolemic on exam, switch lasix to 20 mg po daily   - TTE w/ severe AS, mod global LVDF EF 40 % mild LVh   - Will need outpt evaluation for AS for possible TAVR.   - if blood pressure can tolerate start ace in am tomorrow     Chronic venous stasis/PAD  - Vascular on board  - Planned for peripheral angiogram this am-.  He has a known severe AS and Afib but he has no clinical volume overload, no significant tachycardia.  No life-threatening arrhythmias, no ischemic symptoms.  He has no modifiable risk factors at this time.  However, given his severe AS. he is at an elevated risk but otherwise, optimized for planned procedure.    - Routine hemodynamic monitoring recommended  - Follow Vascular recs    DVT ppx  - Per Primary    Further cardiac w/u pending clinical course  All other w/u per Primary and Vascular  Will continue to follow    Beckie Oscar FNP-C  Cardiology NP  [facility] [address] [phone]

## 2020-06-29 NOTE — PROGRESS NOTE ADULT - PROBLEM SELECTOR PLAN 4
Likely Ac on chronic Systolic CHF  - 2/2 Rapid A Fib, pt with elevated BNP, hyponatremia & leg edema  - unclear about previous echo, last seen cardio >20 yrs ago.  - Echo (6/22) - Mild concentric LVH with moderate global LV systolic dysfunction with LVEF of 40%, grossly normal RV size and systolic functio, calcified trileaflet aortic valve with severe aortic stenosis, mild to moderate MR, mild TR.   - continue Lasix 20 mg IV daily  - i/os, daily weights  - Cardio -(torrey),follow up Likely Ac on chronic Systolic CHF  - 2/2 Rapid A Fib, pt with elevated BNP, hyponatremia & leg edema  - unclear about previous echo, last seen cardio >20 yrs ago.  - Echo (6/22) - Mild concentric LVH with moderate global LV systolic dysfunction with LVEF of 40%, grossly normal RV size and systolic functio, calcified trileaflet aortic valve with severe aortic stenosis, mild to moderate MR, mild TR.   - continue Lasix 20 mg IV daily  - i/os, daily weights  - Cardio ([facility]) consulted, recs appreciated Likely Ac on chronic Systolic CHF  - 2/2 Rapid A Fib, pt with elevated BNP, hyponatremia & leg edema  - unclear about previous echo, last seen cardio >20 yrs ago.  - Echo (6/22) - Mild concentric LVH with moderate global LV systolic dysfunction with LVEF of 40%, grossly normal RV size and systolic functio, calcified trileaflet aortic valve with severe aortic stenosis, mild to moderate MR, mild TR.   - switch to Lasix 20 mg PO daily  - i/os, daily weights  - Cardio ([facility]) consulted, recs appreciated Likely Ac on chronic Systolic CHF  - 2/2 Rapid A Fib, pt with elevated BNP, hyponatremia & leg edema  - unclear about previous echo, last seen cardio >20 yrs ago.  - Echo (6/22) - Mild concentric LVH with moderate global LV systolic dysfunction with LVEF of 40%, grossly normal RV size and systolic functio, calcified trileaflet aortic valve with severe aortic stenosis, mild to moderate MR, mild TR.   - switch to Lasix 20 mg PO daily (6/29/2020)  - i/os, daily weights  - Cardio ([facility]) consulted, recs appreciated

## 2020-06-30 ENCOUNTER — TRANSCRIPTION ENCOUNTER (OUTPATIENT)
Age: 85
End: 2020-06-30

## 2020-06-30 VITALS
HEART RATE: 81 BPM | RESPIRATION RATE: 17 BRPM | DIASTOLIC BLOOD PRESSURE: 72 MMHG | TEMPERATURE: 97 F | OXYGEN SATURATION: 97 % | SYSTOLIC BLOOD PRESSURE: 117 MMHG

## 2020-06-30 LAB
ANION GAP SERPL CALC-SCNC: 5 MMOL/L — SIGNIFICANT CHANGE UP (ref 5–17)
BUN SERPL-MCNC: 12 MG/DL — SIGNIFICANT CHANGE UP (ref 7–23)
CALCIUM SERPL-MCNC: 8.9 MG/DL — SIGNIFICANT CHANGE UP (ref 8.5–10.1)
CHLORIDE SERPL-SCNC: 100 MMOL/L — SIGNIFICANT CHANGE UP (ref 96–108)
CO2 SERPL-SCNC: 30 MMOL/L — SIGNIFICANT CHANGE UP (ref 22–31)
CREAT SERPL-MCNC: 0.73 MG/DL — SIGNIFICANT CHANGE UP (ref 0.5–1.3)
GLUCOSE SERPL-MCNC: 115 MG/DL — HIGH (ref 70–99)
HCT VFR BLD CALC: 35.8 % — LOW (ref 39–50)
HGB BLD-MCNC: 11.7 G/DL — LOW (ref 13–17)
INR BLD: 1.65 RATIO — HIGH (ref 0.88–1.16)
MCHC RBC-ENTMCNC: 28.7 PG — SIGNIFICANT CHANGE UP (ref 27–34)
MCHC RBC-ENTMCNC: 32.7 GM/DL — SIGNIFICANT CHANGE UP (ref 32–36)
MCV RBC AUTO: 87.7 FL — SIGNIFICANT CHANGE UP (ref 80–100)
NRBC # BLD: 0 /100 WBCS — SIGNIFICANT CHANGE UP (ref 0–0)
PLATELET # BLD AUTO: 187 K/UL — SIGNIFICANT CHANGE UP (ref 150–400)
POTASSIUM SERPL-MCNC: 4.1 MMOL/L — SIGNIFICANT CHANGE UP (ref 3.5–5.3)
POTASSIUM SERPL-SCNC: 4.1 MMOL/L — SIGNIFICANT CHANGE UP (ref 3.5–5.3)
PROTHROM AB SERPL-ACNC: 18.9 SEC — HIGH (ref 10.6–13.6)
RBC # BLD: 4.08 M/UL — LOW (ref 4.2–5.8)
RBC # FLD: 16 % — HIGH (ref 10.3–14.5)
SODIUM SERPL-SCNC: 135 MMOL/L — SIGNIFICANT CHANGE UP (ref 135–145)
WBC # BLD: 8.91 K/UL — SIGNIFICANT CHANGE UP (ref 3.8–10.5)
WBC # FLD AUTO: 8.91 K/UL — SIGNIFICANT CHANGE UP (ref 3.8–10.5)

## 2020-06-30 PROCEDURE — 93306 TTE W/DOPPLER COMPLETE: CPT

## 2020-06-30 PROCEDURE — U0003: CPT

## 2020-06-30 PROCEDURE — G0269: CPT

## 2020-06-30 PROCEDURE — 84436 ASSAY OF TOTAL THYROXINE: CPT

## 2020-06-30 PROCEDURE — 83735 ASSAY OF MAGNESIUM: CPT

## 2020-06-30 PROCEDURE — 36430 TRANSFUSION BLD/BLD COMPNT: CPT

## 2020-06-30 PROCEDURE — C1760: CPT

## 2020-06-30 PROCEDURE — 93925 LOWER EXTREMITY STUDY: CPT

## 2020-06-30 PROCEDURE — P9059: CPT

## 2020-06-30 PROCEDURE — 80053 COMPREHEN METABOLIC PANEL: CPT

## 2020-06-30 PROCEDURE — 75630 X-RAY AORTA LEG ARTERIES: CPT

## 2020-06-30 PROCEDURE — 83880 ASSAY OF NATRIURETIC PEPTIDE: CPT

## 2020-06-30 PROCEDURE — 36415 COLL VENOUS BLD VENIPUNCTURE: CPT

## 2020-06-30 PROCEDURE — 80048 BASIC METABOLIC PNL TOTAL CA: CPT

## 2020-06-30 PROCEDURE — C1887: CPT

## 2020-06-30 PROCEDURE — 86850 RBC ANTIBODY SCREEN: CPT

## 2020-06-30 PROCEDURE — 99285 EMERGENCY DEPT VISIT HI MDM: CPT

## 2020-06-30 PROCEDURE — 36200 PLACE CATHETER IN AORTA: CPT

## 2020-06-30 PROCEDURE — 87635 SARS-COV-2 COVID-19 AMP PRB: CPT

## 2020-06-30 PROCEDURE — C1894: CPT

## 2020-06-30 PROCEDURE — 76000 FLUOROSCOPY <1 HR PHYS/QHP: CPT

## 2020-06-30 PROCEDURE — 97116 GAIT TRAINING THERAPY: CPT

## 2020-06-30 PROCEDURE — 86901 BLOOD TYPING SEROLOGIC RH(D): CPT

## 2020-06-30 PROCEDURE — C1769: CPT

## 2020-06-30 PROCEDURE — 76937 US GUIDE VASCULAR ACCESS: CPT

## 2020-06-30 PROCEDURE — 97530 THERAPEUTIC ACTIVITIES: CPT

## 2020-06-30 PROCEDURE — 36140 INTRO NDL ICATH UPR/LXTR ART: CPT

## 2020-06-30 PROCEDURE — 99232 SBSQ HOSP IP/OBS MODERATE 35: CPT

## 2020-06-30 PROCEDURE — 84480 ASSAY TRIIODOTHYRONINE (T3): CPT

## 2020-06-30 PROCEDURE — 93923 UPR/LXTR ART STDY 3+ LVLS: CPT

## 2020-06-30 PROCEDURE — 97162 PT EVAL MOD COMPLEX 30 MIN: CPT

## 2020-06-30 PROCEDURE — 85730 THROMBOPLASTIN TIME PARTIAL: CPT

## 2020-06-30 PROCEDURE — 86900 BLOOD TYPING SEROLOGIC ABO: CPT

## 2020-06-30 PROCEDURE — 84443 ASSAY THYROID STIM HORMONE: CPT

## 2020-06-30 PROCEDURE — 75710 ARTERY X-RAYS ARM/LEG: CPT

## 2020-06-30 PROCEDURE — 84100 ASSAY OF PHOSPHORUS: CPT

## 2020-06-30 PROCEDURE — 85610 PROTHROMBIN TIME: CPT

## 2020-06-30 PROCEDURE — 85027 COMPLETE CBC AUTOMATED: CPT

## 2020-06-30 RX ORDER — METOPROLOL TARTRATE 50 MG
1 TABLET ORAL
Qty: 0 | Refills: 0 | DISCHARGE
Start: 2020-06-30

## 2020-06-30 RX ORDER — FUROSEMIDE 40 MG
1 TABLET ORAL
Qty: 0 | Refills: 0 | DISCHARGE
Start: 2020-06-30

## 2020-06-30 RX ORDER — ACETAMINOPHEN 500 MG
2 TABLET ORAL
Qty: 0 | Refills: 0 | DISCHARGE
Start: 2020-06-30

## 2020-06-30 RX ADMIN — APIXABAN 5 MILLIGRAM(S): 2.5 TABLET, FILM COATED ORAL at 05:09

## 2020-06-30 RX ADMIN — Medication 25 MILLIGRAM(S): at 05:10

## 2020-06-30 RX ADMIN — Medication 20 MILLIGRAM(S): at 05:09

## 2020-06-30 NOTE — DISCHARGE NOTE NURSING/CASE MANAGEMENT/SOCIAL WORK - PATIENT PORTAL LINK FT
You can access the FollowMyHealth Patient Portal offered by [facility] by registering at the following website: [email or web address]. By joining [facility]’s FollowMyHealth portal, you will also be able to view your health information using other applications (apps) compatible with our system.

## 2020-06-30 NOTE — PROGRESS NOTE ADULT - PROBLEM SELECTOR PLAN 8
DVT ppx: Discontinued Coumadin, switched to Eliquis 5 mg PO BID (6/29/2020)    DC planning to [facility] DVT ppx: Continue Eliquis. Patient switched from Coumadin to Eliquis 5 mg PO BID on 6/29/2020    DC planning to [facility]

## 2020-06-30 NOTE — PROGRESS NOTE ADULT - ASSESSMENT
88 y/o M with hx of CHF, afib, lung ca s/p resection presents to ED for b/l weeping edema. Admitted for b/l LE cellulitis, Rapid A Fib, and acute exacerbation of systolic CHF, s/p B/L LE angiogram (6/29/2020) showing occluded SFA and popliteal right leg and occluded popliteal left leg, now changed from Coumadin to Eliquis.

## 2020-06-30 NOTE — PROGRESS NOTE ADULT - ATTENDING COMMENTS
Pt seen, Examined, case & care plan d/w pt, residents at detail.  D/C to [facility] today,  Total d/c care time is 40 minutes.

## 2020-06-30 NOTE — PROGRESS NOTE ADULT - PROBLEM SELECTOR PLAN 6
Improved  -IV site infiltration L U Ext while giving FFP on 6/25 prior to angiogram, angiogram cancelled per patient's request and performed today 6/29/2020  -Keep KIRIT Ext elevated, Warm soaks PRN  -N/V intact Improved/Resolved   -IV site infiltration L U Ext while giving FFP on 6/25 ,  - angiogram cancelled  as per patient's request and performed on  6/29/2020  -Keep KIRIT Ext elevated, Warm soaks PRN  -N/V intact

## 2020-06-30 NOTE — PROGRESS NOTE ADULT - PROBLEM SELECTOR PLAN 4
Likely Ac on chronic Systolic CHF  - 2/2 Rapid A Fib, pt with elevated BNP, hyponatremia & leg edema  - unclear about previous echo, last seen cardio >20 yrs ago.  - Echo (6/22) - Mild concentric LVH with moderate global LV systolic dysfunction with LVEF of 40%, grossly normal RV size and systolic functio, calcified trileaflet aortic valve with severe aortic stenosis, mild to moderate MR, mild TR.   - switch to Lasix 20 mg PO daily (6/29/2020)  - i/os, daily weights  - Cardio ([facility]) consulted, recs appreciated Likely Ac on chronic Systolic CHF  - 2/2 Rapid A Fib, pt with elevated BNP, hyponatremia & leg edema-Resolved   - unclear about previous echo, last seen cardio >20 yrs ago.  - Echo (6/22) - Mild concentric LVH with moderate global LV systolic dysfunction with LVEF of 40%, grossly normal RV size and systolic functio, calcified trileaflet aortic valve with severe aortic stenosis, mild to moderate MR, mild TR.   - switch to Lasix 20 mg PO daily (6/29/2020)  - i/os, daily weights  - Cardio ([facility]) consulted, recs appreciated

## 2020-06-30 NOTE — PROGRESS NOTE ADULT - SUBJECTIVE AND OBJECTIVE BOX
Patient is a 87y old  Male who presents with a chief complaint of b/l LE weeping edema (29 Jun 2020 21:46)    HPI:  86 y/o M with hx of , afib (on coumadin), 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 CHF , PVD  presents to ED for worsening edema with  b/l weeping skin & erythema with pain that bothers him to walk  . Pt reports that his sons made him come into the ED. Reports has been having b/l LE edema with  weeping legs  for around 1 1/2 year. Has gotten worse in the past few day with increasing erythema, skin blisters +/- pus with occasional bouts of pain. Denies any other complaints, no fever, No chills ,No  cp, sob, palpitations, abdominal pain, dizziness, palpitations.  Pt was transferred from [place] [facility] after he got IV Vanco & IV Lasix, IV Cardizem     Given IV Cardizem and started on Cardizem gtt in the ED for rapid afib (20 Jun 2020 19:26)    INTERVAL HPI:    OVERNIGHT EVENTS:    Home Medications:  Coumadin 5 mg oral tablet: 1 tab(s) orally 2 times a day (22 Jun 2020 18:12)      MEDICATIONS  (STANDING):  apixaban 5 milliGRAM(s) Oral two times a day  ceFAZolin   IVPB 2000 milliGRAM(s) IV Intermittent once  furosemide    Tablet 20 milliGRAM(s) Oral daily  metoprolol tartrate 25 milliGRAM(s) Oral two times a day    MEDICATIONS  (PRN):  acetaminophen   Tablet .. 650 milliGRAM(s) Oral every 6 hours PRN Temp greater or equal to 38C (100.4F), Mild Pain (1 - 3)      No Known Allergies      Social History:  Never smoker, denies etoh and drug abuse  , Lives alone, Retired  (20 Jun 2020 19:26)      REVIEW OF SYSTEMS:  CONSTITUTIONAL: No fever, No chills, No fatigue, No myalgia, No Body ache, No Weakness  EYES: No eye pain,  No visual disturbances, No discharge, No Redness  ENMT: No ear pain, No nose bleed, No vertigo; No sinus pain, No throat pain, No Congestion  NECK: No pain, No stiffness  RESPIRATORY: No cough, No wheezing, No hemoptysis, No shortness of breath  CARDIOVASCULAR: No chest pain, No palpitations  GASTROINTESTINAL: No abdominal pain, No epigastric pain. No nausea, No vomiting, No diarrhea, No constipation; [  ] BM  GENITOURINARY: No dysuria, No frequency, No urgency, No hematuria, No incontinence  NEUROLOGICAL: No headaches, No dizziness, No numbness, No tingling, No tremors, No weakness  EXTREMITIES: No Swelling, No Pain, No Edema  SKIN: [  ] No itching, burning, rashes, or lesions   MUSCULOSKELETAL: No joint pain, No joint swelling; No muscle pain, No back pain, No extremity pain  PSYCHIATRIC: No depression, No anxiety, No mood swings, No difficulty sleeping at night  PAIN SCALE: [  ] None  [  ] Other-  ROS Unable to obtain due to: [  ] Dementia  [  ] Lethargy  [  ] Sedated  [  ] Non verbal  REST OF REVIEW OF SYSTEMS: [  ] Normal     Vital Signs Last 24 Hrs  T(C): 36.3 (30 Jun 2020 07:26), Max: 36.6 (30 Jun 2020 05:10)  T(F): 97.3 (30 Jun 2020 07:26), Max: 97.8 (30 Jun 2020 05:10)  HR: 81 (30 Jun 2020 07:26) (72 - 96)  BP: 117/72 (30 Jun 2020 07:26) (106/71 - 121/73)  BP(mean): --  RR: 17 (30 Jun 2020 07:26) (17 - 17)  SpO2: 97% (30 Jun 2020 07:26) (95% - 97%)    CAPILLARY BLOOD GLUCOSE          I&O's Summary    29 Jun 2020 07:01  -  30 Jun 2020 07:00  --------------------------------------------------------  IN: 240 mL / OUT: 0 mL / NET: 240 mL      PHYSICAL EXAM:  GENERAL:  [  ] NAD, [  ] Well appearing, [  ] Agitated, [  ] Drowsy, [  ] Lethargy, [  ] Confused   HEAD:  [  ] Normal, [  ] Other  EYES:  [  ] EOMI, [  ] PERRLA, [  ] Conjunctiva and sclera clear normal, [  ] Other, [  ] Pallor, [  ] Discharge  ENMT:  [  ] Normal, [  ] Moist mucous membranes, [  ] Good dentition, [  ] No thrush  NECK:  [  ] Supple, [  ] No JVD, [  ] Normal thyroid, [  ] Lymphadenopathy, [  ] Other  CHEST/LUNG:  [  ] Clear to auscultation bilaterally, [  ] Breath Sounds equal B/L / decreased, [  ] Poor effort, [  ] No rales, [  ] No rhonchi, [  ] No wheezing  HEART:  [  ] Regular rate and rhythm, [  ] Tachycardia, [  ] Bradycardia, [  ] Irregular, [  ] No murmurs, No rubs, No gallops, [  ] PPM in place (Mfr:  )  ABDOMEN:  [  ] Soft, [  ] Nontender, [  ] Nondistended, [  ] No mass, [  ] Bowel sounds present, [  ] Obese  NERVOUS SYSTEM:  [  ] Alert & Oriented x3, [  ] Nonfocal, [  ] Confusion, [  ] Encephalopathic, [  ] Sedated, [  ] Unable to assess, [  ] Dementia, [  ] Other-  EXTREMITIES:  [  ] 2+ Peripheral Pulses, No clubbing, No cyanosis,  [  ] Edema B/L lower EXT, [  ] PVD stasis skin changes B/L lower EXT, [  ] Wound  LYMPH:  No lymphadenopathy noted  SKIN:  [  ] No rashes or lesions, [  ] Pressure ulcers, [  ] Ecchymosis, [  ] Skin tears, [  ] Other    DIET: Diet, DASH/TLC:   Sodium & Cholesterol Restricted  1500mL Fluid Restriction ([id number]) (06-22-20 @ 08:06)      LABS:                        11.7   8.91  )-----------( 187      ( 30 Jun 2020 07:44 )             35.8     30 Jun 2020 07:44    135    |  100    |  12     ----------------------------<  115    4.1     |  30     |  0.73     Ca    8.9        30 Jun 2020 07:44      PT/INR - ( 30 Jun 2020 07:44 )   PT: 18.9 sec;   INR: 1.65 ratio                        Anemia Panel:      Thyroid Panel:                RADIOLOGY & ADDITIONAL TESTS:      HEALTH ISSUES - PROBLEM Dx:  Varicose veins of bilateral lower extremities with pain: Varicose veins of bilateral lower extremities with pain  Stasis dermatitis with ulcer of right lower extremity due to peripheral venous hypertension: Stasis dermatitis with ulcer of right lower extremity due to peripheral venous hypertension  Chronic venous stasis dermatitis of both lower extremities: Chronic venous stasis dermatitis of both lower extremities  CHF (congestive heart failure): CHF (congestive heart failure)  Hyponatremia: Hyponatremia  Need for prophylactic measure: Need for prophylactic measure  Acute on chronic congestive heart failure, unspecified heart failure type: Acute on chronic congestive heart failure, unspecified heart failure type  Cellulitis of lower extremity, unspecified laterality: Cellulitis of lower extremity, unspecified laterality  Stasis dermatitis with ulcer of left lower extremity due to peripheral venous hypertension: Stasis dermatitis with ulcer of left lower extremity due to peripheral venous hypertension  Atrial fibrillation: Atrial fibrillation  Severe aortic stenosis: Severe aortic stenosis  Systolic CHF: Systolic CHF  PVD (peripheral vascular disease): PVD (peripheral vascular disease)  IV infiltration: IV infiltration        Consultant(s) Notes Reviewed:  [  ] YES     Care Discussed with [ x ] Consultants, [  ] Patient, [  ] Family, [  ] HCP, [  ] , [  ] Social Service, [  ] RN, [  ] Physical Therapy, [  ] Palliative Care Team  DVT PPX: [  ] Lovenox, [  ] SC Heparin, [  ] Coumadin, [  ] Xarelto, [  ] Eliquis, [  ] Pradaxa, [  ] IV Heparin drip, [  ] SCD, [  ] Ambulation, [  ] Contraindicated 2/2 GI Bleed, [  ] Contraindicated 2/2  Bleed, [  ] Contraindicated 2/2 Brain Bleed  Advanced Directive: [  ] None, [  ] DNR/DNI Patient is a 87y old  Male who presents with a chief complaint of b/l LE weeping edema (29 Jun 2020 21:46)    HPI:  86 y/o M with hx of , afib (on coumadin), 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 CHF , PVD  presents to ED for worsening edema with  b/l weeping skin & erythema with pain that bothers him to walk  . Pt reports that his sons made him come into the ED. Reports has been having b/l LE edema with  weeping legs  for around 1 1/2 year. Has gotten worse in the past few day with increasing erythema, skin blisters +/- pus with occasional bouts of pain. Denies any other complaints, no fever, No chills ,No  cp, sob, palpitations, abdominal pain, dizziness, palpitations.  Pt was transferred from [place] ER after he got IV Vanco & IV Lasix, IV Cardizem     Given IV Cardizem and started on Cardizem gtt in the ED for rapid afib (20 Jun 2020 19:26)    INTERVAL HPI:  6/24/2020: Patient seen and examined at bedside. Low, stable H/H. INR 2.36. Hyponatremia improving. Pending RLE angiogram, possible stent/angioplasty in IR tomorrow. Hold Coumadin at this time, goal INR for procedure is 1.5 or less. HOLD Lisinopril 2.5 mg PO qd for Angio.  6/25/2020: Patient seen and examined at bedside. Low, stable H/H. INR 1.89. Hyponatremia improving. RLE angiogram, possible stent/angioplasty in IR today. Procedure was cancelled in IR as pt got upset with KIRIT Ext IV site Infiltration with FFP causing swelling of L U EXT. Given Coumadin 2.5 mg PO x1. Last day abx.   6/26/2020: Patient seen and examined at bedside. Low, stable H/H. INR 1.64. Off IV antibiotics. Pt is refusing Lovenox & IV Heparin as AC, until when angiogram is done next week.  6/27/2020: Pt seen, examined, NO Complaints, feels much better. Awaiting angiogram on Monday.6/29, AC on HOLD. Completed Abx   6/28/2020: Pt seen, examined, C/O leg pain with dressing changes. HOLD AC. Pt is schedule for Angiogram on 6/29/2020.  6/29/2020: Pt seen, examined. S/p B/L LE Angiogram today showing occluded SFA and popliteal right leg and occluded popliteal left leg. Started Eliquis 5 mg PO BID this evening.  6/29/2020: Pt seen, examined. On Eliquis 5 mg PO BID. DC planning to rehab.    OVERNIGHT EVENTS: NONE    Home Medications:  Coumadin 5 mg oral tablet: 1 tab(s) orally 2 times a day (22 Jun 2020 18:12)      MEDICATIONS  (STANDING):  apixaban 5 milliGRAM(s) Oral two times a day  ceFAZolin   IVPB 2000 milliGRAM(s) IV Intermittent once  furosemide    Tablet 20 milliGRAM(s) Oral daily  metoprolol tartrate 25 milliGRAM(s) Oral two times a day    MEDICATIONS  (PRN):  acetaminophen   Tablet .. 650 milliGRAM(s) Oral every 6 hours PRN Temp greater or equal to 38C (100.4F), Mild Pain (1 - 3)      No Known Allergies      Social History:  Never smoker, denies etoh and drug abuse  , Lives alone, Retired  (20 Jun 2020 19:26)      REVIEW OF SYSTEMS:  CONSTITUTIONAL: No fever, No chills, No fatigue, No myalgia, No Body ache, No Weakness  EYES: No eye pain,  No visual disturbances, No discharge, NO Redness  ENMT:  No ear pain, No nose bleed, No vertigo; No sinus pain, NO throat pain, No Congestion  NECK: No pain, No stiffness  RESPIRATORY: No cough, NO wheezing, No  hemoptysis, NO  shortness of breath  CARDIOVASCULAR: No chest pain, palpitations  GASTROINTESTINAL: No abdominal pain, NO epigastric pain. No nausea, No vomiting; No diarrhea, No constipation. [  ] BM  GENITOURINARY: No dysuria, No frequency, No urgency, No hematuria, NO incontinence  NEUROLOGICAL: No headaches, No dizziness, No numbness, No tingling, No tremors, No weakness  EXT: No Swelling, No Pain, No Edema  SKIN:  [x  ] No itching, burning, rashes, or lesions   MUSCULOSKELETAL: No joint pain ,No Jt swelling; No muscle pain, No back pain, No extremity pain  PSYCHIATRIC: No depression,  No anxiety,  No mood swings ,No difficulty sleeping at night  PAIN SCALE: [  ] None  [ x ] Other- 5/10, leg ulcers  ROS Unable to obtain due to - [  ] Dementia  [  ] Lethargy [  ] Drowsy [  ] Sedated [  ] non verbal  REST OF REVIEW Of SYSTEM - [x  ] Normal     Vital Signs Last 24 Hrs  T(C): 36.3 (30 Jun 2020 07:26), Max: 36.6 (30 Jun 2020 05:10)  T(F): 97.3 (30 Jun 2020 07:26), Max: 97.8 (30 Jun 2020 05:10)  HR: 81 (30 Jun 2020 07:26) (72 - 96)  BP: 117/72 (30 Jun 2020 07:26) (106/71 - 121/73)  BP(mean): --  RR: 17 (30 Jun 2020 07:26) (17 - 17)  SpO2: 97% (30 Jun 2020 07:26) (95% - 97%)    CAPILLARY BLOOD GLUCOSE          I&O's Summary    29 Jun 2020 07:01  -  30 Jun 2020 07:00  --------------------------------------------------------  IN: 240 mL / OUT: 0 mL / NET: 240 mL      PHYSICAL EXAM:  GENERAL:  [ x ] NAD, [ x ] Well appearing, [  ] Agitated, [  ] Drowsy, [  ] Lethargy, [  ] Confused   HEAD:  [ x ] Normal, [  ] Other  EYES:  [ x ] EOMI, [ x ] PERRLA, [ x ] Conjunctiva and sclera clear normal, [  ] Other, [  ] Pallor, [  ] Discharge  ENMT:  [ x ] Normal, [ x ] Moist mucous membranes, [ x ] Good dentition, [ x ] No thrush  NECK:  [ x ] Supple, [ x ] No JVD, [ x ] Normal thyroid, [ x ] Lymphadenopathy, [  ] Other  CHEST/LUNG:  [ x ] Clear to auscultation bilaterally, [ x ] Breath Sounds equal B/L, [  ] Poor effort, [ x ] No rales, [ x ] No rhonchi, [ x ] No wheezing  HEART:  [ x ] Regular rate and rhythm, [  ] Tachycardia, [  ] Bradycardia, [  ] Irregular, [ x ] 3/6 murmurs, No rubs, No gallops, [  ] PPM in place (Mfr:  )  ABDOMEN:  [ x ] Soft, [ x ] Nontender, [ x ] Nondistended, [ x ] No mass, [  x] Bowel sounds present, [  ] Obese  NERVOUS SYSTEM:  [ x ] Alert & Oriented x3, [ x ] Nonfocal, [  ] Confusion, [  ] Encephalopathic, [  ] Sedated, [  ] Unable to assess, [  ] Dementia, [  ] Other-  EXTREMITIES:  [ x ] 2+ Peripheral Pulses, No clubbing, No cyanosis,  [ x ] Mild Edema B/L lower EXT, [ x ] PVD stasis skin changes B/L lower EXT, [ x ] b/l lower Ext erythema, open ulcers,  some yellowish slough , no foul smell, no warmth, no discharge.  LYMPH:  No lymphadenopathy noted, [ x ] LUEXT  & Hand swelling- improving ,  No pain .N/V Intact, +ROM  SKIN:  [ x ] No rashes or lesions, [  ] Pressure ulcers, [ x ] Ecchymosis - B/L Upper EXT, [  ] Skin tears    DIET: Diet, DASH/TLC:   Sodium & Cholesterol Restricted  1500mL Fluid Restriction ([id number]) (06-22-20 @ 08:06)      LABS:                        11.7   8.91  )-----------( 187      ( 30 Jun 2020 07:44 )             35.8     30 Jun 2020 07:44    135    |  100    |  12     ----------------------------<  115    4.1     |  30     |  0.73     Ca    8.9        30 Jun 2020 07:44      PT/INR - ( 30 Jun 2020 07:44 )   PT: 18.9 sec;   INR: 1.65 ratio                        Anemia Panel:      Thyroid Panel:                RADIOLOGY & ADDITIONAL TESTS:      HEALTH ISSUES - PROBLEM Dx:  Varicose veins of bilateral lower extremities with pain: Varicose veins of bilateral lower extremities with pain  Stasis dermatitis with ulcer of right lower extremity due to peripheral venous hypertension: Stasis dermatitis with ulcer of right lower extremity due to peripheral venous hypertension  Chronic venous stasis dermatitis of both lower extremities: Chronic venous stasis dermatitis of both lower extremities  CHF (congestive heart failure): CHF (congestive heart failure)  Hyponatremia: Hyponatremia  Need for prophylactic measure: Need for prophylactic measure  Acute on chronic congestive heart failure, unspecified heart failure type: Acute on chronic congestive heart failure, unspecified heart failure type  Cellulitis of lower extremity, unspecified laterality: Cellulitis of lower extremity, unspecified laterality  Stasis dermatitis with ulcer of left lower extremity due to peripheral venous hypertension: Stasis dermatitis with ulcer of left lower extremity due to peripheral venous hypertension  Atrial fibrillation: Atrial fibrillation  Severe aortic stenosis: Severe aortic stenosis  Systolic CHF: Systolic CHF  PVD (peripheral vascular disease): PVD (peripheral vascular disease)  IV infiltration: IV infiltration        Consultant(s) Notes Reviewed:  [ x] YES     Care Discussed with [X] Consultants  [ x ] Patient  [ x] Family [  ] HCP [  ]   [  ] Social Service  [x  ] RN, [  ] Physical Therapy,[  ] Palliative care team  DVT PPX: [  ] Lovenox, [  ] S C Heparin, [  ] Coumadin, [  ] Xarelto, [  ] Eliquis, [  ] Pradaxa, [  ] IV Heparin drip, [  ] SCD [  ] Contraindication 2 to GI Bleed,[  ] Ambulation [  ] Contraindicated 2 to  bleed [  ] Contraindicated 2 to Brain Bleed, [ x ] Pt refusing DVT ppx & SCD  Advanced directive: [ x ] None, [  ] DNR/DNI Patient is a 87y old  Male who presents with a chief complaint of b/l LE weeping edema (29 Jun 2020 21:46)    HPI:  88 y/o M with hx of , afib (on coumadin), 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 CHF , PVD  presents to ED for worsening edema with  b/l weeping skin & erythema with pain that bothers him to walk  . Pt reports that his sons made him come into the ED. Reports has been having b/l LE edema with  weeping legs  for around 1 1/2 year. Has gotten worse in the past few day with increasing erythema, skin blisters +/- pus with occasional bouts of pain. Denies any other complaints, no fever, No chills ,No  cp, sob, palpitations, abdominal pain, dizziness, palpitations.  Pt was transferred from [place] ER after he got IV Vanco & IV Lasix, IV Cardizem     Given IV Cardizem and started on Cardizem gtt in the ED for rapid afib (20 Jun 2020 19:26)    INTERVAL HPI:  6/24/2020: Patient seen and examined at bedside. Low, stable H/H. INR 2.36. Hyponatremia improving. Pending RLE angiogram, possible stent/angioplasty in IR tomorrow. Hold Coumadin at this time, goal INR for procedure is 1.5 or less. HOLD Lisinopril 2.5 mg PO qd for Angio.  6/25/2020: Patient seen and examined at bedside. Low, stable H/H. INR 1.89. Hyponatremia improving. RLE angiogram, possible stent/angioplasty in IR today. Procedure was cancelled in IR as pt got upset with KIRIT Ext IV site Infiltration with FFP causing swelling of L U EXT. Given Coumadin 2.5 mg PO x1. Last day abx.   6/26/2020: Patient seen and examined at bedside. Low, stable H/H. INR 1.64. Off IV antibiotics. Pt is refusing Lovenox & IV Heparin as AC, until when angiogram is done next week.  6/27/2020: Pt seen, examined, NO Complaints, feels much better. Awaiting angiogram on Monday.6/29, AC on HOLD. Completed Abx   6/28/2020: Pt seen, examined, C/O leg pain with dressing changes. HOLD AC. Pt is schedule for Angiogram on 6/29/2020.  6/29/2020: Pt seen, examined. S/p B/L LE Angiogram today showing occluded SFA and popliteal right leg and occluded popliteal left leg. Started Eliquis 5 mg PO BID this evening.  6/29/2020: Pt seen, examined. On Eliquis 5 mg PO BID. DC planning to rehab.    OVERNIGHT EVENTS: NONE    Home Medications:  Coumadin 5 mg oral tablet: 1 tab(s) orally 2 times a day (22 Jun 2020 18:12)      MEDICATIONS  (STANDING):  apixaban 5 milliGRAM(s) Oral two times a day  ceFAZolin   IVPB 2000 milliGRAM(s) IV Intermittent once  furosemide    Tablet 20 milliGRAM(s) Oral daily  metoprolol tartrate 25 milliGRAM(s) Oral two times a day    MEDICATIONS  (PRN):  acetaminophen   Tablet .. 650 milliGRAM(s) Oral every 6 hours PRN Temp greater or equal to 38C (100.4F), Mild Pain (1 - 3)      No Known Allergies      Social History:  Never smoker, denies etoh and drug abuse  , Lives alone, Retired  (20 Jun 2020 19:26)      REVIEW OF SYSTEMS:  CONSTITUTIONAL: No fever, No chills, No fatigue, No myalgia, No Body ache, No Weakness  EYES: No eye pain,  No visual disturbances, No discharge, NO Redness  ENMT:  No ear pain, No nose bleed, No vertigo; No sinus pain, NO throat pain, No Congestion  NECK: No pain, No stiffness  RESPIRATORY: No cough, NO wheezing, No  hemoptysis, NO  shortness of breath  CARDIOVASCULAR: No chest pain, palpitations  GASTROINTESTINAL: No abdominal pain, NO epigastric pain. No nausea, No vomiting; No diarrhea, No constipation. [  ] BM  GENITOURINARY: No dysuria, No frequency, No urgency, No hematuria, NO incontinence  NEUROLOGICAL: No headaches, No dizziness, No numbness, No tingling, No tremors, No weakness  EXT: No Swelling, No Pain, No Edema  SKIN:  [x  ] No itching, burning, rashes, or lesions   MUSCULOSKELETAL: No joint pain ,No Jt swelling; No muscle pain, No back pain, No extremity pain  PSYCHIATRIC: No depression,  No anxiety,  No mood swings ,No difficulty sleeping at night  PAIN SCALE: [  ] None  [ x ] Other- 5/10, leg ulcers  ROS Unable to obtain due to - [  ] Dementia  [  ] Lethargy [  ] Drowsy [  ] Sedated [  ] non verbal  REST OF REVIEW Of SYSTEM - [x  ] Normal     Vital Signs Last 24 Hrs  T(C): 36.3 (30 Jun 2020 07:26), Max: 36.6 (30 Jun 2020 05:10)  T(F): 97.3 (30 Jun 2020 07:26), Max: 97.8 (30 Jun 2020 05:10)  HR: 81 (30 Jun 2020 07:26) (72 - 96)  BP: 117/72 (30 Jun 2020 07:26) (106/71 - 121/73)  BP(mean): --  RR: 17 (30 Jun 2020 07:26) (17 - 17)  SpO2: 97% (30 Jun 2020 07:26) (95% - 97%)    CAPILLARY BLOOD GLUCOSE          I&O's Summary    29 Jun 2020 07:01  -  30 Jun 2020 07:00  --------------------------------------------------------  IN: 240 mL / OUT: 0 mL / NET: 240 mL      PHYSICAL EXAM:  GENERAL:  [ x ] NAD, [ x ] Well appearing, [  ] Agitated, [  ] Drowsy, [  ] Lethargy, [  ] Confused   HEAD:  [ x ] Normal, [  ] Other  EYES:  [ x ] EOMI, [ x ] PERRLA, [ x ] Conjunctiva and sclera clear normal, [  ] Other, [  ] Pallor, [  ] Discharge  ENMT:  [ x ] Normal, [ x ] Moist mucous membranes, [ x ] Good dentition, [ x ] No thrush  NECK:  [ x ] Supple, [ x ] No JVD, [ x ] Normal thyroid, [ x ] Lymphadenopathy, [  ] Other  CHEST/LUNG:  [ x ] Clear to auscultation bilaterally, [ x ] Breath Sounds equal B/L, [  ] Poor effort, [ x ] No rales, [ x ] No rhonchi, [ x ] No wheezing  HEART:  [ x ] Regular rate and rhythm, [  ] Tachycardia, [  ] Bradycardia, [  ] Irregular, [ x ] 3/6 murmurs, No rubs, No gallops, [  ] PPM in place (Mfr:  )  ABDOMEN:  [ x ] Soft, [ x ] Nontender, [ x ] Nondistended, [ x ] No mass, [  x] Bowel sounds present, [  ] Obese  NERVOUS SYSTEM:  [ x ] Alert & Oriented x3, [ x ] Nonfocal, [  ] Confusion, [  ] Encephalopathic, [  ] Sedated, [  ] Unable to assess, [  ] Dementia, [  ] Other-  EXTREMITIES:  [ x ] 2+ Peripheral Pulses, No clubbing, No cyanosis,  [ x ] Mild Edema B/L lower EXT, [ x ] PVD stasis skin changes B/L lower EXT, [ x ] b/l lower Ext erythema, open ulcers,  some yellowish slough , no foul smell, no warmth, no discharge.  LYMPH:  No lymphadenopathy noted, [ x ] LUEXT  & Hand swelling- improving ,  No pain .N/V Intact, +ROM  SKIN:  [ x ] No rashes or lesions, [  ] Pressure ulcers, [ x ] Ecchymosis - B/L Upper EXT, [  ] Skin tears    DIET: Diet, DASH/TLC:   Sodium & Cholesterol Restricted  1500mL Fluid Restriction ([id number]) (06-22-20 @ 08:06)      LABS:                        11.7   8.91  )-----------( 187      ( 30 Jun 2020 07:44 )             35.8     30 Jun 2020 07:44    135    |  100    |  12     ----------------------------<  115    4.1     |  30     |  0.73     Ca    8.9        30 Jun 2020 07:44      PT/INR - ( 30 Jun 2020 07:44 )   PT: 18.9 sec;   INR: 1.65 ratio                        Anemia Panel:      Thyroid Panel:                RADIOLOGY & ADDITIONAL TESTS:      HEALTH ISSUES - PROBLEM Dx:  Varicose veins of bilateral lower extremities with pain: Varicose veins of bilateral lower extremities with pain  Stasis dermatitis with ulcer of right lower extremity due to peripheral venous hypertension: Stasis dermatitis with ulcer of right lower extremity due to peripheral venous hypertension  Chronic venous stasis dermatitis of both lower extremities: Chronic venous stasis dermatitis of both lower extremities  CHF (congestive heart failure): CHF (congestive heart failure)  Hyponatremia: Hyponatremia  Need for prophylactic measure: Need for prophylactic measure  Acute on chronic congestive heart failure, unspecified heart failure type: Acute on chronic congestive heart failure, unspecified heart failure type  Cellulitis of lower extremity, unspecified laterality: Cellulitis of lower extremity, unspecified laterality  Stasis dermatitis with ulcer of left lower extremity due to peripheral venous hypertension: Stasis dermatitis with ulcer of left lower extremity due to peripheral venous hypertension  Atrial fibrillation: Atrial fibrillation  Severe aortic stenosis: Severe aortic stenosis  Systolic CHF: Systolic CHF  PVD (peripheral vascular disease): PVD (peripheral vascular disease)  IV infiltration: IV infiltration        Consultant(s) Notes Reviewed:  [ x] YES     Care Discussed with [X] Consultants  [ x ] Patient  [ x] Family [  ] HCP [  ]   [  ] Social Service  [x  ] RN, [  ] Physical Therapy,[  ] Palliative care team  DVT PPX: [  ] Lovenox, [  ] S C Heparin, [  ] Coumadin, [  ] Xarelto, [ x ] Eliquis, [  ] Pradaxa, [  ] IV Heparin drip, [  ] SCD [  ] Contraindication 2 to GI Bleed,[  ] Ambulation [  ] Contraindicated 2 to  bleed [  ] Contraindicated 2 to Brain Bleed, [ x ] Pt refusing DVT ppx & SCD  Advanced directive: [ x ] None, [  ] DNR/DNI Patient is a 87y old  Male who presents with a chief complaint of b/l LE weeping edema (29 Jun 2020 21:46)    HPI:  88 y/o M with hx of , afib (on coumadin), 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 CHF , PVD  presents to ED for worsening edema with  b/l weeping skin & erythema with pain that bothers him to walk  . Pt reports that his sons made him come into the ED. Reports has been having b/l LE edema with  weeping legs  for around 1 1/2 year. Has gotten worse in the past few day with increasing erythema, skin blisters +/- pus with occasional bouts of pain. Denies any other complaints, no fever, No chills ,No  cp, sob, palpitations, abdominal pain, dizziness, palpitations.  Pt was transferred from [place] ER after he got IV Vanco & IV Lasix, IV Cardizem     Given IV Cardizem and started on Cardizem gtt in the ED for rapid afib (20 Jun 2020 19:26)    INTERVAL HPI:  6/24/2020: Patient seen and examined at bedside. Low, stable H/H. INR 2.36. Hyponatremia improving. Pending RLE angiogram, possible stent/angioplasty in IR tomorrow. Hold Coumadin at this time, goal INR for procedure is 1.5 or less. HOLD Lisinopril 2.5 mg PO qd for Angio.  6/25/2020: Patient seen and examined at bedside. Low, stable H/H. INR 1.89. Hyponatremia improving. RLE angiogram, possible stent/angioplasty in IR today. Procedure was cancelled in IR as pt got upset with KIRIT Ext IV site Infiltration with FFP causing swelling of L U EXT. Given Coumadin 2.5 mg PO x1. Last day abx.   6/26/2020: Patient seen and examined at bedside. Low, stable H/H. INR 1.64. Off IV antibiotics. Pt is refusing Lovenox & IV Heparin as AC, until when angiogram is done next week.  6/27/2020: Pt seen, examined, NO Complaints, feels much better. Awaiting angiogram on Monday.6/29, AC on HOLD. Completed Abx   6/28/2020: Pt seen, examined, C/O leg pain with dressing changes. HOLD AC. Pt is schedule for Angiogram on 6/29/2020.  6/29/2020: Pt seen, examined. S/p B/L LE Angiogram today showing occluded SFA and popliteal right leg and occluded popliteal left leg. Started Eliquis 5 mg PO BID this evening.  6/29/2020: Pt seen, examined. On Eliquis 5 mg PO BID. DC planning to rehab.     OVERNIGHT EVENTS: NONE    Home Medications:  Coumadin 5 mg oral tablet: 1 tab(s) orally 2 times a day (22 Jun 2020 18:12)      MEDICATIONS  (STANDING):  apixaban 5 milliGRAM(s) Oral two times a day  ceFAZolin   IVPB 2000 milliGRAM(s) IV Intermittent once  furosemide    Tablet 20 milliGRAM(s) Oral daily  metoprolol tartrate 25 milliGRAM(s) Oral two times a day    MEDICATIONS  (PRN):  acetaminophen   Tablet .. 650 milliGRAM(s) Oral every 6 hours PRN Temp greater or equal to 38C (100.4F), Mild Pain (1 - 3)      No Known Allergies      Social History:  Never smoker, denies etoh and drug abuse  , Lives alone, Retired  (20 Jun 2020 19:26)      REVIEW OF SYSTEMS:  CONSTITUTIONAL: No fever, No chills, No fatigue, No myalgia, No Body ache, No Weakness  EYES: No eye pain,  No visual disturbances, No discharge, NO Redness  ENMT:  No ear pain, No nose bleed, No vertigo; No sinus pain, NO throat pain, No Congestion  NECK: No pain, No stiffness  RESPIRATORY: No cough, NO wheezing, No  hemoptysis, NO  shortness of breath  CARDIOVASCULAR: No chest pain, palpitations  GASTROINTESTINAL: No abdominal pain, NO epigastric pain. No nausea, No vomiting; No diarrhea, No constipation. [  ] BM  GENITOURINARY: No dysuria, No frequency, No urgency, No hematuria, NO incontinence  NEUROLOGICAL: No headaches, No dizziness, No numbness, No tingling, No tremors, No weakness  EXT: No Swelling, No Pain, No Edema  SKIN:  [x  ] No itching, burning, rashes, or lesions   MUSCULOSKELETAL: No joint pain ,No Jt swelling; No muscle pain, No back pain, No extremity pain  PSYCHIATRIC: No depression,  No anxiety,  No mood swings ,No difficulty sleeping at night  PAIN SCALE: [  ] None  [ x ] Other- 1-2/10, leg ulcers  ROS Unable to obtain due to - [  ] Dementia  [  ] Lethargy [  ] Drowsy [  ] Sedated [  ] non verbal  REST OF REVIEW Of SYSTEM - [x  ] Normal     Vital Signs Last 24 Hrs  T(C): 36.3 (30 Jun 2020 07:26), Max: 36.6 (30 Jun 2020 05:10)  T(F): 97.3 (30 Jun 2020 07:26), Max: 97.8 (30 Jun 2020 05:10)  HR: 81 (30 Jun 2020 07:26) (72 - 96)  BP: 117/72 (30 Jun 2020 07:26) (106/71 - 121/73)  BP(mean): --  RR: 17 (30 Jun 2020 07:26) (17 - 17)  SpO2: 97% (30 Jun 2020 07:26) (95% - 97%)    CAPILLARY BLOOD GLUCOSE          I&O's Summary    29 Jun 2020 07:01  -  30 Jun 2020 07:00  --------------------------------------------------------  IN: 240 mL / OUT: 0 mL / NET: 240 mL      PHYSICAL EXAM:  GENERAL:  [ x ] NAD, [ x ] Well appearing, [  ] Agitated, [  ] Drowsy, [  ] Lethargy, [  ] Confused   HEAD:  [ x ] Normal, [  ] Other  EYES:  [ x ] EOMI, [ x ] PERRLA, [ x ] Conjunctiva and sclera clear normal, [  ] Other, [  ] Pallor, [  ] Discharge  ENMT:  [ x ] Normal, [ x ] Moist mucous membranes, [ x ] Good dentition, [ x ] No thrush  NECK:  [ x ] Supple, [ x ] No JVD, [ x ] Normal thyroid, [ x ] Lymphadenopathy, [  ] Other  CHEST/LUNG:  [ x ] Clear to auscultation bilaterally, [ x ] Breath Sounds equal B/L, [  ] Poor effort, [ x ] No rales, [ x ] No rhonchi, [ x ] No wheezing  HEART:  [ x ] Regular rate and rhythm, [  ] Tachycardia, [  ] Bradycardia, [  ] Irregular, [ x ] 3/6 murmurs, No rubs, No gallops, [  ] PPM in place (Mfr:  )  ABDOMEN:  [ x ] Soft, [ x ] Nontender, [ x ] Nondistended, [ x ] No mass, [  x] Bowel sounds present, [  ] Obese  NERVOUS SYSTEM:  [ x ] Alert & Oriented x3, [ x ] Nonfocal, [  ] Confusion, [  ] Encephalopathic, [  ] Sedated, [  ] Unable to assess, [  ] Dementia, [  ] Other-  EXTREMITIES:  [ x ] 2+ Peripheral Pulses, No clubbing, No cyanosis,  [ x ] Mild Edema B/L lower EXT, [ x ] PVD stasis skin changes B/L lower EXT, [ x ] b/l lower Ext erythema, open ulcers,  some yellowish slough , no foul smell, no warmth, no discharge.  LYMPH:  No lymphadenopathy noted, [ x ] LUEXT  & Hand swelling- improving ,  No pain .N/V Intact, +ROM  SKIN:  [ x ] No rashes or lesions, [  ] Pressure ulcers, [ x ] Ecchymosis - B/L Upper EXT, [  ] Skin tears    DIET: Diet, DASH/TLC:   Sodium & Cholesterol Restricted  1500mL Fluid Restriction ([id number]) (06-22-20 @ 08:06)      LABS:                        11.7   8.91  )-----------( 187      ( 30 Jun 2020 07:44 )             35.8     30 Jun 2020 07:44    135    |  100    |  12     ----------------------------<  115    4.1     |  30     |  0.73     Ca    8.9        30 Jun 2020 07:44      PT/INR - ( 30 Jun 2020 07:44 )   PT: 18.9 sec;   INR: 1.65 ratio         RADIOLOGY & ADDITIONAL TESTS: NONE    HEALTH ISSUES - PROBLEM Dx:  Varicose veins of bilateral lower extremities with pain: Varicose veins of bilateral lower extremities with pain  Stasis dermatitis with ulcer of right lower extremity due to peripheral venous hypertension: Stasis dermatitis with ulcer of right lower extremity due to peripheral venous hypertension  Chronic venous stasis dermatitis of both lower extremities: Chronic venous stasis dermatitis of both lower extremities  CHF (congestive heart failure): CHF (congestive heart failure)  Hyponatremia: Hyponatremia  Need for prophylactic measure: Need for prophylactic measure  Acute on chronic congestive heart failure, unspecified heart failure type: Acute on chronic congestive heart failure, unspecified heart failure type  Cellulitis of lower extremity, unspecified laterality: Cellulitis of lower extremity, unspecified laterality  Stasis dermatitis with ulcer of left lower extremity due to peripheral venous hypertension: Stasis dermatitis with ulcer of left lower extremity due to peripheral venous hypertension  Atrial fibrillation: Atrial fibrillation  Severe aortic stenosis: Severe aortic stenosis  Systolic CHF: Systolic CHF  PVD (peripheral vascular disease): PVD (peripheral vascular disease)  IV infiltration: IV infiltration        Consultant(s) Notes Reviewed:  [ x] YES     Care Discussed with [X] Consultants  [ x ] Patient  [ x] Family [  ] HCP [  ]   [ x ] Social Service  [x  ] RN, [  ] Physical Therapy,[  ] Palliative care team  DVT PPX: [  ] Lovenox, [  ] S C Heparin, [  ] Coumadin, [  ] Xarelto, [ x ] Eliquis, [  ] Pradaxa, [  ] IV Heparin drip, [  ] SCD [  ] Contraindication 2 to GI Bleed,[  ] Ambulation [  ] Contraindicated 2 to  bleed [  ] Contraindicated 2 to Brain Bleed, [ x ] Pt refusing DVT ppx & SCD  Advanced directive: [ x ] None, [  ] DNR/DNI

## 2020-06-30 NOTE — PROGRESS NOTE ADULT - PROBLEM SELECTOR PLAN 2
Improved - Cellulitis b/l LE  - marked erythema, s/p oozing clear liquids/skin blister with yellowish slough  with severe chronic venous stasis with possible +/- cellulitis &  fluid over load with edema - Venous Stasis ulcers, afebrile, NL WBC- s/p vanco  - s/p IV Rocephin (6/23-6/25)  - PVD, keep b/l lower EXT elevated  - Blood c/sx 2 - neg  - Pain control  - s/p B/L LE angiogram (6/29/2020) showing occluded SFA and popliteal right leg and occluded popliteal left leg  - Vascular Surgery (Dr. Arias) consulted, recs appreciated  - Wound care (Dr. Chong) consulted, recs appreciated - Skin changes attributed to bilateral stasis dermatitis and bilateral venous hypertension with edema and venous stasis ulcers and weeping - silver alginate and dry dressings every other day, drainage dependant, and ace wraps  - ID consult (Dr gonzalez/Dr Simms) consulted, recs appreciated - suspect most of this is chronic, with venous stasis ulcers and dermatitis Improved - Cellulitis b/l LE  - marked erythema, s/p oozing clear liquids/skin blister with yellowish slough  with severe chronic venous stasis  ulcers with possible 2ndary +/- cellulitis &  fluid over load with edema - Venous Stasis ulcers, afebrile, NL WBC- s/p vanco  - s/p IV Rocephin (6/23-6/25)  - PVD, keep b/l lower EXT elevated  - Blood c/sx 2 - neg  - Pain control  - s/p B/L LE angiogram (6/29/2020) showing occluded SFA and popliteal right leg and occluded popliteal left leg  - Vascular Surgery (Dr. Arias) consulted, recs appreciated  - Wound care (Dr. Chong) consulted, recs appreciated - Skin changes attributed to bilateral stasis dermatitis and bilateral venous hypertension with edema and venous stasis ulcers and weeping - silver alginate and dry dressings every other day, drainage dependant, and ace wraps  - ID consult (Dr gonzalez/Dr Simms) consulted, recs appreciated - suspect most of this is chronic, with venous stasis ulcers and dermatitis

## 2020-06-30 NOTE — PROGRESS NOTE ADULT - REASON FOR ADMISSION
b/l LE weeping edema

## 2020-06-30 NOTE — DISCHARGE NOTE NURSING/CASE MANAGEMENT/SOCIAL WORK - NSDCFUADDAPPT_GEN_ALL_CORE_FT
Please follow up with your PCP within 1 week of discharge  Please follow up with your cardiologist within 1 week of discharge  Please follow up with your vascular surgeon within 1 week of discharge

## 2020-06-30 NOTE — PROGRESS NOTE ADULT - PROBLEM SELECTOR PLAN 5
- Echo (6/22) - Mild concentric LVH with moderate global LV systolic dysfunction with LVEF of 40%, grossly normal RV size and systolic functio, calcified trileaflet aortic valve with severe aortic stenosis, mild to moderate MR, mild TR.   - Cardio DR Mcelroy d/w both sons at bed side at detail about ECHO results & different treatment options including TAVR as out pt. - Echo (6/22) - Mild concentric LVH with moderate global LV systolic dysfunction with LVEF of 40%, grossly normal RV size and systolic functio, calcified trileaflet aortic valve with severe aortic stenosis, mild to moderate MR, mild TR.   - Cardio DR Mcelroy d/w both sons at bed side at detail about ECHO results & different treatment options including TAVR as out pt. Cardio Dr Mcelroy D/W Pt & son at detail.

## 2020-06-30 NOTE — PROGRESS NOTE ADULT - SUBJECTIVE AND OBJECTIVE BOX
[facility] -- Radha Boyle, Vonnie, Lilibeth, Joel Pulido Savella  Office # [phone]      Follow Up:    afib     Subjective/Observations:     No events overnight resting comfortably in bed.  No complaints of chest pain, dyspnea, or palpitations reported. No signs of orthopnea or PND.  + bilateral LE leg pain   REVIEW OF SYSTEMS: All other review of systems is negative unless indicated above    PAST MEDICAL & SURGICAL HISTORY:  PVD (peripheral vascular disease)  Lung cancer: Left lung Sx for removal of Tumor, Rt Lung Cryo therapy for recurrence  Atrial fibrillation: on AC  Atrial fibrillation, unspecified type  S/P lobectomy of lung: Tumor removal from Lower Lobe, NO CT, NO RT      MEDICATIONS  (STANDING):  apixaban 5 milliGRAM(s) Oral two times a day  ceFAZolin   IVPB 2000 milliGRAM(s) IV Intermittent once  furosemide    Tablet 20 milliGRAM(s) Oral daily  metoprolol tartrate 25 milliGRAM(s) Oral two times a day    MEDICATIONS  (PRN):  acetaminophen   Tablet .. 650 milliGRAM(s) Oral every 6 hours PRN Temp greater or equal to 38C (100.4F), Mild Pain (1 - 3)      Allergies    No Known Allergies    Intolerances        Vital Signs Last 24 Hrs  T(C): 36.3 (30 Jun 2020 07:26), Max: 36.6 (30 Jun 2020 05:10)  T(F): 97.3 (30 Jun 2020 07:26), Max: 97.8 (30 Jun 2020 05:10)  HR: 81 (30 Jun 2020 07:26) (72 - 96)  BP: 117/72 (30 Jun 2020 07:26) (106/71 - 121/73)  BP(mean): --  RR: 17 (30 Jun 2020 07:26) (17 - 17)  SpO2: 97% (30 Jun 2020 07:26) (95% - 97%)    I&O's Summary    29 Jun 2020 07:01  -  30 Jun 2020 07:00  --------------------------------------------------------  IN: 240 mL / OUT: 0 mL / NET: 240 mL          PHYSICAL EXAM:  TELE: afib   Constitutional: NAD, awake and alert, well-developed  HEENT: Moist Mucous Membranes, Anicteric  Pulmonary: Non-labored, breath sounds are clear bilaterally, No wheezing, crackles or rhonchi  Cardiovascular: IRRegular, S1 and S2 nl, No murmurs, rubs, gallops or clicks  Gastrointestinal: Bowel Sounds present, soft, nontender.   Lymph: bilateral le with ace wrap   Skin: No visible rashes or ulcers.  Psych:  Mood & affect appropriate    LABS: All Labs Reviewed:                        11.7   8.91  )-----------( 187      ( 30 Jun 2020 07:44 )             35.8                         11.8   8.02  )-----------( 198      ( 29 Jun 2020 06:37 )             35.2                         12.3   9.06  )-----------( 246      ( 28 Jun 2020 06:47 )             36.9     30 Jun 2020 07:44    135    |  100    |  12     ----------------------------<  115    4.1     |  30     |  0.73   29 Jun 2020 06:37    134    |  99     |  14     ----------------------------<  95     4.4     |  30     |  0.69   28 Jun 2020 06:47    133    |  98     |  14     ----------------------------<  91     3.8     |  29     |  0.67     Ca    8.9        30 Jun 2020 07:44  Ca    8.7        29 Jun 2020 06:37  Ca    8.6        28 Jun 2020 06:47    TPro  6.2    /  Alb  3.0    /  TBili  0.9    /  DBili  x      /  AST  11     /  ALT  17     /  AlkPhos  68     28 Jun 2020 06:47    PT/INR - ( 30 Jun 2020 07:44 )   PT: 18.9 sec;   INR: 1.65 ratio         ECG:  < from: 12 Lead ECG (06.20.20 @ 14:49) >    Ventricular Rate 104 BPM    Atrial Rate 117 BPM    QRS Duration 114 ms    Q-T Interval 310 ms    QTC Calculation(Bezet) 407 ms    R Axis -28 degrees    T Axis 72 degrees    Diagnosis Line Atrial fibrillation  Abnormal ECG  No previous ECGs available    < end of copied text >      Echo:  < from: TTE Echo Complete w/o Contrast w/ Doppler (06.21.20 @ 13:37) >  OBSERVATIONS:  Technically difficult study  Mitral Valve: Thickened leaflets, mild to moderate MR.  Aortic Valve/Aorta: Calcified trileaflet aortic valve with decreased opening. Peak transaortic valve gradient of 72.9 mmHg with a mean transaortic valve gradient of 41 mmHg. The aortic valve area is calculated to be 0.36 sq cm. This is consistent with severe aortic stenosis  Tricuspid Valve: normal with mild TR.  Pulmonic Valve: Trace PI  Left Atrium: normal  Right Atrium: Not well visualized  Left Ventricle: Moderate global left ventricular systolic dysfunction with an estimated LVEF of 40%. Mild LVH  Right Ventricle: Grossly normal size and systolic function.  Pericardium/Pleura: normal, no significant pericardial effusion.  Pulmonary/RV Pressure: estimated PA systolic pressure of 28.4mmHg assuming an RA pressure of 3 mmHg.      Conclusion:   Technically difficult study  Mild concentric LVH with moderate global left ventricular systolic dysfunction with an estimated LVEF of 40%  Grossly normal RV size and systolic function.   Calcified trileaflet aortic valve with severe aortic stenosis  Mild to moderate MR   Mild TR.     No significant pericardial effusion.        < end of copied text >      Radiology:  < from: Xray Chest 1 View- PORTABLE-Urgent (06.20.20 @ 16:03) >    FINDINGS:    Lungs: Peripherallinear atelectasis versus scarring in the right upper lobe. No focal consolidation.  Heart: The heart is normal in size. Surgical clips overlie the heart.  Mediastinum: The mediastinum is within normal limits.    IMPRESSION:  Peripheral linear atelectasis versus scarring in the right upper lobe. No focal consolidation.    < end of copied text >

## 2020-06-30 NOTE — PROGRESS NOTE ADULT - PROBLEM SELECTOR PLAN 7
Improving  - 2/2 volume overloaded   - Lasix 20 PO daily Improving  - 2/2 volume overloaded   - Lasix 20 PO daily as per cardiology

## 2020-06-30 NOTE — PROGRESS NOTE ADULT - SUBJECTIVE AND OBJECTIVE BOX
Post operative Day: 1    87y Male admitted with Cellulitis of extremity  S/P b/L Lower extremity angio, occluded right SFA, POP, occluded Left POP  .    Patient seen and examined bedside resting comfortably with Dr. Naranjo.  Pt without complaints.  States feeling better today than yesterday.  Less sore.  States left arm is doing better as well- no complaints of swelling. No general complaints.     T(F): 97.3 (06-30-20 @ 07:26), Max: 97.8 (06-30-20 @ 05:10)  HR: 81 (06-30-20 @ 07:26) (72 - 96)  BP: 117/72 (06-30-20 @ 07:26) (106/71 - 121/73)  RR: 17 (06-30-20 @ 07:26) (17 - 17)  SpO2: 97% (06-30-20 @ 07:26) (95% - 97%)  Wt(kg): --  CAPILLARY BLOOD GLUCOSE          PHYSICAL EXAM:  General: NAD  Neuro:  Alert & oriented x 3  Extremities: left groin- bandaged removed.  No evidence of hematoma, bleeding.  Legs wrapped in Ace bandage.       LABS:                        11.7   8.91  )-----------( 187      ( 30 Jun 2020 07:44 )             35.8     06-30    135  |  100  |  12  ----------------------------<  115<H>  4.1   |  30  |  0.73    Ca    8.9      30 Jun 2020 07:44      PT/INR - ( 30 Jun 2020 07:44 )   PT: 18.9 sec;   INR: 1.65 ratio           I&O's Detail    29 Jun 2020 07:01  -  30 Jun 2020 07:00  --------------------------------------------------------  IN:    Oral Fluid: 240 mL  Total IN: 240 mL    OUT:  Total OUT: 0 mL    Total NET: 240 mL          Impression: 87y Male admitted with Cellulitis of extremity  S/P b/L Lower extremity angio, occluded right SFA, POP, occluded Left POP      Plan:  -continue VTE prophylaxis-Coumadin   -Increase activity with PT, OOB, Ambulate  -Patient instructed on and encouraged incentive spirometry use  -continue local wound care  -Pt cleared for discharge to rehab.   Continue current medical/podiatry management  -Discussed with Dr. Naranjo.

## 2020-06-30 NOTE — PROGRESS NOTE ADULT - ASSESSMENT
87 year old male with hx of CHF, afib (on coumadin), lung ca s/p resection presents to ED for b/l weeping edema. Admitted for b/l LE cellulitis, Rapid A Fib with RVR, volume overload    Afib with RVR  - Remains in Afib but rate-controlled 80-90bpm- Can discontinue telemetry  - S/P Cardizem gtt.  Will keep off of CCB in the setting of moderately reduced LVF, EF 40%  - Continue lopressor 25 mg twice daily with hold parameters.   -on eliquis bid   - Monitor electrolytes, replete to keep K>4 and Mag>2    HFrEF, Severe AS  - Euvolemic on exam, on lasix PO   - TTE w/ severe AS, mod global LVDF EF 40 % mild LVh   - Will need outpt evaluation for AS for possible TAVR.   -outpatient when bp can tolerate to start ace/arb given reduced ef    Chronic venous stasis/PAD  -follow up vascular rec  DVT ppx  - Per Primary    Further cardiac w/u pending clinical course  All other w/u per Primary and Vascular  Will continue to follow    Beckie Oscar FNP-C  Cardiology NP  [facility] [id number] [phone]

## 2020-06-30 NOTE — PROGRESS NOTE ADULT - PROBLEM SELECTOR PLAN 1
Rapid A Fib - HR stable   - Pt stopped home Cardizem on his own, non compliant for Cardiology follow up & meds  - Continue Lopressor 25 mg PO BID (started 6/23)  - Discontinue Coumadin. Start Eliquis 5 mg PO BID (6/29/2020)  - Echo (6/22) - Mild concentric LVH with moderate global LV systolic dysfunction with LVEF of 40%, grossly normal RV size and systolic functio, calcified trileaflet aortic valve with severe aortic stenosis, mild to moderate MR, mild TR.     - TSH wnl, low T3 Rapid A Fib - HR stable   - Pt stopped home Cardizem on his own, non compliant for Cardiology follow up & meds  - Continue Lopressor 25 mg PO BID (started 6/23)  - Continue Eliquis 5 mg PO BID (started 6/29/2020)  - Echo (6/22) - Mild concentric LVH with moderate global LV systolic dysfunction with LVEF of 40%, grossly normal RV size and systolic functio, calcified trileaflet aortic valve with severe aortic stenosis, mild to moderate MR, mild TR.     - TSH wnl, low T3 Rapid A Fib - HR stable on BB  - Pt stopped home Cardizem on his own, non compliant for Cardiology follow up & meds  - Continue Lopressor 25 mg PO BID (started 6/23)  - Continue Eliquis 5 mg PO BID (started 6/29/2020)  - Echo (6/22) - Mild concentric LVH with moderate global LV systolic dysfunction with LVEF of 40%, grossly normal RV size and systolic functio, calcified trileaflet aortic valve with severe aortic stenosis, mild to moderate MR, mild TR.     - TSH wnl, low T3

## 2020-06-30 NOTE — PROGRESS NOTE ADULT - PROBLEM SELECTOR PLAN 3
Chronic PVD b/l lower EXT with venous stasis dermatitis & skin blistering-dry now   - Wound care (Dr. Chong) consulted, recs appreciated - Skin changes attributed to bilateral stasis dermatitis and bilateral venous hypertension with edema and venous stasis ulcers and weeping - silver alginate and dry dressings every other day, drainage dependant, and ace wraps  - Vascular studies: RLE: MAYNOR is .54, pulse waveforms are diffusely monophasic and diminished in the right foot. LLE: MAYNOR is .56, pulse waveforms are diffusely monophasic and severely diminished in the left foot.  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 Single-vessel runoff via the posterior tibial artery. 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 Otherwise, no focal stenosis or occlusion in the left leg.  - s/p B/L LE angiogram (6/29/2020) showing occluded SFA and popliteal right leg and occluded popliteal left leg  - Vascular Surgery (Dr. Arias) consulted, recs appreciated Chronic PVD b/l lower EXT with venous stasis dermatitis & skin blistering-dry now   - Wound care (Dr. Chong) consulted, recs appreciated   -PAD - Skin changes attributed to bilateral stasis dermatitis and bilateral venous hypertension with edema and venous stasis ulcers and weeping - silver alginate and dry dressings every other day, drainage dependant, and ace wraps  - Vascular studies: RLE: MAYNOR is .54, pulse waveforms are diffusely monophasic and diminished in the right foot. LLE: MAYNOR is .56, pulse waveforms are diffusely monophasic and severely diminished in the left foot.  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 Single-vessel runoff via the posterior tibial artery. 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 Otherwise, no focal stenosis or occlusion in the left leg.  - s/p B/L LE angiogram (6/29/2020) showing occluded SFA and popliteal right leg and occluded popliteal left leg  - Vascular Surgery (Dr. Arias) Dr Arzola - No vascular Sx intervention at this time, Local wound care, daily dressing, Out pt follow up with DR Goodwin  at wound care.

## 2022-03-29 NOTE — DISCHARGE NOTE NURSING/CASE MANAGEMENT/SOCIAL WORK - NSPROEXTENSIONSOFSELF_GEN_A_NUR
QI CALL: Advanced directive.     Called patient and she is agreeable to complete this- packet mailed.        none

## 2022-06-26 ENCOUNTER — INPATIENT (INPATIENT)
Facility: HOSPITAL | Age: 87
LOS: 1 days | Discharge: HOSPICE | DRG: 682 | End: 2022-06-28
Attending: INTERNAL MEDICINE | Admitting: INTERNAL MEDICINE
Payer: MEDICARE

## 2022-06-26 VITALS
WEIGHT: 169.98 LBS | DIASTOLIC BLOOD PRESSURE: 87 MMHG | HEIGHT: 69 IN | HEART RATE: 113 BPM | RESPIRATION RATE: 18 BRPM | TEMPERATURE: 97 F | OXYGEN SATURATION: 98 % | SYSTOLIC BLOOD PRESSURE: 127 MMHG

## 2022-06-26 DIAGNOSIS — I50.22 CHRONIC SYSTOLIC (CONGESTIVE) HEART FAILURE: ICD-10-CM

## 2022-06-26 DIAGNOSIS — C34.90 MALIGNANT NEOPLASM OF UNSPECIFIED PART OF UNSPECIFIED BRONCHUS OR LUNG: ICD-10-CM

## 2022-06-26 DIAGNOSIS — I48.91 UNSPECIFIED ATRIAL FIBRILLATION: ICD-10-CM

## 2022-06-26 DIAGNOSIS — R93.89 ABNORMAL FINDINGS ON DIAGNOSTIC IMAGING OF OTHER SPECIFIED BODY STRUCTURES: ICD-10-CM

## 2022-06-26 DIAGNOSIS — I73.9 PERIPHERAL VASCULAR DISEASE, UNSPECIFIED: ICD-10-CM

## 2022-06-26 DIAGNOSIS — Z71.89 OTHER SPECIFIED COUNSELING: ICD-10-CM
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
## 2022-06-26 DIAGNOSIS — I50.9 HEART FAILURE, UNSPECIFIED: ICD-10-CM

## 2022-06-26 DIAGNOSIS — N17.9 ACUTE KIDNEY FAILURE, UNSPECIFIED: ICD-10-CM

## 2022-06-26 DIAGNOSIS — Z29.9 ENCOUNTER FOR PROPHYLACTIC MEASURES, UNSPECIFIED: ICD-10-CM

## 2022-06-26 DIAGNOSIS — J96.20 ACUTE AND CHRONIC RESPIRATORY FAILURE, UNSPECIFIED WHETHER WITH HYPOXIA OR HYPERCAPNIA: ICD-10-CM

## 2022-06-26 DIAGNOSIS — Z90.2 ACQUIRED ABSENCE OF LUNG [PART OF]: Chronic | ICD-10-CM

## 2022-06-26 DIAGNOSIS — R06.02 SHORTNESS OF BREATH: ICD-10-CM

## 2022-06-26 LAB
ALBUMIN SERPL ELPH-MCNC: 3.4 G/DL — SIGNIFICANT CHANGE UP (ref 3.3–5)
ALP SERPL-CCNC: 44 U/L — SIGNIFICANT CHANGE UP (ref 40–120)
ALT FLD-CCNC: 20 U/L — SIGNIFICANT CHANGE UP (ref 12–78)
ANION GAP SERPL CALC-SCNC: 12 MMOL/L — SIGNIFICANT CHANGE UP (ref 5–17)
APTT BLD: 34.8 SEC — SIGNIFICANT CHANGE UP (ref 27.5–35.5)
AST SERPL-CCNC: 24 U/L — SIGNIFICANT CHANGE UP (ref 15–37)
BASOPHILS # BLD AUTO: 0.08 K/UL — SIGNIFICANT CHANGE UP (ref 0–0.2)
BASOPHILS NFR BLD AUTO: 1 % — SIGNIFICANT CHANGE UP (ref 0–2)
BILIRUB SERPL-MCNC: 2.1 MG/DL — HIGH (ref 0.2–1.2)
BUN SERPL-MCNC: 40 MG/DL — HIGH (ref 7–23)
CALCIUM SERPL-MCNC: 9.4 MG/DL — SIGNIFICANT CHANGE UP (ref 8.5–10.1)
CHLORIDE SERPL-SCNC: 98 MMOL/L — SIGNIFICANT CHANGE UP (ref 96–108)
CO2 SERPL-SCNC: 25 MMOL/L — SIGNIFICANT CHANGE UP (ref 22–31)
CREAT SERPL-MCNC: 2.3 MG/DL — HIGH (ref 0.5–1.3)
EGFR: 26 ML/MIN/1.73M2 — LOW
EOSINOPHIL # BLD AUTO: 0.23 K/UL — SIGNIFICANT CHANGE UP (ref 0–0.5)
EOSINOPHIL NFR BLD AUTO: 3 % — SIGNIFICANT CHANGE UP (ref 0–6)
FLUAV AG NPH QL: SIGNIFICANT CHANGE UP
FLUBV AG NPH QL: SIGNIFICANT CHANGE UP
GLUCOSE SERPL-MCNC: 125 MG/DL — HIGH (ref 70–99)
HCT VFR BLD CALC: 35.5 % — LOW (ref 39–50)
HGB BLD-MCNC: 11.6 G/DL — LOW (ref 13–17)
INR BLD: 2.57 RATIO — HIGH (ref 0.88–1.16)
LACTATE SERPL-SCNC: 1.9 MMOL/L — SIGNIFICANT CHANGE UP (ref 0.7–2)
LYMPHOCYTES # BLD AUTO: 1.36 K/UL — SIGNIFICANT CHANGE UP (ref 1–3.3)
LYMPHOCYTES # BLD AUTO: 18 % — SIGNIFICANT CHANGE UP (ref 13–44)
MCHC RBC-ENTMCNC: 31.1 PG — SIGNIFICANT CHANGE UP (ref 27–34)
MCHC RBC-ENTMCNC: 32.7 GM/DL — SIGNIFICANT CHANGE UP (ref 32–36)
MCV RBC AUTO: 95.2 FL — SIGNIFICANT CHANGE UP (ref 80–100)
MONOCYTES # BLD AUTO: 1.28 K/UL — HIGH (ref 0–0.9)
MONOCYTES NFR BLD AUTO: 17 % — HIGH (ref 2–14)
NEUTROPHILS # BLD AUTO: 4.45 K/UL — SIGNIFICANT CHANGE UP (ref 1.8–7.4)
NEUTROPHILS NFR BLD AUTO: 57 % — SIGNIFICANT CHANGE UP (ref 43–77)
NRBC # BLD: SIGNIFICANT CHANGE UP /100 WBCS (ref 0–0)
NT-PROBNP SERPL-SCNC: 6236 PG/ML — HIGH (ref 0–450)
OSMOLALITY SERPL: 291 MOSMOL/KG — SIGNIFICANT CHANGE UP (ref 280–301)
OSMOLALITY UR: 353 MOSM/KG — SIGNIFICANT CHANGE UP (ref 50–1200)
PLATELET # BLD AUTO: 196 K/UL — SIGNIFICANT CHANGE UP (ref 150–400)
POTASSIUM SERPL-MCNC: 3.6 MMOL/L — SIGNIFICANT CHANGE UP (ref 3.5–5.3)
POTASSIUM SERPL-SCNC: 3.6 MMOL/L — SIGNIFICANT CHANGE UP (ref 3.5–5.3)
PROT SERPL-MCNC: 6.3 G/DL — SIGNIFICANT CHANGE UP (ref 6–8.3)
PROTHROM AB SERPL-ACNC: 30.3 SEC — HIGH (ref 10.5–13.4)
RBC # BLD: 3.73 M/UL — LOW (ref 4.2–5.8)
RBC # FLD: 18.6 % — HIGH (ref 10.3–14.5)
RSV RNA NPH QL NAA+NON-PROBE: SIGNIFICANT CHANGE UP
SARS-COV-2 RNA SPEC QL NAA+PROBE: SIGNIFICANT CHANGE UP
SODIUM SERPL-SCNC: 135 MMOL/L — SIGNIFICANT CHANGE UP (ref 135–145)
TROPONIN I, HIGH SENSITIVITY RESULT: 64.8 NG/L — SIGNIFICANT CHANGE UP
TROPONIN I, HIGH SENSITIVITY RESULT: 69.6 NG/L — SIGNIFICANT CHANGE UP
WBC # BLD: 7.55 K/UL — SIGNIFICANT CHANGE UP (ref 3.8–10.5)
WBC # FLD AUTO: 7.55 K/UL — SIGNIFICANT CHANGE UP (ref 3.8–10.5)

## 2022-06-26 PROCEDURE — 70450 CT HEAD/BRAIN W/O DYE: CPT | Mod: 26

## 2022-06-26 PROCEDURE — 99223 1ST HOSP IP/OBS HIGH 75: CPT

## 2022-06-26 PROCEDURE — 71045 X-RAY EXAM CHEST 1 VIEW: CPT | Mod: 26

## 2022-06-26 PROCEDURE — 76775 US EXAM ABDO BACK WALL LIM: CPT | Mod: 26

## 2022-06-26 PROCEDURE — G1004: CPT

## 2022-06-26 PROCEDURE — 71250 CT THORAX DX C-: CPT | Mod: 26,ME

## 2022-06-26 PROCEDURE — 99285 EMERGENCY DEPT VISIT HI MDM: CPT | Mod: FS

## 2022-06-26 PROCEDURE — 93010 ELECTROCARDIOGRAM REPORT: CPT

## 2022-06-26 PROCEDURE — 74176 CT ABD & PELVIS W/O CONTRAST: CPT | Mod: 26

## 2022-06-26 RX ORDER — SENNA PLUS 8.6 MG/1
2 TABLET ORAL AT BEDTIME
Refills: 0 | Status: DISCONTINUED | OUTPATIENT
Start: 2022-06-26 | End: 2022-06-27

## 2022-06-26 RX ORDER — PIPERACILLIN AND TAZOBACTAM 4; .5 G/20ML; G/20ML
3.38 INJECTION, POWDER, LYOPHILIZED, FOR SOLUTION INTRAVENOUS ONCE
Refills: 0 | Status: COMPLETED | OUTPATIENT
Start: 2022-06-26 | End: 2022-06-26

## 2022-06-26 RX ORDER — ONDANSETRON 8 MG/1
4 TABLET, FILM COATED ORAL EVERY 8 HOURS
Refills: 0 | Status: DISCONTINUED | OUTPATIENT
Start: 2022-06-26 | End: 2022-06-28

## 2022-06-26 RX ORDER — APIXABAN 2.5 MG/1
2.5 TABLET, FILM COATED ORAL
Refills: 0 | Status: DISCONTINUED | OUTPATIENT
Start: 2022-06-26 | End: 2022-06-27

## 2022-06-26 RX ORDER — FUROSEMIDE 40 MG
40 TABLET ORAL DAILY
Refills: 0 | Status: DISCONTINUED | OUTPATIENT
Start: 2022-06-26 | End: 2022-06-28

## 2022-06-26 RX ORDER — FUROSEMIDE 40 MG
20 TABLET ORAL
Refills: 0 | Status: DISCONTINUED | OUTPATIENT
Start: 2022-06-26 | End: 2022-06-26

## 2022-06-26 RX ORDER — SODIUM CHLORIDE 9 MG/ML
500 INJECTION INTRAMUSCULAR; INTRAVENOUS; SUBCUTANEOUS ONCE
Refills: 0 | Status: COMPLETED | OUTPATIENT
Start: 2022-06-26 | End: 2022-06-26

## 2022-06-26 RX ORDER — IPRATROPIUM/ALBUTEROL SULFATE 18-103MCG
3 AEROSOL WITH ADAPTER (GRAM) INHALATION ONCE
Refills: 0 | Status: COMPLETED | OUTPATIENT
Start: 2022-06-26 | End: 2022-06-26

## 2022-06-26 RX ORDER — ALBUTEROL 90 UG/1
2.5 AEROSOL, METERED ORAL ONCE
Refills: 0 | Status: COMPLETED | OUTPATIENT
Start: 2022-06-26 | End: 2022-06-26

## 2022-06-26 RX ORDER — METOPROLOL TARTRATE 50 MG
25 TABLET ORAL
Refills: 0 | Status: DISCONTINUED | OUTPATIENT
Start: 2022-06-26 | End: 2022-06-27

## 2022-06-26 RX ORDER — ALBUTEROL 90 UG/1
2.5 AEROSOL, METERED ORAL EVERY 8 HOURS
Refills: 0 | Status: DISCONTINUED | OUTPATIENT
Start: 2022-06-26 | End: 2022-06-26

## 2022-06-26 RX ORDER — GUAIFENESIN/DEXTROMETHORPHAN 600MG-30MG
10 TABLET, EXTENDED RELEASE 12 HR ORAL EVERY 4 HOURS
Refills: 0 | Status: DISCONTINUED | OUTPATIENT
Start: 2022-06-26 | End: 2022-06-26

## 2022-06-26 RX ORDER — ALBUTEROL 90 UG/1
2.5 AEROSOL, METERED ORAL EVERY 12 HOURS
Refills: 0 | Status: DISCONTINUED | OUTPATIENT
Start: 2022-06-26 | End: 2022-06-28

## 2022-06-26 RX ORDER — ACETAMINOPHEN 500 MG
650 TABLET ORAL EVERY 6 HOURS
Refills: 0 | Status: DISCONTINUED | OUTPATIENT
Start: 2022-06-26 | End: 2022-06-28

## 2022-06-26 RX ORDER — SODIUM CHLORIDE 9 MG/ML
4 INJECTION INTRAMUSCULAR; INTRAVENOUS; SUBCUTANEOUS EVERY 12 HOURS
Refills: 0 | Status: DISCONTINUED | OUTPATIENT
Start: 2022-06-26 | End: 2022-06-28

## 2022-06-26 RX ORDER — FUROSEMIDE 40 MG
20 TABLET ORAL DAILY
Refills: 0 | Status: DISCONTINUED | OUTPATIENT
Start: 2022-06-26 | End: 2022-06-26

## 2022-06-26 RX ORDER — LANOLIN ALCOHOL/MO/W.PET/CERES
3 CREAM (GRAM) TOPICAL AT BEDTIME
Refills: 0 | Status: DISCONTINUED | OUTPATIENT
Start: 2022-06-26 | End: 2022-06-28

## 2022-06-26 RX ADMIN — APIXABAN 2.5 MILLIGRAM(S): 2.5 TABLET, FILM COATED ORAL at 17:42

## 2022-06-26 RX ADMIN — PIPERACILLIN AND TAZOBACTAM 200 GRAM(S): 4; .5 INJECTION, POWDER, LYOPHILIZED, FOR SOLUTION INTRAVENOUS at 13:29

## 2022-06-26 RX ADMIN — Medication 25 MILLIGRAM(S): at 17:42

## 2022-06-26 RX ADMIN — ALBUTEROL 2.5 MILLIGRAM(S): 90 AEROSOL, METERED ORAL at 17:42

## 2022-06-26 RX ADMIN — Medication 10 MILLILITER(S): at 17:42

## 2022-06-26 RX ADMIN — Medication 40 MILLIGRAM(S): at 16:26

## 2022-06-26 RX ADMIN — Medication 3 MILLILITER(S): at 13:30

## 2022-06-26 RX ADMIN — SODIUM CHLORIDE 500 MILLILITER(S): 9 INJECTION INTRAMUSCULAR; INTRAVENOUS; SUBCUTANEOUS at 13:29

## 2022-06-26 RX ADMIN — PIPERACILLIN AND TAZOBACTAM 3.38 GRAM(S): 4; .5 INJECTION, POWDER, LYOPHILIZED, FOR SOLUTION INTRAVENOUS at 16:06

## 2022-06-26 NOTE — ED ADULT NURSE NOTE - NURSING SKIN BRUISE
bilateral Benzoyl Peroxide Pregnancy And Lactation Text: This medication is Pregnancy Category C. It is unknown if benzoyl peroxide is excreted in breast milk.

## 2022-06-26 NOTE — ED ADULT NURSE NOTE - OBJECTIVE STATEMENT
Patient is 89yo M presents via EMS with c/o shortness of breath. When asked for patient to elaborate he reports "I just did not feel right", reports his son probably called the ambulance for him. Patient denies CP or palpitations. Patient reports he woke up feeling unwell, reports he feels better now. Patient also reports "cough for a few days". Patient denies fevers, chills, HA, dizziness, abdominal pain, N/V/D, leg pain or back pain.

## 2022-06-26 NOTE — H&P ADULT - NSICDXPASTSURGICALHX_GEN_ALL_CORE_FT
PAST SURGICAL HISTORY:  S/P lobectomy of lung Tumor removal from Lower Lobe, NO CT, NO RT     PAST SURGICAL HISTORY:  S/P lobectomy of lung Tumor removal from Lower Lobe, NO CT, NO RT  Adeno ca

## 2022-06-26 NOTE — ED PROVIDER NOTE - NS ED ATTENDING STATEMENT MOD
This was a shared visit with the MADHAVI. I reviewed and verified the documentation and independently performed the documented:

## 2022-06-26 NOTE — H&P ADULT - MUSCULOSKELETAL
negative normal/ROM intact/strength 5/5 bilateral upper extremities/strength 5/5 bilateral lower extremities normal/ROM intact/no joint swelling/no joint erythema/no calf tenderness/strength 5/5 bilateral upper extremities/strength 5/5 bilateral lower extremities

## 2022-06-26 NOTE — H&P ADULT - PROBLEM SELECTOR PLAN 4
Suspect ADWOA is ischemic ATN 2/2 decreased perfusion from heart failure as BUN/Cr ratio is <20  - Cr was ~.9 baseline in 2020  - s/p bolus 500cc lasix  - diuresis underway, monitor renal indices closely  - espinoza inserted  - Monitor BMP  - Avoid nephrotoxic medications  - f/u urine lytes, Osm  - f/u ct renal stone hunt  - nephro consult ([facility]) f/u recs Suspect ADWOA is ischemic ATN 2/2 decreased perfusion from heart failure as BUN/Cr ratio is <20  - Cr was ~.9 baseline in 2020  - s/p bolus 500cc lasix  - diuresis underway, monitor renal indices closely  - espinoza inserted  - Monitor BMP  - Avoid nephrotoxic medications  - f/u urine lytes, Osm  - f/u ct renal stone hunt  - f/u urinalysis, UCx BCx  - nephro consult ([facility]) f/u recs Chronic Afib  EKG is afib 110  - Continue metoprolol 25mg po bid for now  - decreased Eliquis dose to 2.5mg po bid given patient's age, GFR.  - Cardiology ([facility]) consulted, recs appreciated

## 2022-06-26 NOTE — H&P ADULT - PROBLEM SELECTOR PLAN 7
lung ca s/p resection of tumor from Left lung, s/p Rt Lung cryo therapy for recurrence of tumor  CT chest:  Small airways disease with multifocal subsegmental bronchial impaction   and tree-in-bud nodularity in the left greater than right lung.Irregular 3.4 cm right upper lobe subsolid lesion with 4 mm solid component, concerning for an adenocarcinoma spectrum lesion. Severe AS.  - patient does not follow regularly with a heme/onc or pulmonary doctor.  - f/u pulmonary (Dr. Franco) recs Patient was reportedly found on the ground by son on 6/25 after possibly slipping off his couch. Patient is on Eliquis.  - No external evidence of fall, no ecchymosis or other injuries noted on physical exam. Patient does not remember falling.  - CTH ordered, f/u results

## 2022-06-26 NOTE — ED ADULT NURSE REASSESSMENT NOTE - NS ED NURSE REASSESS COMMENT FT1
While administering 1800 medications, patient coughing a lot with water and pills while sitting up straight at 90 degrees head of bed. Patient reports he always has trouble with pills. RN called residents to request speech and swallow evaluation order.

## 2022-06-26 NOTE — H&P ADULT - NEGATIVE CARDIOVASCULAR SYMPTOMS
no chest pain/no palpitations/no dyspnea on exertion/no orthopnea/no paroxysmal nocturnal dyspnea/no peripheral edema Calcipotriene Counseling:  I discussed with the patient the risks of calcipotriene including but not limited to erythema, scaling, itching, and irritation.

## 2022-06-26 NOTE — ED PROVIDER NOTE - CLINICAL SUMMARY MEDICAL DECISION MAKING FREE TEXT BOX
Pt presenting to the ED with SOB it is unclear at this tie what the primary cause is. Concern for possible infectious source, vs volume overload, vs tumor progression. PE low on the differential as pt is AC. Will obtain screening labs, CT chest imaging. Will provide gentle hydration for now and supplemental oxygen as needed. Will begin abx if infectious source is found.

## 2022-06-26 NOTE — PATIENT PROFILE ADULT - NSPRESCRUSEDDRG_GEN_A_NUR
Caller: Valerie Avina    Relationship to patient: Self    Best call back number: [phone]    Patient is needing: PT RETURNED A CALL TO [facility] TO SCHEDULE A COLONOSCOPY    ATTEMPTED TO TRANSFER; NO ANSWER AT PRACTICE    SENDING TO  POOL; UNABLE TO SEND TO CLINICAL POOL    PT CAN BE REACHED AT ANYTIME  OKAY TO LEAVE MESSAGE IF NO ANSWER         No

## 2022-06-26 NOTE — ED ADULT NURSE NOTE - NSIMPLEMENTINTERV_GEN_ALL_ED
Implemented All Fall with Harm Risk Interventions:  [place] to call system. Call bell, personal items and telephone within reach. Instruct patient to call for assistance. Room bathroom lighting operational. Non-slip footwear when patient is off stretcher. Physically safe environment: no spills, clutter or unnecessary equipment. Stretcher in lowest position, wheels locked, appropriate side rails in place. Provide visual cue, wrist band, yellow gown, etc. Monitor gait and stability. Monitor for mental status changes and reorient to person, place, and time. Review medications for side effects contributing to fall risk. Reinforce activity limits and safety measures with patient and family. Provide visual clues: red socks.

## 2022-06-26 NOTE — H&P ADULT - PROBLEM SELECTOR PLAN 2
Patient with apparent recurrence of lung cancer on CT scan, severe aortic stenosis leading to HFrEF w/ significant shortness of breath. Patient with decreased PO intake and decreased interest in taking meds over the past month. Patient does not want to pursue intervention for Aortic Stenosis. DNR/DNI.  - f/u hospice eval. Patient presents with worsening shortness of breath and cough likely multifactorial 2/2 undertreated HFrEF w/ severe AS, lung cancer and small airway disease.  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 Severe AS.  - received 500cc bolus in ED however patient appears overloaded on exam, will start lasix 40mg ivp qd per cardio recs. Close monitoring of renal fxn.  - s/p zosyn x1 in ED, no need to continue ABX at this time no s/s of active infection.  - Continue home metoprolol 25mg po bid  - albuterol 2.5mg neb q8h  - Supplemental O2 to maintain O2 sat >90%.  - initial trop 64.8, will f/u repeat.  - tte ordered, F/u  - espinoza catheter  - Strict I/Os, daily weights  - Monitor and replace electrolytes  - Cardiology consulted ([facility]), f/u recs  - pulm consulted (noe), f/u recs  - nephro ([facility]), f/u recs  - palliative care consult ([facility]), f/u recs Patient presents with worsening shortness of breath and cough likely multifactorial 2/2 undertreated HFrEF w/ severe AS, lung cancer and small airway disease.  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 Severe AS.  - received 500cc bolus in ED however patient appears overloaded on exam, will start lasix 40mg ivp qd per cardio recs. Close monitoring of renal fxn.  - s/p zosyn x1 in ED, no need to continue ABX at this time no s/s of active infection.  - albuterol 2.5mg neb q8h  - Supplemental O2 to maintain O2 sat >90%.  - TTE to follow   - pulm consulted (noe), f/u recs  - palliative care consult ([facility]), f/u recs

## 2022-06-26 NOTE — ED PROVIDER NOTE - PROGRESS NOTE DETAILS
89 male with CHF, afib, lung ca s/p resection presents with c/o productive cough x days with episode of SOB this AM. Labs, CXR, CT chest, Flu/COVID, rectal temp 99, blood cultures, lactate. Acute renal failure, BUN/Cr elevated.

## 2022-06-26 NOTE — H&P ADULT - PROBLEM SELECTOR PLAN 5
Patient was reportedly found on the ground by son on 6/25 after possibly slipping off his couch. Patient is on Eliquis.  - No external evidence of fall, no ecchymosis or other injuries noted on physical exam. Patient does not remember falling.  - CTH ordered, f/u results Patient with apparent recurrence of lung cancer on CT scan, severe aortic stenosis leading to HFrEF w/ significant shortness of breath. Patient with decreased PO intake and decreased interest in taking meds over the past month. Patient does not want to pursue intervention for Aortic Stenosis. DNR/DNI.  - f/u hospice eval.

## 2022-06-26 NOTE — H&P ADULT - CARDIOVASCULAR
S1 S2 present/no gallops/no rub/tachycardia/murmur/peripheral edema details… S1 S2 present/no gallops/no rub/irregular rate and rhythm/tachycardia/murmur/peripheral edema

## 2022-06-26 NOTE — H&P ADULT - GASTROINTESTINAL
normal/soft/nontender/nondistended/normal active bowel sounds negative normal/soft/nontender/nondistended/normal active bowel sounds/no guarding/no rigidity/no organomegaly/no palpable tyler/no masses palpable

## 2022-06-26 NOTE — H&P ADULT - ATTENDING COMMENTS
88 yo male with a-fib (on Eliquis), lung ca s/p resection of tumor from Left lung, s/p Rt Lung cryo therapy for recurrence of tumor, PVD presents with shortness of breath, being admitted for acute on chronic respiratory failure likely multifactorial 2/2 undertreated HFrEF, severe AS, small airway disease and lung cancer.  Pt seen, examined, case & care plan d/w pt, residents at detail.   PO Diet  DNR/DNI  AM labs  D/W Son at detail.  Cardio-D/W Dr Barnett  Renal-D/W Dr Flowers  Pulmonary-Dr Franco   D/W RN -Biggs cath , I/O, 1 dose IV lasix 40 mg x 1 now 88 yo male with a-fib (on Eliquis), lung ca s/p resection of tumor from Left lung, s/p Rt Lung cryo therapy for recurrence of tumor, PVD presents with shortness of breath, being admitted for acute on chronic respiratory failure likely multifactorial 2/2 undertreated HFrEF, severe AS, small airway disease and lung cancer.  Pt seen, examined, case & care plan d/w pt, residents at detail.   PO Diet  DNR/DNI  AM labs  D/W Son at detail.  Cardio-D/W Dr Barnett  Renal-D/W Dr Flowers  Pulmonary-Dr Franco   D/W RN -Biggs cath , I/O, 1 dose IV lasix 40 mg x 1 now for possible urinary Retention   D/W Son Dr Rea Sonandrew at detail- Wants Home Hospice , Confirms DNR/DNI , Pt is Not eating , Not ambulating, has HHA -private.

## 2022-06-26 NOTE — H&P ADULT - CONVERSATION DETAILS
Spoke with son on phone regarding GOC. Son states patient is DNR/DNI and that he would sign a MOLST form when he visits if necessary. Spoke with patient regarding GOC personally and he verifies that would not want resuscitation or intubation.

## 2022-06-26 NOTE — ED PROVIDER NOTE - CPE EDP MUSC NORM
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     Patient is seen and evaluated using telehealth device, she is calm and cooperative, states that she has been feeling overwhelmed with worsen depressed mood, anxiety, and frequent passive SI for the past 1 month.  she states that she has been struggling at school, recently broke up with girlfriend 2 weeks ago, and her appetite was decreased since 1 month ago, feels nauseous whenever she eats.  She reports feeling depressed and anxious on most days with poor sleep, energy, feeling worthless and hopeless, with passive SI almost every other day, but denied active SI/plan/intent.  She states she has lost 7 lbs since last month, continues to have low motivation, unable to engaged in school or social agenda.  She denied HI/AVH at this time.  She shares that since her psych admission last March, she completed PHP and was prescribed with Remeron and was supposed to follow up with psychiatrist and therapist.  However patient admits to stopped taking Remeron 2-3 months after she was discharged as she started to feel better and did not followup with outpatient providers as "they asked too many questions." and she verbalized wanting to "just talk about my problems and vent."  She also started drinking Ensure as she has not been eating, above was recommended last year during her psych admission for decreased po intake due to depression.  Patient agreed with voluntary psych admission at this time for stabilization.    Collateral information is obtained by [facility]:  Collateral provided by cousin Abebe Javier [phone], daily contact and reliability is high. Per cousin the HPI starts yesterday evening. 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 Baseline symptoms include mild anxiety, euthymic mood and clear/linear thoughts. Patient is not prescribed medications and does not have outpatient mental health treatment. normal...

## 2022-06-26 NOTE — H&P ADULT - HISTORY OF PRESENT ILLNESS
88 yo male with a-fib (on Eliquis), lung ca s/p resection of tumor from Left lung, s/p Rt Lung cryo therapy for recurrence of tumor, PVD presents with shortness of breath. Spoke with son on phone who is a physician and provided background information. Per son, patient has not wanted to see any doctors since he was discharged from the hospital after being admitted to [facility] in 2020 for weeping cellulitis. Patient was to follow up with a cardiologist to discuss treatment of his severe AS, HFrEF however patient declined to follow-up and stated that he did not want to have his valve repaired. 1 month ago, patient has started eating and drinking less, taking his medications erratically so sons convinced patient to get a home health aid (pt lives alone). Patient has been more short of breath than usual over the past month and Dr. Ahmadi came on a house call to see the patient, an echo was reportedly performed which showed an EF of 40% and severe AS. Home health aid is present 8 hours per day. Yesterday morning, son came over patient's house to find patient face down on the floor after either falling or slipping off the couch. Son helped patient up and asked him if he wanted to go to the hospital at that time however patient declined. Patient was also noted to have increasing shortness of breath and persistent non-productive cough over the past ~week. This morning, patient's difficulty breathing and coughing got significantly worse after he woke up so patient told son that he wanted to go to the hospital. Patient denies fever, chills, chest pain, pleuritic pain, abdominal pain, changes in bowel habits, urinary difficulties, increasing edema of lower extremities or any other concerns. Son states patient is DNR/DNI, confirmed with patient at the bedside that he wants to be DNR/DNI however does not want to sign a MOLST at this time.    ED Course:  vitals - t 99.7 / bp 115/83 / hr 122 / spo2 97 on 3LNC / rr 18  labs - hgb 11.6, INR 2.57, BUN/Cr 40/2.30, probnp 6236 rvp negative  imaging - ctchest - Small airways disease with multifocal subsegmental bronchial impaction   and tree-in-bud nodularity in the left greater than right lung.Irregular 3.4 cm right upper lobe subsolid lesion with 4 mm solid   component, concerning for an adenocarcinoma spectrum lesion. Follow-up chest CT in 3-6 months, in the absence of prior imaging.Severe aortic valvular calcification which can be seen with aortic stenosis. Correlate with echocardiogram.Numerous pancreatic cystic nodules measuring up to 2.1 cm; consider MRI for further evaluation.   89 yo male with a-fib (on Eliquis), lung ca s/p resection of tumor from Left lung, s/p Rt Lung cryo therapy for recurrence of tumor, PVD presents with shortness of breath. Spoke with son on phone who is a physician and provided background information. Per son, patient has not wanted to see any doctors since he was discharged from the hospital after being [facility] in [facility] for weeping cellulitis. Patient was to follow up with a cardiologist to discuss treatment of his severe AS, HFrEF however patient declined to follow-up and stated that he did not want to have his valve repaired. 1 month ago, patient has started eating and drinking less, taking his medications erratically so sons convinced patient to get a home health aid (pt lives alone). Son think spatient may have had a stroke. Patient has been more short of breath than usual over the past month and Dr. Ahmadi came on a house call to see the patient, an echo was reportedly performed which showed an EF of 40% and severe AS. Home health aid is present 8 hours per day. Yesterday morning, son came over patient's house to find patient face down on the floor after either falling or slipping off the couch. Son helped patient up and asked him if he wanted to go to the hospital at that time however patient declined. Patient was also noted to have increasing shortness of breath and persistent non-productive cough over the past ~week. This morning, patient's difficulty breathing and coughing got significantly worse after he woke up so patient told son that he wanted to go to the hospital. Patient denies fever, chills, chest pain, pleuritic pain, abdominal pain, changes in bowel habits, urinary difficulties, increasing edema of lower extremities or any other concerns. Son states patient is DNR/DNI, confirmed with patient at the bedside that he wants to be DNR/DNI however does not want to sign a MOLST at this time.    ED Course:  vitals - t 99.7 / bp 115/83 / hr 122 / spo2 97 on 3LNC / rr 18  labs - hgb 11.6, INR 2.57, BUN/Cr 40/2.30, probnp 6236 rvp negative  imaging - ctchest - Small airways disease with multifocal subsegmental bronchial impaction   and tree-in-bud nodularity in the left greater than right lung.Irregular 3.4 cm right upper lobe subsolid lesion with 4 mm solid   component, concerning for an adenocarcinoma spectrum lesion. Follow-up chest CT in 3-6 months, in the absence of prior imaging.Severe aortic valvular calcification which can be seen with aortic stenosis. Correlate with echocardiogram.Numerous pancreatic cystic nodules measuring up to 2.1 cm; consider MRI for further evaluation.  EKG - afib 110.  Given: Zosyn x1, 500cc bolus   88 yo male with a-fib (on Eliquis), lung ca s/p resection of tumor from Left lung, s/p Rt Lung nodule Bx -Adeno Ca S/P  cryo therapy for recurrence of tumor at [facility] [place] presents with shortness of breath. Spoke with son on phone who is a physician and provided background information. Per son, patient has not wanted to see any doctors since he was discharged from the hospital after being admitted to [facility] in 2020 for weeping cellulitis. Patient was to follow up with a cardiologist to discuss treatment of his severe AS, HFrEF however patient declined to follow-up and stated that he did not want to have his valve repaired. 1 month ago, patient has started eating and drinking less, taking his medications erratically so sons convinced patient to get a home health aid (pt lives alone). Son think spatient may have had a stroke. Patient has been more short of breath than usual over the past month and Dr. Ahmadi came on a house call to see the patient, an echo was reportedly performed which showed an EF of 40% and severe AS. Home health aid is present 8 hours per day. Yesterday morning, son came over patient's house to find patient face down on the floor after either falling or slipping off the couch. Son helped patient up and asked him if he wanted to go to the hospital at that time however patient declined. Patient was also noted to have increasing shortness of breath and persistent non-productive cough over the past ~week. This morning, patient's difficulty breathing and coughing got significantly worse after he woke up so patient told son that he wanted to go to the hospital. Patient denies fever, chills, chest pain, pleuritic pain, abdominal pain, changes in bowel habits, urinary difficulties, increasing edema of lower extremities or any other concerns. Son states patient is DNR/DNI, confirmed with patient at the bedside that he wants to be DNR/DNI however does not want to sign a MOLST at this time.    ED Course:  vitals - t 99.7 / bp 115/83 / hr 122 / spo2 97 on 3LNC / rr 18  labs - hgb 11.6, INR 2.57, BUN/Cr 40/2.30, probnp 6236 rvp negative  imaging - CT chest - Small airways disease with multifocal subsegmental bronchial impaction   and tree-in-bud nodularity in the left greater than right lung.Irregular 3.4 cm right upper lobe subsolid lesion with 4 mm solid   component, concerning for an adenocarcinoma spectrum lesion. Follow-up chest CT in 3-6 months, in the absence of prior imaging. Severe aortic valvular calcification which can be seen with aortic stenosis. Correlate with echocardiogram.Numerous pancreatic cystic nodules measuring up to 2.1 cm; consider MRI for further evaluation.  EKG - afib 110.  Given: Zosyn x1, 500cc bolus

## 2022-06-26 NOTE — ED PROVIDER NOTE - CONSTITUTIONAL, MLM
Pt returned from xray. normal... Well appearing, awake, alert, oriented to person, place, time/situation and in no apparent distress.

## 2022-06-26 NOTE — ED ADULT NURSE NOTE - NSICDXPASTMEDICALHX_GEN_ALL_CORE_FT
PAST MEDICAL HISTORY:  Atrial fibrillation on AC    Atrial fibrillation, unspecified type     Lung cancer Left lung Sx for removal of Tumor, Rt Lung Cryo therapy for recurrence    PVD (peripheral vascular disease)

## 2022-06-26 NOTE — H&P ADULT - PROBLEM SELECTOR PLAN 1
Patient presents with worsening shortness of breath and cough likely multifactorial 2/2 undertreated HFrEF w/ severe AS, lung cancer and small airway disease.  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 Severe AS.  - received 500cc bolus in ED however patient appears overloaded on exam, will start lasix 40mg ivp qd per cardio recs. Close monitoring of renal fxn.  - s/p zosyn x1 in ED, no need to continue ABX at this time no s/s of active infection.  - Continue home metoprolol 25mg po bid  - albuterol 2.5mg neb q8h  - Supplemental O2 to maintain O2 sat >90%.  - tte ordered, F/u  - espinoza catheter  - Strict I/Os, daily weights  - Monitor and replace electrolytes  - Cardiology consulted ([facility]), f/u recs  - pulm consulted ([facility]e), f/u recs  - nephro ([facility]), f/u recs  - palliative care consult ([facility]), f/u recs Patient presents with worsening shortness of breath and cough likely multifactorial 2/2 undertreated HFrEF w/ severe AS, lung cancer and small airway disease.  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 Severe AS.  - received 500cc bolus in ED however patient appears overloaded on exam, will start lasix 40mg ivp qd per cardio recs. Close monitoring of renal fxn.  - s/p zosyn x1 in ED, no need to continue ABX at this time no s/s of active infection.  - Continue home metoprolol 25mg po bid  - albuterol 2.5mg neb q8h  - Supplemental O2 to maintain O2 sat >90%.  - initial trop 64.8, will f/u repeat.  - tte ordered, F/u  - espinoza catheter  - Strict I/Os, daily weights  - Monitor and replace electrolytes  - Cardiology consulted ([facility]), f/u recs  - pulm consulted (noe), f/u recs  - nephro ([facility]), f/u recs  - palliative care consult ([facility]), f/u recs Suspect ADWOA is ischemic ATN 2/2 decreased perfusion from heart failure as BUN/Cr ratio is <20  - Cr was ~.9 baseline in 2020  - s/p bolus 500cc lasix  - diuresis underway, monitor renal indices closely  - espinoza inserted  - Monitor BMP  - Avoid nephrotoxic medications  - f/u urine lytes, Osm  - f/u ct renal stone hunt  - f/u urinalysis, UCx BCx  - nephro consult ([facility]) f/u recs CT [facility] - ADWOA ,? CKD ? Urinary retention- Bladder Scan -270 ml   - Cr was ~.9 baseline in 2020, pt has not had follow up with PMD  -CT [facility] , R/O Obstruction  - s/p bolus 500cc in ER   -, Give  Lasix 40 mg IV x 1 dose STAT as d/w DR Barnett  - espinoza placed , I/O   - Monitor BMP, Renal dose meds   - Avoid nephrotoxic medications-Renal dose meds   - f/u urine lytes, Osmol  - f/u urinalysis, UCx BCx  - nephro consult ([facility]) work up as per Renal.

## 2022-06-26 NOTE — H&P ADULT - RESPIRATORY
no wheezes/no rales/no rhonchi/no use of accessory muscles/diminished breath sounds, L/diminished breath sounds, R

## 2022-06-26 NOTE — ED PROVIDER NOTE - RECENT EXPOSURE TO
"Vital signs:  Temp: 98.5  F (36.9  C) Temp src: Oral BP: 140/86 Pulse: 76   Resp: 22 SpO2: 98 %     Height: 5' 4\" (162.6 cm) Weight: 123 lb 1.6 oz (55.8 kg)  Estimated body mass index is 21.13 kg/(m^2) as calculated from the following:    Height as of this encounter: 5' 4\" (1.626 m).    Weight as of this encounter: 123 lb 1.6 oz (55.8 kg).        Physical.  " none known

## 2022-06-26 NOTE — ED PROVIDER NOTE - CARE PLAN
1 Principal Discharge DX:	Shortness of breath   Principal Discharge DX:	Shortness of breath  Secondary Diagnosis:	Acute renal failure

## 2022-06-26 NOTE — H&P ADULT - PROBLEM SELECTOR PLAN 6
Chronic Afib  EKG is afib 110  - Continue metoprolol 25mg po bid for now  - decreased Eliquis dose to 2.5mg po bid given patient's age, GFR.  - Cardiology ([facility]) consulted, recs appreciated CT chest: "Numerous pancreatic cystic nodules measuring up to 2.1 cm; consider MRI for further evaluation."  - will continue to monitor at this time, patient is asymptomatic and interested in possibly pursuing hospice care. CT chest: "Numerous pancreatic cystic nodules measuring up to 2.1 cm; consider MRI for further evaluation."  - will continue to monitor at this time, patient is asymptomatic and interested in possibly pursuing hospice care.  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

## 2022-06-26 NOTE — CONSULT NOTE ADULT - ASSESSMENT
88 yo male with a-fib (on Eliquis), lung ca s/p resection of tumor from Left lung, s/p Rt Lung cryo therapy for recurrence of tumor, PVD presents with shortness of breath    ct chest reviewed  old records reviewed  cardio eval noted  pt with known lung ca - lobectomy - radiation -   recurrence of Lung Ca  Cardio - optimization of cvs rx - and diuresis as per cardio recs - caution with lasix - monitor renal indices  I and O  ct chest shows mucus impaction in airways - Albuterol NEBS - Hypertonic saline nebs - Mucinex -   monitor VS and Sat  keep sat > 88 pct -  assist with needs -   GOC - DNR  Hospice discussion  dietary discretion   89 yo male with a-fib (on Eliquis), lung ca s/p resection of tumor from Left lung, s/p Rt Lung cryo therapy for recurrence of tumor, PVD presents with shortness of breath    ct chest reviewed  old records reviewed  cardio eval noted  pt with known lung ca - lobectomy - radiation -   recurrence of Lung Ca  Cardio - optimization of cvs rx - and diuresis as per cardio recs - caution with lasix - monitor renal indices - pt with AS and CHF  I and O  ct chest shows mucus impaction in airways - Albuterol NEBS - Hypertonic saline nebs - Mucinex -   monitor VS and Sat  keep sat > 88 pct -  assist with needs -   GOC - DNR  Hospice discussion  dietary discretion

## 2022-06-26 NOTE — H&P ADULT - PROBLEM SELECTOR PLAN 8
pt with known history of PVD, obvious vascular changes on b/l lower extremities  - continue to monitor for s+s of limb ischemia  - pulses intact at this time lung ca s/p resection of tumor from Left lung, s/p Rt Lung cryo therapy for recurrence of tumor  CT chest:  Small airways disease with multifocal subsegmental bronchial impaction   and tree-in-bud nodularity in the left greater than right lung.Irregular 3.4 cm right upper lobe subsolid lesion with 4 mm solid component, concerning for an adenocarcinoma spectrum lesion. Severe AS.  - patient does not follow regularly with a heme/onc or pulmonary doctor.  - f/u pulmonary (Dr. Franco) recs

## 2022-06-26 NOTE — CONSULT NOTE ADULT - SUBJECTIVE AND OBJECTIVE BOX
Date/Time Patient Seen:  		  Referring MD:   Data Reviewed	       Patient is a 89y old  Male who presents with a chief complaint of shortness of breath (26 Jun 2022 16:43)      Subjective/HPI  ct chest reviewed  on o2 support  vs noted  labs reviewed  H and P reviewed  cardio eval noted  known Lung Ca     History and Physical:   Source of Information	Chart(s), Patient, Child  Comments/Contacts	Son Álvaro Micheal - [phone]  Outpatient Providers	Dr Ahmadi - PCP       History of Present Illness:  Reason for Admission: shortness of breath  History of Present Illness:   89 yo male with a-fib (on Eliquis), lung ca s/p resection of tumor from Left lung, s/p Rt Lung cryo therapy for recurrence of tumor, PVD presents with shortness of breath. Spoke with son on phone who is a physician and provided background information. Per son, patient has not wanted to see any doctors since he was discharged from the hospital after being admitted to [facility] in 2020 for weeping cellulitis. Patient was to follow up with a cardiologist to discuss treatment of his severe AS, HFrEF however patient declined to follow-up and stated that he did not want to have his valve repaired. 1 month ago, patient has started eating and drinking less, taking his medications erratically so sons convinced patient to get a home health aid (pt lives alone). Son think spatient may have had a stroke. Patient has been more short of breath than usual over the past month and Dr. Ahmadi came on a house call to see the patient, an echo was reportedly performed which showed an EF of 40% and severe AS. Home health aid is present 8 hours per day. Yesterday morning, son came over patient's house to find patient face down on the floor after either falling or slipping off the couch. Son helped patient up and asked him if he wanted to go to the hospital at that time however patient declined. Patient was also noted to have increasing shortness of breath and persistent non-productive cough over the past ~week. This morning, patient's difficulty breathing and coughing got significantly worse after he woke up so patient told son that he wanted to go to the hospital. Patient denies fever, chills, chest pain, pleuritic pain, abdominal pain, changes in bowel habits, urinary difficulties, increasing edema of lower extremities or any other concerns. Son states patient is DNR/DNI, confirmed with patient at the bedside that he wants to be DNR/DNI however does not want to sign a MOLST at this time.  PAST MEDICAL & SURGICAL HISTORY:  Atrial fibrillation, unspecified type    Atrial fibrillation  on AC    Lung cancer  Left lung Sx for removal of Tumor, Rt Lung Cryo therapy for recurrence    PVD (peripheral vascular disease)    HF (heart failure)    S/P lobectomy of lung  Tumor removal from Lower Lobe, NO CT, NO RT    PAST SURGICAL HISTORY:  S/P lobectomy of lung Tumor removal from Lower Lobe, NO CT, NO RT.     Social History:  Social History (marital status, living situation, occupation, tobacco use, alcohol and drug use, and sexual history): Lives at home has home health aid 8 hours per day    denies tobacco, alcohol, drug use    covid vaccinated w/ j+j w/ booster.     Tobacco Screening:  · Core Measure Site	Yes  · Has the patient used tobacco in the past 30 days?	No    Risk Assessment:    Present on Admission:  Deep Venous Thrombosis	no  Pulmonary Embolus	no     Heart Failure:  Does this patient have a history of or has been diagnosed with heart failure? yes.     LV Function Assessment (LVS function was evaluated before arrival and/or during hospitalization) unknown.      Medication list         MEDICATIONS  (STANDING):  ALBUTerol    0.083% 2.5 milliGRAM(s) Nebulizer every 8 hours  apixaban 2.5 milliGRAM(s) Oral two times a day  furosemide   Injectable 40 milliGRAM(s) IV Push daily  guaifenesin/dextromethorphan Oral Liquid 10 milliLiter(s) Oral every 4 hours  metoprolol tartrate 25 milliGRAM(s) Oral two times a day  senna 2 Tablet(s) Oral at bedtime    MEDICATIONS  (PRN):  acetaminophen     Tablet .. 650 milliGRAM(s) Oral every 6 hours PRN Temp greater or equal to 38C (100.4F), Mild Pain (1 - 3)  aluminum hydroxide/magnesium hydroxide/simethicone Suspension 30 milliLiter(s) Oral every 4 hours PRN Dyspepsia  melatonin 3 milliGRAM(s) Oral at bedtime PRN Insomnia  ondansetron Injectable 4 milliGRAM(s) IV Push every 8 hours PRN Nausea and/or Vomiting         Vitals log        ICU Vital Signs Last 24 Hrs  T(C): 37.6 (26 Jun 2022 11:26), Max: 37.6 (26 Jun 2022 11:26)  T(F): 99.7 (26 Jun 2022 11:26), Max: 99.7 (26 Jun 2022 11:26)  HR: 100 (26 Jun 2022 17:41) (85 - 122)  BP: 112/71 (26 Jun 2022 17:41) (112/71 - 128/66)  BP(mean): --  ABP: --  ABP(mean): --  RR: 18 (26 Jun 2022 17:41) (18 - 20)  SpO2: 100% (26 Jun 2022 17:41) (97% - 100%)           Input and Output:  I&O's Detail      Lab Data                        11.6   7.55  )-----------( 196      ( 26 Jun 2022 11:52 )             35.5     06-26    135  |  98  |  40<H>  ----------------------------<  125<H>  3.6   |  25  |  2.30<H>    Ca    9.4      26 Jun 2022 11:52    TPro  6.3  /  Alb  3.4  /  TBili  2.1<H>  /  DBili  x   /  AST  24  /  ALT  20  /  AlkPhos  44  06-26            Review of Systems	  sob  mendoza      Objective     Physical Examination    heart s1s2  lung dec BS  abd soft      Pertinent Lab findings & Imaging      Biggs:  NO   Adequate UO     I&O's Detail           Discussed with:     Cultures:	        Radiology    ACC: [id number] EXAM:  CT CHEST                          PROCEDURE DATE:  06/26/2022          INTERPRETATION:  INDICATION: Chronic dyspnea of unclear etiology    TECHNIQUE: A volumetric CT acquisition of the chest was obtained from the   thoracic inlet to the upper abdomen without the use of intravenous   contrast. Coronal and sagittal reconstructed images are provided.    COMPARISON: None    FINDINGS:    Lungs/Airways/Pleura: The central airways are patent. Status post left   upper lobectomy. Mild elevation of the left hemidiaphragm. There is a 3.4   x 1.9 cm subsolid lesion in the right upper lobe on series 2 image 47,   with a 4 mm solid component on mediastinal windows. There is scattered   subsegmental bronchial impaction throughout both lungs with tree-in-bud   nodularity in the left lower lobe, findings likely related to small   airways disease. No pleural effusion or pneumothorax.    Mediastinum/Lymph nodes: No thoracic lymphadenopathy. Status post   mediastinal cristobal dissection.    Heart and Vessels: Severe aortic valvular calcification which can be seen   with aortic stenosis. Coronary artery calcification and stenting. There   is biatrial enlargement qualitatively. No pericardial effusion. The great   vessels are normal in size.    Upper Abdomen: Right renal cyst. There are numerous pancreatic cystic   nodules measuring up to 2.1 cm (2:122, 115, 117, 132.    Osseous structures and Soft Tissues: No aggressive bone lesions.    IMPRESSION:  Small airways disease with multifocal subsegmental bronchial impaction   and tree-in-bud nodularity in the left greater than right lung.    Irregular 3.4 cm right upper lobe subsolid lesion with 4 mm solid   component, concerning for an adenocarcinoma spectrum lesion. Follow-up   chest CT in 3-6 months, in the absence of prior imaging.    Severe aortic valvular calcification which can be seen with aortic   stenosis. Correlate with echocardiogram.    Numerous pancreatic cystic nodules measuring up to 2.1 cm; consider MRI   for further evaluation.    --- End of Report ---            NORA BAIN M.D., Attending Radiologist  This document has been electronically signed. Jun 26 2022 12:28PM

## 2022-06-26 NOTE — H&P ADULT - ASSESSMENT
89 yo male with a-fib (on Eliquis), lung ca s/p resection of tumor from Left lung, s/p Rt Lung cryo therapy for recurrence of tumor, PVD presents with shortness of breath, being admitted for acute on chronic respiratory failure. 89 yo male with a-fib (on Eliquis), lung ca s/p resection of tumor from Left lung, s/p Rt Lung cryo therapy for recurrence of tumor, PVD presents with shortness of breath, being admitted for acute on chronic respiratory failure likely multifactorial 2/2 undertreated HFrEF, severe AS, small airway disease and lung cancer.

## 2022-06-26 NOTE — PROGRESS NOTE ADULT - SUBJECTIVE AND OBJECTIVE BOX
ADWOA/CKD. Renal function was normal in 2020, but he has not follow up with a physician since. Unknown progression of CKD vs ADWOA. Volume overload by history. Agree with diuresis. Urine studies. Renal US. Consult to follow in AM

## 2022-06-26 NOTE — ED PROVIDER NOTE - CARDIAC, MLM
Irregularly regular rate.  Heart sounds S1, S2.  ++ MADELYN murmurs, no rubs or gallops. chronic discoloration of b/l LE c/w venous stasis.

## 2022-06-26 NOTE — ED PROVIDER NOTE - ATTENDING APP SHARED VISIT CONTRIBUTION OF CARE
Pt is a 88 yo male who presents to the ED with SOB. PMHx of a-fib (on Eliquis), lung ca s/p resection of tumor from Left lung, s/p Rt Lung nodule Bx -Adeno Ca S/P  cryo therapy for recurrence of tumor at [facility], [facility]. Pt is a overall poor historian, Reports that he was feeling finer until today when he felt SOB with a cough. Pt reports that he want to go home and is unsure why he is in the hospital. Believes that his son may have called the ambulance. On exam pt lying in bed increased work of breathing noted, NCAT, dry MM, heart sinus tachycardia with murmur, lungs diminished in all lung fields, abd soft NT/ND. Peripheral edema noted. Pt presenting to the ED with SOB it is unclear at this tie what the primary cause is. Concern for possible infectious source, vs volume overload, vs tumor progression. PE low on the differential as pt is AC. Will obtain screening labs, CT chest imaging. Will provide gentle hydration for now and supplemental oxygen as needed. Will begin abx if infectious source is found.

## 2022-06-26 NOTE — H&P ADULT - PROBLEM SELECTOR PLAN 3
plan as above - received 500cc bolus in ED however patient appears overloaded on exam, will start lasix 40mg ivp qd per cardio recs. Close monitoring of renal fxn.  - Continue home metoprolol 25mg po bid  - albuterol 2.5mg neb q8h  - Supplemental O2 to maintain O2 sat >90%.  - initial trop 64.8, will f/u repeat.  - tte ordered, F/u  - espinoza catheter  - Strict I/Os, daily weights  - Monitor and replace electrolytes  - Cardiology consulted ([facility]), f/u recs - received 500cc bolus in ED however patient appears overloaded on exam, will start lasix 40mg ivp qd per cardio recs. Close monitoring of renal fxn.  H/O Chronic Systolic CHF   - Continue home metoprolol 25mg po bid  - initial trop 64.8, will f/u repeat.  - tte ordered, F/u  - espinoza catheter - Strict I/Os, daily weights  - Monitor and replace electrolytes  - Cardiology consulted ([facility]), f/u recs - initial trop 64.8, will f/u repeat.

## 2022-06-26 NOTE — CONSULT NOTE ADULT - SUBJECTIVE AND OBJECTIVE BOX
[facility] - Radha Boyle, Lilibeth Shankar, Tess, Anibal Maldonado  Office Number: [phone]    Initial Consult Note    CHIEF COMPLAINT: Patient is a 89y old  Male who presents with a chief complaint of shortness of breath (26 Jun 2022 15:02)      HPI:  88 yo male with a-fib (on Eliquis), lung ca s/p resection of tumor from Left lung, s/p Rt Lung cryo therapy for recurrence of tumor, PVD presents with shortness of breath. Spoke with son on phone who is a physician and provided background information. Per son, patient has not wanted to see any doctors since he was discharged from the hospital after being admitted to [facility] in [facility] for weeping cellulitis. Patient was to follow up with a cardiologist to discuss treatment of his severe AS, HFrEF however patient declined to follow-up and stated that he did not want to have his valve repaired. 1 month ago, patient has started eating and drinking less, taking his medications erratically so sons convinced patient to get a home health aid (pt lives alone). Patient has been more short of breath than usual over the past month and Dr. Ahmadi came on a house call to see the patient, an echo was reportedly performed which showed an EF of 40% and severe AS. Home health aid is present 8 hours per day. Yesterday morning, son came over patient's house to find patient face down on the floor after either falling or slipping off the couch. Son helped patient up and asked him if he wanted to go to the hospital at that time however patient declined. Patient was also noted to have increasing shortness of breath and persistent non-productive cough over the past ~week. This morning, patient's difficulty breathing and coughing got significantly worse after he woke up so patient told son that he wanted to go to the hospital. Patient denies fever, chills, chest pain, pleuritic pain, abdominal pain, changes in bowel habits, urinary difficulties, increasing edema of lower extremities or any other concerns. Son states patient is DNR/DNI, confirmed with patient at the bedside that he wants to be DNR/DNI however does not want to sign a MOLST at this time.    ED Course:  vitals - t 99.7 / bp 115/83 / hr 122 / spo2 97 on 3LNC / rr 18  labs - hgb 11.6, INR 2.57, BUN/Cr 40/2.30, probnp 6236 rvp negative  imaging - ctchest - Small airways disease with multifocal subsegmental bronchial impaction   and tree-in-bud nodularity in the left greater than right lung.Irregular 3.4 cm right upper lobe subsolid lesion with 4 mm solid   component, concerning for an adenocarcinoma spectrum lesion. Follow-up chest CT in 3-6 months, in the absence of prior imaging.Severe aortic valvular calcification which can be seen with aortic stenosis. Correlate with echocardiogram.Numerous pancreatic cystic nodules measuring up to 2.1 cm; consider MRI for further evaluation.   (26 Jun 2022 15:02)      PAST MEDICAL & SURGICAL HISTORY:  Atrial fibrillation, unspecified type      Atrial fibrillation  on AC      Lung cancer  Left lung Sx for removal of Tumor, Rt Lung Cryo therapy for recurrence      PVD (peripheral vascular disease)      HF (heart failure)      S/P lobectomy of lung  Tumor removal from Lower Lobe, NO CT, NO RT          SOCIAL HISTORY:  No tobacco, ethanol, or drug abuse.    FAMILY HISTORY:    No family history of acute MI or sudden cardiac death.    MEDICATIONS  (STANDING):  ALBUTerol    0.083% 2.5 milliGRAM(s) Nebulizer every 8 hours  ALBUTerol    0.083%. 2.5 milliGRAM(s) Nebulizer once  apixaban 2.5 milliGRAM(s) Oral two times a day  furosemide   Injectable 40 milliGRAM(s) IV Push daily  guaifenesin/dextromethorphan Oral Liquid 10 milliLiter(s) Oral every 4 hours  metoprolol tartrate 25 milliGRAM(s) Oral two times a day    MEDICATIONS  (PRN):  acetaminophen     Tablet .. 650 milliGRAM(s) Oral every 6 hours PRN Temp greater or equal to 38C (100.4F), Mild Pain (1 - 3)  aluminum hydroxide/magnesium hydroxide/simethicone Suspension 30 milliLiter(s) Oral every 4 hours PRN Dyspepsia  melatonin 3 milliGRAM(s) Oral at bedtime PRN Insomnia  ondansetron Injectable 4 milliGRAM(s) IV Push every 8 hours PRN Nausea and/or Vomiting      Allergies    No Known Allergies    Intolerances        REVIEW OF SYSTEMS:    CONSTITUTIONAL: No weakness, fevers or chills  EYES/ENT: No visual changes;  No vertigo or throat pain   NECK: No pain or stiffness  RESPIRATORY: No cough, wheezing, hemoptysis; reports shortness of breath  CARDIOVASCULAR: No chest pain or palpitations  GASTROINTESTINAL: No abdominal pain. No nausea, vomiting, or hematemesis; No diarrhea or constipation. No melena or hematochezia.  GENITOURINARY: No dysuria, frequency or hematuria  NEUROLOGICAL: No numbness or weakness  SKIN: No itching or rash  All other review of systems is negative unless indicated above    VITAL SIGNS:   Vital Signs Last 24 Hrs  T(C): 37.6 (26 Jun 2022 11:26), Max: 37.6 (26 Jun 2022 11:26)  T(F): 99.7 (26 Jun 2022 11:26), Max: 99.7 (26 Jun 2022 11:26)  HR: 85 (26 Jun 2022 16:26) (85 - 122)  BP: 123/61 (26 Jun 2022 16:26) (115/83 - 127/87)  BP(mean): --  RR: 19 (26 Jun 2022 16:26) (18 - 19)  SpO2: 99% (26 Jun 2022 16:26) (97% - 99%)    I&O's Summary      On Exam:    Constitutional: NAD, alert and oriented x 2-3  Lungs:  Non-labored, breath sounds are coarse bilaterally, No wheezing, rales or rhonchi  Cardiovascular: irregular and tachy.  S1 and S2 positive.  +sm at ru/lusb, no rubs, gallops or clicks  Gastrointestinal: Bowel Sounds present, soft, nontender.   Lymph: No peripheral edema. No cervical lymphadenopathy.  Neurological: Alert, no focal deficits  Skin: No rashes or ulcers   Psych:  Mood & affect appropriate.    LABS: All Labs Reviewed:                        11.6   7.55  )-----------( 196      ( 26 Jun 2022 11:52 )             35.5     26 Jun 2022 11:52    135    |  98     |  40     ----------------------------<  125    3.6     |  25     |  2.30     Ca    9.4        26 Jun 2022 11:52    TPro  6.3    /  Alb  3.4    /  TBili  2.1    /  DBili  x      /  AST  24     /  ALT  20     /  AlkPhos  44     26 Jun 2022 11:52    PT/INR - ( 26 Jun 2022 11:52 )   PT: 30.3 sec;   INR: 2.57 ratio         PTT - ( 26 Jun 2022 11:52 )  PTT:34.8 sec      Blood Culture:   06-26 @ 11:52  Pro Bnp [id number]        RADIOLOGY:    EKG: af, st abn

## 2022-06-26 NOTE — PATIENT PROFILE ADULT - FALL HARM RISK - HARM RISK INTERVENTIONS

## 2022-06-26 NOTE — H&P ADULT - NEGATIVE NEUROLOGICAL SYMPTOMS
no weakness/no syncope/no tremors/no loss of sensation/no difficulty walking/no headache/no confusion

## 2022-06-26 NOTE — H&P ADULT - NSICDXPASTMEDICALHX_GEN_ALL_CORE_FT
PAST MEDICAL HISTORY:  Atrial fibrillation on AC    Atrial fibrillation, unspecified type     HF (heart failure)     Lung cancer Left lung Sx for removal of Tumor, Rt Lung Cryo therapy for recurrence    PVD (peripheral vascular disease)      PAST MEDICAL HISTORY:  Atrial fibrillation on AC    Atrial fibrillation, unspecified type     HF (heart failure) Chronic Systolic CHF    Lung cancer Left lung Sx for removal of Tumor, Rt Lung Cryo therapy for recurrence    PVD (peripheral vascular disease)

## 2022-06-26 NOTE — H&P ADULT - PROBLEM SELECTOR PLAN 9
eliquis CT chest: "Numerous pancreatic cystic nodules measuring up to 2.1 cm; consider MRI for further evaluation."  - will continue to monitor at this time, patient is asymptomatic and interested in possibly pursuing hospice care. 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

## 2022-06-26 NOTE — H&P ADULT - GENITOURINARY MALE
no suprapubic ttp no suprapubic ttp/normal external genitalia/no discharge/no penile lesion/no palpable testicular mass/no scrotal mass

## 2022-06-26 NOTE — H&P ADULT - PROBLEM SELECTOR PLAN 10
eliquis pt with known history of PVD, obvious vascular changes on b/l lower extremities  - continue to monitor for s+s of limb ischemia  - pulses intact at this time

## 2022-06-26 NOTE — CONSULT NOTE ADULT - ASSESSMENT
89 yo male with a-fib (on Eliquis), lung ca s/p resection of tumor from Left lung, s/p Rt Lung cryo therapy for recurrence of tumor, PVD presents with shortness of breath.   He was last admitted in 2020 for bilateral cellulitis and AF with RVR.    - presents today with dyspnea on exertion. He got IVF, though appears volume overloaded  - would place espinoza to evaluate for urinary retention  - trend creatinine and electrolytes  - start lasix 40 iv daily  - I/o's and daily weights  - repeat echocardiogram; as of 2020 has mod LV dysfunction and severe AS    - af with fast rates  - cont to watch on telemetry  - trend troponin   - cont metoprolol and titrate up as tolerated by his BP  - cont ac with eliquis    - Further cardiac w/u pending clinical course  - All other w/u per Primary  - Will continue to follow

## 2022-06-26 NOTE — H&P ADULT - NSHPSOCIALHISTORY_GEN_ALL_CORE
Lives at home has home health aid 8 hours per day    denies tobacco, alcohol, drug use    covid vaccinated w/ j+j w/ booster.

## 2022-06-26 NOTE — ED ADULT NURSE REASSESSMENT NOTE - NS ED NURSE REASSESS COMMENT FT1
RN performed bladder scan on patient, approximately 270cc urine in bladder, RN stood patient up at bedside to urinate, patient was not able to urinate. Admitting MD DC Wylie made aware while at bedside to evaluate patient.

## 2022-06-27 DIAGNOSIS — R13.10 DYSPHAGIA, UNSPECIFIED: ICD-10-CM

## 2022-06-27 DIAGNOSIS — D64.9 ANEMIA, UNSPECIFIED: ICD-10-CM

## 2022-06-27 LAB
ALBUMIN SERPL ELPH-MCNC: 2.7 G/DL — LOW (ref 3.3–5)
ALP SERPL-CCNC: 39 U/L — LOW (ref 40–120)
ALT FLD-CCNC: 18 U/L — SIGNIFICANT CHANGE UP (ref 12–78)
ANION GAP SERPL CALC-SCNC: 6 MMOL/L — SIGNIFICANT CHANGE UP (ref 5–17)
APPEARANCE UR: CLEAR — SIGNIFICANT CHANGE UP
AST SERPL-CCNC: 18 U/L — SIGNIFICANT CHANGE UP (ref 15–37)
BASOPHILS # BLD AUTO: 0.02 K/UL — SIGNIFICANT CHANGE UP (ref 0–0.2)
BASOPHILS NFR BLD AUTO: 0.3 % — SIGNIFICANT CHANGE UP (ref 0–2)
BILIRUB SERPL-MCNC: 1.8 MG/DL — HIGH (ref 0.2–1.2)
BILIRUB UR-MCNC: NEGATIVE — SIGNIFICANT CHANGE UP
BUN SERPL-MCNC: 37 MG/DL — HIGH (ref 7–23)
CALCIUM SERPL-MCNC: 8.8 MG/DL — SIGNIFICANT CHANGE UP (ref 8.5–10.1)
CHLORIDE SERPL-SCNC: 101 MMOL/L — SIGNIFICANT CHANGE UP (ref 96–108)
CHLORIDE UR-SCNC: 52 MMOL/L — SIGNIFICANT CHANGE UP
CO2 SERPL-SCNC: 33 MMOL/L — HIGH (ref 22–31)
COLOR SPEC: YELLOW — SIGNIFICANT CHANGE UP
CREAT ?TM UR-MCNC: 105 MG/DL — SIGNIFICANT CHANGE UP
CREAT SERPL-MCNC: 1.8 MG/DL — HIGH (ref 0.5–1.3)
CULTURE RESULTS: NO GROWTH — SIGNIFICANT CHANGE UP
DIFF PNL FLD: ABNORMAL
EGFR: 36 ML/MIN/1.73M2 — LOW
EOSINOPHIL # BLD AUTO: 0.07 K/UL — SIGNIFICANT CHANGE UP (ref 0–0.5)
EOSINOPHIL NFR BLD AUTO: 1.1 % — SIGNIFICANT CHANGE UP (ref 0–6)
EOSINOPHIL NFR URNS MANUAL: NEGATIVE — SIGNIFICANT CHANGE UP
GLUCOSE SERPL-MCNC: 108 MG/DL — HIGH (ref 70–99)
GLUCOSE UR QL: NEGATIVE — SIGNIFICANT CHANGE UP
HCT VFR BLD CALC: 31.6 % — LOW (ref 39–50)
HGB BLD-MCNC: 10.2 G/DL — LOW (ref 13–17)
IMM GRANULOCYTES NFR BLD AUTO: 10.9 % — HIGH (ref 0–1.5)
KETONES UR-MCNC: NEGATIVE — SIGNIFICANT CHANGE UP
LEUKOCYTE ESTERASE UR-ACNC: ABNORMAL
LYMPHOCYTES # BLD AUTO: 1.19 K/UL — SIGNIFICANT CHANGE UP (ref 1–3.3)
LYMPHOCYTES # BLD AUTO: 19.3 % — SIGNIFICANT CHANGE UP (ref 13–44)
MAGNESIUM SERPL-MCNC: 2.3 MG/DL — SIGNIFICANT CHANGE UP (ref 1.6–2.6)
MCHC RBC-ENTMCNC: 30.7 PG — SIGNIFICANT CHANGE UP (ref 27–34)
MCHC RBC-ENTMCNC: 32.3 GM/DL — SIGNIFICANT CHANGE UP (ref 32–36)
MCV RBC AUTO: 95.2 FL — SIGNIFICANT CHANGE UP (ref 80–100)
MONOCYTES # BLD AUTO: 1.57 K/UL — HIGH (ref 0–0.9)
MONOCYTES NFR BLD AUTO: 25.4 % — HIGH (ref 2–14)
NEUTROPHILS # BLD AUTO: 2.65 K/UL — SIGNIFICANT CHANGE UP (ref 1.8–7.4)
NEUTROPHILS NFR BLD AUTO: 43 % — SIGNIFICANT CHANGE UP (ref 43–77)
NITRITE UR-MCNC: NEGATIVE — SIGNIFICANT CHANGE UP
NRBC # BLD: 1 /100 WBCS — HIGH (ref 0–0)
PH UR: 5 — SIGNIFICANT CHANGE UP (ref 5–8)
PHOSPHATE SERPL-MCNC: 3.9 MG/DL — SIGNIFICANT CHANGE UP (ref 2.5–4.5)
PLATELET # BLD AUTO: 144 K/UL — LOW (ref 150–400)
POTASSIUM SERPL-MCNC: 3.3 MMOL/L — LOW (ref 3.5–5.3)
POTASSIUM SERPL-SCNC: 3.3 MMOL/L — LOW (ref 3.5–5.3)
POTASSIUM UR-SCNC: 39.3 MMOL/L — SIGNIFICANT CHANGE UP
PROT ?TM UR-MCNC: 8 MG/DL — SIGNIFICANT CHANGE UP (ref 0–12)
PROT SERPL-MCNC: 5.4 G/DL — LOW (ref 6–8.3)
PROT UR-MCNC: NEGATIVE — SIGNIFICANT CHANGE UP
RBC # BLD: 3.32 M/UL — LOW (ref 4.2–5.8)
RBC # FLD: 18.6 % — HIGH (ref 10.3–14.5)
SODIUM SERPL-SCNC: 140 MMOL/L — SIGNIFICANT CHANGE UP (ref 135–145)
SODIUM UR-SCNC: 49 MMOL/L — SIGNIFICANT CHANGE UP
SP GR SPEC: 1.02 — SIGNIFICANT CHANGE UP (ref 1.01–1.02)
SPECIMEN SOURCE: SIGNIFICANT CHANGE UP
UROBILINOGEN FLD QL: NEGATIVE — SIGNIFICANT CHANGE UP
UUN UR-MCNC: 244 MG/DL — SIGNIFICANT CHANGE UP
WBC # BLD: 6.17 K/UL — SIGNIFICANT CHANGE UP (ref 3.8–10.5)
WBC # FLD AUTO: 6.17 K/UL — SIGNIFICANT CHANGE UP (ref 3.8–10.5)

## 2022-06-27 PROCEDURE — 99232 SBSQ HOSP IP/OBS MODERATE 35: CPT

## 2022-06-27 RX ORDER — ROBINUL 0.2 MG/ML
2 INJECTION INTRAMUSCULAR; INTRAVENOUS THREE TIMES A DAY
Refills: 0 | Status: DISCONTINUED | OUTPATIENT
Start: 2022-06-27 | End: 2022-06-28

## 2022-06-27 RX ORDER — POTASSIUM CHLORIDE 20 MEQ
10 PACKET (EA) ORAL
Refills: 0 | Status: DISCONTINUED | OUTPATIENT
Start: 2022-06-27 | End: 2022-06-27

## 2022-06-27 RX ORDER — POTASSIUM CHLORIDE 20 MEQ
10 PACKET (EA) ORAL
Refills: 0 | Status: COMPLETED | OUTPATIENT
Start: 2022-06-27 | End: 2022-06-27

## 2022-06-27 RX ORDER — APIXABAN 2.5 MG/1
2.5 TABLET, FILM COATED ORAL EVERY 12 HOURS
Refills: 0 | Status: DISCONTINUED | OUTPATIENT
Start: 2022-06-27 | End: 2022-06-28

## 2022-06-27 RX ORDER — METOPROLOL TARTRATE 50 MG
2.5 TABLET ORAL EVERY 6 HOURS
Refills: 0 | Status: DISCONTINUED | OUTPATIENT
Start: 2022-06-27 | End: 2022-06-28

## 2022-06-27 RX ADMIN — Medication 100 MILLIEQUIVALENT(S): at 12:51

## 2022-06-27 RX ADMIN — Medication 100 MILLIEQUIVALENT(S): at 10:48

## 2022-06-27 RX ADMIN — Medication 2.5 MILLIGRAM(S): at 17:42

## 2022-06-27 RX ADMIN — Medication 600 MILLIGRAM(S): at 05:26

## 2022-06-27 RX ADMIN — APIXABAN 2.5 MILLIGRAM(S): 2.5 TABLET, FILM COATED ORAL at 05:26

## 2022-06-27 RX ADMIN — APIXABAN 2.5 MILLIGRAM(S): 2.5 TABLET, FILM COATED ORAL at 17:42

## 2022-06-27 RX ADMIN — Medication 100 MILLIEQUIVALENT(S): at 09:14

## 2022-06-27 RX ADMIN — SODIUM CHLORIDE 4 MILLILITER(S): 9 INJECTION INTRAMUSCULAR; INTRAVENOUS; SUBCUTANEOUS at 07:42

## 2022-06-27 RX ADMIN — ALBUTEROL 2.5 MILLIGRAM(S): 90 AEROSOL, METERED ORAL at 07:42

## 2022-06-27 RX ADMIN — ROBINUL 2 MILLIGRAM(S): 0.2 INJECTION INTRAMUSCULAR; INTRAVENOUS at 16:44

## 2022-06-27 RX ADMIN — Medication 25 MILLIGRAM(S): at 05:26

## 2022-06-27 NOTE — SWALLOW BEDSIDE ASSESSMENT ADULT - SWALLOW EVAL: RECOMMENDED DIET
oral nutrition/hydration/medication is contraindicated. Consider short-term means of non-oral nutrition/hydration/medication and GOC discussion with the patient/patient's family regarding nutritional plan of care. Recommend RD services to ensure adequate daily caloric nutritional intake.

## 2022-06-27 NOTE — PROGRESS NOTE ADULT - ATTENDING COMMENTS
88 yo male with a-fib (on Eliquis), lung ca s/p resection of tumor from Left lung, s/p Rt Lung cryo therapy for recurrence of tumor, PVD presents with shortness of breath, being admitted for acute on chronic respiratory failure likely multifactorial 2/2 undertreated HFrEF, severe AS, small airway disease and lung cancer.  Pt seen, examined, case & care plan d/w pt, residents at detail.   PO Diet  DNR/DNI  -D/W Social service- -Hospice Eval.  Cardio-D/W Dr DENZEL castorena follow up- Daily IV lasix 40 mg x daily  Renal-D/W Dr DENZEL DUNAWAY -BMP   Pulmonary-Dr Franco -Lung Ca   -D/W Palliative care RN & Dr Meléndez at detail, Son wants Home Hospice   D/W RN -Biggs cath , I/O, for possible urinary Retention   D/W Son Dr Álvaro Michael at detail- Wants Home Hospice, DNR/DNI ,   -D/W Speech & swallow -NPO as risks of aspiration, son wants comfort measures & wants to start feeds., aspiration precautions.  Pt is now on comfort Care now- Restart  -Poor prognosis.  Total care time is 50 minutes.  D/W Social service at detail. 88 yo male with a-fib (on Eliquis), lung ca s/p resection of tumor from Left lung, s/p Rt Lung cryo therapy for recurrence of tumor, PVD presents with shortness of breath, being admitted for acute on chronic respiratory failure likely multifactorial 2/2 undertreated HFrEF, severe AS, small airway disease and lung cancer.  Pt seen, examined, case & care plan d/w pt, residents at [facility].   PO Diet  DNR/DNI  -D/W Social service- -Hospice Eval.  Cardio-D/W Dr DENZEL castorena follow up- Daily IV lasix 40 mg x daily  Renal-D/W Dr DENZEL DUNAWAY -BMP   Pulmonary-Dr Franco -Lung Ca   -D/W Palliative care RN & Dr Meléndez at detail, Son wants Home Hospice   D/W RN -Biggs cath , I/O, for possible urinary Retention   D/W Son Dr Álvaro Michael at [facility]- Wants Home Hospice, DNR/DNI ,   -D/W Speech & swallow -NPO as risks of aspiration, son wants comfort measures & wants to start feeds., aspiration precautions.  Pt is now on comfort Care now- Restart  -Poor prognosis.  Total care time is 50 minutes.  D/W Social service at [facility]. NO RRT, NO VS, NO Labs , Pt is on Comfort care.

## 2022-06-27 NOTE — PROGRESS NOTE ADULT - SUBJECTIVE AND OBJECTIVE BOX
Date/Time Patient Seen:  		  Referring MD:   Data Reviewed	       Patient is a 89y old  Male who presents with a chief complaint of shortness of breath (26 Jun 2022 18:34)      Subjective/HPI     PAST MEDICAL & SURGICAL HISTORY:  Atrial fibrillation, unspecified type    Atrial fibrillation  on AC    Lung cancer  Left lung Sx for removal of Tumor, Rt Lung Cryo therapy for recurrence    PVD (peripheral vascular disease)    HF (heart failure)  Chronic Systolic CHF    S/P lobectomy of lung  Tumor removal from Lower Lobe, NO CT, NO RT  Adeno ca          Medication list         MEDICATIONS  (STANDING):  ALBUTerol    0.083% 2.5 milliGRAM(s) Nebulizer every 12 hours  apixaban 2.5 milliGRAM(s) Oral two times a day  furosemide   Injectable 40 milliGRAM(s) IV Push daily  guaiFENesin  milliGRAM(s) Oral every 12 hours  metoprolol tartrate 25 milliGRAM(s) Oral two times a day  senna 2 Tablet(s) Oral at bedtime  sodium chloride 3%  Inhalation 4 milliLiter(s) Inhalation every 12 hours    MEDICATIONS  (PRN):  acetaminophen     Tablet .. 650 milliGRAM(s) Oral every 6 hours PRN Temp greater or equal to 38C (100.4F), Mild Pain (1 - 3)  aluminum hydroxide/magnesium hydroxide/simethicone Suspension 30 milliLiter(s) Oral every 4 hours PRN Dyspepsia  melatonin 3 milliGRAM(s) Oral at bedtime PRN Insomnia  ondansetron Injectable 4 milliGRAM(s) IV Push every 8 hours PRN Nausea and/or Vomiting         Vitals log        ICU Vital Signs Last 24 Hrs  T(C): 36.7 (27 Jun 2022 05:04), Max: 37.6 (26 Jun 2022 11:26)  T(F): 98 (27 Jun 2022 05:04), Max: 99.7 (26 Jun 2022 11:26)  HR: 85 (27 Jun 2022 05:04) (73 - 122)  BP: 105/67 (27 Jun 2022 05:19) (96/64 - 128/66)  BP(mean): --  ABP: --  ABP(mean): --  RR: 18 (27 Jun 2022 05:04) (18 - 20)  SpO2: 93% (27 Jun 2022 05:04) (93% - 100%)           Input and Output:  I&O's Detail    26 Jun 2022 07:01  -  27 Jun 2022 05:39  --------------------------------------------------------  IN:  Total IN: 0 mL    OUT:    Voided (mL): 550 mL  Total OUT: 550 mL    Total NET: -550 mL          Lab Data                        11.6   7.55  )-----------( 196      ( 26 Jun 2022 11:52 )             35.5     06-26    135  |  98  |  40<H>  ----------------------------<  125<H>  3.6   |  25  |  2.30<H>    Ca    9.4      26 Jun 2022 11:52    TPro  6.3  /  Alb  3.4  /  TBili  2.1<H>  /  DBili  x   /  AST  24  /  ALT  20  /  AlkPhos  44  06-26            Review of Systems	      Objective     Physical Examination    heart s1s2  lung dec BS  abd soft      Pertinent Lab findings & Imaging      Dian:  NO   Adequate UO     I&O's Detail    26 Jun 2022 07:01  -  27 Jun 2022 05:39  --------------------------------------------------------  IN:  Total IN: 0 mL    OUT:    Voided (mL): 550 mL  Total OUT: 550 mL    Total NET: -550 mL               Discussed with:     Cultures:	        Radiology

## 2022-06-27 NOTE — SWALLOW BEDSIDE ASSESSMENT ADULT - ADDITIONAL RECOMMENDATIONS
This department will continue to follow the patient for PO candidacy during this admission, as schedule permits

## 2022-06-27 NOTE — SWALLOW BEDSIDE ASSESSMENT ADULT - SWALLOW EVAL: DIAGNOSIS
Patient provided PO trials of pueed and moderately thick liquids via teaspoon and presented with 1. mild oral dysphagia marked by prolonged manipulation resulting in delayed collection, transfer, and transport. 2. Severe pharyngeal dysphagia marked by suspected delayed pharyngeal swallow trigger, multiple swallows (3-4), and immediate reflexive coughing with a change in vocal quality to a wet tone suggestive of penetration/aspiration. 3. Recommend oral nutrition/hydration/medication is contraindicated. Consider short-term means of non-oral nutrition/hydration/medication and GOC discussion with the patient/patient's family regarding nutritional plan of care. Recommend RD services to ensure adequate daily caloric nutritional intake.

## 2022-06-27 NOTE — PROGRESS NOTE ADULT - PROBLEM SELECTOR PLAN 1
likely pre-renal azotemia in the setting of poor perfusion from heart failure (FEUrea 13.4%)  - Cr 2.30. Cr was ~.9 baseline in 2020  - s/p bolus 500cc NS in the ED.   - started lasix 40mg ivp qd  - diuresis underway, monitor renal indices closely  - espinoza inserted  - Monitor BMP  - Avoid nephrotoxic medications  - urine urea 244, urine protein 8, Eos negative, urine sodium 49, urine cr 105, urine K 39.2.  - CT renal stone hunt demonstrates no renal stone or hydronephrosis.  - f/u urinalysis, UCx BCx  - nephro consult (vicente) recs appreciated likely pre-renal azotemia in the setting of poor perfusion from heart failure (FEUrea 13.4%)  - Cr 2.30 -> 1.8. Cr was ~.9 baseline in 2020  - s/p bolus 500cc NS in the ED.   - started lasix 40mg ivp qd  - diuresis underway, monitor renal indices closely  - espinoza inserted draining yellow urine  - Monitor BMP  - Avoid nephrotoxic medications  - urine urea 244, urine protein 8, Eos negative, urine sodium 49, urine cr 105, urine K 39.2.  - CT renal stone hunt demonstrates no renal stone or hydronephrosis.  - urinalysis w/ large blood   - UCx BCx pending, f/u  - nephro consult (vicente) recs appreciated S+S performed an eval 6/27 am and recommended that po nutrition/hydration/meds are contraindicated. Spoke with son and patient who agreed to start comfort measures and allow for pleasure feeds.   - comfort measures only: no RRT, no vitals, no labs.  - Oral hygiene  - suction prn  - stated on pureed diet with mildly thickened liquids, will continue to give AC orally for now; metoprolol and lasix given IV.

## 2022-06-27 NOTE — PROGRESS NOTE ADULT - PROBLEM SELECTOR PLAN 8
lung ca s/p resection of tumor from Left lung, s/p Rt Lung cryo therapy for recurrence of tumor  CT chest:  Small airways disease with multifocal subsegmental bronchial impaction   and tree-in-bud nodularity in the left greater than right lung.Irregular 3.4 cm right upper lobe subsolid lesion with 4 mm solid component, concerning for an adenocarcinoma spectrum lesion. Severe AS.  - patient does not follow regularly with a heme/onc or pulmonary doctor.  - pulmonology (Dr. Franco) recs appreciated Patient was reportedly found on the ground by son on 6/25 after possibly slipping off his couch. Patient is on Eliquis.  - No external evidence of fall, no ecchymosis or other injuries noted on physical exam. Patient does not remember falling.  - CTH demonstrates no acute findings.  - fall precautions  - continue to monitor CT chest: "Numerous pancreatic cystic nodules measuring up to 2.1 cm; consider MRI for further evaluation."  - will continue to monitor at this time, patient is asymptomatic and interested in possibly pursuing hospice care.

## 2022-06-27 NOTE — SWALLOW BEDSIDE ASSESSMENT ADULT - ASR SWALLOW RECOMMEND DIAG
objective testing is not warranted d/t overt signs on least restrictive PO and wet congestive baseline cough suggestive of poor secretion management; will continue to follow at bedside at this time

## 2022-06-27 NOTE — PROGRESS NOTE ADULT - PROBLEM SELECTOR PLAN 9
pt with known history of PVD, obvious vascular changes on b/l lower extremities  - continue to monitor for s+s of limb ischemia  - pulses intact at this time lung ca s/p resection of tumor from Left lung, s/p Rt Lung cryo therapy for recurrence of tumor  CT chest:  Small airways disease with multifocal subsegmental bronchial impaction   and tree-in-bud nodularity in the left greater than right lung.Irregular 3.4 cm right upper lobe subsolid lesion with 4 mm solid component, concerning for an adenocarcinoma spectrum lesion. Severe AS.  - patient does not follow regularly with a heme/onc or pulmonary doctor.  - pulmonology (Dr. Franco) recs appreciated Patient was reportedly found on the ground by son on 6/25 after possibly slipping off his couch. Patient is on Eliquis.  - No external evidence of fall, no ecchymosis or other injuries noted on physical exam. Patient does not remember falling.  - CTH demonstrates no acute findings.  - fall precautions  - continue to monitor

## 2022-06-27 NOTE — PROGRESS NOTE ADULT - PROBLEM SELECTOR PLAN 2
Patient presents with worsening shortness of breath and cough likely multifactorial 2/2 undertreated HFrEF w/ severe AS, lung cancer and small airway disease.  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 Severe AS.  - received 500cc bolus in ED however patient appears overloaded on exam  - Continue lasix 40mg ivp qd per cardio recs. Close monitoring of renal fxn.  - s/p zosyn x1 in ED, no need to continue ABX at this time no s/s of active infection.  - Continue home metoprolol 25mg po bid  - albuterol 2.5mg neb q8h  - Supplemental O2 to maintain O2 sat >90%.  - initial trop 64.8, will f/u repeat.  - tte ordered, F/u  - espinoza catheter  - Strict I/Os, daily weights  - Monitor and replace electrolytes  - Cardiology consulted ([facility]), recs appreciated  - pulm consulted (noe), recs appreciated  - nephro (vicente), recs appreciated  - palliative care consult ([facility]), f/u recs likely pre-renal azotemia in the setting of poor perfusion from heart failure (FEUrea 13.4%)  - Cr 2.30 -> 1.8. Cr was ~.9 baseline in 2020  - s/p bolus 500cc NS in the ED.   - started lasix 40mg ivp qd  - diuresis underway, monitor renal indices closely  - espinoza inserted draining yellow urine  - Monitor BMP  - Avoid nephrotoxic medications  - urine urea 244, urine protein 8, Eos negative, urine sodium 49, urine cr 105, urine K 39.2.  - CT renal stone hunt demonstrates no renal stone or hydronephrosis.  - urinalysis w/ large blood   - UCx BCx pending, f/u  - nephro consult (vicente) recs appreciated - received 500cc bolus in ED however patient appears overloaded on exam, will start lasix 40mg ivp qd per cardio recs. Close monitoring of renal fxn.  - Continue home metoprolol 25mg po bid  - albuterol 2.5mg neb q8h  - Supplemental O2 to maintain O2 sat >90%.  - initial trop 64.8, will f/u repeat.  - tte ordered, F/u  - espinoza catheter  - Strict I/Os, daily weights  - Monitor and replace electrolytes  - Cardiology consulted ([facility]), f/u recs

## 2022-06-27 NOTE — PROGRESS NOTE ADULT - SUBJECTIVE AND OBJECTIVE BOX
Patient is a 89y old  Male who presents with a chief complaint of shortness of breath (27 Jun 2022 05:39)    HPI:  89 yo male with a-fib (on Eliquis), lung ca s/p resection of tumor from Left lung, s/p Rt Lung nodule Bx -Adeno Ca S/P  cryo therapy for recurrence of tumor at [facility], PVD presents with shortness of breath. Spoke with son on phone who is a physician and provided background information. Per son, patient has not wanted to see any doctors since he was discharged from the hospital after being admitted to [facility] in 2020 for weeping cellulitis. Patient was to follow up with a cardiologist to discuss treatment of his severe AS, HFrEF however patient declined to follow-up and stated that he did not want to have his valve repaired. 1 month ago, patient has started eating and drinking less, taking his medications erratically so sons convinced patient to get a home health aid (pt lives alone). Son think spatient may have had a stroke. Patient has been more short of breath than usual over the past month and Dr. Ahmadi came on a house call to see the patient, an echo was reportedly performed which showed an EF of 40% and severe AS. Home health aid is present 8 hours per day. Yesterday morning, son came over patient's house to find patient face down on the floor after either falling or slipping off the couch. Son helped patient up and asked him if he wanted to go to the hospital at that time however patient declined. Patient was also noted to have increasing shortness of breath and persistent non-productive cough over the past ~week. This morning, patient's difficulty breathing and coughing got significantly worse after he woke up so patient told son that he wanted to go to the hospital. Patient denies fever, chills, chest pain, pleuritic pain, abdominal pain, changes in bowel habits, urinary difficulties, increasing edema of lower extremities or any other concerns. Son states patient is DNR/DNI, confirmed with patient at the bedside that he wants to be DNR/DNI however does not want to sign a MOLST at this time.    ED Course:  vitals - t 99.7 / bp 115/83 / hr 122 / spo2 97 on 3LNC / rr 18  labs - hgb 11.6, INR 2.57, BUN/Cr 40/2.30, probnp 6236 rvp negative  imaging - CT chest - Small airways disease with multifocal subsegmental bronchial impaction   and tree-in-bud nodularity in the left greater than right lung.Irregular 3.4 cm right upper lobe subsolid lesion with 4 mm solid   component, concerning for an adenocarcinoma spectrum lesion. Follow-up chest CT in 3-6 months, in the absence of prior imaging. Severe aortic valvular calcification which can be seen with aortic stenosis. Correlate with echocardiogram.Numerous pancreatic cystic nodules measuring up to 2.1 cm; consider MRI for further evaluation.  EKG - afib 110.  Given: Zosyn x1, 500cc bolus   (26 Jun 2022 15:02)    INTERVAL HPI:      OVERNIGHT EVENTS:    Home Medications:  furosemide 20 mg oral tablet: 1 tab(s) orally 2 times a day (26 Jun 2022 15:03)  metoprolol tartrate 25 mg oral tablet: 1 tab(s) orally 2 times a day (26 Jun 2022 15:03)      MEDICATIONS  (STANDING):  ALBUTerol    0.083% 2.5 milliGRAM(s) Nebulizer every 12 hours  apixaban 2.5 milliGRAM(s) Oral two times a day  furosemide   Injectable 40 milliGRAM(s) IV Push daily  guaiFENesin  milliGRAM(s) Oral every 12 hours  metoprolol tartrate 25 milliGRAM(s) Oral two times a day  senna 2 Tablet(s) Oral at bedtime  sodium chloride 3%  Inhalation 4 milliLiter(s) Inhalation every 12 hours    MEDICATIONS  (PRN):  acetaminophen     Tablet .. 650 milliGRAM(s) Oral every 6 hours PRN Temp greater or equal to 38C (100.4F), Mild Pain (1 - 3)  aluminum hydroxide/magnesium hydroxide/simethicone Suspension 30 milliLiter(s) Oral every 4 hours PRN Dyspepsia  melatonin 3 milliGRAM(s) Oral at bedtime PRN Insomnia  ondansetron Injectable 4 milliGRAM(s) IV Push every 8 hours PRN Nausea and/or Vomiting      Allergies    No Known Allergies    Intolerances        Social History:  Lives at home has home health aid 8 hours per day    denies tobacco, alcohol, drug use    covid vaccinated w/ j+j w/ booster. (26 Jun 2022 15:02)      REVIEW OF SYSTEMS:  CONSTITUTIONAL: No fever, No chills, No fatigue, No myalgia, No Body ache, No Weakness  EYES: No eye pain,  No visual disturbances, No discharge, NO Redness  ENMT:  No ear pain, No nose bleed, No vertigo; No sinus pain, NO throat pain, No Congestion  NECK: No pain, No stiffness  RESPIRATORY: No cough, NO wheezing, No  hemoptysis, NO  shortness of breath  CARDIOVASCULAR: No chest pain, palpitations  GASTROINTESTINAL: No abdominal pain, NO epigastric pain. No nausea, No vomiting; No diarrhea, No constipation. [  ] BM  GENITOURINARY: No dysuria, No frequency, No urgency, No hematuria, NO incontinence  NEUROLOGICAL: No headaches, No dizziness, No numbness, No tingling, No tremors, No weakness  EXT: No Swelling, No Pain, No Edema  SKIN:  [  ] No itching, burning, rashes, or lesions   MUSCULOSKELETAL: No joint pain ,No Jt swelling; No muscle pain, No back pain, No extremity pain  PSYCHIATRIC: No depression,  No anxiety,  No mood swings ,No difficulty sleeping at night  PAIN SCALE: [  ] None  [  ] Other-  ROS Unable to obtain due to - [  ] Dementia  [  ] Lethargy [  ] Drowsy [  ] Sedated [  ] non verbal  REST OF REVIEW Of SYSTEM - [  ] Normal     Vital Signs Last 24 Hrs  T(C): 36.7 (27 Jun 2022 05:04), Max: 37.6 (26 Jun 2022 11:26)  T(F): 98 (27 Jun 2022 05:04), Max: 99.7 (26 Jun 2022 11:26)  HR: 85 (27 Jun 2022 05:04) (73 - 122)  BP: 105/67 (27 Jun 2022 05:19) (96/64 - 128/66)  BP(mean): --  RR: 18 (27 Jun 2022 05:04) (18 - 20)  SpO2: 93% (27 Jun 2022 05:04) (93% - 100%)  Finger Stick        06-26 @ 07:01  -  06-27 @ 07:00  --------------------------------------------------------  IN: 0 mL / OUT: 1230 mL / NET: -1230 mL        PHYSICAL EXAM:  GENERAL:  [  ] NAD , [  ] well appearing, [  ] Agitated, [  ] Drowsy,  [  ] Lethargy, [  ] confused   HEAD:  [  ] Normal, [  ] Other  EYES:  [  ] EOMI, [  ] PERRLA, [  ] conjunctiva and sclera clear normal, [  ] Other,  [  ] Pallor,[  ] Discharge  ENMT:  [  ] Normal, [  ] Moist mucous membranes, [  ] Good dentition, [  ] No Thrush  NECK:  [  ] Supple, [  ] No JVD, [  ] Normal thyroid, [  ] Lymphadenopathy [  ] Other  CHEST/LUNG:  [  ] Clear to auscultation bilaterally, [  ] Breath Sounds equal B/L / Decrease, [  ] poor effort  [  ] No rales, [  ] No rhonchi  [  ]  No wheezing,   HEART:  [  ] Regular rate and rhythm, [  ] tachycardia, [  ] Bradycardia,  [  ] irregular  [  ] No murmurs, No rubs, No gallops, [  ] PPM in place (Mfr:  )  ABDOMEN:  [  ] Soft, [  ] Nontender, [  ] Nondistended, [  ] No mass, [  ] Bowel sounds present, [  ] obese  NERVOUS SYSTEM:  [  ] Alert & Oriented X3, [  ] Nonfocal  [  ] Confusion  [  ] Encephalopathic [  ] Sedated [  ] Unable to assess, [  ] Dementia [  ] Other-  EXTREMITIES: [  ] 2+ Peripheral Pulses, No clubbing, No cyanosis,  [  ] edema B/L lower EXT. [  ] PVD stasis skin changes B/L Lower EXT, [  ] wound  LYMPH: No lymphadenopathy noted  SKIN:  [  ] No rashes or lesions, [  ] Pressure Ulcers, [  ] ecchymosis, [  ] Skin Tears, [  ] Other    DIET: Diet, NPO:   Except Medications (06-26-22 @ 18:00)      LABS:                        11.6   7.55  )-----------( 196      ( 26 Jun 2022 11:52 )             35.5     26 Jun 2022 11:52    135    |  98     |  40     ----------------------------<  125    3.6     |  25     |  2.30     Ca    9.4        26 Jun 2022 11:52    TPro  6.3    /  Alb  3.4    /  TBili  2.1    /  DBili  x      /  AST  24     /  ALT  20     /  AlkPhos  44     26 Jun 2022 11:52    PT/INR - ( 26 Jun 2022 11:52 )   PT: 30.3 sec;   INR: 2.57 ratio         PTT - ( 26 Jun 2022 11:52 )  PTT:34.8 sec                         Anemia Panel:      Thyroid Panel:            Serum Pro-Brain Natriuretic Peptide: 6236 pg/mL (06-26-22 @ 11:52)      RADIOLOGY & ADDITIONAL TESTS:      HEALTH ISSUES - PROBLEM Dx:  Acute on chronic respiratory failure    Counseling regarding goals of care    ADWOA (acute kidney injury)    Fall    Atrial fibrillation  on AC    Lung cancer  Left lung Sx for removal of Tumor, Rt Lung Cryo therapy for recurrence    PVD (peripheral vascular disease)    Abnormal finding on CT scan    DVT prophylaxis    Chronic systolic congestive heart failure            Consultant(s) Notes Reviewed:  [  ] YES     Care Discussed with [X] Consultants  [  ] Patient  [  ] Family [  ] HCP [  ]   [  ] Social Service  [  ] RN, [  ] Physical Therapy,[  ] Palliative care team  DVT PPX: [  ] Lovenox, [  ] S C Heparin, [  ] Coumadin, [  ] Xarelto, [  ] Eliquis, [  ] Pradaxa, [  ] IV Heparin drip, [  ] SCD [  ] Contraindication 2 to GI Bleed,[  ] Ambulation [  ] Contraindicated 2 to  bleed [  ] Contraindicated 2 to Brain Bleed  Advanced directive: [  ] None, [  ] DNR/DNI Patient is a 89y old  Male who presents with a chief complaint of shortness of breath (27 Jun 2022 05:39)    HPI:  88 yo male with a-fib (on Eliquis), lung ca s/p resection of tumor from Left lung, s/p Rt Lung nodule Bx -Adeno Ca S/P  cryo therapy for recurrence of tumor at [facility], PVD presents with shortness of breath. Spoke with son on phone who is a physician and provided background information. Per son, patient has not wanted to see any doctors since he was discharged from the hospital after being admitted to [facility] in 2020 for weeping cellulitis. Patient was to follow up with a cardiologist to discuss treatment of his severe AS, HFrEF however patient declined to follow-up and stated that he did not want to have his valve repaired. 1 month ago, patient has started eating and drinking less, taking his medications erratically so sons convinced patient to get a home health aid (pt lives alone). Son think spatient may have had a stroke. Patient has been more short of breath than usual over the past month and Dr. Ahmadi came on a house call to see the patient, an echo was reportedly performed which showed an EF of 40% and severe AS. Home health aid is present 8 hours per day. Yesterday morning, son came over patient's house to find patient face down on the floor after either falling or slipping off the couch. Son helped patient up and asked him if he wanted to go to the hospital at that time however patient declined. Patient was also noted to have increasing shortness of breath and persistent non-productive cough over the past ~week. This morning, patient's difficulty breathing and coughing got significantly worse after he woke up so patient told son that he wanted to go to the hospital. Patient denies fever, chills, chest pain, pleuritic pain, abdominal pain, changes in bowel habits, urinary difficulties, increasing edema of lower extremities or any other concerns. Son states patient is DNR/DNI, confirmed with patient at the bedside that he wants to be DNR/DNI however does not want to sign a MOLST at this time.    ED Course:  vitals - t 99.7 / bp 115/83 / hr 122 / spo2 97 on 3LNC / rr 18  labs - hgb 11.6, INR 2.57, BUN/Cr 40/2.30, probnp 6236 rvp negative  imaging - CT chest - Small airways disease with multifocal subsegmental bronchial impaction   and tree-in-bud nodularity in the left greater than right lung.Irregular 3.4 cm right upper lobe subsolid lesion with 4 mm solid   component, concerning for an adenocarcinoma spectrum lesion. Follow-up chest CT in 3-6 months, in the absence of prior imaging. Severe aortic valvular calcification which can be seen with aortic stenosis. Correlate with echocardiogram.Numerous pancreatic cystic nodules measuring up to 2.1 cm; consider MRI for further evaluation.  EKG - afib 110.  Given: Zosyn x1, 500cc bolus   (26 Jun 2022 15:02)    INTERVAL HPI:  6/27/22: patient seen and examined at the bedside. He states that his non-productive cough is still present however he does not have any shortness of breath at this time. States that he has not had a bowel movement as of yet. Denies pain, denies CP, abdominal pain, n/v/d/c or any other symptoms at this time.      OVERNIGHT EVENTS:  none    Home Medications:  furosemide 20 mg oral tablet: 1 tab(s) orally 2 times a day (26 Jun 2022 15:03)  metoprolol tartrate 25 mg oral tablet: 1 tab(s) orally 2 times a day (26 Jun 2022 15:03)      MEDICATIONS  (STANDING):  ALBUTerol    0.083% 2.5 milliGRAM(s) Nebulizer every 12 hours  apixaban 2.5 milliGRAM(s) Oral two times a day  furosemide   Injectable 40 milliGRAM(s) IV Push daily  guaiFENesin  milliGRAM(s) Oral every 12 hours  metoprolol tartrate 25 milliGRAM(s) Oral two times a day  senna 2 Tablet(s) Oral at bedtime  sodium chloride 3%  Inhalation 4 milliLiter(s) Inhalation every 12 hours    MEDICATIONS  (PRN):  acetaminophen     Tablet .. 650 milliGRAM(s) Oral every 6 hours PRN Temp greater or equal to 38C (100.4F), Mild Pain (1 - 3)  aluminum hydroxide/magnesium hydroxide/simethicone Suspension 30 milliLiter(s) Oral every 4 hours PRN Dyspepsia  melatonin 3 milliGRAM(s) Oral at bedtime PRN Insomnia  ondansetron Injectable 4 milliGRAM(s) IV Push every 8 hours PRN Nausea and/or Vomiting      Allergies    No Known Allergies    Intolerances        Social History:  Lives at home has home health aid 8 hours per day    denies tobacco, alcohol, drug use    covid vaccinated w/ j+j w/ booster. (26 Jun 2022 15:02)      REVIEW OF SYSTEMS:  CONSTITUTIONAL: No fever, No chills, No fatigue, No myalgia, No Body ache, No Weakness  EYES: No eye pain,  No visual disturbances, No discharge, NO Redness  ENMT:  No ear pain, No nose bleed, No vertigo; No sinus pain, NO throat pain, No Congestion  NECK: No pain, No stiffness  RESPIRATORY: +non-productive cough, NO wheezing, No  hemoptysis, NO  shortness of breath  CARDIOVASCULAR: No chest pain, palpitations  GASTROINTESTINAL: No abdominal pain, NO epigastric pain. No nausea, No vomiting; No diarrhea, No constipation. [  ] BM  GENITOURINARY: No dysuria   NEUROLOGICAL: No headaches, No dizziness, No numbness, No tingling, No tremors, No weakness  EXT: +Swelling, No Pain, No Edema  SKIN:  [  ] No itching, burning, rashes, or lesions   MUSCULOSKELETAL: No joint pain ,No Jt swelling; No muscle pain, No back pain, No extremity pain  PSYCHIATRIC: No depression,  No anxiety,  No mood swings ,No difficulty sleeping at night  PAIN SCALE: [  ] None  [  ] Other-  ROS Unable to obtain due to - [  ] Dementia  [  ] Lethargy [  ] Drowsy [  ] Sedated [  ] non verbal  REST OF REVIEW Of SYSTEM - [x  ] Normal     Vital Signs Last 24 Hrs  T(C): 36.7 (27 Jun 2022 05:04), Max: 37.6 (26 Jun 2022 11:26)  T(F): 98 (27 Jun 2022 05:04), Max: 99.7 (26 Jun 2022 11:26)  HR: 85 (27 Jun 2022 05:04) (73 - 122)  BP: 105/67 (27 Jun 2022 05:19) (96/64 - 128/66)  RR: 18 (27 Jun 2022 05:04) (18 - 20)  SpO2: 93% (27 Jun 2022 05:04) (93% - 100%)  Finger Stick        06-26 @ 07:01  -  06-27 @ 07:00  --------------------------------------------------------  IN: 0 mL / OUT: 1230 mL / NET: -1230 mL        PHYSICAL EXAM:  GENERAL:  [ x ] NAD , [  x] well appearing, [  ] Agitated, [  ] Drowsy,  [  ] Lethargy, [  ] confused   HEAD:  [ x ] Normal, [  ] Other  EYES:  [  x] EOMI, [ x ] PERRLA, [ x ] conjunctiva and sclera clear normal, [  ] Other,  [  ] Pallor,[  ] Discharge  ENMT:  [ x ] Normal, [ x ] Moist mucous membranes, [  ] Good dentition, [  ] No Thrush  NECK:  [ x ] Supple, [x  ] No JVD, [  ] Normal thyroid, [  ] Lymphadenopathy [  ] Other  CHEST/LUNG:  [ x ] Clear to auscultation bilaterally, [ x ] Breath Sounds equal B/L, [  ] poor effort  [ x ] No rales, [  x] No rhonchi  [x  ]  No wheezing,   HEART: [x] III/VI holosystolic murmur best heard RUSB [ x ] Regular rate and rhythm, [  ] tachycardia, [  ] Bradycardia,  [  ] irregular  [  ] No murmurs, No rubs, No gallops, [  ] PPM in place (Mfr:  )  ABDOMEN:  [ x ] Soft, [ x ] Nontender, [x  ] Nondistended, [ x ] No mass, [  x] Bowel sounds present, [  ] obese  NERVOUS SYSTEM:  [  x] Alert & Oriented X3, [x  ] Nonfocal  [  ] Confusion  [  ] Encephalopathic [  ] Sedated [  ] Unable to assess, [  ] Dementia [  ] Other-  EXTREMITIES: [x  ] 2+ Peripheral Pulses, No clubbing, No cyanosis,  [ x ] edema B/L lower EXT. [  x] PVD stasis skin changes B/L Lower EXT, [  ] wound  LYMPH: No lymphadenopathy noted  SKIN:  [  ] No rashes or lesions, [  ] Pressure Ulcers, [  ] ecchymosis, [  ] Skin Tears, [  ] Other    DIET: Diet, NPO:   Except Medications (06-26-22 @ 18:00)      LABS:                        11.6   7.55  )-----------( 196      ( 26 Jun 2022 11:52 )             35.5     26 Jun 2022 11:52    135    |  98     |  40     ----------------------------<  125    3.6     |  25     |  2.30     Ca    9.4        26 Jun 2022 11:52    TPro  6.3    /  Alb  3.4    /  TBili  2.1    /  DBili  x      /  AST  24     /  ALT  20     /  AlkPhos  44     26 Jun 2022 11:52    PT/INR - ( 26 Jun 2022 11:52 )   PT: 30.3 sec;   INR: 2.57 ratio         PTT - ( 26 Jun 2022 11:52 )  PTT:34.8 sec                         Anemia Panel:      Thyroid Panel:            Serum Pro-Brain Natriuretic Peptide: 6236 pg/mL (06-26-22 @ 11:52)      RADIOLOGY & ADDITIONAL TESTS:      HEALTH ISSUES - PROBLEM Dx:  Acute on chronic respiratory failure    Counseling regarding goals of care    ADWOA (acute kidney injury)    Fall    Atrial fibrillation  on AC    Lung cancer  Left lung Sx for removal of Tumor, Rt Lung Cryo therapy for recurrence    PVD (peripheral vascular disease)    Abnormal finding on CT scan    DVT prophylaxis    Chronic systolic congestive heart failure            Consultant(s) Notes Reviewed:  [  ] YES     Care Discussed with [X] Consultants  [x  ] Patient  [  x] Family [  ] HCP [  ]   [  ] Social Service  [ x ] RN, [  ] Physical Therapy,[  ] Palliative care team  DVT PPX: [ x ] Lovenox, [  ] S C Heparin, [  ] Coumadin, [  ] Xarelto, [  ] Eliquis, [  ] Pradaxa, [  ] IV Heparin drip, [  ] SCD [  ] Contraindication 2 to GI Bleed,[  ] Ambulation [  ] Contraindicated 2 to  bleed [  ] Contraindicated 2 to Brain Bleed  Advanced directive: [  ] None, [x  ] DNR/DNI Patient is a 89y old  Male who presents with a chief complaint of shortness of breath (27 Jun 2022 05:39)    HPI:  89 yo male with a-fib (on Eliquis), lung ca s/p resection of tumor from Left lung, s/p Rt Lung nodule Bx -Adeno Ca S/P  cryo therapy for recurrence of tumor at [facility], PVD presents with shortness of breath. Spoke with son on phone who is a physician and provided background information. Per son, patient has not wanted to see any doctors since he was discharged from the hospital after being admitted to [facility] in 2020 for weeping cellulitis. Patient was to follow up with a cardiologist to discuss treatment of his severe AS, HFrEF however patient declined to follow-up and stated that he did not want to have his valve repaired. 1 month ago, patient has started eating and drinking less, taking his medications erratically so sons convinced patient to get a home health aid (pt lives alone). Son think spatient may have had a stroke. Patient has been more short of breath than usual over the past month and Dr. Ahmadi came on a house call to see the patient, an echo was reportedly performed which showed an EF of 40% and severe AS. Home health aid is present 8 hours per day. Yesterday morning, son came over patient's house to find patient face down on the floor after either falling or slipping off the couch. Son helped patient up and asked him if he wanted to go to the hospital at that time however patient declined. Patient was also noted to have increasing shortness of breath and persistent non-productive cough over the past ~week. This morning, patient's difficulty breathing and coughing got significantly worse after he woke up so patient told son that he wanted to go to the hospital. Patient denies fever, chills, chest pain, pleuritic pain, abdominal pain, changes in bowel habits, urinary difficulties, increasing edema of lower extremities or any other concerns. Son states patient is DNR/DNI, confirmed with patient at the bedside that he wants to be DNR/DNI however does not want to sign a MOLST at this time.    ED Course:  vitals - t 99.7 / bp 115/83 / hr 122 / spo2 97 on 3LNC / rr 18  labs - hgb 11.6, INR 2.57, BUN/Cr 40/2.30, probnp 6236 rvp negative  imaging - CT chest - Small airways disease with multifocal subsegmental bronchial impaction   and tree-in-bud nodularity in the left greater than right lung.Irregular 3.4 cm right upper lobe subsolid lesion with 4 mm solid   component, concerning for an adenocarcinoma spectrum lesion. Follow-up chest CT in 3-6 months, in the absence of prior imaging. Severe aortic valvular calcification which can be seen with aortic stenosis. Correlate with echocardiogram.Numerous pancreatic cystic nodules measuring up to 2.1 cm; consider MRI for further evaluation.  EKG - afib 110.  Given: Zosyn x1, 500cc bolus   (26 Jun 2022 15:02)    INTERVAL HPI:  6/27/22: patient seen and examined at the bedside. He states that his non-productive cough is still present however he does not have any shortness of breath at this time. Patient Denies pain, denies CP, abdominal pain, n/v/d/c or any other symptoms at this time. Patient was placed NPO last night as patient was actively coughing and seeming to have difficulty clearing secretion when being presented with PO nutrition/hydration/meds. S+S performed an eval this AM and recommended that po nutrition/hydration/meds are contraindicated. Spoke with son and patient who agreed to start comfort measures and allow for pleasure feeds.     OVERNIGHT EVENTS:  none    Home Medications:  furosemide 20 mg oral tablet: 1 tab(s) orally 2 times a day (26 Jun 2022 15:03)  metoprolol tartrate 25 mg oral tablet: 1 tab(s) orally 2 times a day (26 Jun 2022 15:03)      MEDICATIONS  (STANDING):  ALBUTerol    0.083% 2.5 milliGRAM(s) Nebulizer every 12 hours  apixaban 2.5 milliGRAM(s) Oral two times a day  furosemide   Injectable 40 milliGRAM(s) IV Push daily  guaiFENesin  milliGRAM(s) Oral every 12 hours  metoprolol tartrate 25 milliGRAM(s) Oral two times a day  senna 2 Tablet(s) Oral at bedtime  sodium chloride 3%  Inhalation 4 milliLiter(s) Inhalation every 12 hours    MEDICATIONS  (PRN):  acetaminophen     Tablet .. 650 milliGRAM(s) Oral every 6 hours PRN Temp greater or equal to 38C (100.4F), Mild Pain (1 - 3)  aluminum hydroxide/magnesium hydroxide/simethicone Suspension 30 milliLiter(s) Oral every 4 hours PRN Dyspepsia  melatonin 3 milliGRAM(s) Oral at bedtime PRN Insomnia  ondansetron Injectable 4 milliGRAM(s) IV Push every 8 hours PRN Nausea and/or Vomiting      Allergies    No Known Allergies    Intolerances        Social History:  Lives at home has home health aid 8 hours per day    denies tobacco, alcohol, drug use    covid vaccinated w/ j+j w/ booster. (26 Jun 2022 15:02)      REVIEW OF SYSTEMS:  CONSTITUTIONAL: No fever, No chills, No fatigue, No myalgia, No Body ache, No Weakness  EYES: No eye pain,  No visual disturbances, No discharge, NO Redness  ENMT:  No ear pain, No nose bleed, No vertigo; No sinus pain, NO throat pain, No Congestion  NECK: No pain, No stiffness  RESPIRATORY: +non-productive cough, NO wheezing, No  hemoptysis, NO  shortness of breath  CARDIOVASCULAR: No chest pain, palpitations  GASTROINTESTINAL: No abdominal pain, NO epigastric pain. No nausea, No vomiting; No diarrhea, No constipation. [  ] BM  GENITOURINARY: No dysuria   NEUROLOGICAL: No headaches, No dizziness, No numbness, No tingling, No tremors, No weakness  EXT: +Swelling, No Pain, No Edema  SKIN:  [  ] No itching, burning, rashes, or lesions   MUSCULOSKELETAL: No joint pain ,No Jt swelling; No muscle pain, No back pain, No extremity pain  PSYCHIATRIC: No depression,  No anxiety,  No mood swings ,No difficulty sleeping at night  PAIN SCALE: [  ] None  [  ] Other-  ROS Unable to obtain due to - [  ] Dementia  [  ] Lethargy [  ] Drowsy [  ] Sedated [  ] non verbal  REST OF REVIEW Of SYSTEM - [x  ] Normal     Vital Signs Last 24 Hrs  T(C): 36.7 (27 Jun 2022 05:04), Max: 37.6 (26 Jun 2022 11:26)  T(F): 98 (27 Jun 2022 05:04), Max: 99.7 (26 Jun 2022 11:26)  HR: 85 (27 Jun 2022 05:04) (73 - 122)  BP: 105/67 (27 Jun 2022 05:19) (96/64 - 128/66)  RR: 18 (27 Jun 2022 05:04) (18 - 20)  SpO2: 93% (27 Jun 2022 05:04) (93% - 100%)  Finger Stick        06-26 @ 07:01  -  06-27 @ 07:00  --------------------------------------------------------  IN: 0 mL / OUT: 1230 mL / NET: -1230 mL        PHYSICAL EXAM:  GENERAL:  [ x ] NAD , [  x] well appearing, [  ] Agitated, [  ] Drowsy,  [  ] Lethargy, [  ] confused   HEAD:  [ x ] Normal, [  ] Other  EYES:  [  x] EOMI, [ x ] PERRLA, [ x ] conjunctiva and sclera clear normal, [  ] Other,  [  ] Pallor,[  ] Discharge  ENMT:  [ x ] Normal, [ x ] Moist mucous membranes, [  ] Good dentition, [  ] No Thrush  NECK:  [ x ] Supple, [x  ] No JVD, [  ] Normal thyroid, [  ] Lymphadenopathy [  ] Other  CHEST/LUNG:  [ x ] Clear to auscultation bilaterally, [ x ] Breath Sounds equal B/L, [  ] poor effort  [ x ] No rales, [  x] No rhonchi  [x  ]  No wheezing,   HEART: [x] III/VI holosystolic murmur best heard RUSB [ x ] Regular rate and rhythm, [  ] tachycardia, [  ] Bradycardia,  [  ] irregular  [  ] No murmurs, No rubs, No gallops, [  ] PPM in place (Mfr:  )  ABDOMEN:  [ x ] Soft, [ x ] Nontender, [x  ] Nondistended, [ x ] No mass, [  x] Bowel sounds present, [  ] obese  NERVOUS SYSTEM:  [  x] Alert & Oriented X3, [x  ] Nonfocal  [  ] Confusion  [  ] Encephalopathic [  ] Sedated [  ] Unable to assess, [  ] Dementia [  ] Other-  EXTREMITIES: [x  ] 2+ Peripheral Pulses, No clubbing, No cyanosis,  [ x ] edema B/L lower EXT. [  x] PVD stasis skin changes B/L Lower EXT, [  ] wound  LYMPH: No lymphadenopathy noted  SKIN:  [  ] No rashes or lesions, [  ] Pressure Ulcers, [  ] ecchymosis, [  ] Skin Tears, [  ] Other    DIET: Diet, NPO:   Except Medications (06-26-22 @ 18:00)      LABS:                        11.6   7.55  )-----------( 196      ( 26 Jun 2022 11:52 )             35.5     26 Jun 2022 11:52    135    |  98     |  40     ----------------------------<  125    3.6     |  25     |  2.30     Ca    9.4        26 Jun 2022 11:52    TPro  6.3    /  Alb  3.4    /  TBili  2.1    /  DBili  x      /  AST  24     /  ALT  20     /  AlkPhos  44     26 Jun 2022 11:52    PT/INR - ( 26 Jun 2022 11:52 )   PT: 30.3 sec;   INR: 2.57 ratio         PTT - ( 26 Jun 2022 11:52 )  PTT:34.8 sec                         Anemia Panel:      Thyroid Panel:            Serum Pro-Brain Natriuretic Peptide: 6236 pg/mL (06-26-22 @ 11:52)      RADIOLOGY & ADDITIONAL TESTS:      HEALTH ISSUES - PROBLEM Dx:  Acute on chronic respiratory failure    Counseling regarding goals of care    ADWOA (acute kidney injury)    Fall    Atrial fibrillation  on AC    Lung cancer  Left lung Sx for removal of Tumor, Rt Lung Cryo therapy for recurrence    PVD (peripheral vascular disease)    Abnormal finding on CT scan    DVT prophylaxis    Chronic systolic congestive heart failure            Consultant(s) Notes Reviewed:  [  ] YES     Care Discussed with [X] Consultants  [x  ] Patient  [  x] Family [  ] HCP [  ]   [  ] Social Service  [ x ] RN, [  ] Physical Therapy,[  ] Palliative care team  DVT PPX: [ x ] Lovenox, [  ] S C Heparin, [  ] Coumadin, [  ] Xarelto, [  ] Eliquis, [  ] Pradaxa, [  ] IV Heparin drip, [  ] SCD [  ] Contraindication 2 to GI Bleed,[  ] Ambulation [  ] Contraindicated 2 to  bleed [  ] Contraindicated 2 to Brain Bleed  Advanced directive: [  ] None, [x  ] DNR/DNI Patient is a 89y old  Male who presents with a chief complaint of shortness of breath (27 Jun 2022 05:39)    HPI:  89 yo male with a-fib (on Eliquis), lung ca s/p resection of tumor from Left lung, s/p Rt Lung nodule Bx -Adeno Ca S/P  cryo therapy for recurrence of tumor at [facility], PVD presents with shortness of breath. Spoke with son on phone who is a physician and provided background information. Per son, patient has not wanted to see any doctors since he was discharged from the hospital after being admitted to [facility] in 2020 for weeping cellulitis. Patient was to follow up with a cardiologist to discuss treatment of his severe AS, HFrEF however patient declined to follow-up and stated that he did not want to have his valve repaired. 1 month ago, patient has started eating and drinking less, taking his medications erratically so sons convinced patient to get a home health aid (pt lives alone). Son think spatient may have had a stroke. Patient has been more short of breath than usual over the past month and Dr. Ahmadi came on a house call to see the patient, an echo was reportedly performed which showed an EF of 40% and severe AS. Home health aid is present 8 hours per day. Yesterday morning, son came over patient's house to find patient face down on the floor after either falling or slipping off the couch. Son helped patient up and asked him if he wanted to go to the hospital at that time however patient declined. Patient was also noted to have increasing shortness of breath and persistent non-productive cough over the past ~week. This morning, patient's difficulty breathing and coughing got significantly worse after he woke up so patient told son that he wanted to go to the hospital. Patient denies fever, chills, chest pain, pleuritic pain, abdominal pain, changes in bowel habits, urinary difficulties, increasing edema of lower extremities or any other concerns. Son states patient is DNR/DNI, confirmed with patient at the bedside that he wants to be DNR/DNI however does not want to sign a MOLST at this time.    ED Course:  vitals - t 99.7 / bp 115/83 / hr 122 / spo2 97 on 3LNC / rr 18  labs - hgb 11.6, INR 2.57, BUN/Cr 40/2.30, probnp 6236 rvp negative  imaging - CT chest - Small airways disease with multifocal subsegmental bronchial impaction   and tree-in-bud nodularity in the left greater than right lung.Irregular 3.4 cm right upper lobe subsolid lesion with 4 mm solid   component, concerning for an adenocarcinoma spectrum lesion. Follow-up chest CT in 3-6 months, in the absence of prior imaging. Severe aortic valvular calcification which can be seen with aortic stenosis. Correlate with echocardiogram.Numerous pancreatic cystic nodules measuring up to 2.1 cm; consider MRI for further evaluation.  EKG - afib 110.  Given: Zosyn x1, 500cc bolus   (26 Jun 2022 15:02)    INTERVAL HPI:  6/27/22: patient seen and examined at the bedside. He states that his non-productive cough is still present however he does not have any shortness of breath at this time. Patient Denies pain, denies CP, abdominal pain, n/v/d/c or any other symptoms at this time. Patient was placed NPO last night as patient was actively coughing and seeming to have difficulty clearing secretion when being presented with PO nutrition/hydration/meds. S+S performed an eval this AM and recommended that po nutrition/hydration/meds are contraindicated. Spoke with son and patient who agreed to start comfort measures and allow for pleasure feeds.     OVERNIGHT EVENTS:  none    Home Medications:  furosemide 20 mg oral tablet: 1 tab(s) orally 2 times a day (26 Jun 2022 15:03)  metoprolol tartrate 25 mg oral tablet: 1 tab(s) orally 2 times a day (26 Jun 2022 15:03)      MEDICATIONS  (STANDING):  ALBUTerol    0.083% 2.5 milliGRAM(s) Nebulizer every 12 hours  apixaban 2.5 milliGRAM(s) Oral two times a day  furosemide   Injectable 40 milliGRAM(s) IV Push daily  guaiFENesin  milliGRAM(s) Oral every 12 hours  metoprolol tartrate 25 milliGRAM(s) Oral two times a day  senna 2 Tablet(s) Oral at bedtime  sodium chloride 3%  Inhalation 4 milliLiter(s) Inhalation every 12 hours    MEDICATIONS  (PRN):  acetaminophen     Tablet .. 650 milliGRAM(s) Oral every 6 hours PRN Temp greater or equal to 38C (100.4F), Mild Pain (1 - 3)  aluminum hydroxide/magnesium hydroxide/simethicone Suspension 30 milliLiter(s) Oral every 4 hours PRN Dyspepsia  melatonin 3 milliGRAM(s) Oral at bedtime PRN Insomnia  ondansetron Injectable 4 milliGRAM(s) IV Push every 8 hours PRN Nausea and/or Vomiting      Allergies    No Known Allergies    Intolerances        Social History:  Lives at home has home health aid 8 hours per day    denies tobacco, alcohol, drug use    covid vaccinated w/ j+j w/ booster. (26 Jun 2022 15:02)      REVIEW OF SYSTEMS: i want to eat   CONSTITUTIONAL: No fever, No chills, No fatigue, No myalgia, No Body ache, No Weakness  EYES: No eye pain,  No visual disturbances, No discharge, NO Redness  ENMT:  No ear pain, No nose bleed, No vertigo; No sinus pain, NO throat pain, No Congestion  NECK: No pain, No stiffness  RESPIRATORY: +non-productive cough, NO wheezing, No  hemoptysis, NO  shortness of breath  CARDIOVASCULAR: No chest pain, palpitations  GASTROINTESTINAL: No abdominal pain, NO epigastric pain. No nausea, No vomiting; No diarrhea, No constipation. [  ] BM  GENITOURINARY: No dysuria   NEUROLOGICAL: No headaches, No dizziness, No numbness, No tingling, No tremors, No weakness  EXT: +Swelling, No Pain, No Edema  SKIN:  [ x ] No itching, burning, rashes, or lesions   MUSCULOSKELETAL: No joint pain ,No Jt swelling; No muscle pain, No back pain, No extremity pain  PSYCHIATRIC: No depression,  No anxiety,  No mood swings ,No difficulty sleeping at night  PAIN SCALE: [x  ] None  [  ] Other-  ROS Unable to obtain due to - [x  ] Dementia  [  ] Lethargy [  ] Drowsy [  ] Sedated [  ] non verbal  REST OF REVIEW Of SYSTEM - [x  ] Normal     Vital Signs Last 24 Hrs  T(C): 36.7 (27 Jun 2022 05:04), Max: 37.6 (26 Jun 2022 11:26)  T(F): 98 (27 Jun 2022 05:04), Max: 99.7 (26 Jun 2022 11:26)  HR: 85 (27 Jun 2022 05:04) (73 - 122)  BP: 105/67 (27 Jun 2022 05:19) (96/64 - 128/66)  RR: 18 (27 Jun 2022 05:04) (18 - 20)  SpO2: 93% (27 Jun 2022 05:04) (93% - 100%)  Finger Stick        06-26 @ 07:01  -  06-27 @ 07:00  --------------------------------------------------------  IN: 0 mL / OUT: 1230 mL / NET: -1230 mL        PHYSICAL EXAM:  GENERAL:  [ x ] NAD , [  x] well appearing, [  ] Agitated, [  ] Drowsy,  [  ] Lethargy, [  ] confused   HEAD:  [ x ] Normal, [  ] Other  EYES:  [  x] EOMI, [ x ] PERRLA, [ x ] conjunctiva and sclera clear normal, [  ] Other,  [  ] Pallor,[  ] Discharge  ENMT:  [ x ] Normal, [ x ] Dry mucous membranes, [ x ] Good dentition, [ x ] No Thrush  NECK:  [ x ] Supple, [x  ] No JVD, [ x ] Normal thyroid, [  ] Lymphadenopathy [  ] Other  CHEST/LUNG:  [ x ] Clear to auscultation bilaterally, [ x ] Breath Sounds equal B/L, [  ] poor effort  [ x ] No rales, [  x] No rhonchi  [x  ]  No wheezing,   HEART: [x] III/VI holosystolic murmur best heard RUSB [ x ] Regular rate and rhythm, [  ] tachycardia, [  ] Bradycardia,  [  ] irregular  [  ] No murmurs, No rubs, No gallops, [  ] PPM in place (Mfr:  )  ABDOMEN:  [ x ] Soft, [ x ] Nontender, [x  ] Nondistended, [ x ] No mass, [  x] Bowel sounds present, [  x] obese  NERVOUS SYSTEM:  [  x] Alert & Oriented X3, [x  ] Nonfocal  [  ] Confusion  [  ] Encephalopathic [  ] Sedated [  ] Unable to assess, [  ] Dementia [  ] Other-  EXTREMITIES: [x  ] 2+ Peripheral Pulses, No clubbing, No cyanosis,  [ x ] edema B/L lower EXT. [  x] PVD stasis skin changes B/L Lower EXT, [  ] wound  LYMPH: No lymphadenopathy noted  SKIN:  [  ] No rashes or lesions, [  ] Pressure Ulcers, [  ] ecchymosis, [  ] Skin Tears, [  ] Other    DIET: Diet, NPO:   Except Medications (06-26-22 @ 18:00)      LABS:                        11.6   7.55  )-----------( 196      ( 26 Jun 2022 11:52 )             35.5     26 Jun 2022 11:52    135    |  98     |  40     ----------------------------<  125    3.6     |  25     |  2.30     Ca    9.4        26 Jun 2022 11:52    TPro  6.3    /  Alb  3.4    /  TBili  2.1    /  DBili  x      /  AST  24     /  ALT  20     /  AlkPhos  44     26 Jun 2022 11:52    PT/INR - ( 26 Jun 2022 11:52 )   PT: 30.3 sec;   INR: 2.57 ratio         PTT - ( 26 Jun 2022 11:52 )  PTT:34.8 sec    Serum Pro-Brain Natriuretic Peptide: 6236 pg/mL (06-26-22 @ 11:52)      RADIOLOGY & ADDITIONAL TESTS:  NONE    HEALTH ISSUES - PROBLEM Dx:  Acute on chronic respiratory failure    Counseling regarding goals of care    ADWOA (acute kidney injury)    Fall    Atrial fibrillation  on AC    Lung cancer  Left lung Sx for removal of Tumor, Rt Lung Cryo therapy for recurrence    PVD (peripheral vascular disease)    Abnormal finding on CT scan    DVT prophylaxis    Chronic systolic congestive heart failure        Consultant(s) Notes Reviewed:  [ x ] YES     Care Discussed with [X] Consultants  [x  ] Patient  [  x] Family- son  [  ] HCP [  ]   [ x ] Social Service  [ x ] RN, [  ] Physical Therapy,[x  ] Palliative care team  DVT PPX: [ x ] Lovenox, [  ] S C Heparin, [  ] Coumadin, [  ] Xarelto, [  ] Eliquis, [  ] Pradaxa, [  ] IV Heparin drip, [  ] SCD [  ] Contraindication 2 to GI Bleed,[  ] Ambulation [  ] Contraindicated 2 to  bleed [  ] Contraindicated 2 to Brain Bleed  Advanced directive: [  ] None, [x  ] DNR/DNI

## 2022-06-27 NOTE — PROGRESS NOTE ADULT - PROBLEM SELECTOR PLAN 4
Chronic Afib  EKG is afib 110  - Continue metoprolol 25mg po bid for now  - decreased Eliquis dose to 2.5mg po bid given patient's age, GFR.  - Cardiology ([facility]) consulted, recs appreciated present - received 500cc bolus in ED however patient appears overloaded on exam, will start lasix 40mg ivp qd per cardio recs. Close monitoring of renal fxn.  - Continue home metoprolol 25mg po bid  - albuterol 2.5mg neb q8h  - Supplemental O2 to maintain O2 sat >90%.  - initial trop 64.8, will f/u repeat.  - tte ordered, F/u  - espinoza catheter  - Strict I/Os, daily weights  - Monitor and replace electrolytes  - Cardiology consulted ([facility]), f/u recs S+S performed an eval 6/27 am and recommended that po nutrition/hydration/meds are contraindicated. Spoke with son and patient who agreed to start comfort measures and allow for pleasure feeds.   - comfort measures only: no RRT, no vitals, no labs.  - Oral hygiene  - suction prn  - stated on pureed diet with mildly thickened liquids, will continue to give AC orally for now; metoprolol and lasix given IV.

## 2022-06-27 NOTE — CONSULT NOTE ADULT - SUBJECTIVE AND OBJECTIVE BOX
Patient is a 89y old  Male who presents with a chief complaint of shortness of breath (2022 10:15)       HPI:  88 yo male with a-fib (on Eliquis), lung ca s/p resection of tumor from Left lung, s/p Rt Lung nodule Bx -Adeno Ca S/P  cryo therapy for recurrence of tumor at [facility], PVD presents with shortness of breath. Spoke with son on phone who is a physician and provided background information. Per son, patient has not wanted to see any doctors since he was discharged from the hospital after being admitted to [facility] in  for weeping cellulitis. Patient was to follow up with a cardiologist to discuss treatment of his severe AS, HFrEF however patient declined to follow-up and stated that he did not want to have his valve repaired. 1 month ago, patient has started eating and drinking less, taking his medications erratically so sons convinced patient to get a home health aid (pt lives alone). Son think spatient may have had a stroke. Patient has been more short of breath than usual over the past month and Dr. Ahmadi came on a house call to see the patient, an echo was reportedly performed which showed an EF of 40% and severe AS. Home health aid is present 8 hours per day. Yesterday morning, son came over patient's house to find patient face down on the floor after either falling or slipping off the couch. Son helped patient up and asked him if he wanted to go to the hospital at that time however patient declined. Patient was also noted to have increasing shortness of breath and persistent non-productive cough over the past ~week. This morning, patient's difficulty breathing and coughing got significantly worse after he woke up so patient told son that he wanted to go to the hospital. Patient denies fever, chills, chest pain, pleuritic pain, abdominal pain, changes in bowel habits, urinary difficulties, increasing edema of lower extremities or any other concerns. Son states patient is DNR/DNI, confirmed with patient at the bedside that he wants to be DNR/DNI however does not want to sign a MOLST at this time.    ED Course:  vitals - t 99.7 / bp 115/83 / hr 122 / spo2 97 on 3LNC / rr 18  labs - hgb 11.6, INR 2.57, BUN/Cr 40/2.30, probnp 6236 rvp negative  imaging - CT chest - Small airways disease with multifocal subsegmental bronchial impaction   and tree-in-bud nodularity in the left greater than right lung.Irregular 3.4 cm right upper lobe subsolid lesion with 4 mm solid   component, concerning for an adenocarcinoma spectrum lesion. Follow-up chest CT in 3-6 months, in the absence of prior imaging. Severe aortic valvular calcification which can be seen with aortic stenosis. Correlate with echocardiogram.Numerous pancreatic cystic nodules measuring up to 2.1 cm; consider MRI for further evaluation.  EKG - afib 110.  Given: Zosyn x1, 500cc bolus   (2022 15:02)       PAST MEDICAL & SURGICAL HISTORY:  Atrial fibrillation, unspecified type      Atrial fibrillation  on AC      Lung cancer  Left lung Sx for removal of Tumor, Rt Lung Cryo therapy for recurrence      PVD (peripheral vascular disease)      HF (heart failure)  Chronic Systolic CHF      S/P lobectomy of lung  Tumor removal from Lower Lobe, NO CT, NO RT  Adeno ca           FAMILY HISTORY:  NC    Social History:Non smoker    MEDICATIONS  (STANDING):  ALBUTerol    0.083% 2.5 milliGRAM(s) Nebulizer every 12 hours  apixaban 2.5 milliGRAM(s) Oral every 12 hours  furosemide   Injectable 40 milliGRAM(s) IV Push daily  glycopyrrolate 2 milliGRAM(s) Oral three times a day  guaiFENesin  milliGRAM(s) Oral every 12 hours  metoprolol tartrate Injectable 2.5 milliGRAM(s) IV Push every 6 hours  sodium chloride 3%  Inhalation 4 milliLiter(s) Inhalation every 12 hours    MEDICATIONS  (PRN):  acetaminophen     Tablet .. 650 milliGRAM(s) Oral every 6 hours PRN Temp greater or equal to 38C (100.4F), Mild Pain (1 - 3)  aluminum hydroxide/magnesium hydroxide/simethicone Suspension 30 milliLiter(s) Oral every 4 hours PRN Dyspepsia  melatonin 3 milliGRAM(s) Oral at bedtime PRN Insomnia  ondansetron Injectable 4 milliGRAM(s) IV Push every 8 hours PRN Nausea and/or Vomiting   Meds reviewed    Allergies    No Known Allergies    Intolerances         REVIEW OF SYSTEMS:    Review of Systems:   Constitutional: Denies fatigue  HEENT: Denies headaches and dizziness  Respiratory: denies SOB, cough, or wheezing  Cardiovascular: denies CP, palpitations  Gastrointestinal: Denies nausea, denies vomiting, diarrhea, constipation, abdominal pain, or bloody stools  Genitourinary: denies painful urination, increased frequency, urgency, or bloody urine  Skin: denies rashes or itching  Musculoskeletal: denies muscle aches, joint swelling  Neurologic: Denies generalized weakness, denies loss of sensation, numbness, or tingling      Vital Signs Last 24 Hrs  T(C): 36.9 (2022 12:57), Max: 36.9 (2022 12:57)  T(F): 98.5 (2022 12:57), Max: 98.5 (2022 12:57)  HR: 79 (2022 12:57) (73 - 100)  BP: 95/61 (2022 12:57) (95/61 - 123/85)  BP(mean): --  RR: 18 (2022 12:57) (18 - 19)  SpO2: 95% (2022 12:57) (93% - 100%)  Daily     Daily Weight in k.8 (2022 05:04)    PHYSICAL EXAM:    GENERAL: NAD  HEAD:  Atraumatic, Normocephalic  EYES: EOMI, conjunctiva and sclera clear  ENMT: No Drainage from nares, No drainage from ears  NECK: Supple, neck  veins full  NERVOUS SYSTEM:  Awake and Alert  CHEST/LUNG: Clear to percussion bilaterally; No rales, rhonchi, wheezing, or rubs  HEART: Regular rate and rhythm; No murmurs, rubs, or gallops  ABDOMEN: Soft, Nontender, Nondistended; Bowel sounds present  EXTREMITIES:  No Edema  SKIN: No rashes No obvious ecchymosis      LABS:                        10.2   6.17  )-----------( 144      ( 2022 06:24 )             31.6     06-27    140  |  101  |  37<H>  ----------------------------<  108<H>  3.3<L>   |  33<H>  |  1.80<H>    Ca    8.8      2022 06:24  Phos  3.9       Mg     2.3         TPro  5.4<L>  /  Alb  2.7<L>  /  TBili  1.8<H>  /  DBili  x   /  AST  18  /  ALT  18  /  AlkPhos  39<L>      PT/INR - ( 2022 11:52 )   PT: 30.3 sec;   INR: 2.57 ratio         PTT - ( 2022 11:52 )  PTT:34.8 sec  Urinalysis Basic - ( 2022 05:00 )    Color: Yellow / Appearance: Clear / S.020 / pH: x  Gluc: x / Ketone: Negative  / Bili: Negative / Urobili: Negative   Blood: x / Protein: Negative / Nitrite: Negative   Leuk Esterase: Trace / RBC: 11-25 /HPF / WBC 0-2   Sq Epi: x / Non Sq Epi: Occasional / Bacteria: x      Magnesium, Serum: 2.3 mg/dL ( @ 06:24)  Phosphorus Level, Serum: 3.9 mg/dL ( @ 06:24)          RADIOLOGY & ADDITIONAL TESTS:   Patient is a 89y old  Male who presents with a chief complaint of shortness of breath (2022 10:15)       HPI:  88 yo male with a-fib (on Eliquis), lung ca s/p resection of tumor from Left lung, s/p Rt Lung nodule Bx -Adeno Ca S/P  cryo therapy for recurrence of tumor at [facility], PVD presents with shortness of breath. Spoke with son on phone who is a physician and provided background information. Per son, patient has not wanted to see any doctors since he was discharged from the hospital after being admitted to [facility] in  for weeping cellulitis. Patient was to follow up with a cardiologist to discuss treatment of his severe AS, HFrEF however patient declined to follow-up and stated that he did not want to have his valve repaired. 1 month ago, patient has started eating and drinking less, taking his medications erratically so sons convinced patient to get a home health aid (pt lives alone). Son think spatient may have had a stroke. Patient has been more short of breath than usual over the past month and Dr. Ahmadi came on a house call to see the patient, an echo was reportedly performed which showed an EF of 40% and severe AS. Home health aid is present 8 hours per day. Yesterday morning, son came over patient's house to find patient face down on the floor after either falling or slipping off the couch. Son helped patient up and asked him if he wanted to go to the hospital at that time however patient declined. Patient was also noted to have increasing shortness of breath and persistent non-productive cough over the past ~week. This morning, patient's difficulty breathing and coughing got significantly worse after he woke up so patient told son that he wanted to go to the hospital. Patient denies fever, chills, chest pain, pleuritic pain, abdominal pain, changes in bowel habits, urinary difficulties, increasing edema of lower extremities or any other concerns. Son states patient is DNR/DNI, confirmed with patient at the bedside that he wants to be DNR/DNI however does not want to sign a MOLST at this time.  No N/V/SOB/dizziness.       PAST MEDICAL & SURGICAL HISTORY:  Atrial fibrillation, unspecified type      Atrial fibrillation  on AC      Lung cancer  Left lung Sx for removal of Tumor, Rt Lung Cryo therapy for recurrence      PVD (peripheral vascular disease)      HF (heart failure)  Chronic Systolic CHF      S/P lobectomy of lung  Tumor removal from Lower Lobe, NO CT, NO RT  Adeno ca           FAMILY HISTORY:  NC    Social History:Non smoker    MEDICATIONS  (STANDING):  ALBUTerol    0.083% 2.5 milliGRAM(s) Nebulizer every 12 hours  apixaban 2.5 milliGRAM(s) Oral every 12 hours  furosemide   Injectable 40 milliGRAM(s) IV Push daily  glycopyrrolate 2 milliGRAM(s) Oral three times a day  guaiFENesin  milliGRAM(s) Oral every 12 hours  metoprolol tartrate Injectable 2.5 milliGRAM(s) IV Push every 6 hours  sodium chloride 3%  Inhalation 4 milliLiter(s) Inhalation every 12 hours    MEDICATIONS  (PRN):  acetaminophen     Tablet .. 650 milliGRAM(s) Oral every 6 hours PRN Temp greater or equal to 38C (100.4F), Mild Pain (1 - 3)  aluminum hydroxide/magnesium hydroxide/simethicone Suspension 30 milliLiter(s) Oral every 4 hours PRN Dyspepsia  melatonin 3 milliGRAM(s) Oral at bedtime PRN Insomnia  ondansetron Injectable 4 milliGRAM(s) IV Push every 8 hours PRN Nausea and/or Vomiting   Meds reviewed    Allergies    No Known Allergies    Intolerances         REVIEW OF SYSTEMS:    Review of Systems:   Constitutional: Denies fatigue  HEENT: Denies headaches and dizziness  Respiratory: denies SOB, cough, or wheezing  Cardiovascular: denies CP, palpitations  Gastrointestinal: Denies nausea, denies vomiting, diarrhea, constipation, abdominal pain, or bloody stools  Genitourinary: denies painful urination, increased frequency, urgency, or bloody urine  Skin: denies rashes or itching  Musculoskeletal: denies muscle aches, joint swelling  Neurologic: Denies generalized weakness, denies loss of sensation, numbness, or tingling      Vital Signs Last 24 Hrs  T(C): 36.9 (2022 12:57), Max: 36.9 (2022 12:57)  T(F): 98.5 (2022 12:57), Max: 98.5 (2022 12:57)  HR: 79 (2022 12:57) (73 - 100)  BP: 95/61 (2022 12:57) (95/61 - 123/85)  BP(mean): --  RR: 18 (2022 12:57) (18 - 19)  SpO2: 95% (2022 12:57) (93% - 100%)  Daily     Daily Weight in k.8 (2022 05:04)    PHYSICAL EXAM:    GENERAL: NAD  HEAD:  Atraumatic, Normocephalic  EYES: EOMI, conjunctiva and sclera clear  ENMT: No Drainage from nares, No drainage from ears  NECK: Supple, neck  veins full  NERVOUS SYSTEM:  Awake and Alert  CHEST/LUNG: Clear to percussion bilaterally; No rales, rhonchi, wheezing, or rubs  HEART: Regular rate and rhythm; No murmurs, rubs, or gallops  ABDOMEN: Soft, Nontender, Nondistended; Bowel sounds present  EXTREMITIES:  No Edema  SKIN: No rashes No obvious ecchymosis      LABS:                        10.2   6.17  )-----------( 144      ( 2022 06:24 )             31.6     06-27    140  |  101  |  37<H>  ----------------------------<  108<H>  3.3<L>   |  33<H>  |  1.80<H>    Ca    8.8      2022 06:24  Phos  3.9       Mg     2.3         TPro  5.4<L>  /  Alb  2.7<L>  /  TBili  1.8<H>  /  DBili  x   /  AST  18  /  ALT  18  /  AlkPhos  39<L>  27    PT/INR - ( 2022 11:52 )   PT: 30.3 sec;   INR: 2.57 ratio         PTT - ( 2022 11:52 )  PTT:34.8 sec  Urinalysis Basic - ( 2022 05:00 )    Color: Yellow / Appearance: Clear / S.020 / pH: x  Gluc: x / Ketone: Negative  / Bili: Negative / Urobili: Negative   Blood: x / Protein: Negative / Nitrite: Negative   Leuk Esterase: Trace / RBC: 11-25 /HPF / WBC 0-2   Sq Epi: x / Non Sq Epi: Occasional / Bacteria: x      Magnesium, Serum: 2.3 mg/dL ( @ 06:24)  Phosphorus Level, Serum: 3.9 mg/dL ( @ 06:24)          RADIOLOGY & ADDITIONAL TESTS:

## 2022-06-27 NOTE — PROGRESS NOTE ADULT - NS ATTEND AMEND GEN_ALL_CORE FT
still vol ol. cont iv lasix. cr improving. Please continue to maintain strict I/Os, monitor daily weights, Cr, and K. Further cardiac workup will depend on clinical course. still vol ol. cont iv lasix. cr improving. awaiting repeat echo. exam c/w sig valvular dz. Please continue to maintain strict I/Os, monitor daily weights, Cr, and K. Further cardiac workup will depend on clinical course.

## 2022-06-27 NOTE — SWALLOW BEDSIDE ASSESSMENT ADULT - PHARYNGEAL PHASE
Delayed pharyngeal swallow/Decreased laryngeal elevation/Wet vocal quality post oral intake/Cough post oral intake/Multiple swallows

## 2022-06-27 NOTE — PROGRESS NOTE ADULT - PROBLEM SELECTOR PLAN 6
CT chest: "Numerous pancreatic cystic nodules measuring up to 2.1 cm; consider MRI for further evaluation."  - will continue to monitor at this time, patient is asymptomatic and interested in possibly pursuing hospice care. Patient with apparent recurrence of lung cancer on CT scan, severe aortic stenosis leading to HFrEF w/ significant shortness of breath. Patient with decreased PO intake and decreased interest in taking meds over the past month. Patient does not want to pursue intervention for Aortic Stenosis. DNR/DNI.  - f/u hospice eval.

## 2022-06-27 NOTE — CONSULT NOTE ADULT - ASSESSMENT
ADWOA on Likely CKD  Volume Overload  Hypokalemia  Metabolic Alkalosis  Anemia    -BLCR     ADWOA on CKD2  Volume Overload  Hypokalemia  Metabolic Alkalosis  Anemia    -BLCR 0.8  -Urine lytes  -UA RBC 11-25  -Cont lasix  -Renal function improving  -Renal US - no hydro  -May need to give diamox if worsening alkalosis  -KCL repletion

## 2022-06-27 NOTE — GOALS OF CARE CONVERSATION - ADVANCED CARE PLANNING - CONVERSATION DETAILS
Writer met with pt who asked that I call son Álvaro. Reviewed patient's medical and social history as well as events leading to patient's hospitalization. Writer discussed patient's current diagnosis lung ca, acute/chronic resp failure ,severe AS, HF,afib ,advanced age, debility, recent fall,  medical condition and management,. prognosis, and life expectancy .Pt son showed good insight to his condition. Discussed pt inability to swallow without aspiration. Pt son states that he wants pt to receive comfort feeds. Explained CMO which would permit feedings in hospital. Pt is going home on hospice so son agrees understanding there will be no bloodwork or diagnostic tests, no VS or rapid response

## 2022-06-27 NOTE — PROGRESS NOTE ADULT - PROBLEM SELECTOR PLAN 3
- received 500cc bolus in ED however patient appears overloaded on exam, will start lasix 40mg ivp qd per cardio recs. Close monitoring of renal fxn.  - Continue home metoprolol 25mg po bid  - albuterol 2.5mg neb q8h  - Supplemental O2 to maintain O2 sat >90%.  - initial trop 64.8, will f/u repeat.  - tte ordered, F/u  - espinoza catheter  - Strict I/Os, daily weights  - Monitor and replace electrolytes  - Cardiology consulted ([facility]), f/u recs Patient presents with worsening shortness of breath and cough likely multifactorial 2/2 undertreated HFrEF w/ severe AS, lung cancer and small airway disease.  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 Severe AS.  - received 500cc bolus in ED however patient appears overloaded on exam  - Continue lasix 40mg ivp qd per cardio recs. Close monitoring of renal fxn.  - s/p zosyn x1 in ED, no need to continue ABX at this time no s/s of active infection.  - Continue home metoprolol 25mg po bid  - albuterol 2.5mg neb q8h  - Supplemental O2 to maintain O2 sat >90%.  - initial trop 64.8, will f/u repeat.  - tte ordered, F/u  - espinoza catheter  - Strict I/Os, daily weights  - Monitor and replace electrolytes  - Cardiology consulted ([facility]), recs appreciated  - pulm consulted (noe), recs appreciated  - nephro (vicente), recs appreciated  - palliative care consult (Minor), f/u recs

## 2022-06-27 NOTE — PROGRESS NOTE ADULT - PROBLEM SELECTOR PLAN 7
Patient was reportedly found on the ground by son on 6/25 after possibly slipping off his couch. Patient is on Eliquis.  - No external evidence of fall, no ecchymosis or other injuries noted on physical exam. Patient does not remember falling.  - CTH demonstrates no acute findings.  - fall precautions  - continue to monitor CT chest: "Numerous pancreatic cystic nodules measuring up to 2.1 cm; consider MRI for further evaluation."  - will continue to monitor at this time, patient is asymptomatic and interested in possibly pursuing hospice care. Chronic Anemia- ACD  2/2 [place] &  Lung Ca

## 2022-06-27 NOTE — PROGRESS NOTE ADULT - PROBLEM SELECTOR PLAN 5
Patient with apparent recurrence of lung cancer on CT scan, severe aortic stenosis leading to HFrEF w/ significant shortness of breath. Patient with decreased PO intake and decreased interest in taking meds over the past month. Patient does not want to pursue intervention for Aortic Stenosis. DNR/DNI.  - f/u hospice eval. Chronic Afib  EKG is afib 110  - Continue metoprolol 25mg po bid for now  - decreased Eliquis dose to 2.5mg po bid given patient's age, GFR.  - Cardiology ([facility]) consulted, recs appreciated

## 2022-06-27 NOTE — PROGRESS NOTE ADULT - PROBLEM SELECTOR PLAN 10
eliquis pt with known history of PVD, obvious vascular changes on b/l lower extremities  - continue to monitor for s+s of limb ischemia  - pulses intact at this time lung ca s/p resection of tumor from Left lung, s/p Rt Lung cryo therapy for recurrence of tumor  CT chest:  Small airways disease with multifocal subsegmental bronchial impaction   and tree-in-bud nodularity in the left greater than right lung.Irregular 3.4 cm right upper lobe subsolid lesion with 4 mm solid component, concerning for an adenocarcinoma spectrum lesion. Severe AS.  - patient does not follow regularly with a heme/onc or pulmonary doctor.  - pulmonology (Dr. Franco) recs appreciated

## 2022-06-27 NOTE — PROGRESS NOTE ADULT - SUBJECTIVE AND OBJECTIVE BOX
[facility] -- Radha Boyle, Vonnie, Lilibeth, Joel Pulido, Adelina Barnett: Office # [phone]    Follow Up:  SOB, Afib    Subjective/Observations: Patient seen and examined, awake, alert, resting comfortably flat in bed, denies chest pain, dyspnea, palpitations or dizziness,  Tolerating room air.     REVIEW OF SYSTEMS: All review of systems is negative for eye, ENT, GI, , allergic, dermatologic, musculoskeletal and neurologic except as described above    PAST MEDICAL & SURGICAL HISTORY:  Atrial fibrillation, unspecified type      Atrial fibrillation  on AC      Lung cancer  Left lung Sx for removal of Tumor, Rt Lung Cryo therapy for recurrence      PVD (peripheral vascular disease)      HF (heart failure)  Chronic Systolic CHF      S/P lobectomy of lung  Tumor removal from Lower Lobe, NO CT, NO RT  Adeno ca          MEDICATIONS  (STANDING):  ALBUTerol    0.083% 2.5 milliGRAM(s) Nebulizer every 12 hours  apixaban 2.5 milliGRAM(s) Oral two times a day  furosemide   Injectable 40 milliGRAM(s) IV Push daily  guaiFENesin  milliGRAM(s) Oral every 12 hours  metoprolol tartrate 25 milliGRAM(s) Oral two times a day  potassium chloride  10 mEq/100 mL IVPB 10 milliEquivalent(s) IV Intermittent every 1 hour  senna 2 Tablet(s) Oral at bedtime  sodium chloride 3%  Inhalation 4 milliLiter(s) Inhalation every 12 hours    MEDICATIONS  (PRN):  acetaminophen     Tablet .. 650 milliGRAM(s) Oral every 6 hours PRN Temp greater or equal to 38C (100.4F), Mild Pain (1 - 3)  aluminum hydroxide/magnesium hydroxide/simethicone Suspension 30 milliLiter(s) Oral every 4 hours PRN Dyspepsia  melatonin 3 milliGRAM(s) Oral at bedtime PRN Insomnia  ondansetron Injectable 4 milliGRAM(s) IV Push every 8 hours PRN Nausea and/or Vomiting    Allergies    No Known Allergies    Intolerances      Vital Signs Last 24 Hrs  T(C): 36.7 (27 Jun 2022 05:04), Max: 37.6 (26 Jun 2022 11:26)  T(F): 98 (27 Jun 2022 05:04), Max: 99.7 (26 Jun 2022 11:26)  HR: 81 (27 Jun 2022 07:49) (73 - 122)  BP: 105/67 (27 Jun 2022 05:19) (96/64 - 128/66)  BP(mean): --  RR: 18 (27 Jun 2022 05:04) (18 - 20)  SpO2: 93% (27 Jun 2022 07:49) (93% - 100%)  I&O's Summary    26 Jun 2022 07:01  -  27 Jun 2022 07:00  --------------------------------------------------------  IN: 0 mL / OUT: 1230 mL / NET: -1230 mL      Weight (kg): 77.1 (06-26 @ 10:56)    TELE: Afib 60-90's   PHYSICAL EXAM:  Constitutional: NAD, awake and alert, well-developed  HEENT: Moist Mucous Membranes, Anicteric  Pulmonary: Non-labored, breath sounds are clear, diminished at bases bilaterally, No wheezing, rales or rhonchi  Cardiovascular: irregular, S1 and S2, + murmurs, rubs, gallops or clicks  Gastrointestinal: Bowel Sounds present, soft, nontender.   Lymph: No peripheral edema. No lymphadenopathy.  Skin: No visible rashes or ulcers.  Psych:  Mood & affect appropriate for situation    LABS: All Labs Reviewed:                        10.2   6.17  )-----------( 144      ( 27 Jun 2022 06:24 )             31.6                         11.6   7.55  )-----------( 196      ( 26 Jun 2022 11:52 )             35.5     27 Jun 2022 06:24    140    |  101    |  37     ----------------------------<  108    3.3     |  33     |  1.80   26 Jun 2022 11:52    135    |  98     |  40     ----------------------------<  125    3.6     |  25     |  2.30     Ca    8.8        27 Jun 2022 06:24  Ca    9.4        26 Jun 2022 11:52  Phos  3.9       27 Jun 2022 06:24  Mg     2.3       27 Jun 2022 06:24    TPro  5.4    /  Alb  2.7    /  TBili  1.8    /  DBili  x      /  AST  18     /  ALT  18     /  AlkPhos  39     27 Jun 2022 06:24  TPro  6.3    /  Alb  3.4    /  TBili  2.1    /  DBili  x      /  AST  24     /  ALT  20     /  AlkPhos  44     26 Jun 2022 11:52    PT/INR - ( 26 Jun 2022 11:52 )   PT: 30.3 sec;   INR: 2.57 ratio         PTT - ( 26 Jun 2022 11:52 )  PTT:34.8 sec  Troponin I, High Sensitivity Result: 69.6 ng/L (06-26-22 @ 18:14)  Troponin I, High Sensitivity Result: 64.8 ng/L (06-26-22 @ 11:52)              Lactate, Blood: 1.9 mmol/L (06-26-22 @ 11:52)    12 Lead ECG:   Ventricular Rate 110 BPM    Atrial Rate 117 BPM    QRS Duration 110 ms    Q-T Interval 344 ms    QTC Calculation(Bazett) 465 ms    P Axis 63 degrees    R Axis -13 degrees    T Axis 167 degrees    Diagnosis Line Atrial fibrillation  Minimal voltage criteria for LVH, may be normal variant ( [place] product )  Marked ST abnormality, possible inferior subendocardial injury  Abnormal ECG  No previous ECGs available  Confirmed by boo Barnett ([id number]) on 6/26/2022 3:16:31 PM (06-26-22 @ 11:19)    < from: CT Chest No Cont (06.26.22 @ 12:01) >    ACC: [id number] EXAM:  CT CHEST                          PROCEDURE DATE:  06/26/2022          INTERPRETATION:  INDICATION: Chronic dyspnea of unclear etiology    TECHNIQUE: A volumetric CT acquisition of the chest was obtained from the   thoracic inlet to the upper abdomen without the use of intravenous   contrast. Coronal and sagittal reconstructed images are provided.    COMPARISON: None    FINDINGS:    Lungs/Airways/Pleura: The central airways are patent. Status post left   upper lobectomy. Mild elevation of the left hemidiaphragm. There is a 3.4   x 1.9 cm subsolid lesion in the right upper lobe on series 2 image 47,   with a 4 mm solid component on mediastinal windows. There is scattered   subsegmental bronchial impaction throughout bothlungs with tree-in-bud   nodularity in the left lower lobe, findings likely related to small   airways disease. No pleural effusion or pneumothorax.    Mediastinum/Lymph nodes: No thoracic lymphadenopathy. Status post   mediastinal cristobal dissection.    Heart and Vessels: Severe aortic valvular calcification which can be seen   with aortic stenosis. Coronary artery calcification and stenting. There   is biatrial enlargement qualitatively. No pericardial effusion. The great   vessels are normal in size.    Upper Abdomen: Right renal cyst. There are numerous pancreatic cystic   nodules measuring up to 2.1 cm (2:122, 115, 117, 132.    Osseous structures and Soft Tissues: No aggressive bone lesions.    IMPRESSION:  Small airways disease with multifocal subsegmental bronchial impaction   and tree-in-bud nodularity in the left greater than right lung.    Irregular 3.4 cm right upper lobe subsolid lesion with 4 mm solid   component, concerning for an adenocarcinoma spectrum lesion. Follow-up   chest CT in 3-6 months, in the absence of prior imaging.    Severe aortic valvular calcification which can be seen with aortic   stenosis. Correlate with echocardiogram.    Numerous pancreatic cystic nodules measuring up to 2.1 cm; consider MRI   for further evaluation.    --- End of Report ---            NORA BAIN M.D., Attending Radiologist  This document has been electronically signed. Jun 26 2022 12:28PM    < end of copied text >  < from: TTE Echo Complete w/o Contrast w/ Doppler (06.21.20 @ 13:37) >     EXAM:  ECHO TTE WO CON COMP W DOPP         PROCEDURE DATE:  06/21/2020        INTERPRETATION:  INDICATION: Heart failure  Sonographer AS    Blood Pressure 124/57    Height unavailable     Weight 83.9 kg    Dimensions:    LA 3.8       Normal Values: 2.0 - 4.0 cm    Ao 3.0        Normal Values: 2.0 - 3.8 cm  SEPTUM 1.2       Normal Values: 0.6 - 1.2 cm  PWT 1.3       Normal Values: 0.6 - 1.1 cm  LVIDd 4.7         Normal Values: 3.0 - 5.6 cm  LVIDs Normal Values: 1.8 - 4.0 cm      OBSERVATIONS:  Technically difficult study  Mitral Valve: Thickened leaflets, mild to moderate MR.  Aortic Valve/Aorta: Calcified trileaflet aortic valve with decreased opening. Peak transaortic valve gradient of 72.9 mmHg with a mean transaortic valve gradient of 41 mmHg. The aortic valve area is calculated to be 0.36 sq cm. This is consistent with severe aortic stenosis  Tricuspid Valve: normal with mild TR.  Pulmonic Valve: Trace PI  Left Atrium: normal  Right Atrium: Not well visualized  Left Ventricle: Moderate global left ventricular systolic dysfunction with an estimated LVEF of 40%. Mild LVH  Right Ventricle: Grossly normal size and systolic function.  Pericardium/Pleura: normal, no significant pericardial effusion.  Pulmonary/RV Pressure: estimated PA systolic pressure of 28.4mmHg assuming an RA pressure of 3 mmHg.      Conclusion:   Technically difficult study  Mild concentric LVH with moderate global left ventricular systolic dysfunction with an estimated LVEF of 40%  Grossly normal RV size and systolic function.   Calcified trileaflet aortic valve with severe aortic stenosis  Mild to moderate MR   Mild TR.     No significant pericardial effusion.                  KARIE LONGO   This document has been electronically signed. Jun 22 2020  1:08PM          < end of copied text >

## 2022-06-27 NOTE — PROGRESS NOTE ADULT - PROBLEM SELECTOR PROBLEM 2
Acute on chronic respiratory failure ADWOA (acute kidney injury) Chronic systolic congestive heart failure

## 2022-06-28 ENCOUNTER — TRANSCRIPTION ENCOUNTER (OUTPATIENT)
Age: 87
End: 2022-06-28

## 2022-06-28 VITALS — OXYGEN SATURATION: 95 %

## 2022-06-28 DIAGNOSIS — R33.9 RETENTION OF URINE, UNSPECIFIED: ICD-10-CM

## 2022-06-28 PROCEDURE — 84156 ASSAY OF PROTEIN URINE: CPT

## 2022-06-28 PROCEDURE — 82570 ASSAY OF URINE CREATININE: CPT

## 2022-06-28 PROCEDURE — 71045 X-RAY EXAM CHEST 1 VIEW: CPT

## 2022-06-28 PROCEDURE — 84484 ASSAY OF TROPONIN QUANT: CPT

## 2022-06-28 PROCEDURE — 85730 THROMBOPLASTIN TIME PARTIAL: CPT

## 2022-06-28 PROCEDURE — 83735 ASSAY OF MAGNESIUM: CPT

## 2022-06-28 PROCEDURE — 93005 ELECTROCARDIOGRAM TRACING: CPT

## 2022-06-28 PROCEDURE — 87637 SARSCOV2&INF A&B&RSV AMP PRB: CPT

## 2022-06-28 PROCEDURE — 85610 PROTHROMBIN TIME: CPT

## 2022-06-28 PROCEDURE — 70450 CT HEAD/BRAIN W/O DYE: CPT

## 2022-06-28 PROCEDURE — 85025 COMPLETE CBC W/AUTO DIFF WBC: CPT

## 2022-06-28 PROCEDURE — 87086 URINE CULTURE/COLONY COUNT: CPT

## 2022-06-28 PROCEDURE — 81001 URINALYSIS AUTO W/SCOPE: CPT

## 2022-06-28 PROCEDURE — 71250 CT THORAX DX C-: CPT | Mod: ME

## 2022-06-28 PROCEDURE — 84300 ASSAY OF URINE SODIUM: CPT

## 2022-06-28 PROCEDURE — 36415 COLL VENOUS BLD VENIPUNCTURE: CPT

## 2022-06-28 PROCEDURE — 83880 ASSAY OF NATRIURETIC PEPTIDE: CPT

## 2022-06-28 PROCEDURE — 80053 COMPREHEN METABOLIC PANEL: CPT

## 2022-06-28 PROCEDURE — 92610 EVALUATE SWALLOWING FUNCTION: CPT

## 2022-06-28 PROCEDURE — 74176 CT ABD & PELVIS W/O CONTRAST: CPT

## 2022-06-28 PROCEDURE — 99223 1ST HOSP IP/OBS HIGH 75: CPT

## 2022-06-28 PROCEDURE — 96366 THER/PROPH/DIAG IV INF ADDON: CPT

## 2022-06-28 PROCEDURE — 84540 ASSAY OF URINE/UREA-N: CPT

## 2022-06-28 PROCEDURE — 83930 ASSAY OF BLOOD OSMOLALITY: CPT

## 2022-06-28 PROCEDURE — 76775 US EXAM ABDO BACK WALL LIM: CPT

## 2022-06-28 PROCEDURE — 82962 GLUCOSE BLOOD TEST: CPT

## 2022-06-28 PROCEDURE — 94640 AIRWAY INHALATION TREATMENT: CPT

## 2022-06-28 PROCEDURE — 84100 ASSAY OF PHOSPHORUS: CPT

## 2022-06-28 PROCEDURE — 87205 SMEAR GRAM STAIN: CPT

## 2022-06-28 PROCEDURE — 99232 SBSQ HOSP IP/OBS MODERATE 35: CPT

## 2022-06-28 PROCEDURE — 84133 ASSAY OF URINE POTASSIUM: CPT

## 2022-06-28 PROCEDURE — 83605 ASSAY OF LACTIC ACID: CPT

## 2022-06-28 PROCEDURE — 87040 BLOOD CULTURE FOR BACTERIA: CPT

## 2022-06-28 PROCEDURE — 96365 THER/PROPH/DIAG IV INF INIT: CPT

## 2022-06-28 PROCEDURE — G1004: CPT

## 2022-06-28 PROCEDURE — 82436 ASSAY OF URINE CHLORIDE: CPT

## 2022-06-28 PROCEDURE — 99285 EMERGENCY DEPT VISIT HI MDM: CPT

## 2022-06-28 PROCEDURE — 83935 ASSAY OF URINE OSMOLALITY: CPT

## 2022-06-28 RX ORDER — ROBINUL 0.2 MG/ML
1 INJECTION INTRAMUSCULAR; INTRAVENOUS
Qty: 90 | Refills: 0
Start: 2022-06-28 | End: 2022-07-27

## 2022-06-28 RX ORDER — METOPROLOL TARTRATE 50 MG
1 TABLET ORAL
Qty: 60 | Refills: 0
Start: 2022-06-28 | End: 2022-07-27

## 2022-06-28 RX ORDER — METOPROLOL TARTRATE 50 MG
25 TABLET ORAL
Refills: 0 | Status: DISCONTINUED | OUTPATIENT
Start: 2022-06-28 | End: 2022-06-28

## 2022-06-28 RX ORDER — SENNA PLUS 8.6 MG/1
1 TABLET ORAL
Qty: 30 | Refills: 0
Start: 2022-06-28 | End: 2022-07-27

## 2022-06-28 RX ORDER — POLYETHYLENE GLYCOL 3350 17 G/17G
17 POWDER, FOR SOLUTION ORAL
Qty: 510 | Refills: 0
Start: 2022-06-28 | End: 2022-07-27

## 2022-06-28 RX ADMIN — Medication 40 MILLIGRAM(S): at 05:59

## 2022-06-28 RX ADMIN — APIXABAN 2.5 MILLIGRAM(S): 2.5 TABLET, FILM COATED ORAL at 05:57

## 2022-06-28 RX ADMIN — ALBUTEROL 2.5 MILLIGRAM(S): 90 AEROSOL, METERED ORAL at 07:45

## 2022-06-28 RX ADMIN — SODIUM CHLORIDE 4 MILLILITER(S): 9 INJECTION INTRAMUSCULAR; INTRAVENOUS; SUBCUTANEOUS at 07:45

## 2022-06-28 NOTE — PROGRESS NOTE ADULT - PROBLEM SELECTOR PLAN 7
Chronic Anemia- ACD  2/2 Saavnnah &  Lung Ca Patient with apparent recurrence of lung cancer on CT scan, severe aortic stenosis leading to HFrEF w/ significant shortness of breath. Patient with decreased PO intake and decreased interest in taking meds over the past month. Patient does not want to pursue intervention for Aortic Stenosis. DNR/DNI.  - f/u hospice eval.

## 2022-06-28 NOTE — PROGRESS NOTE ADULT - SUBJECTIVE AND OBJECTIVE BOX
Patient is a 89y old  Male who presents with a chief complaint of shortness of breath (2022 05:54)    HPI:  88 yo male with a-fib (on Eliquis), lung ca s/p resection of tumor from Left lung, s/p Rt Lung nodule Bx -Adeno Ca S/P  cryo therapy for recurrence of tumor at [facility], PVD presents with shortness of breath. Spoke with son on phone who is a physician and provided background information. Per son, patient has not wanted to see any doctors since he was discharged from the hospital after being admitted to [facility] in  for weeping cellulitis. Patient was to follow up with a cardiologist to discuss treatment of his severe AS, HFrEF however patient declined to follow-up and stated that he did not want to have his valve repaired. 1 month ago, patient has started eating and drinking less, taking his medications erratically so sons convinced patient to get a home health aid (pt lives alone). Son think spatient may have had a stroke. Patient has been more short of breath than usual over the past month and Dr. Ahmadi came on a house call to see the patient, an echo was reportedly performed which showed an EF of 40% and severe AS. Home health aid is present 8 hours per day. Yesterday morning, son came over patient's house to find patient face down on the floor after either falling or slipping off the couch. Son helped patient up and asked him if he wanted to go to the hospital at that time however patient declined. Patient was also noted to have increasing shortness of breath and persistent non-productive cough over the past ~week. This morning, patient's difficulty breathing and coughing got significantly worse after he woke up so patient told son that he wanted to go to the hospital. Patient denies fever, chills, chest pain, pleuritic pain, abdominal pain, changes in bowel habits, urinary difficulties, increasing edema of lower extremities or any other concerns. Son states patient is DNR/DNI, confirmed with patient at the bedside that he wants to be DNR/DNI however does not want to sign a MOLST at this time.    ED Course:  vitals - t 99.7 / bp 115/83 / hr 122 / spo2 97 on 3LNC / rr 18  labs - hgb 11.6, INR 2.57, BUN/Cr 40/2.30, probnp 6236 rvp negative  imaging - CT chest - Small airways disease with multifocal subsegmental bronchial impaction   and tree-in-bud nodularity in the left greater than right lung.Irregular 3.4 cm right upper lobe subsolid lesion with 4 mm solid   component, concerning for an adenocarcinoma spectrum lesion. Follow-up chest CT in 3-6 months, in the absence of prior imaging. Severe aortic valvular calcification which can be seen with aortic stenosis. Correlate with echocardiogram.Numerous pancreatic cystic nodules measuring up to 2.1 cm; consider MRI for further evaluation.  EKG - afib 110.  Given: Zosyn x1, 500cc bolus   (2022 15:02)    INTERVAL HPI:  22: patient seen and examined at the bedside. He states that his non-productive cough is still present however he does not have any shortness of breath at this time. Patient Denies pain, denies CP, abdominal pain, n/v/d/c or any other symptoms at this time. Patient was placed NPO last night as patient was actively coughing and seeming to have difficulty clearing secretion when being presented with PO nutrition/hydration/meds. S+S performed an eval this AM and recommended that po nutrition/hydration/meds are contraindicated. Spoke with son and patient who agreed to start comfort measures and allow for pleasure feeds.         OVERNIGHT EVENTS:    Home Medications:  furosemide 20 mg oral tablet: 1 tab(s) orally 2 times a day (2022 15:03)  metoprolol tartrate 25 mg oral tablet: 1 tab(s) orally 2 times a day (2022 15:03)      MEDICATIONS  (STANDING):  ALBUTerol    0.083% 2.5 milliGRAM(s) Nebulizer every 12 hours  apixaban 2.5 milliGRAM(s) Oral every 12 hours  furosemide   Injectable 40 milliGRAM(s) IV Push daily  glycopyrrolate 2 milliGRAM(s) Oral three times a day  guaiFENesin Oral Liquid (Sugar-Free) 100 milliGRAM(s) Oral every 8 hours  metoprolol tartrate Injectable 2.5 milliGRAM(s) IV Push every 6 hours  sodium chloride 3%  Inhalation 4 milliLiter(s) Inhalation every 12 hours    MEDICATIONS  (PRN):  acetaminophen     Tablet .. 650 milliGRAM(s) Oral every 6 hours PRN Temp greater or equal to 38C (100.4F), Mild Pain (1 - 3)  aluminum hydroxide/magnesium hydroxide/simethicone Suspension 30 milliLiter(s) Oral every 4 hours PRN Dyspepsia  melatonin 3 milliGRAM(s) Oral at bedtime PRN Insomnia  ondansetron Injectable 4 milliGRAM(s) IV Push every 8 hours PRN Nausea and/or Vomiting      Allergies    No Known Allergies    Intolerances        Social History:  Lives at home has home health aid 8 hours per day    denies tobacco, alcohol, drug use    covid vaccinated w/ j+j w/ booster. (2022 15:02)      REVIEW OF SYSTEMS:  CONSTITUTIONAL: No fever, No chills, No fatigue, No myalgia, No Body ache, No Weakness  EYES: No eye pain,  No visual disturbances, No discharge, NO Redness  ENMT:  No ear pain, No nose bleed, No vertigo; No sinus pain, NO throat pain, No Congestion  NECK: No pain, No stiffness  RESPIRATORY: +non-productive cough, NO wheezing, No  hemoptysis, NO  shortness of breath  CARDIOVASCULAR: No chest pain, palpitations  GASTROINTESTINAL: No abdominal pain, NO epigastric pain. No nausea, No vomiting; No diarrhea, No constipation. [  ] BM  GENITOURINARY: No dysuria   NEUROLOGICAL: No headaches, No dizziness, No numbness, No tingling, No tremors, No weakness  EXT: +Swelling, No Pain, No Edema  SKIN:  [ x ] No itching, burning, rashes, or lesions   MUSCULOSKELETAL: No joint pain ,No Jt swelling; No muscle pain, No back pain, No extremity pain  PSYCHIATRIC: No depression,  No anxiety,  No mood swings ,No difficulty sleeping at night  PAIN SCALE: [x  ] None  [  ] Other-  ROS Unable to obtain due to - [x  ] Dementia  [  ] Lethargy [  ] Drowsy [  ] Sedated [  ] non verbal  REST OF REVIEW Of SYSTEM - [x  ] Normal     Vital Signs Last 24 Hrs  T(C): 36.9 (2022 12:57), Max: 36.9 (2022 12:57)  T(F): 98.5 (2022 12:57), Max: 98.5 (2022 12:57)  HR: 72 (2022 05:59) (71 - 81)  BP: 111/70 (2022 05:59) (95/61 - 111/70)  BP(mean): --  RR: 18 (2022 12:57) (18 - 18)  SpO2: 95% (2022 12:57) (93% - 95%)  Finger Stick         @ 07:01  -   @ 07:00  --------------------------------------------------------  IN: 0 mL / OUT: 1050 mL / NET: -1050 mL        PHYSICAL EXAM:  GENERAL:  [ x ] NAD , [  x] well appearing, [  ] Agitated, [  ] Drowsy,  [  ] Lethargy, [  ] confused   HEAD:  [ x ] Normal, [  ] Other  EYES:  [  x] EOMI, [ x ] PERRLA, [ x ] conjunctiva and sclera clear normal, [  ] Other,  [  ] Pallor,[  ] Discharge  ENMT:  [ x ] Normal, [ x ] Dry mucous membranes, [ x ] Good dentition, [ x ] No Thrush  NECK:  [ x ] Supple, [x  ] No JVD, [ x ] Normal thyroid, [  ] Lymphadenopathy [  ] Other  CHEST/LUNG:  [ x ] Clear to auscultation bilaterally, [ x ] Breath Sounds equal B/L, [  ] poor effort  [ x ] No rales, [  x] No rhonchi  [x  ]  No wheezing,   HEART: [x] III/VI holosystolic murmur best heard RUSB [ x ] Regular rate and rhythm, [  ] tachycardia, [  ] Bradycardia,  [  ] irregular  [  ] No murmurs, No rubs, No gallops, [  ] PPM in place (Mfr:  )  ABDOMEN:  [ x ] Soft, [ x ] Nontender, [x  ] Nondistended, [ x ] No mass, [  x] Bowel sounds present, [  x] obese  NERVOUS SYSTEM:  [  x] Alert & Oriented X3, [x  ] Nonfocal  [  ] Confusion  [  ] Encephalopathic [  ] Sedated [  ] Unable to assess, [  ] Dementia [  ] Other-  EXTREMITIES: [x  ] 2+ Peripheral Pulses, No clubbing, No cyanosis,  [ x ] edema B/L lower EXT. [  x] PVD stasis skin changes B/L Lower EXT, [  ] wound  LYMPH: No lymphadenopathy noted  SKIN:  [  ] No rashes or lesions, [  ] Pressure Ulcers, [  ] ecchymosis, [  ] Skin Tears, [  ] Other    DIET: Diet, Pureed:   Mildly Thick Liquids (MILDTHICKLIQS) (22 @ 15:10)      LABS:      Ca    8.8        2022 06:24      PT/INR - ( 2022 11:52 )   PT: 30.3 sec;   INR: 2.57 ratio         PTT - ( 2022 11:52 )  PTT:34.8 sec  Urinalysis Basic - ( 2022 05:00 )    Color: Yellow / Appearance: Clear / S.020 / pH: x  Gluc: x / Ketone: Negative  / Bili: Negative / Urobili: Negative   Blood: x / Protein: Negative / Nitrite: Negative   Leuk Esterase: Trace / RBC: 11-25 /HPF / WBC 0-2   Sq Epi: x / Non Sq Epi: Occasional / Bacteria: x        Culture Results:   No growth ( @ 17:52)  Culture Results:   No growth to date. ( @ 11:50)  Culture Results:   No growth to date. ( @ 11:35)      culture blood  -- Clean Catch Clean Catch (Midstream)  @ 17:52    culture urine  --   @ 17:52  culture blood  -- .Blood Blood-Peripheral  @ 11:50    culture urine  --   @ 11:50  culture blood  -- .Blood Blood-Peripheral  @ 11:35    culture urine  --   @ 11:35              Culture - Urine (collected 2022 17:52)  Source: Clean Catch Clean Catch (Midstream)  Final Report (2022 17:16):    No growth    Culture - Blood (collected 2022 11:50)  Source: .Blood Blood-Peripheral  Preliminary Report (2022 15:02):    No growth to date.    Culture - Blood (collected 2022 11:35)  Source: .Blood Blood-Peripheral  Preliminary Report (2022 15:02):    No growth to date.         Anemia Panel:      Thyroid Panel:            Serum Pro-Brain Natriuretic Peptide: 6236 pg/mL (22 @ 11:52)      RADIOLOGY & ADDITIONAL TESTS:      HEALTH ISSUES - PROBLEM Dx:  Dysphagia    ADWOA (acute kidney injury)    Acute on chronic respiratory failure    Chronic systolic congestive heart failure    Atrial fibrillation  on AC    Counseling regarding goals of care    Abnormal finding on CT scan    Fall    Lung cancer  Left lung Sx for removal of Tumor, Rt Lung Cryo therapy for recurrence    PVD (peripheral vascular disease)    DVT prophylaxis    Anemia            Consultant(s) Notes Reviewed:  [  ] YES     Care Discussed with [X] Consultants  [x  ] Patient  [  ] Family [  ] HCP [  ]   [  ] Social Service  [ x ] RN, [  ] Physical Therapy,[  ] Palliative care team  DVT PPX: [  ] Lovenox, [  ] S C Heparin, [  ] Coumadin, [  ] Xarelto, [  ] Eliquis, [  ] Pradaxa, [  ] IV Heparin drip, [  ] SCD [  ] Contraindication 2 to GI Bleed,[  ] Ambulation [  ] Contraindicated 2 to  bleed [  ] Contraindicated 2 to Brain Bleed  Advanced directive: [  ] None, [  ] DNR/DNI Patient is a 89y old  Male who presents with a chief complaint of shortness of breath (2022 05:54)    HPI:  89 yo male with a-fib (on Eliquis), lung ca s/p resection of tumor from Left lung, s/p Rt Lung nodule Bx -Adeno Ca S/P  cryo therapy for recurrence of tumor at [facility], PVD presents with shortness of breath. Spoke with son on phone who is a physician and provided background information. Per son, patient has not wanted to see any doctors since he was discharged from the hospital after being admitted to [facility] in  for weeping cellulitis. Patient was to follow up with a cardiologist to discuss treatment of his severe AS, HFrEF however patient declined to follow-up and stated that he did not want to have his valve repaired. 1 month ago, patient has started eating and drinking less, taking his medications erratically so sons convinced patient to get a home health aid (pt lives alone). Son think spatient may have had a stroke. Patient has been more short of breath than usual over the past month and Dr. Ahmadi came on a house call to see the patient, an echo was reportedly performed which showed an EF of 40% and severe AS. Home health aid is present 8 hours per day. Yesterday morning, son came over patient's house to find patient face down on the floor after either falling or slipping off the couch. Son helped patient up and asked him if he wanted to go to the hospital at that time however patient declined. Patient was also noted to have increasing shortness of breath and persistent non-productive cough over the past ~week. This morning, patient's difficulty breathing and coughing got significantly worse after he woke up so patient told son that he wanted to go to the hospital. Patient denies fever, chills, chest pain, pleuritic pain, abdominal pain, changes in bowel habits, urinary difficulties, increasing edema of lower extremities or any other concerns. Son states patient is DNR/DNI, confirmed with patient at the bedside that he wants to be DNR/DNI however does not want to sign a MOLST at this time.    ED Course:  vitals - t 99.7 / bp 115/83 / hr 122 / spo2 97 on 3LNC / rr 18  labs - hgb 11.6, INR 2.57, BUN/Cr 40/2.30, probnp 6236 rvp negative  imaging - CT chest - Small airways disease with multifocal subsegmental bronchial impaction   and tree-in-bud nodularity in the left greater than right lung.Irregular 3.4 cm right upper lobe subsolid lesion with 4 mm solid   component, concerning for an adenocarcinoma spectrum lesion. Follow-up chest CT in 3-6 months, in the absence of prior imaging. Severe aortic valvular calcification which can be seen with aortic stenosis. Correlate with echocardiogram.Numerous pancreatic cystic nodules measuring up to 2.1 cm; consider MRI for further evaluation.  EKG - afib 110.  Given: Zosyn x1, 500cc bolus   (2022 15:02)    INTERVAL HPI:  22: patient seen and examined at the bedside. He states that his non-productive cough is still present however he does not have any shortness of breath at this time. Patient Denies pain, denies CP, abdominal pain, n/v/d/c or any other symptoms at this time. Patient was placed NPO last night as patient was actively coughing and seeming to have difficulty clearing secretion when being presented with PO nutrition/hydration/meds. S+S performed an eval this AM and recommended that po nutrition/hydration/meds are contraindicated. Spoke with son and patient who agreed to start comfort measures and allow for pleasure feeds.   22: patient seen and edxamined at the bedside. His non-productive cough is still present but improved. Denies all complaints. Asks for water. Currently on comfort care only w/ pleasure feeds, plan for discharge today with home-hospice. Asks to walk around.      OVERNIGHT EVENTS:  none    Home Medications:  furosemide 20 mg oral tablet: 1 tab(s) orally 2 times a day (2022 15:03)  metoprolol tartrate 25 mg oral tablet: 1 tab(s) orally 2 times a day (2022 15:03)      MEDICATIONS  (STANDING):  ALBUTerol    0.083% 2.5 milliGRAM(s) Nebulizer every 12 hours  apixaban 2.5 milliGRAM(s) Oral every 12 hours  furosemide   Injectable 40 milliGRAM(s) IV Push daily  glycopyrrolate 2 milliGRAM(s) Oral three times a day  guaiFENesin Oral Liquid (Sugar-Free) 100 milliGRAM(s) Oral every 8 hours  metoprolol tartrate Injectable 2.5 milliGRAM(s) IV Push every 6 hours  sodium chloride 3%  Inhalation 4 milliLiter(s) Inhalation every 12 hours    MEDICATIONS  (PRN):  acetaminophen     Tablet .. 650 milliGRAM(s) Oral every 6 hours PRN Temp greater or equal to 38C (100.4F), Mild Pain (1 - 3)  aluminum hydroxide/magnesium hydroxide/simethicone Suspension 30 milliLiter(s) Oral every 4 hours PRN Dyspepsia  melatonin 3 milliGRAM(s) Oral at bedtime PRN Insomnia  ondansetron Injectable 4 milliGRAM(s) IV Push every 8 hours PRN Nausea and/or Vomiting      Allergies    No Known Allergies    Intolerances        Social History:  Lives at home has home health aid 8 hours per day    denies tobacco, alcohol, drug use    covid vaccinated w/ j+j w/ booster. (2022 15:02)      REVIEW OF SYSTEMS:  CONSTITUTIONAL: No fever, No chills, No fatigue, No myalgia, No Body ache, No Weakness  EYES: No eye pain,  No visual disturbances, No discharge, NO Redness  ENMT:  No ear pain, No nose bleed, No vertigo; No sinus pain, NO throat pain, No Congestion  NECK: No pain, No stiffness  RESPIRATORY: +non-productive cough, NO wheezing, No  hemoptysis, NO  shortness of breath  CARDIOVASCULAR: No chest pain, palpitations  GASTROINTESTINAL: No abdominal pain, NO epigastric pain. No nausea, No vomiting; No diarrhea, No constipation. [  ] BM  GENITOURINARY: No dysuria   NEUROLOGICAL: No headaches, No dizziness, No numbness, No tingling, No tremors, No weakness  EXT: +Swelling, No Pain, No Edema  SKIN:  [ x ] No itching, burning, rashes, or lesions   MUSCULOSKELETAL: No joint pain ,No Jt swelling; No muscle pain, No back pain, No extremity pain  PSYCHIATRIC: No depression,  No anxiety,  No mood swings ,No difficulty sleeping at night  PAIN SCALE: [x  ] None  [  ] Other-  ROS Unable to obtain due to - [x  ] Dementia  [  ] Lethargy [  ] Drowsy [  ] Sedated [  ] non verbal  REST OF REVIEW Of SYSTEM - [x  ] Normal     Vital Signs Last 24 Hrs  T(C): 36.9 (2022 12:57), Max: 36.9 (2022 12:57)  T(F): 98.5 (2022 12:57), Max: 98.5 (2022 12:57)  HR: 72 (2022 05:59) (71 - 81)  BP: 111/70 (2022 05:59) (95/61 - 111/70)  RR: 18 (2022 12:57) (18 - 18)  SpO2: 95% (2022 12:57) (93% - 95%)  Finger Stick         @ 07:01  -   @ 07:00  --------------------------------------------------------  IN: 0 mL / OUT: 1050 mL / NET: -1050 mL        PHYSICAL EXAM:  GENERAL:  [ x ] NAD , [  x] well appearing, [  ] Agitated, [  ] Drowsy,  [  ] Lethargy, [  ] confused   HEAD:  [ x ] Normal, [  ] Other  EYES:  [  x] EOMI, [ x ] PERRLA, [ x ] conjunctiva and sclera clear normal, [  ] Other,  [  ] Pallor,[  ] Discharge  ENMT:  [ x ] Normal, [ x ] Dry mucous membranes, [ x ] Good dentition, [ x ] No Thrush  NECK:  [ x ] Supple, [x  ] No JVD, [ x ] Normal thyroid, [  ] Lymphadenopathy [  ] Other  CHEST/LUNG:  [ x ] Clear to auscultation bilaterally, [ x ] Breath Sounds equal B/L, [  ] poor effort  [ x ] No rales, [  x] No rhonchi  [x  ]  No wheezing,   HEART: [x] III/VI holosystolic murmur best heard RUSB [ x ] Regular rate and rhythm, [  ] tachycardia, [  ] Bradycardia,  [  ] irregular  [  ] No murmurs, No rubs, No gallops, [  ] PPM in place (Mfr:  )  ABDOMEN:  [ x ] Soft, [ x ] Nontender, [x  ] Nondistended, [ x ] No mass, [  x] Bowel sounds present, [  x] obese  NERVOUS SYSTEM:  [  x] Alert & Oriented X3, [x  ] Nonfocal  [  ] Confusion  [  ] Encephalopathic [  ] Sedated [  ] Unable to assess, [  ] Dementia [  ] Other-  EXTREMITIES: [x  ] 2+ Peripheral Pulses, No clubbing, No cyanosis,  [ x ] edema B/L lower EXT - improving. [  x] PVD stasis skin changes B/L Lower EXT, [  ] wound  LYMPH: No lymphadenopathy noted  SKIN:  [  ] No rashes or lesions, [  ] Pressure Ulcers, [  ] ecchymosis, [  ] Skin Tears, [  ] Other    DIET: Diet, Pureed:   Mildly Thick Liquids (MILDTHICKLIQS) (22 @ 15:10)      LABS:      Ca    8.8        2022 06:24      PT/INR - ( 2022 11:52 )   PT: 30.3 sec;   INR: 2.57 ratio         PTT - ( 2022 11:52 )  PTT:34.8 sec  Urinalysis Basic - ( 2022 05:00 )    Color: Yellow / Appearance: Clear / S.020 / pH: x  Gluc: x / Ketone: Negative  / Bili: Negative / Urobili: Negative   Blood: x / Protein: Negative / Nitrite: Negative   Leuk Esterase: Trace / RBC: 11-25 /HPF / WBC 0-2   Sq Epi: x / Non Sq Epi: Occasional / Bacteria: x        Culture Results:   No growth ( @ 17:52)  Culture Results:   No growth to date. ( @ 11:50)  Culture Results:   No growth to date. ( @ 11:35)      culture blood  -- Clean Catch Clean Catch (Midstream)  @ 17:52    culture urine  --   @ 17:52  culture blood  -- .Blood Blood-Peripheral  @ 11:50    culture urine  --   @ 11:50  culture blood  -- .Blood Blood-Peripheral  @ 11:35    culture urine  --   @ 11:35              Culture - Urine (collected 2022 17:52)  Source: Clean Catch Clean Catch (Midstream)  Final Report (2022 17:16):    No growth    Culture - Blood (collected 2022 11:50)  Source: .Blood Blood-Peripheral  Preliminary Report (2022 15:02):    No growth to date.    Culture - Blood (collected 2022 11:35)  Source: .Blood Blood-Peripheral  Preliminary Report (2022 15:02):    No growth to date.         Anemia Panel:      Thyroid Panel:            Serum Pro-Brain Natriuretic Peptide: 6236 pg/mL (22 @ 11:52)      RADIOLOGY & ADDITIONAL TESTS:      HEALTH ISSUES - PROBLEM Dx:  Dysphagia    ADWOA (acute kidney injury)    Acute on chronic respiratory failure    Chronic systolic congestive heart failure    Atrial fibrillation  on AC    Counseling regarding goals of care    Abnormal finding on CT scan    Fall    Lung cancer  Left lung Sx for removal of Tumor, Rt Lung Cryo therapy for recurrence    PVD (peripheral vascular disease)    DVT prophylaxis    Anemia            Consultant(s) Notes Reviewed:  [  ] YES     Care Discussed with [X] Consultants  [x  ] Patient  [  ] Family [  ] HCP [  ]   [  ] Social Service  [ x ] RN, [  ] Physical Therapy,[  ] Palliative care team  DVT PPX: [  ] Lovenox, [  ] S C Heparin, [  ] Coumadin, [  ] Xarelto, [ x ] Eliquis, [  ] Pradaxa, [  ] IV Heparin drip, [  ] SCD [  ] Contraindication 2 to GI Bleed,[  ] Ambulation [  ] Contraindicated 2 to  bleed [  ] Contraindicated 2 to Brain Bleed  Advanced directive: [  ] None, [  ] DNR/DNI Patient is a 89y old  Male who presents with a chief complaint of shortness of breath (2022 05:54)    HPI:  88 yo male with a-fib (on Eliquis), lung ca s/p resection of tumor from Left lung, s/p Rt Lung nodule Bx -Adeno Ca S/P  cryo therapy for recurrence of tumor at [facility], PVD presents with shortness of breath. Spoke with son on phone who is a physician and provided background information. Per son, patient has not wanted to see any doctors since he was discharged from the hospital after being admitted to [facility] in  for weeping cellulitis. Patient was to follow up with a cardiologist to discuss treatment of his severe AS, HFrEF however patient declined to follow-up and stated that he did not want to have his valve repaired. 1 month ago, patient has started eating and drinking less, taking his medications erratically so sons convinced patient to get a home health aid (pt lives alone). Son think spatient may have had a stroke. Patient has been more short of breath than usual over the past month and Dr. Ahmadi came on a house call to see the patient, an echo was reportedly performed which showed an EF of 40% and severe AS. Home health aid is present 8 hours per day. Yesterday morning, son came over patient's house to find patient face down on the floor after either falling or slipping off the couch. Son helped patient up and asked him if he wanted to go to the hospital at that time however patient declined. Patient was also noted to have increasing shortness of breath and persistent non-productive cough over the past ~week. This morning, patient's difficulty breathing and coughing got significantly worse after he woke up so patient told son that he wanted to go to the hospital. Patient denies fever, chills, chest pain, pleuritic pain, abdominal pain, changes in bowel habits, urinary difficulties, increasing edema of lower extremities or any other concerns. Son states patient is DNR/DNI, confirmed with patient at the bedside that he wants to be DNR/DNI however does not want to sign a MOLST at this time.    ED Course:  vitals - t 99.7 / bp 115/83 / hr 122 / spo2 97 on 3LNC / rr 18  labs - hgb 11.6, INR 2.57, BUN/Cr 40/2.30, probnp 6236 rvp negative  imaging - CT chest - Small airways disease with multifocal subsegmental bronchial impaction   and tree-in-bud nodularity in the left greater than right lung.Irregular 3.4 cm right upper lobe subsolid lesion with 4 mm solid   component, concerning for an adenocarcinoma spectrum lesion. Follow-up chest CT in 3-6 months, in the absence of prior imaging. Severe aortic valvular calcification which can be seen with aortic stenosis. Correlate with echocardiogram.Numerous pancreatic cystic nodules measuring up to 2.1 cm; consider MRI for further evaluation.  EKG - afib 110.  Given: Zosyn x1, 500cc bolus   (2022 15:02)    INTERVAL HPI:  22: patient seen and examined at the bedside. He states that his non-productive cough is still present however he does not have any shortness of breath at this time. Patient Denies pain, denies CP, abdominal pain, n/v/d/c or any other symptoms at this time. Patient was placed NPO last night as patient was actively coughing and seeming to have difficulty clearing secretion when being presented with PO nutrition/hydration/meds. S+S performed an eval this AM and recommended that po nutrition/hydration/meds are contraindicated. Spoke with son and patient who agreed to start comfort measures and allow for pleasure feeds.   22: patient seen and edxamined at the bedside. His non-productive cough is still present but improved. Denies all complaints. Asks for water. Currently on comfort care only w/ pleasure feeds, plan for discharge today with home-hospice. Asks to walk around.      OVERNIGHT EVENTS:  none    Home Medications:  furosemide 20 mg oral tablet: 1 tab(s) orally 2 times a day (2022 15:03)  metoprolol tartrate 25 mg oral tablet: 1 tab(s) orally 2 times a day (2022 15:03)      MEDICATIONS  (STANDING):  ALBUTerol    0.083% 2.5 milliGRAM(s) Nebulizer every 12 hours  apixaban 2.5 milliGRAM(s) Oral every 12 hours  furosemide   Injectable 40 milliGRAM(s) IV Push daily  glycopyrrolate 2 milliGRAM(s) Oral three times a day  guaiFENesin Oral Liquid (Sugar-Free) 100 milliGRAM(s) Oral every 8 hours  metoprolol tartrate Injectable 2.5 milliGRAM(s) IV Push every 6 hours  sodium chloride 3%  Inhalation 4 milliLiter(s) Inhalation every 12 hours    MEDICATIONS  (PRN):  acetaminophen     Tablet .. 650 milliGRAM(s) Oral every 6 hours PRN Temp greater or equal to 38C (100.4F), Mild Pain (1 - 3)  aluminum hydroxide/magnesium hydroxide/simethicone Suspension 30 milliLiter(s) Oral every 4 hours PRN Dyspepsia  melatonin 3 milliGRAM(s) Oral at bedtime PRN Insomnia  ondansetron Injectable 4 milliGRAM(s) IV Push every 8 hours PRN Nausea and/or Vomiting      Allergies    No Known Allergies    Intolerances        Social History:  Lives at home has home health aid 8 hours per day    denies tobacco, alcohol, drug use    covid vaccinated w/ j+j w/ booster. (2022 15:02)      REVIEW OF SYSTEMS:  CONSTITUTIONAL: No fever, No chills, No fatigue, No myalgia, No Body ache, No Weakness  EYES: No eye pain,  No visual disturbances, No discharge, NO Redness  ENMT:  No ear pain, No nose bleed, No vertigo; No sinus pain, NO throat pain, No Congestion  NECK: No pain, No stiffness  RESPIRATORY: +non-productive cough, NO wheezing, No  hemoptysis, NO  shortness of breath  CARDIOVASCULAR: No chest pain, palpitations  GASTROINTESTINAL: No abdominal pain, NO epigastric pain. No nausea, No vomiting; No diarrhea, No constipation. [  ] BM  GENITOURINARY: No dysuria   NEUROLOGICAL: No headaches, No dizziness, No numbness, No tingling, No tremors, No weakness  EXT: +Swelling, No Pain, No Edema  SKIN:  [ x ] No itching, burning, rashes, or lesions   MUSCULOSKELETAL: No joint pain ,No Jt swelling; No muscle pain, No back pain, No extremity pain  PSYCHIATRIC: No depression,  No anxiety,  No mood swings ,No difficulty sleeping at night  PAIN SCALE: [x  ] None  [  ] Other-  ROS Unable to obtain due to - [x  ] Dementia  [  ] Lethargy [  ] Drowsy [  ] Sedated [  ] non verbal  REST OF REVIEW Of SYSTEM - [x  ] Normal     Vital Signs Last 24 Hrs  T(C): 36.9 (2022 12:57), Max: 36.9 (2022 12:57)  T(F): 98.5 (2022 12:57), Max: 98.5 (2022 12:57)  HR: 72 (2022 05:59) (71 - 81)  BP: 111/70 (2022 05:59) (95/61 - 111/70)  RR: 18 (2022 12:57) (18 - 18)  SpO2: 95% (2022 12:57) (93% - 95%)  Finger Stick         @ 07:01  -   @ 07:00  --------------------------------------------------------  IN: 0 mL / OUT: 1050 mL / NET: -1050 mL        PHYSICAL EXAM:  GENERAL:  [ x ] NAD , [  x] well appearing, [  ] Agitated, [  ] Drowsy,  [  ] Lethargy, [  ] confused   HEAD:  [ x ] Normal, [  ] Other  EYES:  [  x] EOMI, [ x ] PERRLA, [ x ] conjunctiva and sclera clear normal, [  ] Other,  [  ] Pallor,[  ] Discharge  ENMT:  [ x ] Normal, [ x ] Dry mucous membranes, [ x ] Good dentition, [ x ] No Thrush  NECK:  [ x ] Supple, [x  ] No JVD, [ x ] Normal thyroid, [  ] Lymphadenopathy [  ] Other  CHEST/LUNG:  [ x ] Clear to auscultation bilaterally, [ x ] Breath Sounds equal B/L, [  ] poor effort  [ x ] No rales, [  x] No rhonchi  [x  ]  No wheezing,   HEART: [x] III/VI holosystolic murmur best heard RUSB [ x ] Regular rate and rhythm, [  ] tachycardia, [  ] Bradycardia,  [  ] irregular  [  ] No murmurs, No rubs, No gallops, [  ] PPM in place (Mfr:  )  ABDOMEN:  [ x ] Soft, [ x ] Nontender, [x  ] Nondistended, [ x ] No mass, [  x] Bowel sounds present, [  x] obese  NERVOUS SYSTEM:  [  x] Alert & Oriented X3, [x  ] Nonfocal  [  ] Confusion  [  ] Encephalopathic [  ] Sedated [  ] Unable to assess, [  ] Dementia [  ] Other-  EXTREMITIES: [x  ] 2+ Peripheral Pulses, No clubbing, No cyanosis,  [ x ] edema B/L lower EXT - improving. [  x] PVD stasis skin changes B/L Lower EXT, [  ] wound  LYMPH: No lymphadenopathy noted  SKIN:  [  ] No rashes or lesions, [  ] Pressure Ulcers, [  ] ecchymosis, [  ] Skin Tears, [  ] Other    DIET: Diet, Pureed:   Mildly Thick Liquids (MILDTHICKLIQS) (22 @ 15:10)      LABS:      Ca    8.8        2022 06:24      PT/INR - ( 2022 11:52 )   PT: 30.3 sec;   INR: 2.57 ratio         PTT - ( 2022 11:52 )  PTT:34.8 sec  Urinalysis Basic - ( 2022 05:00 )    Color: Yellow / Appearance: Clear / S.020 / pH: x  Gluc: x / Ketone: Negative  / Bili: Negative / Urobili: Negative   Blood: x / Protein: Negative / Nitrite: Negative   Leuk Esterase: Trace / RBC: 11-25 /HPF / WBC 0-2   Sq Epi: x / Non Sq Epi: Occasional / Bacteria: x        Culture Results:   No growth ( @ 17:52)  Culture Results:   No growth to date. ( @ 11:50)  Culture Results:   No growth to date. ( @ 11:35)      culture blood  -- Clean Catch Clean Catch (Midstream)  @ 17:52    culture urine  --   @ 17:52  culture blood  -- .Blood Blood-Peripheral  @ 11:50    culture urine  --   @ 11:50  culture blood  -- .Blood Blood-Peripheral  @ 11:35    culture urine  --   @ 11:35              Culture - Urine (collected 2022 17:52)  Source: Clean Catch Clean Catch (Midstream)  Final Report (2022 17:16):    No growth    Culture - Blood (collected 2022 11:50)  Source: .Blood Blood-Peripheral  Preliminary Report (2022 15:02):    No growth to date.    Culture - Blood (collected 2022 11:35)  Source: .Blood Blood-Peripheral  Preliminary Report (2022 15:02):    No growth to date.         Anemia Panel:      Thyroid Panel:            Serum Pro-Brain Natriuretic Peptide: 6236 pg/mL (22 @ 11:52)      RADIOLOGY & ADDITIONAL TESTS:      HEALTH ISSUES - PROBLEM Dx:  Dysphagia    ADWOA (acute kidney injury)    Acute on chronic respiratory failure    Chronic systolic congestive heart failure    Atrial fibrillation  on AC    Counseling regarding goals of care    Abnormal finding on CT scan    Fall    Lung cancer  Left lung Sx for removal of Tumor, Rt Lung Cryo therapy for recurrence    PVD (peripheral vascular disease)    DVT prophylaxis    Anemia            Consultant(s) Notes Reviewed:  [  ] YES     Care Discussed with [X] Consultants  [x  ] Patient  [  ] Family [  ] HCP [  ]   [  ] Social Service  [ x ] RN, [  ] Physical Therapy,[  ] Palliative care team  DVT PPX: [  ] Lovenox, [  ] S C Heparin, [  ] Coumadin, [  ] Xarelto, [ x ] Eliquis, [  ] Pradaxa, [  ] IV Heparin drip, [  ] SCD [  ] Contraindication 2 to GI Bleed,[  ] Ambulation [  ] Contraindicated 2 to  bleed [  ] Contraindicated 2 to Brain Bleed  Advanced directive: [  ] None, [  ] DNR/DNI Patient is a 89y old  Male who presents with a chief complaint of shortness of breath (2022 05:54)    HPI:  89 yo male with a-fib (on Eliquis), lung ca s/p resection of tumor from Left lung, s/p Rt Lung nodule Bx -Adeno Ca S/P  cryo therapy for recurrence of tumor at [facility], PVD presents with shortness of breath. Spoke with son on phone who is a physician and provided background information. Per son, patient has not wanted to see any doctors since he was discharged from the hospital after being admitted to [facility] in  for weeping cellulitis. Patient was to follow up with a cardiologist to discuss treatment of his severe AS, HFrEF however patient declined to follow-up and stated that he did not want to have his valve repaired. 1 month ago, patient has started eating and drinking less, taking his medications erratically so sons convinced patient to get a home health aid (pt lives alone). Son think spatient may have had a stroke. Patient has been more short of breath than usual over the past month and Dr. Ahmadi came on a house call to see the patient, an echo was reportedly performed which showed an EF of 40% and severe AS. Home health aid is present 8 hours per day. Yesterday morning, son came over patient's house to find patient face down on the floor after either falling or slipping off the couch. Son helped patient up and asked him if he wanted to go to the hospital at that time however patient declined. Patient was also noted to have increasing shortness of breath and persistent non-productive cough over the past ~week. This morning, patient's difficulty breathing and coughing got significantly worse after he woke up so patient told son that he wanted to go to the hospital. Patient denies fever, chills, chest pain, pleuritic pain, abdominal pain, changes in bowel habits, urinary difficulties, increasing edema of lower extremities or any other concerns. Son states patient is DNR/DNI, confirmed with patient at the bedside that he wants to be DNR/DNI however does not want to sign a MOLST at this time.    ED Course:  vitals - t 99.7 / bp 115/83 / hr 122 / spo2 97 on 3LNC / rr 18  labs - hgb 11.6, INR 2.57, BUN/Cr 40/2.30, probnp 6236 rvp negative  imaging - CT chest - Small airways disease with multifocal subsegmental bronchial impaction   and tree-in-bud nodularity in the left greater than right lung.Irregular 3.4 cm right upper lobe subsolid lesion with 4 mm solid   component, concerning for an adenocarcinoma spectrum lesion. Follow-up chest CT in 3-6 months, in the absence of prior imaging. Severe aortic valvular calcification which can be seen with aortic stenosis. Correlate with echocardiogram.Numerous pancreatic cystic nodules measuring up to 2.1 cm; consider MRI for further evaluation.  EKG - afib 110.  Given: Zosyn x1, 500cc bolus   (2022 15:02)    INTERVAL HPI:  22: patient seen and examined at the bedside. He states that his non-productive cough is still present however he does not have any shortness of breath at this time. Patient Denies pain, denies CP, abdominal pain, n/v/d/c or any other symptoms at this time. Patient was placed NPO last night as patient was actively coughing and seeming to have difficulty clearing secretion when being presented with PO nutrition/hydration/meds. S+S performed an eval this AM and recommended that po nutrition/hydration/meds are contraindicated. Spoke with son and patient who agreed to start comfort measures and allow for pleasure feeds.   22: patient seen and edxamined at the bedside. His non-productive cough is still present but improved. Denies all complaints. Asks for water. Currently on comfort care only w/ pleasure feeds, plan for discharge today with home-hospice. Asks to walk around.      OVERNIGHT EVENTS:  none    Home Medications:  furosemide 20 mg oral tablet: 1 tab(s) orally 2 times a day (2022 15:03)  metoprolol tartrate 25 mg oral tablet: 1 tab(s) orally 2 times a day (2022 15:03)      MEDICATIONS  (STANDING):  ALBUTerol    0.083% 2.5 milliGRAM(s) Nebulizer every 12 hours  apixaban 2.5 milliGRAM(s) Oral every 12 hours  furosemide   Injectable 40 milliGRAM(s) IV Push daily  glycopyrrolate 2 milliGRAM(s) Oral three times a day  guaiFENesin Oral Liquid (Sugar-Free) 100 milliGRAM(s) Oral every 8 hours  metoprolol tartrate Injectable 2.5 milliGRAM(s) IV Push every 6 hours  sodium chloride 3%  Inhalation 4 milliLiter(s) Inhalation every 12 hours    MEDICATIONS  (PRN):  acetaminophen     Tablet .. 650 milliGRAM(s) Oral every 6 hours PRN Temp greater or equal to 38C (100.4F), Mild Pain (1 - 3)  aluminum hydroxide/magnesium hydroxide/simethicone Suspension 30 milliLiter(s) Oral every 4 hours PRN Dyspepsia  melatonin 3 milliGRAM(s) Oral at bedtime PRN Insomnia  ondansetron Injectable 4 milliGRAM(s) IV Push every 8 hours PRN Nausea and/or Vomiting      Allergies    No Known Allergies    Intolerances        Social History:  Lives at home has home health aid 8 hours per day    denies tobacco, alcohol, drug use    covid vaccinated w/ j+j w/ booster. (2022 15:02)      REVIEW OF SYSTEMS:  CONSTITUTIONAL: No fever, No chills, No fatigue, No myalgia, No Body ache, No Weakness  EYES: No eye pain,  No visual disturbances, No discharge, NO Redness  ENMT:  No ear pain, No nose bleed, No vertigo; No sinus pain, NO throat pain, No Congestion  NECK: No pain, No stiffness  RESPIRATORY: +non-productive cough, NO wheezing, No  hemoptysis, NO  shortness of breath  CARDIOVASCULAR: No chest pain, palpitations  GASTROINTESTINAL: No abdominal pain, NO epigastric pain. No nausea, No vomiting; No diarrhea, No constipation. [  ] BM  GENITOURINARY: No dysuria   NEUROLOGICAL: No headaches, No dizziness, No numbness, No tingling, No tremors, No weakness  EXT: +Swelling, No Pain, No Edema  SKIN:  [ x ] No itching, burning, rashes, or lesions   MUSCULOSKELETAL: No joint pain ,No Jt swelling; No muscle pain, No back pain, No extremity pain  PSYCHIATRIC: No depression,  No anxiety,  No mood swings ,No difficulty sleeping at night  PAIN SCALE: [x  ] None  [  ] Other-  ROS Unable to obtain due to - [x  ] Dementia  [  ] Lethargy [  ] Drowsy [  ] Sedated [  ] non verbal  REST OF REVIEW Of SYSTEM - [x  ] Normal     Vital Signs Last 24 Hrs  T(C): 36.9 (2022 12:57), Max: 36.9 (2022 12:57)  T(F): 98.5 (2022 12:57), Max: 98.5 (2022 12:57)  HR: 72 (2022 05:59) (71 - 81)  BP: 111/70 (2022 05:59) (95/61 - 111/70)  RR: 18 (2022 12:57) (18 - 18)  SpO2: 95% (2022 12:57) (93% - 95%)  Finger Stick         @ 07:01  -   @ 07:00  --------------------------------------------------------  IN: 0 mL / OUT: 1050 mL / NET: -1050 mL        PHYSICAL EXAM:  GENERAL:  [ x ] NAD , [  x] well appearing, [  ] Agitated, [  ] Drowsy,  [  ] Lethargy, [  ] confused   HEAD:  [ x ] Normal, [  ] Other  EYES:  [  x] EOMI, [ x ] PERRLA, [ x ] conjunctiva and sclera clear normal, [  ] Other,  [  ] Pallor,[  ] Discharge  ENMT:  [ x ] Normal, [ x ] Dry mucous membranes, [ x ] Good dentition, [ x ] No Thrush  NECK:  [ x ] Supple, [x  ] No JVD, [ x ] Normal thyroid, [  ] Lymphadenopathy [  ] Other  CHEST/LUNG:  [ x ] Clear to auscultation bilaterally, [ x ] Breath Sounds equal B/L, [  ] poor effort  [ x ] No rales, [  x] No rhonchi  [x  ]  No wheezing,   HEART: [x] III/VI holosystolic murmur best heard RUSB [ x ] Regular rate and rhythm, [  ] tachycardia, [  ] Bradycardia,  [  ] irregular  [  ] No murmurs, No rubs, No gallops, [  ] PPM in place (Mfr:  )  ABDOMEN:  [ x ] Soft, [ x ] Nontender, [x  ] Nondistended, [ x ] No mass, [  x] Bowel sounds present, [  x] obese  NERVOUS SYSTEM:  [  x] Alert & Oriented X1, [x  ] Nonfocal  [  ] Confusion  [  ] Encephalopathic [  ] Sedated [  ] Unable to assess, [x ] Dementia [  ] Other-  EXTREMITIES: [x  ] 2+ Peripheral Pulses, No clubbing, No cyanosis,  [ x ] edema B/L lower EXT - improving. [  x] PVD stasis skin changes B/L Lower EXT, [  ] wound  LYMPH: No lymphadenopathy noted  SKIN:  [ x ] No rashes or lesions, [  ] Pressure Ulcers, [  ] ecchymosis, [  ] Skin Tears, [  ] Other    DIET: Diet, Pureed:   Mildly Thick Liquids (MILDTHICKLIQS) (22 @ 15:10)      LABS:      Ca    8.8        2022 06:24      PT/INR - ( 2022 11:52 )   PT: 30.3 sec;   INR: 2.57 ratio         PTT - ( 2022 11:52 )  PTT:34.8 sec  Urinalysis Basic - ( 2022 05:00 )    Color: Yellow / Appearance: Clear / S.020 / pH: x  Gluc: x / Ketone: Negative  / Bili: Negative / Urobili: Negative   Blood: x / Protein: Negative / Nitrite: Negative   Leuk Esterase: Trace / RBC: 11-25 /HPF / WBC 0-2   Sq Epi: x / Non Sq Epi: Occasional / Bacteria: x        Culture Results:   No growth ( @ 17:52)  Culture Results:   No growth to date. ( @ 11:50)  Culture Results:   No growth to date. ( @ 11:35)      culture blood  -- Clean Catch Clean Catch (Midstream)  @ 17:52    culture urine  --   @ 17:52  culture blood  -- .Blood Blood-Peripheral  @ 11:50    culture urine  --   @ 11:50  culture blood  -- .Blood Blood-Peripheral  @ 11:35    culture urine  --   @ 11:35      Culture - Urine (collected 2022 17:52)  Source: Clean Catch Clean Catch (Midstream)  Final Report (2022 17:16):    No growth    Culture - Blood (collected 2022 11:50)  Source: .Blood Blood-Peripheral  Preliminary Report (2022 15:02):    No growth to date.    Culture - Blood (collected 2022 11:35)  Source: .Blood Blood-Peripheral  Preliminary Report (2022 15:02):    No growth to date.          Serum Pro-Brain Natriuretic Peptide: 6236 pg/mL (22 @ 11:52)      RADIOLOGY & ADDITIONAL TESTS: NONE      HEALTH ISSUES - PROBLEM Dx:  Dysphagia    ADWOA (acute kidney injury)    Acute on chronic respiratory failure    Chronic systolic congestive heart failure    Atrial fibrillation  on AC    Counseling regarding goals of care    Abnormal finding on CT scan    Fall    Lung cancer  Left lung Sx for removal of Tumor, Rt Lung Cryo therapy for recurrence    PVD (peripheral vascular disease)    DVT prophylaxis    Anemia        Consultant(s) Notes Reviewed:  [ x ] YES     Care Discussed with [X] Consultants  [x  ] Patient  [  ] Family [  ] HCP [  ]   [  ] Social Service  [ x ] RN, [  ] Physical Therapy,[  ] Palliative care team  DVT PPX: [  ] Lovenox, [  ] S C Heparin, [  ] Coumadin, [  ] Xarelto, [ x ] Eliquis, [  ] Pradaxa, [  ] IV Heparin drip, [  ] SCD [  ] Contraindication 2 to GI Bleed,[  ] Ambulation [  ] Contraindicated 2 to  bleed [  ] Contraindicated 2 to Brain Bleed  Advanced directive: [  ] None, [ x ] DNR/DNI

## 2022-06-28 NOTE — PROGRESS NOTE ADULT - PROBLEM SELECTOR PLAN 2
- received 500cc bolus in ED however patient appears overloaded on exam, will start lasix 40mg ivp qd per cardio recs. Close monitoring of renal fxn.  - Continue home metoprolol 25mg po bid  - albuterol 2.5mg neb q8h  - Supplemental O2 to maintain O2 sat >90%.  - initial trop 64.8, will f/u repeat.  - tte ordered, F/u  - espinoza catheter  - Strict I/Os, daily weights  - Monitor and replace electrolytes  - Cardiology consulted (Tamar pham), f/u recs - Chronic Systolic CHF   - On lasix 40mg ivp qd per cardio recs.   - Continue  metoprolol 25mg po bid  - albuterol 2.5mg neb q8h  - Supplemental O2 to maintain O2 sat >90%.  - initial trop 64.8, will f/u repeat.  - espinoza catheter  - Strict I/Os, daily weights  - Cardiology Dr garrison -D/W pt with poor po inatke on Home hospice, Lasix 20 mg daily

## 2022-06-28 NOTE — PROGRESS NOTE ADULT - ASSESSMENT
88 yo male with a-fib (on Eliquis), lung ca s/p resection of tumor from Left lung, s/p Rt Lung cryo therapy for recurrence of tumor, PVD presents with shortness of breath    VS noted  on lasix  on NEBS - Saline Nebs    ct chest reviewed  old records reviewed  cardio eval noted  pt with known lung ca - lobectomy - radiation -   recurrence of Lung Ca  Cardio - optimization of cvs rx - and diuresis as per cardio recs - caution with lasix - monitor renal indices - pt with AS and CHF  I and O  ct chest shows mucus impaction in airways - Albuterol NEBS - Hypertonic saline nebs - Mucinex -   monitor VS and Sat  keep sat > 88 pct -  assist with needs -   GOC - DNR  Hospice discussion  dietary discretion
89 yo male with a-fib (on Eliquis), lung ca s/p resection of tumor from Left lung, s/p Rt Lung cryo therapy for recurrence of tumor, PVD presents with shortness of breath. His admission was in 2020 for bilateral cellulitis and AF with RVR.    SOB/CUEVAS  - He is now compensated from HF standpoint.  Not requiring O2  - Continue Lasix 40 mg IV daily.  Plan to switch to PO daily   - TTE (2020) has mod LV dysfunction and severe AS.  Repeat TTE showed EF 40%, normal RVF, mild to mod MR and sever AS (.35 sqcm).  He is not a candidate for any valvular intervention    Permanent Afib  - EKG is non-ischemic, trops neg, no anginal complaints   - Overnight, his HR was intermittently rapid to 130;s, though, non-sustained  - Continue BB but can switch to PO 25 mg q6H  - Continue AC with renal dose Eliquis   - Monitor and replete Lytes. Keep K > 4 and Mg > 2    For D/C to Home Hospice today  Will continue to follow as inpatient    Zahra Costa DNP, NP-C  Cardiology   Spectra #[phone]       
88 yo male with a-fib (on Eliquis), lung ca s/p resection of tumor from Left lung, s/p Rt Lung cryo therapy for recurrence of tumor, PVD presents with shortness of breath    on Robinul  on bronchodilators  vs noted  GOC - DNR -   DME delivery scheduled  CM following    ct chest reviewed  old records reviewed  cardio eval noted  pt with known lung ca - lobectomy - radiation -   recurrence of Lung Ca  Cardio - optimization of cvs rx - and diuresis as per cardio recs - caution with lasix - monitor renal indices - pt with AS and CHF  I and O  ct chest shows mucus impaction in airways - Albuterol NEBS - Hypertonic saline nebs - Mucinex -   monitor VS and Sat  keep sat > 88 pct -  assist with needs -   GOC - DNR  Hospice discussion  dietary discretion
ADWOA on CKD2  Volume Overload  Hypokalemia  Metabolic Alkalosis  Anemia    -BLCR 0.8  -Urine lytes reviewed suggest ATN, but this was collected in the setting of Lasix   -UA RBC 11-25  -Cont lasix  -Renal function improving  -Renal US - no hydro  -May need to give diamox if worsening alkalosis  -Awaiting today's lab result        
89 yo male with a-fib (on Eliquis), lung ca s/p resection of tumor from Left lung, s/p Rt Lung cryo therapy for recurrence of tumor, PVD presents with shortness of breath. His admission was in 2020 for bilateral cellulitis and AF with RVR.    - p/w CUEVAS, s/p IVF  - remains mildly volume overloaded on exam   - continue lasix 40 mg iv daily   - trend creatinine and electrolytes  - I/o's and daily weights  - TTE (2020) has mod LV dysfunction and severe AS  - f/u repeat echocardiogram    - Telemetry: rate controlled Afib 60-90's, no events overnight noted   - trops neg, no anginal complaints   - cont metoprolol and titrate up as tolerated by his BP  - cont AC with Eliquis   - Monitor and replete Lytes. Keep K > 4 and Mg > 2    - All other medical needs as per primary team.  - Other cardiovascular workup will depend on clinical course.  - Will continue to follow.    Nani Duncan [facility]  Nurse Practitioner - Cardiology   Spectra #[phone]
88 yo male with a-fib (on Eliquis), lung ca s/p resection of tumor from Left lung, s/p Rt Lung cryo therapy for recurrence of tumor, PVD presents with shortness of breath, being admitted for acute on chronic respiratory failure likely multifactorial 2/2 undertreated HFrEF, severe AS, small airway disease and lung cancer.
88 yo male with a-fib (on Eliquis), lung ca s/p resection of tumor from Left lung, s/p Rt Lung cryo therapy for recurrence of tumor, PVD presents with shortness of breath, being admitted for acute on chronic respiratory failure likely multifactorial 2/2 undertreated HFrEF, severe AS, small airway disease and lung cancer.

## 2022-06-28 NOTE — DISCHARGE NOTE NURSING/CASE MANAGEMENT/SOCIAL WORK - PATIENT PORTAL LINK FT
You can access the FollowMyHealth Patient Portal offered by [facility] by registering at the following website: [email or web address]. By joining [employer]’s FollowMyHealth portal, you will also be able to view your health information using other applications (apps) compatible with our system.

## 2022-06-28 NOTE — PROGRESS NOTE ADULT - PROBLEM SELECTOR PLAN 6
Patient with apparent recurrence of lung cancer on CT scan, severe aortic stenosis leading to HFrEF w/ significant shortness of breath. Patient with decreased PO intake and decreased interest in taking meds over the past month. Patient does not want to pursue intervention for Aortic Stenosis. DNR/DNI.  - f/u hospice eval. Chronic Afib  EKG is afib 110  - Continue metoprolol 25mg po bid for now  - decreased Eliquis dose to 2.5mg po bid given patient's age, GFR.  - Cardiology ([facility]) consulted, recs appreciated Chronic Afib  EKG is afib 110  - Continue metoprolol 25mg po bid for now  - decreased Eliquis dose to 2.5mg po bid given patient's age, GFR.  - Cardiology ([facility]) follow up-Dr garrison  D/W Pt's son Dr Álvaro Michael- pt is on [facility] & [place] ? CKD , Labs /Cr is not being monitored, Eliquis is renally cleared & dose to be adjusted as per Cr Cl, son decided to STOP Eliquis.

## 2022-06-28 NOTE — PROGRESS NOTE ADULT - SUBJECTIVE AND OBJECTIVE BOX
Patient is a 89y old  Male who presents with a chief complaint of shortness of breath (2022 10:15)       HPI:  89 yo male with a-fib (on Eliquis), lung ca s/p resection of tumor from Left lung, s/p Rt Lung nodule Bx -Adeno Ca S/P  cryo therapy for recurrence of tumor at [facility], PVD presents with shortness of breath. Spoke with son on phone who is a physician and provided background information. Per son, patient has not wanted to see any doctors since he was discharged from the hospital after being admitted to [facility] in  for weeping cellulitis. Patient was to follow up with a cardiologist to discuss treatment of his severe AS, HFrEF however patient declined to follow-up and stated that he did not want to have his valve repaired. 1 month ago, patient has started eating and drinking less, taking his medications erratically so sons convinced patient to get a home health aid (pt lives alone). Son think spatient may have had a stroke. Patient has been more short of breath than usual over the past month and Dr. Ahmadi came on a house call to see the patient, an echo was reportedly performed which showed an EF of 40% and severe AS. Home health aid is present 8 hours per day. Yesterday morning, son came over patient's house to find patient face down on the floor after either falling or slipping off the couch. Son helped patient up and asked him if he wanted to go to the hospital at that time however patient declined. Patient was also noted to have increasing shortness of breath and persistent non-productive cough over the past ~week. This morning, patient's difficulty breathing and coughing got significantly worse after he woke up so patient told son that he wanted to go to the hospital. Patient denies fever, chills, chest pain, pleuritic pain, abdominal pain, changes in bowel habits, urinary difficulties, increasing edema of lower extremities or any other concerns. Son states patient is DNR/DNI, confirmed with patient at the bedside that he wants to be DNR/DNI however does not want to sign a MOLST at this time.  No N/V/SOB/dizziness.       PAST MEDICAL & SURGICAL HISTORY:  Atrial fibrillation, unspecified type      Atrial fibrillation  on AC      Lung cancer  Left lung Sx for removal of Tumor, Rt Lung Cryo therapy for recurrence      PVD (peripheral vascular disease)      HF (heart failure)  Chronic Systolic CHF      S/P lobectomy of lung  Tumor removal from Lower Lobe, NO CT, NO RT  Adeno ca           FAMILY HISTORY:  NC    Social History:Non smoker    MEDICATIONS  (STANDING):  ALBUTerol    0.083% 2.5 milliGRAM(s) Nebulizer every 12 hours  apixaban 2.5 milliGRAM(s) Oral every 12 hours  furosemide   Injectable 40 milliGRAM(s) IV Push daily  glycopyrrolate 2 milliGRAM(s) Oral three times a day  guaiFENesin  milliGRAM(s) Oral every 12 hours  metoprolol tartrate Injectable 2.5 milliGRAM(s) IV Push every 6 hours  sodium chloride 3%  Inhalation 4 milliLiter(s) Inhalation every 12 hours    MEDICATIONS  (PRN):  acetaminophen     Tablet .. 650 milliGRAM(s) Oral every 6 hours PRN Temp greater or equal to 38C (100.4F), Mild Pain (1 - 3)  aluminum hydroxide/magnesium hydroxide/simethicone Suspension 30 milliLiter(s) Oral every 4 hours PRN Dyspepsia  melatonin 3 milliGRAM(s) Oral at bedtime PRN Insomnia  ondansetron Injectable 4 milliGRAM(s) IV Push every 8 hours PRN Nausea and/or Vomiting   Meds reviewed    Allergies    No Known Allergies    Intolerances         REVIEW OF SYSTEMS:    Review of Systems:   Constitutional: Denies fatigue  HEENT: Denies headaches and dizziness  Respiratory: denies SOB, cough, or wheezing  Cardiovascular: denies CP, palpitations  Gastrointestinal: Denies nausea, denies vomiting, diarrhea, constipation, abdominal pain, or bloody stools  Genitourinary: denies painful urination, increased frequency, urgency, or bloody urine  Skin: denies rashes or itching  Musculoskeletal: denies muscle aches, joint swelling  Neurologic: Denies generalized weakness, denies loss of sensation, numbness, or tingling      Vital Signs Last 24 Hrs  T(C): 36.9 (2022 12:57), Max: 36.9 (2022 12:57)  T(F): 98.5 (2022 12:57), Max: 98.5 (2022 12:57)  HR: 80 (2022 08:00) (71 - 84)  BP: 111/70 (2022 05:59) (95/61 - 111/70)  BP(mean): --  RR: 18 (2022 12:57) (18 - 18)  SpO2: 95% (2022 08:00) (95% - 95%)    PHYSICAL EXAM:    GENERAL: NAD  HEAD:  Atraumatic, Normocephalic  EYES: EOMI, conjunctiva and sclera clear  ENMT: No Drainage from nares, No drainage from ears  NECK: Supple, neck  veins full  NERVOUS SYSTEM:  Awake and Alert  CHEST/LUNG: Clear to percussion bilaterally; No rales, rhonchi, wheezing, or rubs  HEART: Regular rate and rhythm; No murmurs, rubs, or gallops  ABDOMEN: Soft, Nontender, Nondistended; Bowel sounds present  EXTREMITIES:  No Edema  SKIN: No rashes No obvious ecchymosis      LABS:                        10.2   6.17  )-----------( 144      ( 2022 06:24 )             31.6     27    140  |  101  |  37<H>  ----------------------------<  108<H>  3.3<L>   |  33<H>  |  1.80<H>    Ca    8.8      2022 06:24  Phos  3.9       Mg     2.3         TPro  5.4<L>  /  Alb  2.7<L>  /  TBili  1.8<H>  /  DBili  x   /  AST  18  /  ALT  18  /  AlkPhos  39<L>  27    PT/INR - ( 2022 11:52 )   PT: 30.3 sec;   INR: 2.57 ratio         PTT - ( 2022 11:52 )  PTT:34.8 sec  Urinalysis Basic - ( 2022 05:00 )    Color: Yellow / Appearance: Clear / S.020 / pH: x  Gluc: x / Ketone: Negative  / Bili: Negative / Urobili: Negative   Blood: x / Protein: Negative / Nitrite: Negative   Leuk Esterase: Trace / RBC: 11-25 /HPF / WBC 0-2   Sq Epi: x / Non Sq Epi: Occasional / Bacteria: x

## 2022-06-28 NOTE — DISCHARGE NOTE PROVIDER - NSDCCPCAREPLAN_GEN_ALL_CORE_FT
PRINCIPAL DISCHARGE DIAGNOSIS  Diagnosis: Chronic systolic congestive heart failure  Assessment and Plan of Treatment:       SECONDARY DISCHARGE DIAGNOSES  Diagnosis: Acute renal failure  Assessment and Plan of Treatment:     Diagnosis: Dysphagia  Assessment and Plan of Treatment:     Diagnosis: Atrial fibrillation  Assessment and Plan of Treatment: on AC    Diagnosis: Anemia  Assessment and Plan of Treatment:     Diagnosis: Fall  Assessment and Plan of Treatment:     Diagnosis: Lung cancer  Assessment and Plan of Treatment: Left lung Sx for removal of Tumor, Rt Lung Cryo therapy for recurrence    Diagnosis: Abnormal finding on CT scan  Assessment and Plan of Treatment:      PRINCIPAL DISCHARGE DIAGNOSIS  Diagnosis: Chronic systolic congestive heart failure  Assessment and Plan of Treatment: Congestive heart failure is a chronic condition in which the heart has trouble pumping blood. In some cases of heart failure, fluid may back up into your lungs or you may have swelling (edema) in your lower legs. There are many causes of heart failure including high blood pressure, coronary artery disease, abnormal heart valves, heart muscle disease, lung disease, diabetes, etc. Symptoms include shortness of breath with activity or when lying flat, cough, swelling of the legs, fatigue, or increased urination during the night.   Treatment is aimed at managing the symptoms of heart failure and may include lifestyle changes, medications, or surgical procedures.   Continue to take Lasix***        SECONDARY DISCHARGE DIAGNOSES  Diagnosis: Encounter for home hospice care  Assessment and Plan of Treatment: You have elected to pursue home hospice care. You can continue to take your medications including lasix, metoprolol, and eliquis. You can eat and drink whatever you would like. The hospice team will be visiting you at your home to make sure that you are comfortable and to manage any issues that arise at home.    Diagnosis: ADWOA (acute kidney injury)  Assessment and Plan of Treatment: You came to us with decreased kidney function in the setting of no prior known history of chronic kidney disease. It was deemed that the kidney injury was secondary to heart failure leading to decreased blood flow to the kidneys. A CT scan of your kidneys was performed and was negative for any stone or swelling of the kidneys.    Diagnosis: Dysphagia  Assessment and Plan of Treatment: You were noted to have difficulty swallowing during your hospital stay. Speech and swallow team evaluated you and stated that you cannot safely swallow food/liquids/medications without risk of aspirating those contents into your lungs. You and your family has decided to pursue hospice care at this time and  you can therefore continue to eat and drink whatever you would like, despite the risk of aspiration.    Diagnosis: Atrial fibrillation  Assessment and Plan of Treatment: You have a known diagnosis of atrial fibrillation prior to your admission. This condition, if not treated, increases your risk of stroke or heart attack.   CONTINUE eliquis however lower dose to 2.5mg twice a day.  Please make sure to continue taking these medications to avoid developing blood clots and to lower your risk of stroke. Additionally, please follow up with your PCP (and/or cardiologist) on a regular basis to ensure that this condition does not require changes to the dose or type of medication.      Diagnosis: Anemia  Assessment and Plan of Treatment: Anemia is the medical term for when you do not have enough Red Blood Cells. We measure this using Hemoglobin, which is a molecule in the Red Blood Cells use to carry Oxygen around the body. When you came to the hospital, your Hemoglobin was 11.6.   Many people with anemia do not have symptoms and may find out from blood tests that they have anemia. Other people may have symptoms from anemia, including headache, shortness of breath, and/or feeling tired, weak, or dizzy especially upon exertion. There are many causes for anemia, in your case, it is likely chronic secondary to your medical conditions such as lung cancer.       Diagnosis: Fall  Assessment and Plan of Treatment: You fell before the admission and in the setting of anticoagulation we performed a head CT scan which was negative for any bleeding in the brain.    Diagnosis: Lung cancer  Assessment and Plan of Treatment: You have a known history of lung cancer and have received treatment. During this admission we performed a chest CT and noted findings likely related to some element of recurrence of the cancer.    Diagnosis: Abnormal finding on CT scan  Assessment and Plan of Treatment: Your CT scan was noted to have pancreatic cystic nodules. An MRI could be obtained to further clarify these nodules, however you have elected to pursue hospice care at this time.     PRINCIPAL DISCHARGE DIAGNOSIS  Diagnosis: Chronic systolic congestive heart failure  Assessment and Plan of Treatment: Congestive heart failure is a chronic condition in which the heart has trouble pumping blood. In some cases of heart failure, fluid may back up into your lungs or you may have swelling (edema) in your lower legs. There are many causes of heart failure including high blood pressure, coronary artery disease, abnormal heart valves, heart muscle disease, lung disease, diabetes, etc. Symptoms include shortness of breath with activity or when lying flat, cough, swelling of the legs, fatigue, or increased urination during the night.   Treatment is aimed at managing the symptoms of heart failure and may include lifestyle changes, medications, or surgical procedures.   Continue to take Lasix***        SECONDARY DISCHARGE DIAGNOSES  Diagnosis: Encounter for home hospice care  Assessment and Plan of Treatment: You have elected to pursue home hospice care. You can continue to take your medications including lasix, metoprolol, and eliquis. You can eat and drink whatever you would like. The hospice team will be visiting you at your home to make sure that you are comfortable and to manage any issues that arise at home.    Diagnosis: ADWOA (acute kidney injury)  Assessment and Plan of Treatment: You came to us with decreased kidney function in the setting of no prior known history of chronic kidney disease. It was deemed that the kidney injury was secondary to heart failure leading to decreased blood flow to the kidneys. A CT scan of your kidneys was performed and was negative for any stone or swelling of the kidneys.    Diagnosis: Dysphagia  Assessment and Plan of Treatment: You were noted to have difficulty swallowing during your hospital stay. Speech and swallow team evaluated you and stated that you cannot safely swallow food/liquids/medications without risk of aspirating those contents into your lungs. You and your family has decided to pursue hospice care at this time and  you can therefore continue to eat and drink whatever you would like, despite the risk of aspiration.    Diagnosis: Atrial fibrillation  Assessment and Plan of Treatment: You have a known diagnosis of atrial fibrillation prior to your admission. This condition, if not treated, increases your risk of stroke or heart attack.   We will hold eliquis on discharge as you are going home with hospice care and will not be able to check labs and due to an increased risk of bleeding.      Diagnosis: Anemia  Assessment and Plan of Treatment: Anemia is the medical term for when you do not have enough Red Blood Cells. We measure this using Hemoglobin, which is a molecule in the Red Blood Cells use to carry Oxygen around the body. When you came to the hospital, your Hemoglobin was 11.6.   Many people with anemia do not have symptoms and may find out from blood tests that they have anemia. Other people may have symptoms from anemia, including headache, shortness of breath, and/or feeling tired, weak, or dizzy especially upon exertion. There are many causes for anemia, in your case, it is likely chronic secondary to your medical conditions such as lung cancer.       Diagnosis: Fall  Assessment and Plan of Treatment: You fell before the admission and in the setting of anticoagulation we performed a head CT scan which was negative for any bleeding in the brain.    Diagnosis: Lung cancer  Assessment and Plan of Treatment: You have a known history of lung cancer and have received treatment. During this admission we performed a chest CT and noted findings likely related to some element of recurrence of the cancer.    Diagnosis: Abnormal finding on CT scan  Assessment and Plan of Treatment: Your CT scan was noted to have pancreatic cystic nodules. An MRI could be obtained to further clarify these nodules, however you have elected to pursue hospice care at this time.     PRINCIPAL DISCHARGE DIAGNOSIS  Diagnosis: Chronic systolic congestive heart failure  Assessment and Plan of Treatment: Congestive heart failure is a chronic condition in which the heart has trouble pumping blood. In some cases of heart failure, fluid may back up into your lungs or you may have swelling (edema) in your lower legs. There are many causes of heart failure including high blood pressure, coronary artery disease, abnormal heart valves, heart muscle disease, lung disease, diabetes, etc. Symptoms include shortness of breath with activity or when lying flat, cough, swelling of the legs, fatigue, or increased urination during the night.   - You had requested to be on home hospice as you were eligible and qualified  - Treatment is aimed at managing the symptoms of heart failure and may include lifestyle changes, medications, or surgical procedures.   - Recommend CONTINUE your home dose lasix 20mg twice a day      SECONDARY DISCHARGE DIAGNOSES  Diagnosis: Dysphagia  Assessment and Plan of Treatment: You were noted to have difficulty swallowing during your hospital stay. Speech and swallow team evaluated you and stated that you cannot safely swallow food/liquids/medications without risk of aspirating those contents into your lungs. You and your family has decided to pursue hospice care at this time and  you can therefore continue to eat and drink whatever you would like, despite the risk of aspiration.    Diagnosis: Atrial fibrillation  Assessment and Plan of Treatment: You have a known diagnosis of atrial fibrillation prior to your admission. This condition, if not treated, increases your risk of stroke or heart attack.   We will hold eliquis on discharge as you are going home with hospice care and will not be able to check labs and due to an increased risk of bleeding. Also note that while NOT being on eliquis does increase your risk of stroke and blood clots      Diagnosis: Fall  Assessment and Plan of Treatment: You fell before the admission and in the setting of anticoagulation we performed a head CT scan which was negative for any bleeding in the brain.    Diagnosis: Anemia  Assessment and Plan of Treatment: Anemia is the medical term for when you do not have enough Red Blood Cells. We measure this using Hemoglobin, which is a molecule in the Red Blood Cells use to carry Oxygen around the body. When you came to the hospital, your Hemoglobin was 11.6.   Many people with anemia do not have symptoms and may find out from blood tests that they have anemia. Other people may have symptoms from anemia, including headache, shortness of breath, and/or feeling tired, weak, or dizzy especially upon exertion. There are many causes for anemia, in your case, it is likely chronic secondary to your medical conditions such as lung cancer.       Diagnosis: Abnormal finding on CT scan  Assessment and Plan of Treatment: Your CT scan was noted to have pancreatic cystic nodules. An MRI could be obtained to further clarify these nodules, however you have elected to pursue hospice care at this time.    Diagnosis: Lung cancer  Assessment and Plan of Treatment: You have a known history of lung cancer and have received treatment. During this admission we performed a chest CT and noted findings likely related to some element of recurrence of the cancer.    Diagnosis: ADWOA (acute kidney injury)  Assessment and Plan of Treatment: You came to us with decreased kidney function in the setting of no prior known history of chronic kidney disease. It was deemed that the kidney injury was secondary to heart failure leading to decreased blood flow to the kidneys. A CT scan of your kidneys was performed and was negative for any stone or swelling of the kidneys.    Diagnosis: Encounter for home hospice care  Assessment and Plan of Treatment: You have elected to pursue home hospice care. You can continue to take your medications including lasix, metoprolol, and eliquis. You can eat and drink whatever you would like. The hospice team will be visiting you at your home to make sure that you are comfortable and to manage any issues that arise at home.     PRINCIPAL DISCHARGE DIAGNOSIS  Diagnosis: Chronic systolic congestive heart failure  Assessment and Plan of Treatment: Congestive heart failure is a chronic condition in which the heart has trouble pumping blood. In some cases of heart failure, fluid may back up into your lungs or you may have swelling (edema) in your lower legs. There are many causes of heart failure including high blood pressure, coronary artery disease, abnormal heart valves, heart muscle disease, lung disease, diabetes, etc. Symptoms include shortness of breath with activity or when lying flat, cough, swelling of the legs, fatigue, or increased urination during the night.   - You had requested to be on home hospice as you were eligible and qualified  - Treatment is aimed at managing the symptoms of heart failure and may include lifestyle changes, medications, or surgical procedures.   - Recommend CONTINUE your home dose lasix 20mg twice a day      SECONDARY DISCHARGE DIAGNOSES  Diagnosis: Dysphagia  Assessment and Plan of Treatment: You were noted to have difficulty swallowing during your hospital stay. Speech and swallow team evaluated you and stated that you cannot safely swallow food/liquids/medications without risk of aspirating those contents into your lungs. You and your family has decided to pursue hospice care at this time and  you can therefore continue to eat and drink whatever you would like, despite the risk of aspiration.    Diagnosis: Atrial fibrillation  Assessment and Plan of Treatment: You have a known diagnosis of atrial fibrillation prior to your admission. This condition, if not treated, increases your risk of stroke or heart attack.   We will hold eliquis on discharge as you requested as your are going with hospice care and will not be able to check labs and due to an increased risk of bleeding. Also note that while NOT being on eliquis does increase your risk of stroke and blood clots    Diagnosis: Fall  Assessment and Plan of Treatment: You fell before the admission and in the setting of anticoagulation we performed a head CT scan which was negative for any bleeding in the brain.    Diagnosis: Anemia  Assessment and Plan of Treatment: Anemia is the medical term for when you do not have enough Red Blood Cells. We measure this using Hemoglobin, which is a molecule in the Red Blood Cells use to carry Oxygen around the body. When you came to the hospital, your Hemoglobin was 11.6.   Many people with anemia do not have symptoms and may find out from blood tests that they have anemia. Other people may have symptoms from anemia, including headache, shortness of breath, and/or feeling tired, weak, or dizzy especially upon exertion. There are many causes for anemia, in your case, it is likely chronic secondary to your medical conditions such as lung cancer.       Diagnosis: Abnormal finding on CT scan  Assessment and Plan of Treatment: Your CT scan was noted to have pancreatic cystic nodules. An MRI could be obtained to further clarify these nodules, however you have elected to pursue hospice care at this time.    Diagnosis: Lung cancer  Assessment and Plan of Treatment: You have a known history of lung cancer and have received treatment. During this admission we performed a chest CT and noted findings likely related to some element of recurrence of the cancer.    Diagnosis: ADWOA (acute kidney injury)  Assessment and Plan of Treatment: You came to us with decreased kidney function in the setting of no prior known history of chronic kidney disease. It was deemed that the kidney injury was secondary to heart failure leading to decreased blood flow to the kidneys. A CT scan of your kidneys was performed and was negative for any stone or swelling of the kidneys.    Diagnosis: Encounter for home hospice care  Assessment and Plan of Treatment: You have elected to pursue home hospice care. You can continue to take your medications including lasix, metoprolol, and eliquis. You can eat and drink whatever you would like. The hospice team will be visiting you at your home to make sure that you are comfortable and to manage any issues that arise at home.     PRINCIPAL DISCHARGE DIAGNOSIS  Diagnosis: Chronic systolic congestive heart failure  Assessment and Plan of Treatment: Chronic systolic CHF -last EF 40 % , severe AS   -Congestive heart failure is a chronic   - You had requested to be on home hospice as you were eligible and qualified  - Recommend CONTINUE your home dose lasix 20mg once  a day  -O2 NC as needed      SECONDARY DISCHARGE DIAGNOSES  Diagnosis: Dysphagia  Assessment and Plan of Treatment: You were noted to have difficulty swallowing during your hospital stay. Speech and swallow team evaluated you and stated that you cannot safely swallow food/liquids/medications without risk of aspirating those contents into your lungs.   -You and your family has decided to pursue hospice care at this time and  you can therefore continue to eat and drink whatever you would like, despite the risk of aspiration.    Diagnosis: Atrial fibrillation  Assessment and Plan of Treatment: You have a known diagnosis of atrial fibrillation prior to your admission. This condition, if not treated, increases your risk of stroke or heart attack.   -On Metoprolol 25 mg 2x day   -STOP-  eliquis on discharge as you  have ADWOA &  as your are going with hospice care and will not be able to check labs and due to an increased risk of bleeding. Also note that while NOT being on eliquis does increase your risk of stroke and blood clots    Diagnosis: Lung cancer  Assessment and Plan of Treatment: You have a known history of lung cancer and have received treatment. During this admission we performed a chest CT and noted findings likely related to some element of recurrence of the cancer.    Diagnosis: Anemia  Assessment and Plan of Treatment: Anemia is the medical term for when you do not have enough Red Blood Cells. We measure this using Hemoglobin, which is a molecule in the Red Blood Cells use to carry Oxygen around the body. When you came to the hospital, your Hemoglobin was 11.6.   Many people with anemia do not have symptoms and may find out from blood tests that they have anemia. Other people may have symptoms from anemia, including headache, shortness of breath, and/or feeling tired, weak, or dizzy especially upon exertion. There are many causes for anemia, in your case, it is likely chronic secondary to your medical conditions such as lung cancer.       Diagnosis: ADWOA (acute kidney injury)  Assessment and Plan of Treatment: You came to us with decreased kidney function in the setting of no prior known history of chronic kidney disease. It was deemed that the kidney injury was secondary to heart failure leading to decreased blood flow to the kidneys. A CT scan of your kidneys was performed and was negative for any stone or swelling of the kidneys.    Diagnosis: Fall  Assessment and Plan of Treatment: You fell before the admission and in the setting of anticoagulation we performed a head CT scan which was negative for any bleeding in the brain.    Diagnosis: Abnormal finding on CT scan  Assessment and Plan of Treatment: Your CT scan was noted to have pancreatic cystic nodules. An MRI could be obtained to further clarify these nodules, however you have elected to pursue hospice care at this time.    Diagnosis: Encounter for home hospice care  Assessment and Plan of Treatment: You have elected to pursue home hospice care. You can continue to take your medications including lasix, metoprolol, . You can eat and drink whatever you would like. The hospice team will be visiting you at your home to make sure that you are comfortable and to manage any issues that arise at home.  MOLST in place with DNR/DNI     PRINCIPAL DISCHARGE DIAGNOSIS  Diagnosis: Chronic systolic congestive heart failure  Assessment and Plan of Treatment: Chronic systolic CHF -last EF 40 % , severe AS   -Congestive heart failure is a chronic   - You had requested to be on home hospice as you were eligible and qualified  - Recommend CONTINUE your home dose lasix 20mg once  a day  -O2 NC as needed      SECONDARY DISCHARGE DIAGNOSES  Diagnosis: Dysphagia  Assessment and Plan of Treatment: You were noted to have difficulty swallowing during your hospital stay. Speech and swallow team evaluated you and stated that you cannot safely swallow food/liquids/medications without risk of aspirating those contents into your lungs.   -You and your family has decided to pursue hospice care at this time and  you can therefore continue to eat and drink whatever you would like, despite the risk of aspiration.    Diagnosis: Atrial fibrillation  Assessment and Plan of Treatment: You have a known diagnosis of atrial fibrillation prior to your admission. This condition, if not treated, increases your risk of stroke or heart attack.   -On Metoprolol 25 mg 2x day   -STOP-  eliquis on discharge as you  have ADWOA &  as your are going with hospice care and will not be able to check labs and due to an increased risk of bleeding. Also note that while NOT being on eliquis does increase your risk of stroke and blood clots    Diagnosis: Fall  Assessment and Plan of Treatment: You fell before the admission and in the setting of anticoagulation we performed a head CT scan which was negative for any bleeding in the brain.    Diagnosis: Anemia  Assessment and Plan of Treatment: Anemia is the medical term for when you do not have enough Red Blood Cells. We measure this using Hemoglobin, which is a molecule in the Red Blood Cells use to carry Oxygen around the body. When you came to the hospital, your Hemoglobin was 11.6.   Many people with anemia do not have symptoms and may find out from blood tests that they have anemia. Other people may have symptoms from anemia, including headache, shortness of breath, and/or feeling tired, weak, or dizzy especially upon exertion. There are many causes for anemia, in your case, it is likely chronic secondary to your medical conditions such as lung cancer.       Diagnosis: Abnormal finding on CT scan  Assessment and Plan of Treatment: Your CT scan was noted to have pancreatic cystic nodules. An MRI could be obtained to further clarify these nodules, however you have elected to pursue hospice care at this time.    Diagnosis: Lung cancer  Assessment and Plan of Treatment: You have a known history of lung cancer and have received treatment. During this admission we performed a chest CT and noted findings likely related to some element of recurrence of the cancer.    Diagnosis: ADWOA (acute kidney injury)  Assessment and Plan of Treatment: You came to us with decreased kidney function in the setting of no prior known history of chronic kidney disease. It was deemed that the kidney injury was secondary to heart failure leading to decreased blood flow to the kidneys. A CT scan of your kidneys was performed and was negative for any stone or swelling of the kidneys.    Diagnosis: Encounter for home hospice care  Assessment and Plan of Treatment: You have elected to pursue home hospice care. You can continue to take your medications including lasix, metoprolol, . You can eat and drink whatever you would like. The hospice team will be visiting you at your home to make sure that you are comfortable and to manage any issues that arise at home.  MOLST in place with DNR/DNI    Diagnosis: Urinary retention  Assessment and Plan of Treatment: A espinoza catheter was placed on 6/26 for urinary retention and you will be going home with this catheter This should be changed every 4 weeks

## 2022-06-28 NOTE — PROGRESS NOTE ADULT - SUBJECTIVE AND OBJECTIVE BOX
Date/Time Patient Seen:  		  Referring MD:   Data Reviewed	       Patient is a 89y old  Male who presents with a chief complaint of shortness of breath (27 Jun 2022 15:48)      Subjective/HPI     PAST MEDICAL & SURGICAL HISTORY:  Atrial fibrillation, unspecified type    Atrial fibrillation  on AC    Lung cancer  Left lung Sx for removal of Tumor, Rt Lung Cryo therapy for recurrence    PVD (peripheral vascular disease)    HF (heart failure)  Chronic Systolic CHF    S/P lobectomy of lung  Tumor removal from Lower Lobe, NO CT, NO RT  Adeno ca          Medication list         MEDICATIONS  (STANDING):  ALBUTerol    0.083% 2.5 milliGRAM(s) Nebulizer every 12 hours  apixaban 2.5 milliGRAM(s) Oral every 12 hours  furosemide   Injectable 40 milliGRAM(s) IV Push daily  glycopyrrolate 2 milliGRAM(s) Oral three times a day  guaiFENesin Oral Liquid (Sugar-Free) 100 milliGRAM(s) Oral every 8 hours  metoprolol tartrate Injectable 2.5 milliGRAM(s) IV Push every 6 hours  sodium chloride 3%  Inhalation 4 milliLiter(s) Inhalation every 12 hours    MEDICATIONS  (PRN):  acetaminophen     Tablet .. 650 milliGRAM(s) Oral every 6 hours PRN Temp greater or equal to 38C (100.4F), Mild Pain (1 - 3)  aluminum hydroxide/magnesium hydroxide/simethicone Suspension 30 milliLiter(s) Oral every 4 hours PRN Dyspepsia  melatonin 3 milliGRAM(s) Oral at bedtime PRN Insomnia  ondansetron Injectable 4 milliGRAM(s) IV Push every 8 hours PRN Nausea and/or Vomiting         Vitals log        ICU Vital Signs Last 24 Hrs  T(C): 36.9 (27 Jun 2022 12:57), Max: 36.9 (27 Jun 2022 12:57)  T(F): 98.5 (27 Jun 2022 12:57), Max: 98.5 (27 Jun 2022 12:57)  HR: 71 (27 Jun 2022 23:45) (71 - 81)  BP: 110/69 (27 Jun 2022 23:45) (95/61 - 110/69)  BP(mean): --  ABP: --  ABP(mean): --  RR: 18 (27 Jun 2022 12:57) (18 - 18)  SpO2: 95% (27 Jun 2022 12:57) (93% - 95%)           Input and Output:  I&O's Detail    26 Jun 2022 07:01  -  27 Jun 2022 07:00  --------------------------------------------------------  IN:  Total IN: 0 mL    OUT:    Indwelling Catheter - Urethral (mL): 680 mL    Voided (mL): 550 mL  Total OUT: 1230 mL    Total NET: -1230 mL      27 Jun 2022 07:01  -  28 Jun 2022 05:54  --------------------------------------------------------  IN:  Total IN: 0 mL    OUT:    Indwelling Catheter - Urethral (mL): 1050 mL  Total OUT: 1050 mL    Total NET: -1050 mL          Lab Data                        10.2   6.17  )-----------( 144      ( 27 Jun 2022 06:24 )             31.6     06-27    140  |  101  |  37<H>  ----------------------------<  108<H>  3.3<L>   |  33<H>  |  1.80<H>    Ca    8.8      27 Jun 2022 06:24  Phos  3.9     06-27  Mg     2.3     06-27    TPro  5.4<L>  /  Alb  2.7<L>  /  TBili  1.8<H>  /  DBili  x   /  AST  18  /  ALT  18  /  AlkPhos  39<L>  06-27            Review of Systems	      Objective     Physical Examination    heart s1s2  lung dec BS  abd soft      Pertinent Lab findings & Imaging      Dian:  NO   Adequate UO     I&O's Detail    26 Jun 2022 07:01  -  27 Jun 2022 07:00  --------------------------------------------------------  IN:  Total IN: 0 mL    OUT:    Indwelling Catheter - Urethral (mL): 680 mL    Voided (mL): 550 mL  Total OUT: 1230 mL    Total NET: -1230 mL      27 Jun 2022 07:01  -  28 Jun 2022 05:54  --------------------------------------------------------  IN:  Total IN: 0 mL    OUT:    Indwelling Catheter - Urethral (mL): 1050 mL  Total OUT: 1050 mL    Total NET: -1050 mL               Discussed with:     Cultures:	        Radiology

## 2022-06-28 NOTE — DISCHARGE NOTE PROVIDER - NSDCMRMEDTOKEN_GEN_ALL_CORE_FT
apixaban 5 mg oral tablet: 1 tab(s) orally 2 times a day  furosemide 20 mg oral tablet: 1 tab(s) orally 2 times a day  metoprolol tartrate 25 mg oral tablet: 1 tab(s) orally 2 times a day   furosemide 20 mg oral tablet: 1 tab(s) orally 2 times a day  glycopyrrolate 2 mg oral tablet: 1 tab(s) orally 3 times a day  metoprolol tartrate 25 mg oral tablet: 1 tab(s) orally 2 times a day  MiraLax oral powder for reconstitution: 17 gram(s) orally once a day   Senokot 8.6 mg oral tablet: 1 tab(s) orally once a day    furosemide 20 mg oral tablet: 1 tab(s) orally once a day  glycopyrrolate 2 mg oral tablet: 1 tab(s) orally 3 times a day  metoprolol tartrate 25 mg oral tablet: 1 tab(s) orally 2 times a day  MiraLax oral powder for reconstitution: 17 gram(s) orally once a day   Senokot 8.6 mg oral tablet: 1 tab(s) orally once a day

## 2022-06-28 NOTE — CONSULT NOTE ADULT - ASSESSMENT
89 yo M with PMHx of a-fib (on Eliquis), lung ca s/p resection of tumor from Left lung, s/p Rt Lung cryo therapy for recurrence of tumor, PVD presents with shortness of breath, being admitted for acute on chronic respiratory failure likely multifactorial 2/2 undertreated HFrEF, severe AS, small airway disease and lung cancer.    #Acute on chronic respiratory failure - multifactorial 2/2 undertreated HFrEF w/ severe AS, lung cancer and small airway disease.  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 Severe AS.  - Continue lasix 40mg ivp qd per cardio recs.   - Continue home metoprolol 25mg po bid  - albuterol 2.5mg neb q8h  - Supplemental O2 to maintain O2 sat >90%.  - tte ordered, F/u  - espinoza catheter  - Strict I/Os, daily weights    #ADWOA (acute kidney injury) likely pre-renal azotemia in the setting of poor perfusion from heart failure (FEUrea 13.4%)  - Cr 2.30 -> 1.8. Cr was ~.9 baseline in 2020  - started lasix 40mg ivp qd  - diuresis underway, monitor renal indices closely  - espinoza inserted draining yellow urine  - urine urea 244, urine protein 8, Eos negative, urine sodium 49, urine cr 105, urine K 39.2.  - CT renal stone hunt demonstrates no renal stone or hydronephrosis.  - urinalysis w/ large blood   - UCx BCx pending, f/u  - nephro consult appreciated.  #Dysphagia.  - S+S performed an eval 6/27 am and recommended that po nutrition/hydration/meds are contraindicated  - Son requested comfort measures and allow for pleasure feeds.   - stated on pureed diet with mildly thickened liquids  #Chronic Afib  - Continue metoprolol 25mg po bid for now  - decreased Eliquis dose to 2.5mg po bid given patient's age, GFR.    #Fall.  - Patient was reportedly found on the ground by son on 6/25 after possibly slipping off his couch. Patient is on Eliquis.  - No external evidence of fall, no ecchymosis or other injuries noted on physical exam. Patient does not remember falling.  - CTH demonstrates no acute findings.  - fall precautions    #Lung cancer  - lung ca s/p resection of tumor from Left lung, s/p Rt Lung cryo therapy for recurrence of tumor  - CT chest:  Small airways disease with multifocal subsegmental bronchial impaction and tree-in-bud nodularity in the left greater than right lung.Irregular 3.4 cm right upper lobe subsolid lesion with 4 mm solid component, concerning for an adenocarcinoma spectrum lesion. Severe AS.  - patient does not follow regularly with a heme/onc or pulmonary doctor.    #PVD (peripheral vascular disease).  - pt with known history of PVD, obvious vascular changes on b/l lower extremities  - continue to monitor for s+s of limb ischemia    #Goals of care/advance care planning  - Palliative performance scale score: 20% (poor)  - Prognosis: days-weeks (pt is hospice eligible)   - Code status: DNR/DNI (MOLST form in the chart)  - GOC: comfort measures with comfort feeding. Home hospice upon discharge.   - HCP: pt's son  - Psychosocial support provided  - SW to start referral to hospice    Appreciate consult. Discussed with the primary team.    Alida Malloy MD, SHEEBA-C

## 2022-06-28 NOTE — DISCHARGE NOTE PROVIDER - CARE PROVIDER_API CALL
Inderjit Ahmadi  INTERNAL MEDICINE  [address] [place] [address]  Phone: [phone]  Fax: [phone]  Established Patient  Follow Up Time: 1 week

## 2022-06-28 NOTE — SWALLOW BEDSIDE ASSESSMENT ADULT - COMMENTS
Chart reviewed. Patient seen for initial swallow assessment on 6/27, at which time it was recommended that oral nutrition/hydration/medication is contraindicated (see report for details). Patient is currently on a pureed and mildly thick liquid diet level, as per MD order. As per Internal Medicine progress note 6/28: " Plan: S+S performed an eval 6/27 am and recommended that po nutrition/hydration/meds are contraindicated. Spoke with son and patient who agreed to start comfort measures and allow for pleasure feeds. -comfort measures only: no RRT, no vitals, no labs."     Called out to Dr. Wylie's Resident ([phone]) to discuss POC and discussed this department to sign-off on this time d/t the patient on comfort measures only/pleasure feeds. MD Resident in agreement with POC.
Consult received and chart reviewed. Patient seen at bedside this AM for initial assessment of swallow function, at which time he was alert and agreeable. Patient endorsed some R arm pain and RN was made aware. Patient demonstrates ability to follow simple commands. Wet congestive coughing noted at baseline; speech production WFL. Patient reported he tolerates a regular and thin liquid diet level at baseline without difficulty.    Per charting, the patient is a "89 yo male with a-fib (on Eliquis), lung ca s/p resection of tumor from Left lung, s/p Rt Lung cryo therapy for recurrence of tumor, PVD presents with shortness of breath, being admitted for acute on chronic respiratory failure likely multifactorial 2/2 undertreated HFrEF, severe AS, small airway disease and lung cancer."    WBC WFL.  CXR 6/26/22 revealed "Small airways disease with multifocal subsegmental bronchial impaction and tree-in-bud nodularity in the left greater than right lung. Irregular 3.4 cm right upper lobe subsolid lesion with 4 mm solid component, concerning for an adenocarcinoma spectrum lesion. Follow-up chest CT in 3-6 months, in the absence of prior imaging. Severe aortic valvular calcification which can be seen with aortic stenosis. Correlate with echocardiogram. Numerous pancreatic cystic nodules measuring up to 2.1 cm; consider MRI for further evaluation."  CT Head 6/26/22 revealed "No CT evidence of acute intracranial pathology."    Discussed results and recommendations with the patient, RN, and called out to MD.

## 2022-06-28 NOTE — PROGRESS NOTE ADULT - PROBLEM SELECTOR PLAN 9
Patient was reportedly found on the ground by son on 6/25 after possibly slipping off his couch. Patient is on Eliquis.  - No external evidence of fall, no ecchymosis or other injuries noted on physical exam. Patient does not remember falling.  - CTH demonstrates no acute findings.  - fall precautions  - continue to monitor CT chest: "Numerous pancreatic cystic nodules measuring up to 2.1 cm; consider MRI for further evaluation."  - will continue to monitor at this time, patient is asymptomatic and interested in possibly pursuing hospice care.

## 2022-06-28 NOTE — PROGRESS NOTE ADULT - PROBLEM SELECTOR PLAN 8
CT chest: "Numerous pancreatic cystic nodules measuring up to 2.1 cm; consider MRI for further evaluation."  - will continue to monitor at this time, patient is asymptomatic and interested in possibly pursuing hospice care. Chronic Anemia- ACD  2/2 Savannha &  Lung Ca

## 2022-06-28 NOTE — CONSULT NOTE ADULT - SUBJECTIVE AND OBJECTIVE BOX
HPI:  88 yo male with a-fib (on Eliquis), lung ca s/p resection of tumor from Left lung, s/p Rt Lung nodule Bx -Adeno Ca S/P  cryo therapy for recurrence of tumor at [facility], [place] presents with shortness of breath. Spoke with son on phone who is a physician and provided background information. Per son, patient has not wanted to see any doctors since he was discharged from the hospital after being admitted to [facility] in  for weeping cellulitis. Patient was to follow up with a cardiologist to discuss treatment of his severe AS, HFrEF however patient declined to follow-up and stated that he did not want to have his valve repaired. 1 month ago, patient has started eating and drinking less, taking his medications erratically so sons convinced patient to get a home health aid (pt lives alone). Son think spatient may have had a stroke. Patient has been more short of breath than usual over the past month and Dr. Ahmadi came on a house call to see the patient, an echo was reportedly performed which showed an EF of 40% and severe AS. Home health aid is present 8 hours per day. Yesterday morning, son came over patient's house to find patient face down on the floor after either falling or slipping off the couch. Son helped patient up and asked him if he wanted to go to the hospital at that time however patient declined. Patient was also noted to have increasing shortness of breath and persistent non-productive cough over the past ~week. This morning, patient's difficulty breathing and coughing got significantly worse after he woke up so patient told son that he wanted to go to the hospital. Patient denies fever, chills, chest pain, pleuritic pain, abdominal pain, changes in bowel habits, urinary difficulties, increasing edema of lower extremities or any other concerns. Son states patient is DNR/DNI, confirmed with patient at the bedside that he wants to be DNR/DNI however does not want to sign a MOLST at this time.    ED Course:  vitals - t 99.7 / bp 115/83 / hr 122 / spo2 97 on 3LNC / rr 18  labs - hgb 11.6, INR 2.57, BUN/Cr 40/2.30, probnp 6236 rvp negative  imaging - CT chest - Small airways disease with multifocal subsegmental bronchial impaction   and tree-in-bud nodularity in the left greater than right lung.Irregular 3.4 cm right upper lobe subsolid lesion with 4 mm solid   component, concerning for an adenocarcinoma spectrum lesion. Follow-up chest CT in 3-6 months, in the absence of prior imaging. Severe aortic valvular calcification which can be seen with aortic stenosis. Correlate with echocardiogram.Numerous pancreatic cystic nodules measuring up to 2.1 cm; consider MRI for further evaluation.  EKG - afib 110.  Given: Zosyn x1, 500cc bolus   (2022 15:02)    PERTINENT PM/SXH:   Atrial fibrillation, unspecified type    Atrial fibrillation    Lung cancer    PVD (peripheral vascular disease)    HF (heart failure)      S/P lobectomy of lung      FAMILY HISTORY:    ITEMS NOT CHECKED ARE NOT PRESENT    SOCIAL HISTORY:   Significant other/partner[ ]  Children[x ]  Yazdanism/Spirituality:  Substance hx:  [ ]   Tobacco hx:  [ ]   Alcohol hx: [ ]   (x) none  Home Opioid hx:  [ ] I-Stop Reference No:  Living Situation: [x ]Home  [ ]Long term care  [ ]Rehab [ ]Other    ADVANCE DIRECTIVES:    DNR  MOLST  [ x]  Living Will  [ ]   DECISION MAKER(s):  [ x] Health Care Proxy(s)  [ ] Surrogate(s)  [ ] Guardian           Name(s): Phone Number(s):    BASELINE (I)ADL(s) (prior to admission):  [place]: [ ]Total  [ ] Moderate [x ]Dependent    Allergies    No Known Allergies    Intolerances    MEDICATIONS  (STANDING):  ALBUTerol    0.083% 2.5 milliGRAM(s) Nebulizer every 12 hours  apixaban 2.5 milliGRAM(s) Oral every 12 hours  furosemide   Injectable 40 milliGRAM(s) IV Push daily  glycopyrrolate 2 milliGRAM(s) Oral three times a day  guaiFENesin Oral Liquid (Sugar-Free) 100 milliGRAM(s) Oral every 8 hours  metoprolol tartrate Injectable 2.5 milliGRAM(s) IV Push every 6 hours  sodium chloride 3%  Inhalation 4 milliLiter(s) Inhalation every 12 hours    MEDICATIONS  (PRN):  acetaminophen     Tablet .. 650 milliGRAM(s) Oral every 6 hours PRN Temp greater or equal to 38C (100.4F), Mild Pain (1 - 3)  aluminum hydroxide/magnesium hydroxide/simethicone Suspension 30 milliLiter(s) Oral every 4 hours PRN Dyspepsia  melatonin 3 milliGRAM(s) Oral at bedtime PRN Insomnia  ondansetron Injectable 4 milliGRAM(s) IV Push every 8 hours PRN Nausea and/or Vomiting    PRESENT SYMPTOMS: [x ]Unable to obtain due to poor mentation   Source if other than patient:  [ ]Family   [ ]Team     Pain: [ ]yes [ ]no  QOL impact -   Location -                    Aggravating factors -  Quality -  Radiation -  Timing-  Severity (0-10 scale):  Minimal acceptable level (0-10 scale):     CPOT:    https://www.[email or web address]/getattachment/[email or web address]/Critical-Care-Pain-Observation-Tool-(CPOT)      PAIN AD Score:     http://geriatrictoolkit.[email or web address]/painad.pdf (press ctrl +  left click to view)    Dyspnea:                           [ ]Mild [ ]Moderate [ ]Severe  Anxiety:                             [ ]Mild [ ]Moderate [ ]Severe  Fatigue:                             [ ]Mild [ ]Moderate [ ]Severe  Nausea:                             [ ]Mild [ ]Moderate [ ]Severe  Loss of appetite:              [ ]Mild [ ]Moderate [ ]Severe  Constipation:                    [ ]Mild [ ]Moderate [ ]Severe    Other Symptoms:  [ ]All other review of systems negative     Palliative Performance Status Version 2:         %    http://npcrc.org/files/news/palliative_performance_scale_ppsv2.pdf  PHYSICAL EXAM:  Vital Signs Last 24 Hrs  T(C): 36.9 (2022 12:57), Max: 36.9 (2022 12:57)  T(F): 98.5 (2022 12:57), Max: 98.5 (2022 12:57)  HR: 80 (2022 08:00) (71 - 84)  BP: 111/70 (2022 05:59) (95/61 - 111/70)  BP(mean): --  RR: 18 (2022 12:57) (18 - 18)  SpO2: 95% (2022 08:00) (95% - 95%) I&O's Summary    2022 07:01  -  2022 07:00  --------------------------------------------------------  IN: 0 mL / OUT: 1050 mL / NET: -1050 mL    GENERAL:  [ x ] NAD , [  x] well appearing, [  ] Agitated, [  ] Drowsy,  [  ] Lethargy, [  ] confused   HEAD:  [ x ] Normal, [  ] Other  EYES:  [  x] EOMI, [ x ] PERRLA, [ x ] conjunctiva and sclera clear normal, [  ] Other,  [  ] Pallor,[  ] Discharge  ENMT:  [ x ] Normal, [ x ] Dry mucous membranes, [ x ] Good dentition, [ x ] No Thrush  NECK:  [ x ] Supple, [x  ] No JVD, [ x ] Normal thyroid, [  ] Lymphadenopathy [  ] Other  CHEST/LUNG:  [ x ] Clear to auscultation bilaterally, [ x ] Breath Sounds equal B/L, [  ] poor effort  [ x ] No rales, [  x] No rhonchi  [x  ]  No wheezing,   HEART: [x] III/VI holosystolic murmur best heard RUSB [ x ] Regular rate and rhythm, [  ] tachycardia, [  ] Bradycardia,  [  ] irregular  [  ] No murmurs, No rubs, No gallops, [  ] PPM in place (Mfr:  )  ABDOMEN:  [ x ] Soft, [ x ] Nontender, [x  ] Nondistended, [ x ] No mass, [  x] Bowel sounds present, [  x] obese  NERVOUS SYSTEM:  [  x] Alert & Oriented X3, [x  ] Nonfocal  [  ] Confusion  [  ] Encephalopathic [  ] Sedated [  ] Unable to assess, [  ] Dementia [  ] Other-  EXTREMITIES: [x  ] 2+ Peripheral Pulses, No clubbing, No cyanosis,  [ x ] edema B/L lower EXT. [  x] PVD stasis skin changes B/L Lower EXT, [  ] wound  LYMPH: No lymphadenopathy noted  SKIN:  [  ] No rashes or lesions, [  ] Pressure Ulcers, [  ] ecchymosis, [  ] Skin Tears, [  ] Other      LABS:                        10.2   6.17  )-----------( 144      ( 2022 06:24 )             31.6       140  |  101  |  37<H>  ----------------------------<  108<H>  3.3<L>   |  33<H>  |  1.80<H>    Ca    8.8      2022 06:24  Phos  3.9       Mg     2.3         TPro  5.4<L>  /  Alb  2.7<L>  /  TBili  1.8<H>  /  DBili  x   /  AST  18  /  ALT  18  /  AlkPhos  39<L>    PT/INR - ( 2022 11:52 )   PT: 30.3 sec;   INR: 2.57 ratio         PTT - ( 2022 11:52 )  PTT:34.8 sec    Urinalysis Basic - ( 2022 05:00 )    Color: Yellow / Appearance: Clear / S.020 / pH: x  Gluc: x / Ketone: Negative  / Bili: Negative / Urobili: Negative   Blood: x / Protein: Negative / Nitrite: Negative   Leuk Esterase: Trace / RBC: 11-25 /HPF / WBC 0-2   Sq Epi: x / Non Sq Epi: Occasional / Bacteria: x      RADIOLOGY & ADDITIONAL STUDIES: reviewed    PROTEIN CALORIE MALNUTRITION PRESENT: [ ]mild [ ]moderate [ ]severe [ ]underweight [ ]morbid obesity  https://www.andeal.org/vault/2440/web/files/ONC/Table_Clinical%20Characteristics%20to%20Document%20Malnutrition-White%20JV%20et%20al%2020.pdf    Height (cm): 175.3 (22 @ 10:56)  Weight (kg): 77.1 (22 @ 10:56)  BMI (kg/m2): 25.1 (22 @ 10:56)    [x ]PPSV2 < or = to 30% [ ]significant weight loss  [ ]poor nutritional intake  [ ]anasarca      [ ]Artificial Nutrition      REFERRALS:   [ ]Chaplaincy  [x ]Hospice  [ ]Child Life  [ ]Social Work  [ ]Case management [ ]Holistic Therapy     Goals of Care Document:

## 2022-06-28 NOTE — PROGRESS NOTE ADULT - PROBLEM SELECTOR PLAN 10
lung ca s/p resection of tumor from Left lung, s/p Rt Lung cryo therapy for recurrence of tumor  CT chest:  Small airways disease with multifocal subsegmental bronchial impaction   and tree-in-bud nodularity in the left greater than right lung.Irregular 3.4 cm right upper lobe subsolid lesion with 4 mm solid component, concerning for an adenocarcinoma spectrum lesion. Severe AS.  - patient does not follow regularly with a heme/onc or pulmonary doctor.  - pulmonology (Dr. Franco) recs appreciated Patient was reportedly found on the ground by son on 6/25 after possibly slipping off his couch. Patient is on Eliquis.  - No external evidence of fall, no ecchymosis or other injuries noted on physical exam. Patient does not remember falling.  - CTH demonstrates no acute findings.  - fall precautions  - continue to monitor

## 2022-06-28 NOTE — PROGRESS NOTE ADULT - PROBLEM SELECTOR PLAN 4
S+S performed an eval 6/27 am and recommended that po nutrition/hydration/meds are contraindicated. Spoke with son and patient who agreed to start comfort measures and allow for pleasure feeds.   - comfort measures only: no RRT, no vitals, no labs.  - Oral hygiene  - suction prn  - stated on pureed diet with mildly thickened liquids, will continue to give AC orally for now; metoprolol and lasix given IV. S+S performed an eval 6/27 am and recommended that po nutrition/hydration/meds are contraindicated. Spoke with son and patient who agreed to start comfort measures and allow for pleasure feeds.   - comfort measures only: no RRT, no vitals, no labs.  - Oral hygiene  - suction prn  -Pt on comfort feed - on pureed diet with mildly thickened liquids, will continue to give AC orally for now; metoprolol and lasix given IV.

## 2022-06-28 NOTE — PROGRESS NOTE ADULT - PROBLEM SELECTOR PLAN 11
pt with known history of PVD, obvious vascular changes on b/l lower extremities  - continue to monitor for s+s of limb ischemia  - pulses intact at this time lung ca s/p resection of tumor from Left lung, s/p Rt Lung cryo therapy for recurrence of tumor  CT chest:  Small airways disease with multifocal subsegmental bronchial impaction   and tree-in-bud nodularity in the left greater than right lung.Irregular 3.4 cm right upper lobe subsolid lesion with 4 mm solid component, concerning for an adenocarcinoma spectrum lesion. Severe AS.  - patient does not follow regularly with a heme/onc or pulmonary doctor.  - pulmonology (Dr. Franco) recs appreciated

## 2022-06-28 NOTE — PROGRESS NOTE ADULT - PROBLEM SELECTOR PLAN 5
Chronic Afib  EKG is afib 110  - Continue metoprolol 25mg po bid for now  - decreased Eliquis dose to 2.5mg po bid given patient's age, GFR.  - Cardiology ([facility]) consulted, recs appreciated a 16f catheter was placed in the ED for urinary retention and was continued during the admission.  - patient is being discharged with the catheter which should be replaced q4 weeks

## 2022-06-28 NOTE — PROGRESS NOTE ADULT - PROBLEM SELECTOR PLAN 1
likely pre-renal azotemia in the setting of poor perfusion from heart failure (FEUrea 13.4%)  - Cr 2.30 -> 1.8. Cr was ~.9 baseline in 2020  - s/p bolus 500cc NS in the ED.   - started lasix 40mg ivp qd  - diuresis underway, monitor renal indices closely  - espinoza inserted draining yellow urine  - Monitor BMP  - Avoid nephrotoxic medications  - urine urea 244, urine protein 8, Eos negative, urine sodium 49, urine cr 105, urine K 39.2.  - CT renal stone hunt demonstrates no renal stone or hydronephrosis.  - urinalysis w/ large blood   - UCx BCx pending, f/u  - nephro consult ([facility]) recs appreciated likely pre-renal azotemia in the setting of poor perfusion from heart failure (FEUrea 13.4%)  - Cr 2.30 -> 1.8. Cr was ~.9 baseline in 2020  -unclear baseline since 2 years , pt did not follow up with MD   - On lasix 40mg ivp qd  - espinoza in place   - urine urea 244, urine protein 8, Eos negative, urine sodium 49, urine cr 105, urine K 39.2.  - CT renal stone hunt demonstrates no renal stone or hydronephrosis.  - urinalysis w/ large blood   - UCx BCx -Neg so far  - nephro follow up, Pt is going home on [facility]

## 2022-06-28 NOTE — DISCHARGE NOTE PROVIDER - NSDCACTIVITY_GEN_ALL_CORE
No restrictions No restrictions/Do not drive or operate machinery/Do not make important decisions/Walking - Indoors allowed/No heavy lifting/straining

## 2022-06-28 NOTE — SWALLOW BEDSIDE ASSESSMENT ADULT - SLP PERTINENT HISTORY OF CURRENT PROBLEM
Per charting, the patient is a "88 yo male with a-fib (on Eliquis), lung ca s/p resection of tumor from Left lung, s/p Rt Lung cryo therapy for recurrence of tumor, PVD presents with shortness of breath, being admitted for acute on chronic respiratory failure likely multifactorial 2/2 undertreated HFrEF, severe AS, small airway disease and lung cancer."
r/o dysphagia

## 2022-06-28 NOTE — PROGRESS NOTE ADULT - SUBJECTIVE AND OBJECTIVE BOX
[facility] -- Radha Boyle, Vonnie, Lilibeth, Joel Pulido Savella, Goodger  Office # [phone]    Follow Up:  Afib, SOB, Systolic HF    Subjective/Observations: Awake and alert, admits to have slept well overnight, though, felt that he was jumpy after a medication.  Denies any form of respiratory or cardiac discomfort.  Tele events noted    REVIEW OF SYSTEMS: All other review of systems is negative unless indicated above  PAST MEDICAL & SURGICAL HISTORY:  Atrial fibrillation, unspecified type    Atrial fibrillation  on AC    Lung cancer  Left lung Sx for removal of Tumor, Rt Lung Cryo therapy for recurrence    PVD (peripheral vascular disease)    HF (heart failure)  Chronic Systolic CHF    S/P lobectomy of lung  Tumor removal from Lower Lobe, NO CT, NO RT  Adeno ca    MEDICATIONS  (STANDING):  ALBUTerol    0.083% 2.5 milliGRAM(s) Nebulizer every 12 hours  apixaban 2.5 milliGRAM(s) Oral every 12 hours  furosemide   Injectable 40 milliGRAM(s) IV Push daily  glycopyrrolate 2 milliGRAM(s) Oral three times a day  guaiFENesin Oral Liquid (Sugar-Free) 100 milliGRAM(s) Oral every 8 hours  metoprolol tartrate Injectable 2.5 milliGRAM(s) IV Push every 6 hours  sodium chloride 3%  Inhalation 4 milliLiter(s) Inhalation every 12 hours    MEDICATIONS  (PRN):  acetaminophen     Tablet .. 650 milliGRAM(s) Oral every 6 hours PRN Temp greater or equal to 38C (100.4F), Mild Pain (1 - 3)  aluminum hydroxide/magnesium hydroxide/simethicone Suspension 30 milliLiter(s) Oral every 4 hours PRN Dyspepsia  melatonin 3 milliGRAM(s) Oral at bedtime PRN Insomnia  ondansetron Injectable 4 milliGRAM(s) IV Push every 8 hours PRN Nausea and/or Vomiting    Allergies    No Known Allergies    Intolerances    Vital Signs Last 24 Hrs  T(C): 36.9 (27 Jun 2022 12:57), Max: 36.9 (27 Jun 2022 12:57)  T(F): 98.5 (27 Jun 2022 12:57), Max: 98.5 (27 Jun 2022 12:57)  HR: 80 (28 Jun 2022 08:00) (71 - 84)  BP: 111/70 (28 Jun 2022 05:59) (95/61 - 111/70)  BP(mean): --  RR: 18 (27 Jun 2022 12:57) (18 - 18)  SpO2: 95% (28 Jun 2022 08:00) (95% - 95%)  I&O's Summary    27 Jun 2022 07:01  -  28 Jun 2022 07:00  --------------------------------------------------------  IN: 0 mL / OUT: 1050 mL / NET: -1050 mL    PHYSICAL EXAM:  TELE: Afib  Constitutional: NAD, awake and alert, well-developed  HEENT: Moist Mucous Membranes, Anicteric  Pulmonary: Non-labored, breath sounds are clear bilaterally, No wheezing, rales or rhonchi  Cardiovascular: IRRR, S1 and S2, +murmurs, no rubs, gallops or clicks  Gastrointestinal: Bowel Sounds present, soft, nontender.   Lymph: Trace BLE edema. No lymphadenopathy.  Skin: No visible rashes or ulcers.  +ecchymoses BUE  Psych:  Mood & affect appropriate  LABS: All Labs Reviewed:                        10.2   6.17  )-----------( 144      ( 27 Jun 2022 06:24 )             31.6                         11.6   7.55  )-----------( 196      ( 26 Jun 2022 11:52 )             35.5     27 Jun 2022 06:24    140    |  101    |  37     ----------------------------<  108    3.3     |  33     |  1.80   26 Jun 2022 11:52    135    |  98     |  40     ----------------------------<  125    3.6     |  25     |  2.30     Ca    8.8        27 Jun 2022 06:24  Ca    9.4        26 Jun 2022 11:52  Phos  3.9       27 Jun 2022 06:24  Mg     2.3       27 Jun 2022 06:24    TPro  5.4    /  Alb  2.7    /  TBili  1.8    /  DBili  x      /  AST  18     /  ALT  18     /  AlkPhos  39     27 Jun 2022 06:24  TPro  6.3    /  Alb  3.4    /  TBili  2.1    /  DBili  x      /  AST  24     /  ALT  20     /  AlkPhos  44     26 Jun 2022 11:52    PT/INR - ( 26 Jun 2022 11:52 )   PT: 30.3 sec;   INR: 2.57 ratio      PTT - ( 26 Jun 2022 11:52 )  PTT:34.8 sec       EXAM:  ECHO TTE WO CON COMP W DOPP         PROCEDURE DATE:  06/21/2020        INTERPRETATION:  INDICATION: Heart failure  Sonographer AS    Blood Pressure 124/57    Height unavailable     Weight 83.9 kg    Dimensions:    LA 3.8       Normal Values: 2.0 - 4.0 cm    Ao 3.0        Normal Values: 2.0 - 3.8 cm  SEPTUM 1.2       Normal Values: 0.6 - 1.2 cm  PWT 1.3       Normal Values: 0.6 - 1.1 cm  LVIDd 4.7         Normal Values: 3.0 - 5.6 cm  LVIDs Normal Values: 1.8 - 4.0 cm      OBSERVATIONS:  Technically difficult study  Mitral Valve: Thickened leaflets, mild to moderate MR.  Aortic Valve/Aorta: Calcified trileaflet aortic valve with decreased opening. Peak transaortic valve gradient of 72.9 mmHg with a mean transaortic valve gradient of 41 mmHg. The aortic valve area is calculated to be 0.36 sq cm. This is consistent with severe aortic stenosis  Tricuspid Valve: normal with mild TR.  Pulmonic Valve: Trace PI  Left Atrium: normal  Right Atrium: Not well visualized  Left Ventricle: Moderate global left ventricular systolic dysfunction with an estimated LVEF of 40%. Mild LVH  Right Ventricle: Grossly normal size and systolic function.  Pericardium/Pleura: normal, no significant pericardial effusion.  Pulmonary/RV Pressure: estimated PA systolic pressure of 28.4mmHg assuming an RA pressure of 3 mmHg.    Conclusion:   Technically difficult study  Mild concentric LVH with moderate global left ventricular systolic dysfunction with an estimated LVEF of 40%  Grossly normal RV size and systolic function.   Calcified trileaflet aortic valve with severe aortic stenosis  Mild to moderate MR   Mild TR.     No significant pericardial effusion.      KAIRE LONGO   This document has been electronically signed. Jun 22 2020  1:08PM      ACC: [id number] EXAM:  XR CHEST PORTABLE URGENT 1V                          PROCEDURE DATE:  06/26/2022      INTERPRETATION:  Portable AP Radiograph dated 6/26/2022 12:34 PM    CLINICAL INFORMATION: 89 years, Male, cough    PRIOR STUDIES: Chest x-ray 6/20/2020    FINDINGS:  Lines, Catheters and Support Devices: None.    Heart Size, Mediastinum and Hilar Contours: Heart size is normal. Normal   mediastinal and hilar contours. Aortic arch calcification is again seen.    Lungs: Status post left lung resection, unchanged. No focal   consolidation. Linear atelectasis within the right mid/upper lung zone.   No pleural effusions. No pneumothorax.    Bones/Soft Tissues: No acute abnormalities of the soft tissues and   osseous structures. Discontinuity of the posterior left sixth rib,   findings are similar to 6/20/2020.    IMPRESSION:  No focal consolidation.    --- End of Report ---    ENZO THOMPSON M.D., ATTENDING INTERVENTIONAL RADIOLOGIST  This document has been electronically signed. Jun 27 2022  5:40PM    Ventricular Rate 110 BPM    Atrial Rate 117 BPM    QRS Duration 110 ms    Q-T Interval 344 ms    QTC Calculation(Bazett) 465 ms    P Axis 63 degrees    R Axis -13 degrees    T Axis 167 degrees    Diagnosis Line Atrial fibrillation  Minimal voltage criteria for LVH, may be normal variant ( Bon product )  Marked ST abnormality, possible inferior subendocardial injury  Abnormal ECG  No previous ECGs available  Confirmed by boo Barnett ([id number]) on 6/26/2022 3:16:31 PM

## 2022-06-28 NOTE — DISCHARGE NOTE NURSING/CASE MANAGEMENT/SOCIAL WORK - NSDCPEFALRISK_GEN_ALL_CORE
For information on Fall & Injury Prevention, visit: [email or web address] OR  [email or web address] OR  https://www.cdc.gov/steadi/patient.html

## 2022-06-28 NOTE — PROGRESS NOTE ADULT - NS ATTEND AMEND GEN_ALL_CORE FT
Breathing status better, and can switch to daily lasix  remains with labile hr, and would titrate bb as able

## 2022-06-28 NOTE — PROGRESS NOTE ADULT - PROBLEM SELECTOR PLAN 3
Patient presents with worsening shortness of breath and cough likely multifactorial 2/2 undertreated HFrEF w/ severe AS, lung cancer and small airway disease.  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 Severe AS.  - received 500cc bolus in ED however patient appears overloaded on exam  - Continue lasix 40mg ivp qd per cardio recs. Close monitoring of renal fxn.  - s/p zosyn x1 in ED, no need to continue ABX at this time no s/s of active infection.  - Continue home metoprolol 25mg po bid  - albuterol 2.5mg neb q8h  - Supplemental O2 to maintain O2 sat >90%.  - initial trop 64.8, will f/u repeat.  - tte ordered, F/u  - espinoza catheter  - Strict I/Os, daily weights  - Monitor and replace electrolytes  - Cardiology consulted ([facility]), recs appreciated  - pulm consulted (noe), recs appreciated  - nephro ([facility]ente), recs appreciated  - palliative care consult ([facility]), f/u recs Patient presents with worsening shortness of breath and cough likely multifactorial 2/2 undertreated HFrEF w/ severe AS, lung cancer and small airway disease.  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 Severe AS.  - received 500cc bolus in ED however patient appears overloaded on exam  - Continue lasix 40mg ivp qd per cardio while in hospital   - s/p zosyn x1 in ED, no need to continue ABX at this time no s/s of active infection.  - albuterol 2.5mg neb q8h  - Supplemental O2 to maintain O2 sat >90%.  - pultianna [place]  (noe), recs appreciated

## 2022-06-28 NOTE — DISCHARGE NOTE PROVIDER - HOSPITAL COURSE
ADMISSION DATE:  06-26-22    ---  FROM ADMISSION H+P:   HPI:  89 yo male with a-fib (on Eliquis), lung ca s/p resection of tumor from Left lung, s/p Rt Lung nodule Bx -Adeno Ca S/P  cryo therapy for recurrence of tumor at [facility], PVD presents with shortness of breath. Spoke with son on phone who is a physician and provided background information. Per son, patient has not wanted to see any doctors since he was discharged from the hospital after being admitted to [facility] in 2020 for weeping cellulitis. Patient was to follow up with a cardiologist to discuss treatment of his severe AS, HFrEF however patient declined to follow-up and stated that he did not want to have his valve repaired. 1 month ago, patient has started eating and drinking less, taking his medications erratically so sons convinced patient to get a home health aid (pt lives alone). Son think spatient may have had a stroke. Patient has been more short of breath than usual over the past month and Dr. Ahmadi came on a house call to see the patient, an echo was reportedly performed which showed an EF of 40% and severe AS. Home health aid is present 8 hours per day. Yesterday morning, son came over patient's house to find patient face down on the floor after either falling or slipping off the couch. Son helped patient up and asked him if he wanted to go to the hospital at that time however patient declined. Patient was also noted to have increasing shortness of breath and persistent non-productive cough over the past ~week. This morning, patient's difficulty breathing and coughing got significantly worse after he woke up so patient told son that he wanted to go to the hospital. Patient denies fever, chills, chest pain, pleuritic pain, abdominal pain, changes in bowel habits, urinary difficulties, increasing edema of lower extremities or any other concerns. Son states patient is DNR/DNI, confirmed with patient at the bedside that he wants to be DNR/DNI however does not want to sign a MOLST at this time.    ED Course:  vitals - t 99.7 / bp 115/83 / hr 122 / spo2 97 on 3LNC / rr 18  labs - hgb 11.6, INR 2.57, BUN/Cr 40/2.30, probnp 6236 rvp negative  imaging - CT chest - Small airways disease with multifocal subsegmental bronchial impaction   and tree-in-bud nodularity in the left greater than right lung.Irregular 3.4 cm right upper lobe subsolid lesion with 4 mm solid   component, concerning for an adenocarcinoma spectrum lesion. Follow-up chest CT in 3-6 months, in the absence of prior imaging. Severe aortic valvular calcification which can be seen with aortic stenosis. Correlate with echocardiogram.Numerous pancreatic cystic nodules measuring up to 2.1 cm; consider MRI for further evaluation.  EKG - afib 110.  Given: Zosyn x1, 500cc bolus   (26 Jun 2022 15:02)      ---  HOSPITAL COURSE/PERTINENT LABS/PROCEDURES PERFORMED/PENDING TESTS:    ---  PATIENT CONDITION:  - stable    ---  PHYSICAL EXAM ON DAY OF DISCHARGE:    ---  CONSULTANTS:     ---  ADVANCED CARE PLANNING:  - Code status:      - MOLST completed:      [  ] NO     [  ] YES    ---  TIME SPENT:  I, the attending physician, was physically present for the key portions of the evaluation and management (E/M) service provided. The total amount of time spent reviewing the hospital notes, laboratory values, imaging findings, assessing/counseling the patient, discussing with consultant physicians, social work, nursing staff was -- minutes ADMISSION DATE:  06-26-22    ---  FROM ADMISSION H+P:   HPI:  89 yo male with a-fib (on Eliquis), lung ca s/p resection of tumor from Left lung, s/p Rt Lung nodule Bx -Adeno Ca S/P  cryo therapy for recurrence of tumor at [facility], PVD presents with shortness of breath. Spoke with son on phone who is a physician and provided background information. Per son, patient has not wanted to see any doctors since he was discharged from the hospital after being admitted to [facility] in 2020 for weeping cellulitis. Patient was to follow up with a cardiologist to discuss treatment of his severe AS, HFrEF however patient declined to follow-up and stated that he did not want to have his valve repaired. 1 month ago, patient has started eating and drinking less, taking his medications erratically so sons convinced patient to get a home health aid (pt lives alone). Son think spatient may have had a stroke. Patient has been more short of breath than usual over the past month and Dr. Ahmadi came on a house call to see the patient, an echo was reportedly performed which showed an EF of 40% and severe AS. Home health aid is present 8 hours per day. Yesterday morning, son came over patient's house to find patient face down on the floor after either falling or slipping off the couch. Son helped patient up and asked him if he wanted to go to the hospital at that time however patient declined. Patient was also noted to have increasing shortness of breath and persistent non-productive cough over the past ~week. This morning, patient's difficulty breathing and coughing got significantly worse after he woke up so patient told son that he wanted to go to the hospital. Patient denies fever, chills, chest pain, pleuritic pain, abdominal pain, changes in bowel habits, urinary difficulties, increasing edema of lower extremities or any other concerns. Son states patient is DNR/DNI, confirmed with patient at the bedside that he wants to be DNR/DNI however does not want to sign a MOLST at this time.    ED Course:  vitals - t 99.7 / bp 115/83 / hr 122 / spo2 97 on 3LNC / rr 18  labs - hgb 11.6, INR 2.57, BUN/Cr 40/2.30, probnp 6236 rvp negative  imaging - CT chest - Small airways disease with multifocal subsegmental bronchial impaction   and tree-in-bud nodularity in the left greater than right lung.Irregular 3.4 cm right upper lobe subsolid lesion with 4 mm solid   component, concerning for an adenocarcinoma spectrum lesion. Follow-up chest CT in 3-6 months, in the absence of prior imaging. Severe aortic valvular calcification which can be seen with aortic stenosis. Correlate with echocardiogram.Numerous pancreatic cystic nodules measuring up to 2.1 cm; consider MRI for further evaluation.  EKG - afib 110.  Given: Zosyn x1, 500cc bolus   (26 Jun 2022 15:02)      ---  HOSPITAL COURSE/PERTINENT LABS/PROCEDURES PERFORMED/PENDING TESTS: Patient was admitted for further management of heart failure and acute kidney injury. Patient was fluid overloaded and started on IV lasix. For patient's shortness of breath, pt started on albuterol nebs. A espinoza catheter was placed. A ct renal stone hunt was performed to r/o hydro/stones and was negative. A Head CT was ordered as patient had fallen in the setting of AC and was negative for acute pathology. Troponin was trended and remained negative. Urine culture demonstated no growth, Blood cultures x2 were NGTD at time of discharge. Urine lytes were taken and demonstrated that the dangelo was likely pre-renal azotemia 2/2 decreased perfusion from heart failure. Patient was noted to have difficulty clearing secretions, swallowing po nutrition/hydration/meds so was placed NPO. S+S was consulted who deemed that it was unsafe for the patient to continue taking PO. After conversation between patient, son Álvaro, palliative care team, and primary team patient was made comfort care w/ plans for home hospice. Eliquis was continued, metoprolol was continued. A pureed diet w/ mildly thickened liquids was started. Glycopyrrolate was added for secretion management. Patient was seen and examined on date of discharge and was deemed stable for discharge home w/ hospice.      ---  PATIENT CONDITION:  - stable    ---  PHYSICAL EXAM ON DAY OF DISCHARGE:    ---  CONSULTANTS:   nephro - [facility]  cardiology - [facility]  Pultianna Franco  Palliative care Maggie Gaxiola    ---  ADVANCED CARE PLANNING:  - Code status:      - MOLST completed:      [  ] NO     [  ] YES    ---  TIME SPENT:  I, the attending physician, was physically present for the key portions of the evaluation and management (E/M) service provided. The total amount of time spent reviewing the hospital notes, laboratory values, imaging findings, assessing/counseling the patient, discussing with consultant physicians, social work, nursing staff was -- minutes ADMISSION DATE:  06-26-22    ---  FROM ADMISSION H+P:   HPI:  88 yo male with a-fib (on Eliquis), lung ca s/p resection of tumor from Left lung, s/p Rt Lung nodule Bx -Adeno Ca S/P  cryo therapy for recurrence of tumor at [facility], PVD presents with shortness of breath. Spoke with son on phone who is a physician and provided background information. Per son, patient has not wanted to see any doctors since he was discharged from the hospital after being admitted to [facility] in 2020 for weeping cellulitis. Patient was to follow up with a cardiologist to discuss treatment of his severe AS, HFrEF however patient declined to follow-up and stated that he did not want to have his valve repaired. 1 month ago, patient has started eating and drinking less, taking his medications erratically so sons convinced patient to get a home health aid (pt lives alone). Son think spatient may have had a stroke. Patient has been more short of breath than usual over the past month and Dr. Ahmadi came on a house call to see the patient, an echo was reportedly performed which showed an EF of 40% and severe AS. Home health aid is present 8 hours per day. Yesterday morning, son came over patient's house to find patient face down on the floor after either falling or slipping off the couch. Son helped patient up and asked him if he wanted to go to the hospital at that time however patient declined. Patient was also noted to have increasing shortness of breath and persistent non-productive cough over the past ~week. This morning, patient's difficulty breathing and coughing got significantly worse after he woke up so patient told son that he wanted to go to the hospital. Patient denies fever, chills, chest pain, pleuritic pain, abdominal pain, changes in bowel habits, urinary difficulties, increasing edema of lower extremities or any other concerns. Son states patient is DNR/DNI, confirmed with patient at the bedside that he wants to be DNR/DNI however does not want to sign a MOLST at this time.    ED Course:  vitals - t 99.7 / bp 115/83 / hr 122 / spo2 97 on 3LNC / rr 18  labs - hgb 11.6, INR 2.57, BUN/Cr 40/2.30, probnp 6236 rvp negative  imaging - CT chest - Small airways disease with multifocal subsegmental bronchial impaction   and tree-in-bud nodularity in the left greater than right lung.Irregular 3.4 cm right upper lobe subsolid lesion with 4 mm solid   component, concerning for an adenocarcinoma spectrum lesion. Follow-up chest CT in 3-6 months, in the absence of prior imaging. Severe aortic valvular calcification which can be seen with aortic stenosis. Correlate with echocardiogram.Numerous pancreatic cystic nodules measuring up to 2.1 cm; consider MRI for further evaluation.  EKG - afib 110.  Given: Zosyn x1, 500cc bolus   (26 Jun 2022 15:02)      ---  HOSPITAL COURSE/PERTINENT LABS/PROCEDURES PERFORMED/PENDING TESTS: Patient was admitted for further management of heart failure and acute kidney injury. Patient was fluid overloaded and started on IV lasix. For patient's shortness of breath, pt started on albuterol nebs. A espinoza catheter was placed. A ct renal stone hunt was performed to r/o hydro/stones and was negative. A Head CT was ordered as patient had fallen in the setting of AC and was negative for acute pathology. Troponin was trended and remained negative. Urine culture demonstated no growth, Blood cultures x2 were NGTD at time of discharge. Urine lytes were taken and demonstrated that the dangelo was likely pre-renal azotemia 2/2 decreased perfusion from heart failure. Patient was noted to have difficulty clearing secretions, swallowing po nutrition/hydration/meds so was placed NPO. S+S was consulted who deemed that it was unsafe for the patient to continue taking PO. After conversation between patient, son Álvaro, palliative care team, and primary team patient was made comfort care w/ plans for home hospice. Eliquis was continued, metoprolol was continued. A pureed diet w/ mildly thickened liquids was started. Glycopyrrolate was added for secretion management. Patient was seen and examined on date of discharge and was deemed stable for discharge home w/ hospice.      ---  PATIENT CONDITION:  - stable    ---  PHYSICAL EXAM ON DAY OF DISCHARGE:  GENERAL:  [ x ] NAD , [  x] well appearing, [  ] Agitated, [  ] Drowsy,  [  ] Lethargy, [  ] confused   HEAD:  [ x ] Normal, [  ] Other  EYES:  [  x] EOMI, [ x ] PERRLA, [ x ] conjunctiva and sclera clear normal, [  ] Other,  [  ] Pallor,[  ] Discharge  ENMT:  [ x ] Normal, [ x ] Dry mucous membranes, [ x ] Good dentition, [ x ] No Thrush  NECK:  [ x ] Supple, [x  ] No JVD, [ x ] Normal thyroid, [  ] Lymphadenopathy [  ] Other  CHEST/LUNG:  [ x ] Clear to auscultation bilaterally, [ x ] Breath Sounds equal B/L, [  ] poor effort  [ x ] No rales, [  x] No rhonchi  [x  ]  No wheezing,   HEART: [x] III/VI holosystolic murmur best heard RUSB [ x ] Regular rate and rhythm, [  ] tachycardia, [  ] Bradycardia,  [  ] irregular  [  ] No murmurs, No rubs, No gallops, [  ] PPM in place (Mfr:  )  ABDOMEN:  [ x ] Soft, [ x ] Nontender, [x  ] Nondistended, [ x ] No mass, [  x] Bowel sounds present, [  x] obese  NERVOUS SYSTEM:  [  x] Alert & Oriented X3, [x  ] Nonfocal  [  ] Confusion  [  ] Encephalopathic [  ] Sedated [  ] Unable to assess, [  ] Dementia [  ] Other-  EXTREMITIES: [x  ] 2+ Peripheral Pulses, No clubbing, No cyanosis,  [ x ] edema B/L lower EXT - improving. [  x] PVD stasis skin changes B/L Lower EXT, [  ] wound  LYMPH: No lymphadenopathy noted  SKIN:  [  ] No rashes or lesions, [  ] Pressure Ulcers, [  ] ecchymosis, [  ] Skin Tears, [  ] Other        ---  CONSULTANTS:   nephro - [facility]  cardiology - [facility]  [facility]  Palliative care  [facility]    ---  ADVANCED CARE PLANNING:  - Code status:      - MOLST completed:      [  ] NO     [  x] YES    ---  TIME SPENT:  I, the attending physician, was physically present for the key portions of the evaluation and management (E/M) service provided. The total amount of time spent reviewing the hospital notes, laboratory values, imaging findings, assessing/counseling the patient, discussing with consultant physicians, social work, nursing staff was -- minutes ADMISSION DATE:  06-26-22    ---  FROM ADMISSION H+P:   HPI:  89 yo male with a-fib (on Eliquis), lung ca s/p resection of tumor from Left lung, s/p Rt Lung nodule Bx -Adeno Ca S/P  cryo therapy for recurrence of tumor at [facility], PVD presents with shortness of breath. Spoke with son on phone who is a physician and provided background information. Per son, patient has not wanted to see any doctors since he was discharged from the hospital after being admitted to [facility] in 2020 for weeping cellulitis. Patient was to follow up with a cardiologist to discuss treatment of his severe AS, HFrEF however patient declined to follow-up and stated that he did not want to have his valve repaired. 1 month ago, patient has started eating and drinking less, taking his medications erratically so sons convinced patient to get a home health aid (pt lives alone). Son think spatient may have had a stroke. Patient has been more short of breath than usual over the past month and Dr. Ahmadi came on a house call to see the patient, an echo was reportedly performed which showed an EF of 40% and severe AS. Home health aid is present 8 hours per day. Yesterday morning, son came over patient's house to find patient face down on the floor after either falling or slipping off the couch. Son helped patient up and asked him if he wanted to go to the hospital at that time however patient declined. Patient was also noted to have increasing shortness of breath and persistent non-productive cough over the past ~week. This morning, patient's difficulty breathing and coughing got significantly worse after he woke up so patient told son that he wanted to go to the hospital. Patient denies fever, chills, chest pain, pleuritic pain, abdominal pain, changes in bowel habits, urinary difficulties, increasing edema of lower extremities or any other concerns. Son states patient is DNR/DNI, confirmed with patient at the bedside that he wants to be DNR/DNI however does not want to sign a MOLST at this time.    ED Course:  vitals - t 99.7 / bp 115/83 / hr 122 / spo2 97 on 3LNC / rr 18  labs - hgb 11.6, INR 2.57, BUN/Cr 40/2.30, probnp 6236 rvp negative  imaging - CT chest - Small airways disease with multifocal subsegmental bronchial impaction   and tree-in-bud nodularity in the left greater than right lung.Irregular 3.4 cm right upper lobe subsolid lesion with 4 mm solid   component, concerning for an adenocarcinoma spectrum lesion. Follow-up chest CT in 3-6 months, in the absence of prior imaging. Severe aortic valvular calcification which can be seen with aortic stenosis. Correlate with echocardiogram.Numerous pancreatic cystic nodules measuring up to 2.1 cm; consider MRI for further evaluation.  EKG - afib 110.  Given: Zosyn x1, 500cc bolus   (26 Jun 2022 15:02)      ---  HOSPITAL COURSE/PERTINENT LABS/PROCEDURES PERFORMED/PENDING TESTS: Patient was admitted for further management of heart failure and acute kidney injury. Patient was fluid overloaded and started on IV lasix. For patient's shortness of breath, pt started on albuterol nebs. A espinoza catheter was placed. A ct renal stone hunt was performed to r/o hydro/stones and was negative. A Head CT was ordered as patient had fallen in the setting of AC and was negative for acute pathology. Troponin was trended and remained negative. Urine culture demonstated no growth, Blood cultures x2 were NGTD at time of discharge. Urine lytes were taken and demonstrated that the dangelo was likely pre-renal azotemia 2/2 decreased perfusion from heart failure. Patient was noted to have difficulty clearing secretions, swallowing po nutrition/hydration/meds so was placed NPO. S+S was consulted who deemed that it was unsafe for the patient to continue taking PO. After conversation between patient, son Álvaro, palliative care team, and primary team patient was made comfort care w/ plans for home hospice. Eliquis was continued, metoprolol was continued. A pureed diet w/ mildly thickened liquids was started. Glycopyrrolate was added for secretion management. Patient was seen and examined on date of discharge and was deemed stable for discharge home w/ hospice. DNR/DNI      ---  PATIENT CONDITION:  - stable        ---  CONSULTANTS:   nephro - [facility]  cardiology - [facility]  [facility] - [facility]  Palliative care Maggie Gaxiola    ---  ADVANCED CARE PLANNING:  - Code status:      - MOLST completed:      [  ] NO     [  x] YES    ---  TIME SPENT:  I, the attending physician, was physically present for the key portions of the evaluation and management (E/M) service provided. The total amount of time spent reviewing the hospital notes, laboratory values, imaging findings, assessing/counseling the patient, discussing with consultant physicians, social work, nursing staff was 60 minutes

## 2022-06-28 NOTE — PROGRESS NOTE ADULT - PROVIDER SPECIALTY LIST ADULT
Cardiology
Nephrology
Nephrology
Cardiology
Pulmonology
Pulmonology
Internal Medicine
Internal Medicine

## 2022-06-28 NOTE — PROGRESS NOTE ADULT - ATTENDING COMMENTS
88 yo male with a-fib (on Eliquis), lung ca s/p resection of tumor from Left lung, s/p Rt Lung cryo therapy for recurrence of tumor, PVD presents with shortness of breath, being admitted for acute on chronic respiratory failure likely multifactorial 2/2 undertreated HFrEF, severe AS, small airway disease and lung cancer.  Pt seen, examined, case & care plan d/w pt, residents at [facility].   DNR/DNI, On comfort feed  -D/W Social service- -Hospice Eval done  Cardio-D/W Dr Barnett- Pt to go home with Lasix 20 mg daily   -D/W Palliative care RN & Dr Meléndez -Pt  D/C with Home hospice , MOLST in place  D/W RN -Biggs cath placed for  urinary Retention,    D/W Son  Álvaro Michael at [facility]- D/C home today    -Pt is now on comfort Care   -Poor prognosis.  -D/W Social service at [facility]. Home  Hospice in place.  Total d/c care time is 45 minutes.

## 2022-07-01 LAB
CULTURE RESULTS: SIGNIFICANT CHANGE UP
CULTURE RESULTS: SIGNIFICANT CHANGE UP
SPECIMEN SOURCE: SIGNIFICANT CHANGE UP
SPECIMEN SOURCE: SIGNIFICANT CHANGE UP

## 2022-11-04 NOTE — PROGRESS NOTE ADULT - SUBJECTIVE AND OBJECTIVE BOX
SUBJECTIVE:  88 y/o M seen and examined at bedside. Last night's events noted.  Patient c/o b/l LE pain. He states his RUE is much better. Patient denies any fevers, chills, chest pain, shortness of breath, nausea, vomiting or diarrhea.    ICU Vital Signs Last 24 Hrs  T(C): 36.4 (27 Jun 2020 07:25), Max: 36.4 (26 Jun 2020 17:10)  T(F): 97.5 (27 Jun 2020 07:25), Max: 97.5 (26 Jun 2020 17:10)  HR: 80 (27 Jun 2020 07:25) (78 - 92)  BP: 112/73 (27 Jun 2020 07:25) (107/69 - 114/68)  BP(mean): --  ABP: --  ABP(mean): --  RR: 18 (27 Jun 2020 07:25) (18 - 21)  SpO2: 98% (27 Jun 2020 07:25) (94% - 98%)    PHYSICAL EXAM:  GENERAL: NAD, OOB in chair with arm elevated on pillow  HEAD:  Atraumatic, Normocephalic  EXT: B/L LE dressed with ACE wraps. RLE_- 2+ PT and DP pulses dopplerable. LLE- Dopplerable PT pulse. RUE with improving edema.    NEUROLOGY: A&O x 3    I&O's Summary    25 Jun 2020 07:01  -  26 Jun 2020 07:00  --------------------------------------------------------  IN: 500 mL / OUT: 1275 mL / NET: -775 mL    MEDICATIONS  (STANDING):  cefTRIAXone   IVPB      cefTRIAXone   IVPB 1000 milliGRAM(s) IV Intermittent every 24 hours  furosemide   Injectable 20 milliGRAM(s) IV Push daily  gentamicin 0.3% Ophthalmic Solution 1 Drop(s) Both EYES five times a day  metoprolol tartrate 25 milliGRAM(s) Oral two times a day    MEDICATIONS  (PRN):  acetaminophen   Tablet .. 650 milliGRAM(s) Oral every 6 hours PRN Temp greater or equal to 38C (100.4F), Mild Pain (1 - 3)    LABS:                        12.3   8.09  )-----------( 249      ( 26 Jun 2020 06:47 )             36.7     06-26    134<L>  |  97  |  16  ----------------------------<  91  3.5   |  29  |  0.70    Ca    8.7      26 Jun 2020 06:47      PT/INR - ( 26 Jun 2020 06:47 )   PT: 18.6 sec;   INR: 1.64 ratio         PTT - ( 26 Jun 2020 06:47 )  PTT:31.0 sec      ASSESSMENT  88 y/o M with PVD with worsening bilateral LE peripheral edema, venous ulcerations, moderate PAD/ stenosis in right SFA and LLE, refused angio 6/25/20 after infiltrated IV    PLAN  - Continue care as per primary team  - pain control, supportive care, OOB  - Regular diet   - Refusing lovenox - continue AC with coumadin and monitor INR  - local wound care  - elevation of RUE  - Pt to undergo LE Angiogram on Monday with Dr. Arzola     Surgical Team Contact Information  Spectralink: Ext: [phone] or [phone]  Pager: [phone] none

## 2023-09-06 NOTE — ED PROVIDER NOTE - RESPIRATORY NEGATIVE STATEMENT, MLM
no chest pain, ++ productive cough, and ++ shortness of breath. h/o lung CA s/p resection Deep Sutures: 4-0 Vicryl

## 2023-12-14 NOTE — ED ADULT NURSE NOTE - PRIMARY CARE PROVIDER
Bill For Surgical Tray: no
Performing Laboratory: -165
Clinical Notes (To The Lab): Standing Order every month for the next 8 months
Billing Type: Third-Party Bill
Expected Date Of Service: 12/14/2023
Dr. Ahmadi

## 2025-02-26 NOTE — ED PROVIDER NOTE - PROGRESS NOTE DETAILS
CHF, elevated BNP, INR. Plan - Transfer to [facility] for admission there to Dr. Merrick Wylie who covers Juan Miguel Ahmadi. Dr Andreina vargas. Fall Risk

## 2025-05-30 NOTE — PROGRESS NOTE ADULT - PROBLEM SELECTOR PROBLEM 4
- decreased the dose from 20- 10 mg  - BP has been stable at home  -   BP Readings from Last 3 Encounters:   05/30/25 116/78   02/28/25 130/78   08/12/24 114/79         Orders:    lisinopril (PRINIVIL;ZESTRIL) 10 MG tablet; Take 1 tablet by mouth daily     Hyponatremia